# Patient Record
Sex: FEMALE | Race: BLACK OR AFRICAN AMERICAN | HISPANIC OR LATINO
[De-identification: names, ages, dates, MRNs, and addresses within clinical notes are randomized per-mention and may not be internally consistent; named-entity substitution may affect disease eponyms.]

---

## 2021-04-20 ENCOUNTER — APPOINTMENT (OUTPATIENT)
Dept: SURGICAL ONCOLOGY | Facility: CLINIC | Age: 56
End: 2021-04-20
Payer: MEDICAID

## 2021-04-20 ENCOUNTER — RESULT REVIEW (OUTPATIENT)
Age: 56
End: 2021-04-20

## 2021-04-20 PROCEDURE — 99072 ADDL SUPL MATRL&STAF TM PHE: CPT

## 2021-04-20 PROCEDURE — 19083 BX BREAST 1ST LESION US IMAG: CPT | Mod: LT

## 2021-04-20 PROCEDURE — 99204 OFFICE O/P NEW MOD 45 MIN: CPT | Mod: 25

## 2021-04-28 NOTE — REASON FOR VISIT
[Initial Consultation] : an initial consultation for [Follow-Up Visit] : a follow-up visit for [Breast Cancer] : breast cancer

## 2021-04-29 ENCOUNTER — NON-APPOINTMENT (OUTPATIENT)
Age: 56
End: 2021-04-29

## 2021-04-29 ENCOUNTER — APPOINTMENT (OUTPATIENT)
Dept: HEMATOLOGY ONCOLOGY | Facility: CLINIC | Age: 56
End: 2021-04-29
Payer: MEDICAID

## 2021-04-29 ENCOUNTER — APPOINTMENT (OUTPATIENT)
Dept: MULTI SPECIALTY CLINIC | Facility: CLINIC | Age: 56
End: 2021-04-29
Payer: MEDICAID

## 2021-04-29 ENCOUNTER — OUTPATIENT (OUTPATIENT)
Dept: OUTPATIENT SERVICES | Facility: HOSPITAL | Age: 56
LOS: 1 days | Discharge: HOME | End: 2021-04-29

## 2021-04-29 VITALS
HEIGHT: 66 IN | BODY MASS INDEX: 20.89 KG/M2 | OXYGEN SATURATION: 99 % | WEIGHT: 130 LBS | SYSTOLIC BLOOD PRESSURE: 160 MMHG | RESPIRATION RATE: 18 BRPM | DIASTOLIC BLOOD PRESSURE: 85 MMHG | HEART RATE: 97 BPM

## 2021-04-29 DIAGNOSIS — C96.9 MALIGNANT NEOPLASM OF LYMPHOID, HEMATOPOIETIC AND RELATED TISSUE, UNSPECIFIED: ICD-10-CM

## 2021-04-29 DIAGNOSIS — Z78.9 OTHER SPECIFIED HEALTH STATUS: ICD-10-CM

## 2021-04-29 PROCEDURE — 99214 OFFICE O/P EST MOD 30 MIN: CPT

## 2021-04-29 PROCEDURE — 99072 ADDL SUPL MATRL&STAF TM PHE: CPT

## 2021-04-29 PROCEDURE — 99203 OFFICE O/P NEW LOW 30 MIN: CPT | Mod: 25

## 2021-04-29 PROCEDURE — 99205 OFFICE O/P NEW HI 60 MIN: CPT

## 2021-04-29 NOTE — ASSESSMENT
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 \par \par PLAN: \par PET Skull to Thigh \par Medical oncology evaluation for hormonal therapy\par Rad oncology evaluation for radiation or possible surgery for the lumbar spine\par RTO 3 months

## 2021-04-29 NOTE — ASSESSMENT
[FreeTextEntry1] : IMP:\par \par 57 y/o with newly diagnosed invasive moderately differentiated ductal carcinoma, ER+/OH+/HER-2 negative with metastases to the axilla and lumbar spine. \par \par PLAN: \par PET Skull to Thigh \par Medical oncology evaluation for hormonal therapy\par Rad oncology evaluation for radiation or possible surgery for the lumbar spine\par RTO 3 months

## 2021-04-29 NOTE — PHYSICAL EXAM
[Normal] : supple, no neck mass and thyroid not enlarged [Normal Neck Lymph Nodes] : normal neck lymph nodes  [Normal Supraclavicular Lymph Nodes] : normal supraclavicular lymph nodes [Normal] : oriented to person, place and time, with appropriate affect [de-identified] : 5 cm mass medial left breast and left axillary adenopathy ( not fixed ) [de-identified] : mobile left axillary adenopathy

## 2021-04-29 NOTE — HISTORY OF PRESENT ILLNESS
[de-identified] : Ms. ROGELIO PEARSON is a 56 year old female who presents today for follow up for newly diagnoses metastatic left breast cancer.\par \par Bilateral mammo/sono 4/7/21: left breast mass at the site a palpable abnormality, enlarged left axillary lymph node, right breast unremarkable. BI-RADS 4. \par \par Ultrasound Guided core biopsy of left breast x 2 sites 4/20/21:\par 1. Breast, left, 9-11 o?clock, core biopsy: Invasive moderately differentiated ductal carcinoma; Geovanny score 6 / 9 (3 + 2 +1) in this limited material; Invasive tumor measures at least 1.8cm; Ductal carcinoma in situ not seen; Microcalcifications present; Lymphovascular permeation by tumor not seen. --ER+/[place]+/HER-2 equivocal ([facility] study in progress)\par \par \par 2. Left axila, core biopsy: metastatic ductal carcinoma to lymph node.\par \par -ER+/[place]+/HER-2 equivocal ([facility] study in progress)\par \par MRI Lumbar Spine 4/22/21: Metastatic disease to the lower thoracic spine, lumbar spine and upper sacrum with severe pathologic fracture of L2 with retropulsion of the posterior endplate causing moderate canal stenosis. No discogenic disease. Please correlate with any history of known malignancy. The patient is awaiting results of her left breast biopsy studies findings may represent metastatic breast cancer.\par \par She states she has noticed a palpable left breast mass for the past few months. She states she has had lower back pain  that radiates down her leg for approximately 3 months.  She has difficulty walking and bending over. \par \par She states she has not had a routine physical in few years and is unaware of any significant medical history. She denies a personal history of cancer, her family history of cancer includes her mother with cervical cancer. She is scheduled to have a physical on 5/5/21 and [facility]

## 2021-04-29 NOTE — PHYSICAL EXAM
[Normal] : supple, no neck mass and thyroid not enlarged [Normal Neck Lymph Nodes] : normal neck lymph nodes  [Normal Supraclavicular Lymph Nodes] : normal supraclavicular lymph nodes [Normal] : oriented to person, place and time, with appropriate affect [de-identified] : 5 cm mass medial left breast and left axillary adenopathy ( not fixed ) [de-identified] : mobile left axillary adenopathy

## 2021-04-29 NOTE — HISTORY OF PRESENT ILLNESS
[de-identified] : Ms. ROGELIO PEARSON is a 56 year old female who presents today for follow up for newly diagnoses metastatic left breast cancer.\par \par Bilateral mammo/sono 4/7/21: left breast mass at the site a palpable abnormality, enlarged left axillary lymph node, right breast unremarkable. BI-RADS 4. \par \par Ultrasound Guided core biopsy of left breast x 2 sites 4/20/21:\par 1. Breast, left, 9-11 o?clock, core biopsy: Invasive moderately differentiated ductal carcinoma; Geovanny score 6 / 9 (3 + 2 +1) in this limited material; Invasive tumor measures at least 1.8cm; Ductal carcinoma in situ not seen; Microcalcifications present; Lymphovascular permeation by tumor not seen. --ER+/[place]+/HER-2 equivocal ([facility] study in progress)\par \par \par 2. Left axila, core biopsy: metastatic ductal carcinoma to lymph node.\par \par -ER+/[place]+/HER-2 equivocal ([facility] study in progress)\par \par MRI Lumbar Spine 4/22/21: Metastatic disease to the lower thoracic spine, lumbar spine and upper sacrum with severe pathologic fracture of L2 with retropulsion of the posterior endplate causing moderate canal stenosis. No discogenic disease. Please correlate with any history of known malignancy. The patient is awaiting results of her left breast biopsy studies findings may represent metastatic breast cancer.\par \par She states she has noticed a palpable left breast mass for the past few months. She states she has had lower back pain  that radiates down her leg for approximately 3 months.  She has difficulty walking and bending over. \par \par She states she has not had a routine physical in few years and is unaware of any significant medical history. She denies a personal history of cancer, her family history of cancer includes her mother with cervical cancer. She is scheduled to have a physical on 5/5/21 and see a gynecologist on 5/2/21 at [facility].

## 2021-05-02 PROBLEM — Z78.9 DOES NOT USE TOBACCO: Status: ACTIVE | Noted: 2021-05-02

## 2021-05-02 PROBLEM — Z78.9 DENIES ALCOHOL CONSUMPTION: Status: ACTIVE | Noted: 2021-05-02

## 2021-05-02 RX ORDER — ACETAMINOPHEN 325 MG/1
325 TABLET ORAL
Qty: 56 | Refills: 0 | Status: DISCONTINUED | COMMUNITY
Start: 2021-04-17

## 2021-05-02 NOTE — HISTORY OF PRESENT ILLNESS
[de-identified] : ROGELIO PEARSON  is a 56 year old female here for initial consultation for left breast cancer.\par \par Pt noticed a left breast mass x 2 months for which she did not seek medical attention, denied any pain, ulceration, bleeding or nipple changes/discharge.  She also c/o lower back pain for 2-3 months, associated with pain radiating down the lower extremities relieved with Tylenol prn.  Also reports intermittent upper back pain.   No fevers, night sweats, weight loss.  Reports fatigue and poor appetite.  She went to an urgent care in [place] for back pain and breast mass and was given a referral for mammo/US.  \par \par Mammogram and breast US on 4/7/2021 showed  5.6x3.2x5.3cm hypoechoic irregularly-shaped mass with angular margins 9-11:00 5 to 9cm from nipple; left axilla - enlarged 4.0x2.1x3.4cm lymph node, US biopsy rec, BIRADS4.\par \par On 4/20/2021, pt underwent an US guided left breast core biopsy which showed invasive moderately differentiated ductal carcinoma, Sacul score 6/9 (3+2+10), invasive tumor at least 1.8cm, no DCIS, no LVI, microcalcifications present, ER 90%, MO 2%, HER2 2+, CISH negative.  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  \par \par For the back pain, pt underwent an MRI lumbar spine on 4/22/2021 which showed metastatic disease in lower thoracic spine, lumbar spine and upper sacrum with severe pathologic fracture of L2 with retropulsion of the posterior endplate causing moderate canal stenosis. \par \par Risk factors:  No prior disease or biopsies. Menarche: age 12, Postmenopausal age 51; 2 pregnancies, 1 miscarriage, 1 living child daughter 19, 2 years of breastfeeding. No OCP or HRT.  Family history is significant for mother had cervical cancer age 64 and father with prostate cancer age 67.  No cancer of the breast, ovary, endometrium, and pancreatic cancer.  The patient is of [place] ethnic background.  No ETOH or tobacco.\par Screening: No prior colonoscopy, pap smear 2-3 years ago reportedly normal.\par Previous certified nursing assistant.

## 2021-05-02 NOTE — ASSESSMENT
[FreeTextEntry1] : ROGELIO PEARSON  is a 56 year old female here for initial consultation for left breast cancer, ER 90%, OK 2%, HER2 negative, likely with metastatic disease to the spine. \par \par -rec staging scans to assess the extent of disease; PET scan ordered by Dr. Rodríguez\par -after PET, will need biopsy of metastatic site to confirm disease\par -pt to get blood work including tumor markers\par -pt will see radiation oncology vs spine for pathologic fracture\par -back pain controlled currently with Tylenol prn\par -treatment will likely include CDK 4/6 inhibitors + AI\par -bisphosphates to prevent skeletal related events\par -patient was given the time to ask questions which I answered to the best of my ability and to her apparent satisfaction.  I encouraged her to call me with any additional questions. \par \par FU after results

## 2021-05-02 NOTE — PHYSICAL EXAM
[Restricted in physically strenuous activity but ambulatory and able to carry out work of a light or sedentary nature] : Status 1- Restricted in physically strenuous activity but ambulatory and able to carry out work of a light or sedentary nature, e.g., light house work, office work [Normal] : affect appropriate [de-identified] : left 9:00-10:00 breast mass 5x5cm reaching skin, no ulceration, left axillary LN enlarged; right breast no masses

## 2021-05-05 ENCOUNTER — NON-APPOINTMENT (OUTPATIENT)
Age: 56
End: 2021-05-05

## 2021-05-06 ENCOUNTER — APPOINTMENT (OUTPATIENT)
Dept: NUCLEAR MEDICINE | Facility: IMAGING CENTER | Age: 56
End: 2021-05-06
Payer: MEDICAID

## 2021-05-06 ENCOUNTER — OUTPATIENT (OUTPATIENT)
Dept: OUTPATIENT SERVICES | Facility: HOSPITAL | Age: 56
LOS: 1 days | End: 2021-05-06
Payer: MEDICAID

## 2021-05-06 ENCOUNTER — NON-APPOINTMENT (OUTPATIENT)
Age: 56
End: 2021-05-06

## 2021-05-06 DIAGNOSIS — C79.9 SECONDARY MALIGNANT NEOPLASM OF UNSPECIFIED SITE: ICD-10-CM

## 2021-05-06 DIAGNOSIS — C50.919 MALIGNANT NEOPLASM OF UNSPECIFIED SITE OF UNSPECIFIED FEMALE BREAST: ICD-10-CM

## 2021-05-06 DIAGNOSIS — M84.48XA PATHOLOGICAL FRACTURE, OTHER SITE, INITIAL ENCOUNTER FOR FRACTURE: ICD-10-CM

## 2021-05-06 PROCEDURE — A9552: CPT

## 2021-05-06 PROCEDURE — 78815 PET IMAGE W/CT SKULL-THIGH: CPT | Mod: 26,PI

## 2021-05-06 PROCEDURE — 78815 PET IMAGE W/CT SKULL-THIGH: CPT

## 2021-05-12 ENCOUNTER — NON-APPOINTMENT (OUTPATIENT)
Age: 56
End: 2021-05-12

## 2021-05-13 NOTE — ADDENDUM
[FreeTextEntry1] : 5/3/21 \par contacted Dr. Agrawal who advised me to give MsByron Rudy Lopez his phone number to call for an appointment. I called the patient and gave her the phone number and said that she would follow through. \par will continue to follow and monitor. \par LL

## 2021-05-13 NOTE — VITALS
[Least Pain Intensity: 4/10] : 4/10 [70: Cares for self; unalbe to carry on normal activity or do active work.] : 70: Cares for self; unable to carry on normal activity or do active work. [Maximal Pain Intensity: 8/10] : 8/10 [OTC] : OTC [NSAID/Non-Opioid] : NSAID/Non-Opioid

## 2021-05-13 NOTE — PHYSICAL EXAM
[Sclera] : the sclera and conjunctiva were normal [] : no respiratory distress [Heart Rate And Rhythm] : heart rate and rhythm were normal [Abdomen Soft] : soft [Nondistended] : nondistended [Normal] : oriented to person, place and time, the affect was normal, the mood was normal and not anxious [de-identified] : antalgic gait, no motor deficits, no sensory deficits

## 2021-05-13 NOTE — HISTORY OF PRESENT ILLNESS
[FreeTextEntry1] : Ms. Avelar is a 56 year-old woman with metastatic breast cancer. She presents for further recommendations. \par \par Her history began a few months ago, when she noted lower back pain and a palpable left breast mass. The lower back pain became progressively more severe, radiating to the hips, initially the right hip and now more towards the left hip. The pain radiates around the hips and towards the upper thigh. The lower back pain progressed in severity to be rated a 10 on a scale of 1-10. The pain is exacerbated by movement, including walking and bending at the hips. She does not report numbness of the lower extremities. She is taking Tylenol and Aleve with inadequate pain relief but she does not wish to take stronger medications at this time. \par \par Her work up included a diagnostic mammogram on 4/7/21 showing a focal asymmetry in the left breast measuring at least 4.3 cm at 11-12:00, 6 cm FN. Ultrasound showed a 5.6 x 3.2 x 5.3 cm hypoechoic irregular mass at 9-11:00, 5-9cm FN as well as a 4.0 x 2.1 x 3.4 cm left axillary lymph node. Left breast and axilla core biopsies on 4/20/21 showed invasive moderately differentiated ductal carcinoma, measuring at least 1.8 cm, without DCIS or LVI. Metastatic ductal carcinoma was seen in the lymph node. The tumor was ER 90%, AL 2% and HER 2 equivocal 2+, nonamplified by [facility]. \par \par MRI of the lumbar spine showed metastatic disease throughout the spine with a severe pathological fracture at L2 with retropulsion of posterior endplate causing moderate canal stenosis.

## 2021-05-18 ENCOUNTER — RESULT REVIEW (OUTPATIENT)
Age: 56
End: 2021-05-18

## 2021-05-18 ENCOUNTER — APPOINTMENT (OUTPATIENT)
Dept: HEMATOLOGY ONCOLOGY | Facility: CLINIC | Age: 56
End: 2021-05-18
Payer: MEDICAID

## 2021-05-18 ENCOUNTER — APPOINTMENT (OUTPATIENT)
Dept: SPINE | Facility: CLINIC | Age: 56
End: 2021-05-18
Payer: MEDICAID

## 2021-05-18 VITALS
TEMPERATURE: 98.1 F | SYSTOLIC BLOOD PRESSURE: 163 MMHG | DIASTOLIC BLOOD PRESSURE: 105 MMHG | OXYGEN SATURATION: 93 % | WEIGHT: 118 LBS | HEIGHT: 66 IN | HEART RATE: 92 BPM | BODY MASS INDEX: 18.96 KG/M2

## 2021-05-18 VITALS
HEIGHT: 65.98 IN | TEMPERATURE: 97 F | OXYGEN SATURATION: 95 % | SYSTOLIC BLOOD PRESSURE: 157 MMHG | RESPIRATION RATE: 18 BRPM | WEIGHT: 119.05 LBS | BODY MASS INDEX: 19.13 KG/M2 | DIASTOLIC BLOOD PRESSURE: 97 MMHG | HEART RATE: 94 BPM

## 2021-05-18 DIAGNOSIS — Z85.9 PERSONAL HISTORY OF MALIGNANT NEOPLASM, UNSPECIFIED: ICD-10-CM

## 2021-05-18 LAB
BASOPHILS # BLD AUTO: 0.07 K/UL — SIGNIFICANT CHANGE UP (ref 0–0.2)
BASOPHILS NFR BLD AUTO: 0.9 % — SIGNIFICANT CHANGE UP (ref 0–2)
EOSINOPHIL # BLD AUTO: 0.06 K/UL — SIGNIFICANT CHANGE UP (ref 0–0.5)
EOSINOPHIL NFR BLD AUTO: 0.8 % — SIGNIFICANT CHANGE UP (ref 0–6)
HCT VFR BLD CALC: 37.3 % — SIGNIFICANT CHANGE UP (ref 34.5–45)
HGB BLD-MCNC: 11.7 G/DL — SIGNIFICANT CHANGE UP (ref 11.5–15.5)
IMM GRANULOCYTES NFR BLD AUTO: 0.5 % — SIGNIFICANT CHANGE UP (ref 0–1.5)
LYMPHOCYTES # BLD AUTO: 1.77 K/UL — SIGNIFICANT CHANGE UP (ref 1–3.3)
LYMPHOCYTES # BLD AUTO: 23.8 % — SIGNIFICANT CHANGE UP (ref 13–44)
MCHC RBC-ENTMCNC: 26.2 PG — LOW (ref 27–34)
MCHC RBC-ENTMCNC: 31.4 G/DL — LOW (ref 32–36)
MCV RBC AUTO: 83.6 FL — SIGNIFICANT CHANGE UP (ref 80–100)
MONOCYTES # BLD AUTO: 0.74 K/UL — SIGNIFICANT CHANGE UP (ref 0–0.9)
MONOCYTES NFR BLD AUTO: 9.9 % — SIGNIFICANT CHANGE UP (ref 2–14)
NEUTROPHILS # BLD AUTO: 4.77 K/UL — SIGNIFICANT CHANGE UP (ref 1.8–7.4)
NEUTROPHILS NFR BLD AUTO: 64.1 % — SIGNIFICANT CHANGE UP (ref 43–77)
NRBC # BLD: 0 /100 WBCS — SIGNIFICANT CHANGE UP (ref 0–0)
PLATELET # BLD AUTO: 348 K/UL — SIGNIFICANT CHANGE UP (ref 150–400)
RBC # BLD: 4.46 M/UL — SIGNIFICANT CHANGE UP (ref 3.8–5.2)
RBC # FLD: 14.1 % — SIGNIFICANT CHANGE UP (ref 10.3–14.5)
WBC # BLD: 7.45 K/UL — SIGNIFICANT CHANGE UP (ref 3.8–10.5)
WBC # FLD AUTO: 7.45 K/UL — SIGNIFICANT CHANGE UP (ref 3.8–10.5)

## 2021-05-18 PROCEDURE — 99215 OFFICE O/P EST HI 40 MIN: CPT

## 2021-05-18 PROCEDURE — 99072 ADDL SUPL MATRL&STAF TM PHE: CPT

## 2021-05-18 PROCEDURE — 99203 OFFICE O/P NEW LOW 30 MIN: CPT

## 2021-05-19 ENCOUNTER — NON-APPOINTMENT (OUTPATIENT)
Age: 56
End: 2021-05-19

## 2021-05-19 ENCOUNTER — INPATIENT (INPATIENT)
Facility: HOSPITAL | Age: 56
LOS: 22 days | Discharge: NURSING, REHAB OR LONG-TERM CARE | End: 2021-06-11
Attending: HOSPITALIST | Admitting: HOSPITALIST
Payer: MEDICAID

## 2021-05-19 VITALS
SYSTOLIC BLOOD PRESSURE: 178 MMHG | TEMPERATURE: 98 F | HEART RATE: 97 BPM | DIASTOLIC BLOOD PRESSURE: 108 MMHG | OXYGEN SATURATION: 100 % | RESPIRATION RATE: 16 BRPM

## 2021-05-19 DIAGNOSIS — E83.52 HYPERCALCEMIA: ICD-10-CM

## 2021-05-19 PROBLEM — Z85.9 HISTORY OF MALIGNANT NEOPLASM: Status: RESOLVED | Noted: 2021-05-18 | Resolved: 2021-05-19

## 2021-05-19 LAB
25(OH)D3 SERPL-MCNC: 30.5 NG/ML
ALBUMIN SERPL ELPH-MCNC: 4.1 G/DL — SIGNIFICANT CHANGE UP (ref 3.3–5)
ALBUMIN SERPL ELPH-MCNC: 4.5 G/DL
ALP BLD-CCNC: 211 U/L
ALP SERPL-CCNC: 196 U/L — HIGH (ref 40–120)
ALT FLD-CCNC: 82 U/L — HIGH (ref 4–33)
ALT SERPL-CCNC: 100 U/L
ANION GAP SERPL CALC-SCNC: 16 MMOL/L — HIGH (ref 7–14)
ANION GAP SERPL CALC-SCNC: 18 MMOL/L
APTT BLD: 25.8 SEC
AST SERPL-CCNC: 115 U/L
AST SERPL-CCNC: 90 U/L — HIGH (ref 4–32)
BASOPHILS # BLD AUTO: 0.07 K/UL — SIGNIFICANT CHANGE UP (ref 0–0.2)
BASOPHILS NFR BLD AUTO: 0.9 % — SIGNIFICANT CHANGE UP (ref 0–2)
BILIRUB SERPL-MCNC: 0.2 MG/DL
BILIRUB SERPL-MCNC: 0.3 MG/DL — SIGNIFICANT CHANGE UP (ref 0.2–1.2)
BLOOD GAS VENOUS COMPREHENSIVE RESULT: SIGNIFICANT CHANGE UP
BUN SERPL-MCNC: 13 MG/DL
BUN SERPL-MCNC: 15 MG/DL — SIGNIFICANT CHANGE UP (ref 7–23)
CALCIUM SERPL-MCNC: 13.5 MG/DL
CALCIUM SERPL-MCNC: 15 MG/DL — CRITICAL HIGH (ref 8.4–10.5)
CANCER AG27-29 SERPL-ACNC: 84.3 U/ML
CEA SERPL-MCNC: 47.7 NG/ML
CHLORIDE SERPL-SCNC: 95 MMOL/L
CHLORIDE SERPL-SCNC: 95 MMOL/L — LOW (ref 98–107)
CO2 SERPL-SCNC: 28 MMOL/L
CO2 SERPL-SCNC: 29 MMOL/L — SIGNIFICANT CHANGE UP (ref 22–31)
CREAT SERPL-MCNC: 0.73 MG/DL — SIGNIFICANT CHANGE UP (ref 0.5–1.3)
CREAT SERPL-MCNC: 0.81 MG/DL
EOSINOPHIL # BLD AUTO: 0.05 K/UL — SIGNIFICANT CHANGE UP (ref 0–0.5)
EOSINOPHIL NFR BLD AUTO: 0.6 % — SIGNIFICANT CHANGE UP (ref 0–6)
GLUCOSE SERPL-MCNC: 90 MG/DL — SIGNIFICANT CHANGE UP (ref 70–99)
GLUCOSE SERPL-MCNC: 91 MG/DL
HCT VFR BLD CALC: 38.1 % — SIGNIFICANT CHANGE UP (ref 34.5–45)
HGB BLD-MCNC: 11.8 G/DL — SIGNIFICANT CHANGE UP (ref 11.5–15.5)
IANC: 4.91 K/UL — SIGNIFICANT CHANGE UP (ref 1.5–8.5)
IMM GRANULOCYTES NFR BLD AUTO: 0.5 % — SIGNIFICANT CHANGE UP (ref 0–1.5)
INR PPP: 1.04 RATIO
LYMPHOCYTES # BLD AUTO: 1.95 K/UL — SIGNIFICANT CHANGE UP (ref 1–3.3)
LYMPHOCYTES # BLD AUTO: 24.6 % — SIGNIFICANT CHANGE UP (ref 13–44)
MAGNESIUM SERPL-MCNC: 1.6 MG/DL — SIGNIFICANT CHANGE UP (ref 1.6–2.6)
MCHC RBC-ENTMCNC: 25.7 PG — LOW (ref 27–34)
MCHC RBC-ENTMCNC: 31 GM/DL — LOW (ref 32–36)
MCV RBC AUTO: 82.8 FL — SIGNIFICANT CHANGE UP (ref 80–100)
MONOCYTES # BLD AUTO: 0.91 K/UL — HIGH (ref 0–0.9)
MONOCYTES NFR BLD AUTO: 11.5 % — SIGNIFICANT CHANGE UP (ref 2–14)
NEUTROPHILS # BLD AUTO: 4.91 K/UL — SIGNIFICANT CHANGE UP (ref 1.8–7.4)
NEUTROPHILS NFR BLD AUTO: 61.9 % — SIGNIFICANT CHANGE UP (ref 43–77)
NRBC # BLD: 0 /100 WBCS — SIGNIFICANT CHANGE UP
NRBC # FLD: 0 K/UL — SIGNIFICANT CHANGE UP
PHOSPHATE SERPL-MCNC: 2.4 MG/DL — LOW (ref 2.5–4.5)
PLATELET # BLD AUTO: 335 K/UL — SIGNIFICANT CHANGE UP (ref 150–400)
POTASSIUM SERPL-MCNC: 3.2 MMOL/L — LOW (ref 3.5–5.3)
POTASSIUM SERPL-SCNC: 2.6 MMOL/L
POTASSIUM SERPL-SCNC: 3.2 MMOL/L — LOW (ref 3.5–5.3)
PROT SERPL-MCNC: 8.1 G/DL
PROT SERPL-MCNC: 8.4 G/DL — HIGH (ref 6–8.3)
PT BLD: 12.3 SEC
RBC # BLD: 4.6 M/UL — SIGNIFICANT CHANGE UP (ref 3.8–5.2)
RBC # FLD: 13.9 % — SIGNIFICANT CHANGE UP (ref 10.3–14.5)
SARS-COV-2 RNA SPEC QL NAA+PROBE: SIGNIFICANT CHANGE UP
SODIUM SERPL-SCNC: 140 MMOL/L
SODIUM SERPL-SCNC: 140 MMOL/L — SIGNIFICANT CHANGE UP (ref 135–145)
WBC # BLD: 7.93 K/UL — SIGNIFICANT CHANGE UP (ref 3.8–10.5)
WBC # FLD AUTO: 7.93 K/UL — SIGNIFICANT CHANGE UP (ref 3.8–10.5)

## 2021-05-19 PROCEDURE — 99285 EMERGENCY DEPT VISIT HI MDM: CPT

## 2021-05-19 PROCEDURE — 99223 1ST HOSP IP/OBS HIGH 75: CPT

## 2021-05-19 PROCEDURE — 71045 X-RAY EXAM CHEST 1 VIEW: CPT | Mod: 26

## 2021-05-19 RX ORDER — OXYCODONE 5 MG/1
5 TABLET ORAL
Qty: 56 | Refills: 0 | Status: DISCONTINUED | COMMUNITY
Start: 2021-05-18 | End: 2021-05-19

## 2021-05-19 RX ORDER — SODIUM CHLORIDE 9 MG/ML
1000 INJECTION INTRAMUSCULAR; INTRAVENOUS; SUBCUTANEOUS
Refills: 0 | Status: DISCONTINUED | OUTPATIENT
Start: 2021-05-19 | End: 2021-05-19

## 2021-05-19 RX ORDER — ONDANSETRON 8 MG/1
4 TABLET, FILM COATED ORAL ONCE
Refills: 0 | Status: COMPLETED | OUTPATIENT
Start: 2021-05-19 | End: 2021-05-19

## 2021-05-19 RX ORDER — SODIUM CHLORIDE 9 MG/ML
1000 INJECTION, SOLUTION INTRAVENOUS ONCE
Refills: 0 | Status: COMPLETED | OUTPATIENT
Start: 2021-05-19 | End: 2021-05-19

## 2021-05-19 RX ORDER — POTASSIUM CHLORIDE 20 MEQ
40 PACKET (EA) ORAL ONCE
Refills: 0 | Status: COMPLETED | OUTPATIENT
Start: 2021-05-19 | End: 2021-05-19

## 2021-05-19 RX ORDER — ACETAMINOPHEN 500 MG
650 TABLET ORAL EVERY 6 HOURS
Refills: 0 | Status: DISCONTINUED | OUTPATIENT
Start: 2021-05-19 | End: 2021-05-27

## 2021-05-19 RX ORDER — SODIUM CHLORIDE 9 MG/ML
1000 INJECTION INTRAMUSCULAR; INTRAVENOUS; SUBCUTANEOUS
Refills: 0 | Status: DISCONTINUED | OUTPATIENT
Start: 2021-05-19 | End: 2021-05-20

## 2021-05-19 RX ORDER — CALCITONIN SALMON 200 [IU]/ML
220 INJECTION, SOLUTION INTRAMUSCULAR EVERY 12 HOURS
Refills: 0 | Status: COMPLETED | OUTPATIENT
Start: 2021-05-19 | End: 2021-05-20

## 2021-05-19 RX ORDER — OXYCODONE HYDROCHLORIDE 5 MG/1
5 TABLET ORAL ONCE
Refills: 0 | Status: DISCONTINUED | OUTPATIENT
Start: 2021-05-19 | End: 2021-05-19

## 2021-05-19 RX ORDER — OXYCODONE HYDROCHLORIDE 5 MG/1
5 TABLET ORAL EVERY 6 HOURS
Refills: 0 | Status: DISCONTINUED | OUTPATIENT
Start: 2021-05-19 | End: 2021-05-20

## 2021-05-19 RX ORDER — POLYETHYLENE GLYCOL 3350 17 G/17G
17 POWDER, FOR SOLUTION ORAL DAILY
Refills: 0 | Status: DISCONTINUED | OUTPATIENT
Start: 2021-05-19 | End: 2021-06-10

## 2021-05-19 RX ORDER — OXYCODONE HYDROCHLORIDE 5 MG/1
10 TABLET ORAL EVERY 6 HOURS
Refills: 0 | Status: DISCONTINUED | OUTPATIENT
Start: 2021-05-19 | End: 2021-05-20

## 2021-05-19 RX ORDER — ZOLEDRONIC ACID 5 MG/100ML
4 INJECTION, SOLUTION INTRAVENOUS ONCE
Refills: 0 | Status: COMPLETED | OUTPATIENT
Start: 2021-05-19 | End: 2021-05-19

## 2021-05-19 RX ORDER — LETROZOLE TABLETS 2.5 MG/1
2.5 TABLET, FILM COATED ORAL
Qty: 90 | Refills: 1 | Status: DISCONTINUED | COMMUNITY
Start: 2021-05-18 | End: 2021-05-19

## 2021-05-19 RX ORDER — POTASSIUM CHLORIDE 20 MEQ
10 PACKET (EA) ORAL ONCE
Refills: 0 | Status: COMPLETED | OUTPATIENT
Start: 2021-05-19 | End: 2021-05-19

## 2021-05-19 RX ORDER — SENNA PLUS 8.6 MG/1
2 TABLET ORAL AT BEDTIME
Refills: 0 | Status: DISCONTINUED | OUTPATIENT
Start: 2021-05-19 | End: 2021-06-11

## 2021-05-19 RX ADMIN — Medication 100 MILLIEQUIVALENT(S): at 15:18

## 2021-05-19 RX ADMIN — SODIUM CHLORIDE 100 MILLILITER(S): 9 INJECTION INTRAMUSCULAR; INTRAVENOUS; SUBCUTANEOUS at 22:38

## 2021-05-19 RX ADMIN — CALCITONIN SALMON 220 INTERNATIONAL UNIT(S): 200 INJECTION, SOLUTION INTRAMUSCULAR at 22:37

## 2021-05-19 RX ADMIN — SODIUM CHLORIDE 100 MILLILITER(S): 9 INJECTION INTRAMUSCULAR; INTRAVENOUS; SUBCUTANEOUS at 19:02

## 2021-05-19 RX ADMIN — Medication 40 MILLIEQUIVALENT(S): at 15:18

## 2021-05-19 RX ADMIN — SODIUM CHLORIDE 1000 MILLILITER(S): 9 INJECTION, SOLUTION INTRAVENOUS at 17:24

## 2021-05-19 RX ADMIN — SENNA PLUS 2 TABLET(S): 8.6 TABLET ORAL at 22:38

## 2021-05-19 RX ADMIN — OXYCODONE HYDROCHLORIDE 5 MILLIGRAM(S): 5 TABLET ORAL at 13:44

## 2021-05-19 RX ADMIN — ONDANSETRON 4 MILLIGRAM(S): 8 TABLET, FILM COATED ORAL at 23:53

## 2021-05-19 RX ADMIN — OXYCODONE HYDROCHLORIDE 5 MILLIGRAM(S): 5 TABLET ORAL at 17:35

## 2021-05-19 RX ADMIN — ZOLEDRONIC ACID 400 MILLIGRAM(S): 5 INJECTION, SOLUTION INTRAVENOUS at 19:58

## 2021-05-19 RX ADMIN — SODIUM CHLORIDE 1000 MILLILITER(S): 9 INJECTION, SOLUTION INTRAVENOUS at 13:43

## 2021-05-19 NOTE — PATIENT PROFILE ADULT - NSPROPTRIGHTBILLOFRIGHTS_GEN_A_NUR
PT HAS AB APT NEXT SHE HAS TO DEPEND ON GETTING A RIDE WITH HER CHILDREN AND THEY ARE ALL WORKING--NEEDS TO RESCHEDULE BUT WILL BE OUT OF HER LORAZEPAM--CAN SHE HAVE A  REFILL ??   patient

## 2021-05-19 NOTE — H&P ADULT - PROBLEM SELECTOR PLAN 4
MRI lumbar spine showing metastatic disease in the lower thoracic, lumbar, and upper sacral spine with severe pathologic fracture of L2 with retropulsion of the posterior endplate causing moderate canal stenosis. No alarm S/S.  - Pain control with Tylenol PRN for mild pain, oxycodone 5 mg q6hrs PRN for moderate pain, and oxycodone 10 mg q6hrs PRN for severe pain  - Rad-Onc consult to be called in AM for possible palliative RT to spine  - Pt will need to be fitted for TLSO brace   - Pt seen by neurosurgery as outpatient for severe pathologic fracture of L2 and deemed not a surgical candidate

## 2021-05-19 NOTE — H&P ADULT - PROBLEM SELECTOR PLAN 5
AST 90 and ALT 82 on admission. T bili wnl at 0.3, alk phos mildly elevated to 196 from mets to spine.  - AST and ALT elevated, but specimen moderately hemolyzed  - Monitor LFTs. If transaminitis worsens, will obtain viral hepatitis panel, abdominal imaging, and/or Hepatology consult.

## 2021-05-19 NOTE — REVIEW OF SYSTEMS
[Feeling Poorly] : feeling poorly [Poor Coordination] : poor coordination [Numbness] : numbness [Tingling] : tingling [Abnormal Sensation] : an abnormal sensation [Difficulty Walking] : difficulty walking [Arthralgias] : arthralgias [Negative] : Heme/Lymph

## 2021-05-19 NOTE — H&P ADULT - NSHPLABSRESULTS_GEN_ALL_CORE
EKG personally reviewed. EKG personally reviewed.  Sinus tach at 101 bpm, no acute ischemic changes, QTc 414 ms.    Imaging personally reviewed.  MRI lumbar spine w/wo contrast (4/22/21):  Metastatic disease in the lower thoracic, lumbar, and upper sacral spine with severe pathologic fracture of L2 with retropulsion of the posterior endplate causing moderate canal stenosis.

## 2021-05-19 NOTE — ED ADULT TRIAGE NOTE - CHIEF COMPLAINT QUOTE
Patient had lab work yesterday , called today to go to the ED for high calcium, low K . Newly diagnosed with left breast ca. H/o lower back pain x months , states she has a fracture.

## 2021-05-19 NOTE — ED PROVIDER NOTE - OBJECTIVE STATEMENT
56 year old female with history of metastatic L breast cancer on hormone therapy and pathological L2 compression fx presenting for abnormal labs, sent by Dr. Dowd/Julio. States that she had bloodwork at [facility] yesterday and told to present to ED for elevated calcium/low potassium. Reports having low back pain for a few months recent work up resulting in discovery of breast CA, currently seeing multiple providers for tx options including sx for L2 fx with Dr. Agrawal and Dr. Rodríguez ?rad onc. Endorses fatigue, difficulty ambulating due to pain in R hip/leg, and constipation. Denies fever/chills, saddle anethesia, urinary/fecal incontinence, focal weakness, chest pain, SOB, N/V, bleeding. 56 year old female with history of metastatic L breast cancer on hormone therapy and pathological L2 compression fx presenting for abnormal labs, sent by Dr. Dowd/Julio. States that she had bloodwork at [facility] yesterday and told to present to ED for elevated calcium/low potassium. Reports having low back pain for a few months recent work up resulting in discovery of breast CA, currently seeing multiple providers for tx options including sx for L2 fx with Dr. Agrawal and Dr. Rodríguez ?rad onc. Endorses fatigue, difficulty ambulating due to pain in R hip/leg, and constipation. Denies fever/chills, saddle anethesia, urinary/fecal incontinence, focal weakness, chest pain, SOB, N/V, bleeding.  Attending - Agree with above.  I evaluated patient myself. 55 y/o F with recent diagnosis of metastatic Left breast CA and L2 compression fx.  Labs sent at [facility] yesterday.  Patient called today and advised to come to ED for hypercalcemia and hypokalemia.  patient denies CP/SOB.  No n/v/d.  Still having LBP.  Generalized weakness/tiredness, but denies difficulty walking or any focal weakness.  No tremor or seizure activity per patient.

## 2021-05-19 NOTE — CONSULT NOTE ADULT - ASSESSMENT
56f with metastatic breast ca, Hormone receptor positive, not started on treatment as of yet, referred to the ED from [facility] with hypercalcemia, hypokalemia and pain.    Admit to medicine  Hypercalcemia management, will make further recs when CMP comes back  Metastatic site bone biopsy of posterior iliac bone to confirm metastases by IR  Further eval of elevated liver enzymes, PET scan did not show any liver disease, hepatology consult  Rad onc consult for palliative RT to spine for pain and TLSO brace.  Neurosurgery saw her outpt and did not rec surgery.   56f with metastatic breast ca, Hormone receptor positive, not started on treatment as of yet, referred to the ED from [facility] with hypercalcemia, hypokalemia and pain.    Admit to medicine  Hypercalcemia management, continuous IVF at 75-100cc/h, Calcitonin  Zometa 4mg x 1  Monitor Ca level   Metastatic site bone biopsy of posterior iliac bone to confirm metastases by IR  Further eval of elevated liver enzymes, PET scan did not show any liver disease, hepatology consult  Rad onc consult for palliative RT to spine for pain and TLSO brace.  Neurosurgery has seen pt as outpt and did not rec surgery  Supportive care, pain control, Nutrition, PT, DVT ppx  Outpatient oncology f/u    Will follow. Please do not hesitate to call back with questions.     Abril Ramirez MD  Medical Oncology Attending  C: [phone]

## 2021-05-19 NOTE — H&P ADULT - NSHPSOCIALHISTORY_GEN_ALL_CORE
Tobacco: denies  EtOH: denies  Illicit drugs: denies  Lives with Denies any tobacco, alcohol, or illicit drug use.  Lives with Denies any tobacco, alcohol, or illicit drug use.  Lives with daughter.

## 2021-05-19 NOTE — H&P ADULT - PROBLEM SELECTOR PLAN 7
- DVT ppx: Will hold pharmacologic ppx for now given possible bone biopsy by IR. Otherwise, start Lovenox 40 mg daily.  - Diet: Regular, NPO after MN

## 2021-05-19 NOTE — H&P ADULT - NSICDXPASTMEDICALHX_GEN_ALL_CORE_FT
PAST MEDICAL HISTORY:  Breast cancer      PAST MEDICAL HISTORY:  Breast cancer     Lumbar compression fracture

## 2021-05-19 NOTE — H&P ADULT - PROBLEM SELECTOR PLAN 2
Serum K 3.2 on admission, moderately hemolyzed. Possibly in setting of hypercalcemia.  - S/p PO potassium 40 mEq x1 and IV potassium 10 mEq x1 in ED  - Serum Mg 1.6. Will give PO magnesium 400 mg tid x1 day.  - Monitor serum K and Mg  - EKG without any acute changes. Monitor on tele.

## 2021-05-19 NOTE — H&P ADULT - PROBLEM SELECTOR PLAN 6
- DVT ppx: Will hold pharmacologic ppx for now given possible bone biopsy by IR. Otherwise, start Lovenox 40 mg daily.  - Diet: Regular, NPO after MN Pt tachycardic to 110s overnight. Per outpatient records, pt with documented heart rates in 90s. EKG showing sinus tachycardia.   - Check TSH  - F/u TTE  - Low suspicion for PE at this time given that pt is not tachypneic and is maintaining O2 sats in high 90s-100% on RA  - Monitor on tele

## 2021-05-19 NOTE — H&P ADULT - NSICDXFAMILYHX_GEN_ALL_CORE_FT
FAMILY HISTORY:  Father  Still living? Unknown  Family history of prostate cancer, Age at diagnosis: Age Unknown    Mother  Still living? Unknown  Family history of cervical cancer, Age at diagnosis: Age Unknown

## 2021-05-19 NOTE — ED PROVIDER NOTE - PHYSICAL EXAMINATION
ATTENDING PHYSICAL EXAM  GEN - NAD; well appearing; A+O x3  HEAD - NC/AT; EYES/NOSE - PERRL, EOMI, mucous membranes moist, no discharge; THROAT: Oral cavity and pharynx normal. No inflammation, swelling, exudate, or lesions  NECK: Neck supple, non-tender without lymphadenopathy, no masses, no JVD  PULMONARY - CTA b/l, symmetric breath sounds, no w/r/r  CARDIAC -Noted mass medial aspect of left breast, s1s2, RRR, no M,R,G  ABDOMEN - +NABS, ND, NT, soft, no guarding, no rebound, no masses   BACK - + mid-lumbar TTP  EXTREMITIES - symmetric pulses, 2+ dp, capillary refill < 2 seconds, no clubbing, no cyanosis, no edema  SKIN - no rash or bruising   NEUROLOGIC - alert, CN 2-12 intact, no focal deficits.

## 2021-05-19 NOTE — CONSULT NOTE ADULT - SUBJECTIVE AND OBJECTIVE BOX
Patient is a 56y old  Female who presents with a chief complaint of     HPI:  56f, recently diagnosed with metastatic ER/OR+, HER2- breast cancer, with mets to bone, lung, LN referred to [facility] ED from [facility] with electrolyte abnormalities and pain.     ROS: as above     PAST MEDICAL & SURGICAL HISTORY:  Breast CA        MEDICATIONS  (STANDING):  lactated ringers Bolus 1000 milliLiter(s) IV Bolus once    MEDICATIONS  (PRN):      Allergies    Allergy Status Unknown    Intolerances        Vital Signs Last 24 Hrs  T(C): 36.8 (19 May 2021 11:22), Max: 36.8 (19 May 2021 11:22)  T(F): 98.2 (19 May 2021 11:22), Max: 98.2 (19 May 2021 11:22)  HR: 89 (19 May 2021 13:32) (89 - 97)  BP: 197/96 (19 May 2021 13:32) (178/108 - 197/96)  BP(mean): --  RR: 15 (19 May 2021 13:32) (15 - 16)  SpO2: 100% (19 May 2021 13:32) (100% - 100%)  LABS:                          11.8   7.93  )-----------( 335      ( 19 May 2021 14:05 )             38.1         Mean Cell Volume : 82.8 fL  Mean Cell Hemoglobin : 25.7 pg  Mean Cell Hemoglobin Concentration : 31.0 gm/dL  Auto Neutrophil # : 4.91 K/uL  Auto Lymphocyte # : 1.95 K/uL  Auto Monocyte # : 0.91 K/uL  Auto Eosinophil # : 0.05 K/uL  Auto Basophil # : 0.07 K/uL  Auto Neutrophil % : 61.9 %  Auto Lymphocyte % : 24.6 %  Auto Monocyte % : 11.5 %  Auto Eosinophil % : 0.6 %  Auto Basophil % : 0.9 %      Serial CBC's  05-19 @ 14:05  Hct-38.1 / Hgb-11.8 / Plat-335 / RBC-4.60 / WBC-7.93  Serial CBC's  05-18 @ 13:47  Hct-37.3 / Hgb-11.7 / Plat-348 / RBC-4.46 / WBC-7.45      05-19    140  |  95<L>  |  15  ----------------------------<  90  3.2<L>   |  29  |  0.73    Ca    15.0<HH>      19 May 2021 14:05  Phos  2.4     05-19  Mg     1.6     05-19    TPro  8.4<H>  /  Alb  4.1  /  TBili  0.3  /  DBili  x   /  AST  90<H>  /  ALT  82<H>  /  AlkPhos  196<H>  05-19                RADIOLOGY & ADDITIONAL STUDIES:    recent PET CT reviewed

## 2021-05-19 NOTE — H&P ADULT - PROBLEM SELECTOR PLAN 3
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  - Oncology consult obtained. Appreciate recs.  - C/w Letrozole 2.5 mg daily  - PET/CT scan on 5/6/21 showing lesion in the posterior left iliac bone. IR consult to be obtained in AM for possible bone biopsy to confirm metastases  - Lactate elevated to 2.4 on admission. Likely from metastatic breast cancer. Repeat lactate in AM.

## 2021-05-19 NOTE — ED ADULT NURSE NOTE - OBJECTIVE STATEMENT
pt received spot 25. pt A+Ox3 sent for abnormal lab results. states she was recently diagnosed with left breast CA, started hormonal therapy yesterday. c/o back pain. respirations even and unlabored. denies cp/sob. labs sent. IVSl in place. will monitor.

## 2021-05-19 NOTE — ED PROVIDER NOTE - CLINICAL SUMMARY MEDICAL DECISION MAKING FREE TEXT BOX
56F with new dx met breast CA found to be hypercalcemic/hypokalemic on outside BW, here with chronic low BP/constipation/fatigue. Appears well overall, mentating fully, VS wnl stable. Exam benign with some focal tenderness along lumbar midline, strength/sensation intact equally throughout, abdomen soft NT, lungs clear. Will likely need copious IVF to treat hypercalcemia and replenishment of K, tba for pain control and further work up per heme/onc rec

## 2021-05-19 NOTE — H&P ADULT - NSHPPHYSICALEXAM_GEN_ALL_CORE
Vital Signs Last 24 Hrs  T(C): 36.8 (19 May 2021 11:22), Max: 36.8 (19 May 2021 11:22)  T(F): 98.2 (19 May 2021 11:22), Max: 98.2 (19 May 2021 11:22)  HR: 85 (19 May 2021 19:01) (85 - 97)  BP: 187/95 (19 May 2021 19:01) (178/108 - 197/96)  BP(mean): --  RR: 17 (19 May 2021 19:01) (15 - 18)  SpO2: 98% (19 May 2021 19:01) (97% - 100%)    PHYSICAL EXAM:  General: Awake and alert.  No acute distress.  Head: Normocephalic, atraumatic.    Eyes: PERRL.  EOMI.  No scleral icterus.  No conjunctival pallor.  Mouth: Moist MM.  No oropharyngeal exudates.    Neck: Supple.  Full range of motion.  No JVD.  No LAD.  No thyromegaly.  Trachea midline.    Heart: RRR.  Normal S1 and S2.  No murmurs, rubs, or gallops.  No LE edema b/l.   Lungs: Nonlabored breathing.  Good inspiratory effort.  CTAB.  No wheezes, crackles, or rhonchi.    Abdomen: BS+, soft, NT/ND.  No hepatomegaly.   Skin: Warm and dry.  No rashes.  Extremities: No cyanosis.  2+ peripheral pulses b/l.  Musculoskeletal: No joint deformities.  No spinal or paraspinal tenderness.  Neuro: A&Ox3.  CN II-XII intact.  5/5 motor strength in UE and LE b/l.  Tactile sensation intact in UE and LE b/l.  Cerebellar function intact as assessed by finger-to-nose test. Vital Signs Last 24 Hrs  T(C): 36.8 (19 May 2021 11:22), Max: 36.8 (19 May 2021 11:22)  T(F): 98.2 (19 May 2021 11:22), Max: 98.2 (19 May 2021 11:22)  HR: 85 (19 May 2021 19:01) (85 - 97)  BP: 187/95 (19 May 2021 19:01) (178/108 - 197/96)  BP(mean): --  RR: 17 (19 May 2021 19:01) (15 - 18)  SpO2: 98% (19 May 2021 19:01) (97% - 100%)    PHYSICAL EXAM:  General: Awake and alert.  No acute distress.  Head: Normocephalic, atraumatic.    Eyes: PERRL.  EOMI.  No scleral icterus.  No conjunctival pallor.  Mouth: Moist MM.  No oropharyngeal exudates.    Neck: Supple.  Full range of motion.  No JVD.  No LAD.  Trachea midline.    Heart: Tachycardic but regular rhythm.  Normal S1 and S2.  No murmurs, rubs, or gallops.  No LE edema b/l.   Lungs: Nonlabored breathing.  Good inspiratory effort.  CTAB.  No wheezes, crackles, or rhonchi.    Abdomen: BS+, soft, nontender, mildly distended, no hepatomegaly.  Skin: 5x5 cm L breast mass located just left of sternum, no ulceration or TTP.  Warm and dry.  No rashes.  Extremities: No cyanosis.  2+ peripheral pulses b/l.  Musculoskeletal: No joint deformities.  Mild TTP in lumbar spine, no paraspinal tenderness.    Neuro: A&Ox3.  CN II-XII intact.  5/5 motor strength in UE and LE b/l.  Tactile sensation intact in UE and LE b/l.  No focal deficits.

## 2021-05-19 NOTE — ED PROVIDER NOTE - CARE PLAN
Principal Discharge DX:	Hypercalcemia  Secondary Diagnosis:	Hypokalemia  Secondary Diagnosis:	Breast cancer

## 2021-05-19 NOTE — H&P ADULT - HISTORY OF PRESENT ILLNESS
57 yo woman with history of recent diagnosis of L breast cancer (invasive moderately differentiated ductal carcinoma) with metastatic disease in lower thoracic, lumbar, upper sacral spine with severe pathologic fracture of L2 with retropulsion of the posterior endplate causing moderate canal stenosis.  55 yo woman with history of recent diagnosis of L breast cancer (invasive moderately differentiated ductal carcinoma) with metastatic disease in lower thoracic, lumbar, upper sacral spine with severe pathologic fracture of L2 with retropulsion of the posterior endplate causing moderate canal stenosis presents with hypercalcemia (13.5) and hypokalemia (2.6) on outpatient labs that were drawn on Tuesday. Pt states that she was diagnosed with L breast cancer in April after core biopsy of a L breast mass revealed invasive moderately differentiated ductal carcinoma  57 yo woman with history of recent diagnosis of L breast cancer with metastatic disease to spine with severe pathologic fracture of L2 with moderate canal stenosis presents with hypercalcemia (13.5) and hypokalemia (2.6) on outpatient labs that were drawn on Tuesday. Pt was diagnosed with L breast cancer in April after core biopsy of a L breast mass revealed invasive moderately differentiated ductal carcinoma and was started on Letrozole on Tuesday. Soon after her breast cancer diagnosis, pt had an MRI lumbar spine for persistent lower back pain that showed metastatic disease in the lower thoracic, lumbar, and upper sacral spine with severe pathologic fracture of L2 with retropulsion of the posterior endplate causing moderate canal stenosis. Pt denies any worsening or new back pain, weakness, numbness/tingling, or saddle anesthesia. Pt states that she has been experiencing increasing fatigue for the last 2 weeks, though her appetite and PO intake remain strong. She has also been having frequent bouts of constipation during the last 2-3 months, requiring use of fleet enemas, suppositories, and laxatives. Her last BM was 5 days ago, though she has been passing gas since then. Otherwise, pt has no other complaints. She denies any fevers, chills, cough, headaches, vision changes, chest pain, shortness of breath, palpitations, abdominal pain, nausea, vomiting, diarrhea, or dysuria.  55 yo woman with history of recent diagnosis of L breast cancer with metastatic disease to spine with severe pathologic fracture of L2 with moderate canal stenosis presents with hypercalcemia (13.5) and hypokalemia (2.6) on outpatient labs that were drawn on Tuesday. Pt was diagnosed with L breast cancer in April after core biopsy of a L breast mass revealed invasive moderately differentiated ductal carcinoma and was started on Letrozole on Tuesday. Soon after her breast cancer diagnosis, pt had an MRI lumbar spine for persistent lower back pain that showed metastatic disease in the lower thoracic, lumbar, and upper sacral spine with severe pathologic fracture of L2 with retropulsion of the posterior endplate causing moderate canal stenosis. Pt denies any worsening or new back pain, weakness, numbness/tingling, or saddle anesthesia. Pt states that she has been experiencing increasing fatigue for the last 2 weeks, though her appetite and PO intake remain strong. She has also been having frequent bouts of constipation during the last 2-3 months, requiring use of fleet enemas, suppositories, and laxatives. Her last BM was 5 days ago, though she has been passing gas since then. Otherwise, pt has no other complaints. She denies any fevers, chills, cough, headaches, vision changes, chest pain, shortness of breath, palpitations, abdominal pain, nausea, vomiting, diarrhea, or dysuria.     In the ED,  T 98-98.5, HR 80-86, -128/81-90, RR 14-18, O2 sats 96-99% RA.

## 2021-05-19 NOTE — ED ADULT NURSE NOTE - NSIMPLEMENTINTERV_GEN_ALL_ED
Implemented All Universal Safety Interventions:  [place] to call system. Call bell, personal items and telephone within reach. Instruct patient to call for assistance. Room bathroom lighting operational. Non-slip footwear when patient is off stretcher. Physically safe environment: no spills, clutter or unnecessary equipment. Stretcher in lowest position, wheels locked, appropriate side rails in place.

## 2021-05-19 NOTE — H&P ADULT - NSHPREVIEWOFSYSTEMS_GEN_ALL_CORE
Constitutional: No generalized weakness, fevers, chills, or weight loss  Eyes: No visual changes, double vision, or eye pain  Ears, Nose, Mouth, Throat: No runny nose, sinus pain, ear pain, tinnitus, sore throat, dysphagia, or odynophagia  Cardiovascular: No chest pain, palpitations, or LE edema  Respiratory: No cough, wheezing, hemoptysis, or shortness of breath  Gastrointestinal: No abdominal pain, dysphagia, anorexia, nausea/vomiting, diarrhea/constipation, hematemesis, melena, or BRBPR  Genitourinary: No dysuria, frequency, urgency, or hematuria  Musculoskeletal: No joint pain, joint swelling, or decreased ROM  Skin: No pruritus, rashes, lesions, or wounds  Neurologic:  No seizures, headache, paresthesias, numbness, or limb weakness  Psychiatric: No depression, anxiety, difficulty concentrating, anhedonia, or lack of energy  Endocrine: No heat/cold intolerance, mood swings, sweats, polydipsia, or polyuria  Hematologic/lymphatic: No purpura, petechia, or prolonged or excessive bleeding after dental extraction / injury  Allergic/Immunologic: No anaphylaxis or allergic response to materials, foods, animals    Positives and pertinent negatives noted and all other systems negative. Constitutional: +Fatigue. No fevers, chills, or weight changes.  Eyes: No visual changes, double vision, or eye pain  Ears, Nose, Mouth, Throat: No runny nose, sinus pain, ear pain, tinnitus, sore throat, dysphagia, or odynophagia  Cardiovascular: No chest pain, palpitations, or LE edema  Respiratory: No shortness of breath, cough, wheezing, or hemoptysis  Gastrointestinal: +Constipation. No abdominal pain, nausea, vomiting, diarrhea, hematemesis, melena, or BRBPR.  Genitourinary: No dysuria, frequency, urgency, or hematuria  Musculoskeletal: +Lower back pain and R hip pain.   Skin: +L breast mass. No pruritus or rashes.   Neurologic: No syncope, seizures, headache, numbness, paresthesias, saddle anesthesia, or limb weakness  Psychiatric: No depression, anxiety, or agitation  Endocrine: No heat/cold intolerance, mood swings, sweats, polydipsia, or polyuria  Hematologic/lymphatic: No purpura, petechia, or prolonged or excessive bleeding after dental extraction / injury  Allergic/Immunologic: No anaphylaxis or allergic response to materials, foods, animals    Positives and pertinent negatives noted and all other systems negative.

## 2021-05-19 NOTE — PHYSICAL EXAM
[General Appearance - Alert] : alert [General Appearance - In No Acute Distress] : in no acute distress [Oriented To Time, Place, And Person] : oriented to person, place, and time [Impaired Insight] : insight and judgment were intact [Cranial Nerves Optic (II)] : visual acuity intact bilaterally,  pupils equal round and reactive to light [Cranial Nerves Oculomotor (III)] : extraocular motion intact [Cranial Nerves Trigeminal (V)] : facial sensation intact symmetrically [Cranial Nerves Facial (VII)] : face symmetrical [Cranial Nerves Vestibulocochlear (VIII)] : hearing was intact bilaterally [Cranial Nerves Glossopharyngeal (IX)] : tongue and palate midline [Cranial Nerves Accessory (XI - Cranial And Spinal)] : head turning and shoulder shrug symmetric [Cranial Nerves Hypoglossal (XII)] : there was no tongue deviation with protrusion [Motor Tone] : muscle tone was normal in all four extremities [Motor Strength] : muscle strength was normal in all four extremities [Sensation Tactile Decrease] : light touch was intact [No Visual Abnormalities] : no visible abnormailities [Antalgic] : antalgic [Able to toe walk] : the patient was able to toe walk [Able to heel walk] : the patient was able to heel walk [Sclera] : the sclera and conjunctiva were normal [Outer Ear] : the ears and nose were normal in appearance [Neck Appearance] : the appearance of the neck was normal [] : no respiratory distress [Heart Rate And Rhythm] : heart rate was normal and rhythm regular [Abdomen Soft] : soft [Abnormal Walk] : normal gait

## 2021-05-19 NOTE — H&P ADULT - ASSESSMENT
57 yo woman with history of recent diagnosis of L breast cancer with metastatic disease to spine with severe pathologic fracture of L2 with moderate canal stenosis presents with hypercalcemia (13.5) and hypokalemia (2.6) on outpatient labs that were drawn on Tuesday, admitted for electrolyte abnormalities.

## 2021-05-19 NOTE — H&P ADULT - PROBLEM SELECTOR PLAN 1
Serum Ca 15 on admission, 13.5 on outpatient labs. Pt symptomatic with fatigue and constipation but with no altered sensorium. Likely hypercalcemia of malignancy 2/2 metastatic breast cancer.  - Oncology consult obtained. Appreciate recs.  - S/p 2L bolus of LR. C/w IVF with NS at 100 cc/hr for now.  - S/p Zometa 4 mg x1   - Will also give calcitonin 220 IU q12hrs x2 doses as per Oncology recs  - Check PTH, PTHrP, and 25-OH vitamin D levels  - Monitor serum Ca and ionized Ca  - EKG without any acute changes. Monitor on tele.

## 2021-05-19 NOTE — ED PROVIDER NOTE - NS ED ROS FT
Gen: No fever, +fatigue  CV: No chest pain, no palpitations  HEENT: No sore throat, no hoarseness  Skin: No rash, no color changes  Resp: No SOB, no cough  GI: +constipation, no diarrhea, no nausea, no vomiting  Msk: +back pain, no LE swelling, no extremity pain  : No dysuria, no increased frequency  Neuro: No LOC, no weakness

## 2021-05-20 DIAGNOSIS — R74.01 ELEVATION OF LEVELS OF LIVER TRANSAMINASE LEVELS: ICD-10-CM

## 2021-05-20 DIAGNOSIS — E83.52 HYPERCALCEMIA: ICD-10-CM

## 2021-05-20 DIAGNOSIS — S32.020A WEDGE COMPRESSION FRACTURE OF SECOND LUMBAR VERTEBRA, INITIAL ENCOUNTER FOR CLOSED FRACTURE: ICD-10-CM

## 2021-05-20 DIAGNOSIS — C50.919 MALIGNANT NEOPLASM OF UNSPECIFIED SITE OF UNSPECIFIED FEMALE BREAST: ICD-10-CM

## 2021-05-20 DIAGNOSIS — Z29.9 ENCOUNTER FOR PROPHYLACTIC MEASURES, UNSPECIFIED: ICD-10-CM

## 2021-05-20 DIAGNOSIS — R00.0 TACHYCARDIA, UNSPECIFIED: ICD-10-CM

## 2021-05-20 DIAGNOSIS — E87.6 HYPOKALEMIA: ICD-10-CM

## 2021-05-20 DIAGNOSIS — A41.9 SEPSIS, UNSPECIFIED ORGANISM: ICD-10-CM

## 2021-05-20 LAB
ALBUMIN SERPL ELPH-MCNC: 3.7 G/DL — SIGNIFICANT CHANGE UP (ref 3.3–5)
ALP SERPL-CCNC: 186 U/L — HIGH (ref 40–120)
ALT FLD-CCNC: 73 U/L — HIGH (ref 4–33)
ANION GAP SERPL CALC-SCNC: 12 MMOL/L — SIGNIFICANT CHANGE UP (ref 7–14)
ANION GAP SERPL CALC-SCNC: 14 MMOL/L — SIGNIFICANT CHANGE UP (ref 7–14)
APPEARANCE UR: ABNORMAL
APTT BLD: 24.8 SEC — LOW (ref 27–36.3)
AST SERPL-CCNC: 69 U/L — HIGH (ref 4–32)
BASOPHILS # BLD AUTO: 0.05 K/UL — SIGNIFICANT CHANGE UP (ref 0–0.2)
BASOPHILS NFR BLD AUTO: 0.5 % — SIGNIFICANT CHANGE UP (ref 0–2)
BILIRUB SERPL-MCNC: 0.3 MG/DL — SIGNIFICANT CHANGE UP (ref 0.2–1.2)
BILIRUB UR-MCNC: NEGATIVE — SIGNIFICANT CHANGE UP
BUN SERPL-MCNC: 8 MG/DL — SIGNIFICANT CHANGE UP (ref 7–23)
BUN SERPL-MCNC: 9 MG/DL — SIGNIFICANT CHANGE UP (ref 7–23)
CALCIUM SERPL-MCNC: 10.3 MG/DL — SIGNIFICANT CHANGE UP (ref 8.4–10.5)
CALCIUM SERPL-MCNC: 11.8 MG/DL — HIGH (ref 8.4–10.5)
CHLORIDE SERPL-SCNC: 100 MMOL/L — SIGNIFICANT CHANGE UP (ref 98–107)
CHLORIDE SERPL-SCNC: 98 MMOL/L — SIGNIFICANT CHANGE UP (ref 98–107)
CO2 SERPL-SCNC: 28 MMOL/L — SIGNIFICANT CHANGE UP (ref 22–31)
CO2 SERPL-SCNC: 28 MMOL/L — SIGNIFICANT CHANGE UP (ref 22–31)
COLOR SPEC: COLORLESS — SIGNIFICANT CHANGE UP
COVID-19 SPIKE DOMAIN AB INTERP: POSITIVE
COVID-19 SPIKE DOMAIN ANTIBODY RESULT: 149 U/ML — HIGH
CREAT SERPL-MCNC: 0.67 MG/DL — SIGNIFICANT CHANGE UP (ref 0.5–1.3)
CREAT SERPL-MCNC: 0.73 MG/DL — SIGNIFICANT CHANGE UP (ref 0.5–1.3)
DIFF PNL FLD: ABNORMAL
EOSINOPHIL # BLD AUTO: 0.01 K/UL — SIGNIFICANT CHANGE UP (ref 0–0.5)
EOSINOPHIL NFR BLD AUTO: 0.1 % — SIGNIFICANT CHANGE UP (ref 0–6)
GLUCOSE SERPL-MCNC: 142 MG/DL — HIGH (ref 70–99)
GLUCOSE SERPL-MCNC: 182 MG/DL — HIGH (ref 70–99)
GLUCOSE UR QL: NEGATIVE — SIGNIFICANT CHANGE UP
HCT VFR BLD CALC: 35.8 % — SIGNIFICANT CHANGE UP (ref 34.5–45)
HCV AB S/CO SERPL IA: 0.16 S/CO — SIGNIFICANT CHANGE UP (ref 0–0.99)
HCV AB SERPL-IMP: SIGNIFICANT CHANGE UP
HGB BLD-MCNC: 11.3 G/DL — LOW (ref 11.5–15.5)
IANC: 8.56 K/UL — HIGH (ref 1.5–8.5)
IMM GRANULOCYTES NFR BLD AUTO: 0.4 % — SIGNIFICANT CHANGE UP (ref 0–1.5)
INR BLD: 1.12 RATIO — SIGNIFICANT CHANGE UP (ref 0.88–1.16)
KETONES UR-MCNC: ABNORMAL
LACTATE SERPL-SCNC: 1.5 MMOL/L — SIGNIFICANT CHANGE UP (ref 0.5–2)
LEUKOCYTE ESTERASE UR-ACNC: NEGATIVE — SIGNIFICANT CHANGE UP
LYMPHOCYTES # BLD AUTO: 0.67 K/UL — LOW (ref 1–3.3)
LYMPHOCYTES # BLD AUTO: 6.9 % — LOW (ref 13–44)
MAGNESIUM SERPL-MCNC: 1.1 MG/DL — LOW (ref 1.6–2.6)
MAGNESIUM SERPL-MCNC: 1.2 MG/DL — LOW (ref 1.6–2.6)
MCHC RBC-ENTMCNC: 26 PG — LOW (ref 27–34)
MCHC RBC-ENTMCNC: 31.6 GM/DL — LOW (ref 32–36)
MCV RBC AUTO: 82.5 FL — SIGNIFICANT CHANGE UP (ref 80–100)
MONOCYTES # BLD AUTO: 0.4 K/UL — SIGNIFICANT CHANGE UP (ref 0–0.9)
MONOCYTES NFR BLD AUTO: 4.1 % — SIGNIFICANT CHANGE UP (ref 2–14)
NEUTROPHILS # BLD AUTO: 8.56 K/UL — HIGH (ref 1.8–7.4)
NEUTROPHILS NFR BLD AUTO: 88 % — HIGH (ref 43–77)
NITRITE UR-MCNC: NEGATIVE — SIGNIFICANT CHANGE UP
NRBC # BLD: 0 /100 WBCS — SIGNIFICANT CHANGE UP
NRBC # FLD: 0 K/UL — SIGNIFICANT CHANGE UP
PH UR: 8.5 — HIGH (ref 5–8)
PHOSPHATE SERPL-MCNC: 1.8 MG/DL — LOW (ref 2.5–4.5)
PLATELET # BLD AUTO: 337 K/UL — SIGNIFICANT CHANGE UP (ref 150–400)
POTASSIUM SERPL-MCNC: 2.7 MMOL/L — CRITICAL LOW (ref 3.5–5.3)
POTASSIUM SERPL-MCNC: 2.8 MMOL/L — CRITICAL LOW (ref 3.5–5.3)
POTASSIUM SERPL-MCNC: SIGNIFICANT CHANGE UP MMOL/L (ref 3.5–5.3)
POTASSIUM SERPL-SCNC: 2.7 MMOL/L — CRITICAL LOW (ref 3.5–5.3)
POTASSIUM SERPL-SCNC: 2.8 MMOL/L — CRITICAL LOW (ref 3.5–5.3)
POTASSIUM SERPL-SCNC: SIGNIFICANT CHANGE UP MMOL/L (ref 3.5–5.3)
PROT SERPL-MCNC: 7.4 G/DL — SIGNIFICANT CHANGE UP (ref 6–8.3)
PROT UR-MCNC: ABNORMAL
PROTHROM AB SERPL-ACNC: 12.8 SEC — SIGNIFICANT CHANGE UP (ref 10.6–13.6)
RBC # BLD: 4.34 M/UL — SIGNIFICANT CHANGE UP (ref 3.8–5.2)
RBC # FLD: 14 % — SIGNIFICANT CHANGE UP (ref 10.3–14.5)
SARS-COV-2 IGG+IGM SERPL QL IA: 149 U/ML — HIGH
SARS-COV-2 IGG+IGM SERPL QL IA: POSITIVE
SODIUM SERPL-SCNC: 138 MMOL/L — SIGNIFICANT CHANGE UP (ref 135–145)
SODIUM SERPL-SCNC: 142 MMOL/L — SIGNIFICANT CHANGE UP (ref 135–145)
SP GR SPEC: 1.01 — SIGNIFICANT CHANGE UP (ref 1.01–1.02)
UROBILINOGEN FLD QL: SIGNIFICANT CHANGE UP
WBC # BLD: 9.73 K/UL — SIGNIFICANT CHANGE UP (ref 3.8–10.5)
WBC # FLD AUTO: 9.73 K/UL — SIGNIFICANT CHANGE UP (ref 3.8–10.5)

## 2021-05-20 PROCEDURE — 99232 SBSQ HOSP IP/OBS MODERATE 35: CPT

## 2021-05-20 PROCEDURE — 99221 1ST HOSP IP/OBS SF/LOW 40: CPT | Mod: GC

## 2021-05-20 PROCEDURE — 99222 1ST HOSP IP/OBS MODERATE 55: CPT

## 2021-05-20 PROCEDURE — 99233 SBSQ HOSP IP/OBS HIGH 50: CPT

## 2021-05-20 PROCEDURE — 93010 ELECTROCARDIOGRAM REPORT: CPT

## 2021-05-20 RX ORDER — OXYCODONE HYDROCHLORIDE 5 MG/1
10 TABLET ORAL EVERY 6 HOURS
Refills: 0 | Status: DISCONTINUED | OUTPATIENT
Start: 2021-05-20 | End: 2021-05-24

## 2021-05-20 RX ORDER — LETROZOLE 2.5 MG/1
2.5 TABLET, FILM COATED ORAL DAILY
Refills: 0 | Status: DISCONTINUED | OUTPATIENT
Start: 2021-05-20 | End: 2021-06-11

## 2021-05-20 RX ORDER — PIPERACILLIN AND TAZOBACTAM 4; .5 G/20ML; G/20ML
3.38 INJECTION, POWDER, LYOPHILIZED, FOR SOLUTION INTRAVENOUS EVERY 8 HOURS
Refills: 0 | Status: DISCONTINUED | OUTPATIENT
Start: 2021-05-20 | End: 2021-05-23

## 2021-05-20 RX ORDER — POTASSIUM CHLORIDE 20 MEQ
10 PACKET (EA) ORAL
Refills: 0 | Status: COMPLETED | OUTPATIENT
Start: 2021-05-20 | End: 2021-05-20

## 2021-05-20 RX ORDER — POTASSIUM CHLORIDE 20 MEQ
40 PACKET (EA) ORAL EVERY 4 HOURS
Refills: 0 | Status: COMPLETED | OUTPATIENT
Start: 2021-05-20 | End: 2021-05-21

## 2021-05-20 RX ORDER — MAGNESIUM OXIDE 400 MG ORAL TABLET 241.3 MG
400 TABLET ORAL
Refills: 0 | Status: COMPLETED | OUTPATIENT
Start: 2021-05-20 | End: 2021-05-20

## 2021-05-20 RX ORDER — OXYCODONE HYDROCHLORIDE 5 MG/1
5 TABLET ORAL EVERY 6 HOURS
Refills: 0 | Status: DISCONTINUED | OUTPATIENT
Start: 2021-05-20 | End: 2021-05-24

## 2021-05-20 RX ORDER — MAGNESIUM SULFATE 500 MG/ML
2 VIAL (ML) INJECTION ONCE
Refills: 0 | Status: COMPLETED | OUTPATIENT
Start: 2021-05-20 | End: 2021-05-20

## 2021-05-20 RX ORDER — VANCOMYCIN HCL 1 G
1000 VIAL (EA) INTRAVENOUS EVERY 12 HOURS
Refills: 0 | Status: DISCONTINUED | OUTPATIENT
Start: 2021-05-20 | End: 2021-05-22

## 2021-05-20 RX ORDER — SODIUM CHLORIDE 9 MG/ML
1000 INJECTION INTRAMUSCULAR; INTRAVENOUS; SUBCUTANEOUS
Refills: 0 | Status: DISCONTINUED | OUTPATIENT
Start: 2021-05-20 | End: 2021-05-24

## 2021-05-20 RX ORDER — IBUPROFEN 200 MG
400 TABLET ORAL ONCE
Refills: 0 | Status: COMPLETED | OUTPATIENT
Start: 2021-05-20 | End: 2021-05-20

## 2021-05-20 RX ORDER — PIPERACILLIN AND TAZOBACTAM 4; .5 G/20ML; G/20ML
3.38 INJECTION, POWDER, LYOPHILIZED, FOR SOLUTION INTRAVENOUS ONCE
Refills: 0 | Status: COMPLETED | OUTPATIENT
Start: 2021-05-20 | End: 2021-05-20

## 2021-05-20 RX ADMIN — MAGNESIUM OXIDE 400 MG ORAL TABLET 400 MILLIGRAM(S): 241.3 TABLET ORAL at 18:11

## 2021-05-20 RX ADMIN — Medication 650 MILLIGRAM(S): at 15:17

## 2021-05-20 RX ADMIN — SODIUM CHLORIDE 75 MILLILITER(S): 9 INJECTION INTRAMUSCULAR; INTRAVENOUS; SUBCUTANEOUS at 18:11

## 2021-05-20 RX ADMIN — Medication 650 MILLIGRAM(S): at 21:59

## 2021-05-20 RX ADMIN — Medication 50 GRAM(S): at 20:56

## 2021-05-20 RX ADMIN — PIPERACILLIN AND TAZOBACTAM 200 GRAM(S): 4; .5 INJECTION, POWDER, LYOPHILIZED, FOR SOLUTION INTRAVENOUS at 14:49

## 2021-05-20 RX ADMIN — SODIUM CHLORIDE 75 MILLILITER(S): 9 INJECTION INTRAMUSCULAR; INTRAVENOUS; SUBCUTANEOUS at 10:11

## 2021-05-20 RX ADMIN — Medication 100 MILLIEQUIVALENT(S): at 11:22

## 2021-05-20 RX ADMIN — Medication 250 MILLIGRAM(S): at 18:10

## 2021-05-20 RX ADMIN — POLYETHYLENE GLYCOL 3350 17 GRAM(S): 17 POWDER, FOR SOLUTION ORAL at 11:43

## 2021-05-20 RX ADMIN — SODIUM CHLORIDE 75 MILLILITER(S): 9 INJECTION INTRAMUSCULAR; INTRAVENOUS; SUBCUTANEOUS at 06:24

## 2021-05-20 RX ADMIN — SODIUM CHLORIDE 75 MILLILITER(S): 9 INJECTION INTRAMUSCULAR; INTRAVENOUS; SUBCUTANEOUS at 20:57

## 2021-05-20 RX ADMIN — MAGNESIUM OXIDE 400 MG ORAL TABLET 400 MILLIGRAM(S): 241.3 TABLET ORAL at 10:11

## 2021-05-20 RX ADMIN — Medication 40 MILLIEQUIVALENT(S): at 20:56

## 2021-05-20 RX ADMIN — Medication 100 MILLIEQUIVALENT(S): at 09:11

## 2021-05-20 RX ADMIN — OXYCODONE HYDROCHLORIDE 10 MILLIGRAM(S): 5 TABLET ORAL at 09:12

## 2021-05-20 RX ADMIN — Medication 650 MILLIGRAM(S): at 09:12

## 2021-05-20 RX ADMIN — Medication 650 MILLIGRAM(S): at 10:10

## 2021-05-20 RX ADMIN — SENNA PLUS 2 TABLET(S): 8.6 TABLET ORAL at 21:59

## 2021-05-20 RX ADMIN — MAGNESIUM OXIDE 400 MG ORAL TABLET 400 MILLIGRAM(S): 241.3 TABLET ORAL at 14:50

## 2021-05-20 RX ADMIN — OXYCODONE HYDROCHLORIDE 10 MILLIGRAM(S): 5 TABLET ORAL at 10:10

## 2021-05-20 RX ADMIN — Medication 100 MILLIEQUIVALENT(S): at 11:43

## 2021-05-20 RX ADMIN — PIPERACILLIN AND TAZOBACTAM 25 GRAM(S): 4; .5 INJECTION, POWDER, LYOPHILIZED, FOR SOLUTION INTRAVENOUS at 21:59

## 2021-05-20 RX ADMIN — SODIUM CHLORIDE 100 MILLILITER(S): 9 INJECTION INTRAMUSCULAR; INTRAVENOUS; SUBCUTANEOUS at 00:57

## 2021-05-20 RX ADMIN — LETROZOLE 2.5 MILLIGRAM(S): 2.5 TABLET, FILM COATED ORAL at 11:44

## 2021-05-20 RX ADMIN — Medication 650 MILLIGRAM(S): at 22:59

## 2021-05-20 RX ADMIN — OXYCODONE HYDROCHLORIDE 10 MILLIGRAM(S): 5 TABLET ORAL at 01:58

## 2021-05-20 RX ADMIN — Medication 650 MILLIGRAM(S): at 16:00

## 2021-05-20 RX ADMIN — Medication 400 MILLIGRAM(S): at 11:44

## 2021-05-20 RX ADMIN — CALCITONIN SALMON 220 INTERNATIONAL UNIT(S): 200 INJECTION, SOLUTION INTRAMUSCULAR at 10:10

## 2021-05-20 RX ADMIN — OXYCODONE HYDROCHLORIDE 10 MILLIGRAM(S): 5 TABLET ORAL at 00:58

## 2021-05-20 NOTE — END OF VISIT
[FreeTextEntry3] : I, Dr. Anibal Agrawal, evaluated the patient with the nurse practitioner Chiki Eaton and established the plan of care. I personally discuss this patient with the nurse practitioner at the time of the visit. I agree with the assessment and plan as written, unless noted below.\par \par

## 2021-05-20 NOTE — PROGRESS NOTE ADULT - SUBJECTIVE AND OBJECTIVE BOX
INTERVAL HPI/OVERNIGHT EVENTS:  Patient seen at bedside.  Patient with improved pain symptoms   Rated 3/10  No BM for the past 6 days  denies abdominal pain    VITAL SIGNS:  T(F): 99.6 (05-20-21 @ 11:40)  HR: 130 (05-20-21 @ 10:06)  BP: 148/93 (05-20-21 @ 10:06)  RR: 18 (05-20-21 @ 10:06)  SpO2: 100% (05-20-21 @ 10:06)  Wt(kg): --    PHYSICAL EXAM:  In accordance with current standard limiting patient contact, deferred physical exam  2/2 COVID pandemic  Please refer to physical exam of primary team.    MEDICATIONS  (STANDING):  letrozole 2.5 milliGRAM(s) Oral daily  magnesium oxide 400 milliGRAM(s) Oral three times a day with meals  polyethylene glycol 3350 17 Gram(s) Oral daily  potassium chloride  10 mEq/100 mL IVPB 10 milliEquivalent(s) IV Intermittent every 1 hour  senna 2 Tablet(s) Oral at bedtime  sodium chloride 0.9%. 1000 milliLiter(s) (75 mL/Hr) IV Continuous <Continuous>    MEDICATIONS  (PRN):  acetaminophen   Tablet .. 650 milliGRAM(s) Oral every 6 hours PRN Temp greater or equal to 38C (100.4F), Mild Pain (1 - 3)  bisacodyl Suppository 10 milliGRAM(s) Rectal daily PRN Constipation  oxyCODONE    IR 5 milliGRAM(s) Oral every 6 hours PRN Moderate Pain (4 - 6)  oxyCODONE    IR 10 milliGRAM(s) Oral every 6 hours PRN Severe Pain (7 - 10)      Allergies    No Known Allergies    Intolerances        LABS:                        11.3   9.73  )-----------( 337      ( 20 May 2021 07:42 )             35.8     05-20    142  |  100  |  8   ----------------------------<  142<H>  2.7<LL>   |  28  |  0.67    Ca    11.8<H>      20 May 2021 07:42  Phos  1.8     05-20  Mg     1.2     05-20    TPro  7.4  /  Alb  3.7  /  TBili  0.3  /  DBili  x   /  AST  69<H>  /  ALT  73<H>  /  AlkPhos  186<H>  05-20    PT/INR - ( 20 May 2021 07:42 )   PT: 12.8 sec;   INR: 1.12 ratio         PTT - ( 20 May 2021 07:42 )  PTT:24.8 sec      RADIOLOGY & ADDITIONAL TESTS:  Studies reviewed.

## 2021-05-20 NOTE — PROGRESS NOTE ADULT - ASSESSMENT
57 yo woman with history of recent diagnosis of L breast cancer with metastatic disease to spine with severe pathologic fracture of L2 with moderate canal stenosis presents with hypercalcemia (13.5) and hypokalemia (2.6) on outpatient labs likely due to metastatic disease.

## 2021-05-20 NOTE — PROGRESS NOTE ADULT - PROBLEM SELECTOR PLAN 1
- unclear etiology  - monitor blood and urine cultures  - hold off on antibiotics at this time unless pt decompensates

## 2021-05-20 NOTE — PROGRESS NOTE ADULT - PROBLEM SELECTOR PLAN 4
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  - Oncology consult obtained. Appreciate recs.  - C/w Letrozole 2.5 mg daily  - PET/CT scan on 5/6/21 showing lesion in the posterior left iliac bone. IR consult for bone biopsy and kyphoplasty  - Lactate elevated to 2.4 on admission. Likely from metastatic breast cancer.

## 2021-05-20 NOTE — CONSULT NOTE ADULT - SUBJECTIVE AND OBJECTIVE BOX
HPI:  55 yo woman with history of recent diagnosis of left breast cancer, no treatment yet, with metastatic disease to spine with severe pathologic fracture of L2 with moderate canal stenosis.    Also presents with hypercalcemia (13.5) and hypokalemia (2.6) on outpatient labs that were drawn on Tuesday. Pt was diagnosed with L breast cancer in April after core biopsy of a L breast mass revealed invasive moderately differentiated ductal carcinoma and was started on Letrozole on Tuesday. Soon after her breast cancer diagnosis, pt had an MRI lumbar spine for persistent lower back pain that showed metastatic disease in the lower thoracic, lumbar, and upper sacral spine with severe pathologic fracture of L2 with retropulsion of the posterior endplate causing moderate canal stenosis. Pt denies any worsening or new back pain, weakness, numbness/tingling, or saddle anesthesia.    Fracture was seen on MRI from 4/22/21.       seen by ortho- no surgery indicated, TLSO brace.     Surgical Final Report          Final Diagnosis  1. Breast, left, 9-11 o?clock, core biopsy  - Invasive moderately differentiated ductal carcinoma  - Geovanny score 6 / 9 (3 + 2 +1) in this limited material  - Invasive tumor measures at least 1.8cm  - Ductal carcinoma in situ not seen  - Microcalcifications present  - Lymphovascular permeation by tumor not seen  -ER/WI/HER-2 study in progress; an addendum will follow.    2. Left axila, core biopsy  - Metastatic ductal carcinoma to lymph node  - See comment    Case reported to Tumor Registry.    Verified by: LOUIE WILLIS      < from: NM PET/CT Onc FDG Skull to Thigh, Inital (05.06.21 @ 16:17) >  IMPRESSION: Abnormal FDG-PET/CT scan.    1. Large FDG-avid, centrally necrotic mass in medial left breast, involving overlying skin and adjacent chest wall musculature and sternum corresponds to known malignancy. Additional separate FDG-avid lesions in left breast are suspicious for additional sites of disease. Further evaluation may be performed with breast MRI.    2. FDG-avid lymph nodes in bilateral lower cervical, left supraclavicular, left axillary, and left retropectoral regions are compatible with metastatic disease.    3. Multiple FDG-avid bilateral pulmonary nodules are compatible with metastatic disease.    4. Small left pleural effusion with heterogeneous FDG activity suspicious for malignant effusion.    5. Multiple FDG-avid lytic metastases in axial skeleton with severe pathologic compression fracture of L2 vertebral body, as seen on MRI dated 4/22/2021, where retropulsion of posterior endplate is noted, causing moderate canal stenosis.    Findings emailed to Dr. Joshua Rodríguez.          In the ED,  T 98-98.5, HR 80-86, -128/81-90, RR 14-18, O2 sats 96-99% RA.   (19 May 2021 20:30)      Allergies    No Known Allergies    Intolerances        ROS: [  ] Fever  [  ] Chills  [  ]Chest Pain [  ] SOB  [  ]Cough [  ] N/V  [  ] Diarrhea [  ]Constipation [  ]Other ROS:  [  ] ROS otherwise negative    PAST MEDICAL & SURGICAL HISTORY:  Breast CA    Breast cancer    Lumbar compression fracture    No significant past surgical history        FAMILY HISTORY:  Family history of cervical cancer (Mother)    Family history of prostate cancer (Father)        MEDICATIONS  (STANDING):  letrozole 2.5 milliGRAM(s) Oral daily  magnesium oxide 400 milliGRAM(s) Oral three times a day with meals  piperacillin/tazobactam IVPB. 3.375 Gram(s) IV Intermittent once  piperacillin/tazobactam IVPB.. 3.375 Gram(s) IV Intermittent every 8 hours  polyethylene glycol 3350 17 Gram(s) Oral daily  potassium chloride  10 mEq/100 mL IVPB 10 milliEquivalent(s) IV Intermittent every 1 hour  senna 2 Tablet(s) Oral at bedtime  sodium chloride 0.9%. 1000 milliLiter(s) (75 mL/Hr) IV Continuous <Continuous>  vancomycin  IVPB 1000 milliGRAM(s) IV Intermittent every 12 hours    MEDICATIONS  (PRN):  acetaminophen   Tablet .. 650 milliGRAM(s) Oral every 6 hours PRN Temp greater or equal to 38C (100.4F), Mild Pain (1 - 3)  bisacodyl Suppository 10 milliGRAM(s) Rectal daily PRN Constipation  oxyCODONE    IR 5 milliGRAM(s) Oral every 6 hours PRN Moderate Pain (4 - 6)  oxyCODONE    IR 10 milliGRAM(s) Oral every 6 hours PRN Severe Pain (7 - 10)      PHYSICAL EXAM  Vital Signs Last 24 Hrs  T(C): 37.6 (20 May 2021 11:40), Max: 38.4 (20 May 2021 09:02)  T(F): 99.6 (20 May 2021 11:40), Max: 101.1 (20 May 2021 09:02)  HR: 130 (20 May 2021 10:06) (85 - 130)  BP: 148/93 (20 May 2021 10:06) (148/93 - 193/107)  BP(mean): --  RR: 18 (20 May 2021 10:06) (17 - 19)  SpO2: 100% (20 May 2021 10:06) (97% - 100%)    General: Well nourished, well developed, no acute distress  HEENT: NC/AT; EOMI, PERRL, sclera nonicteric; external ears normal; no rhinorrhea or epistaxis; mucous membranes moist; oropharynx clear and without erythema  CV: NR, RR; no appreciable r/m/g  Lungs: CTAB, no increased work of breathing  Abdomen: Bowel sounds present; soft, NTND  MSK: Vertebral spine non-tender to palpation  Neuro: AAOx3; cranial nerves II-XII intact; strength 5/5 in upper and lower extremities; sensation to light touch in tact bilaterally.  Psych: Full affect; mood congruent  Skin: no visible rashes on limited examination      ASSESSMENT/PLAN    ROGELIO PEARSON is a 56y woman with     We discussed the use of palliative radiation in this setting, namely to improve quality of life through the reduction of symptoms.  We talked about the risks, benefits, acute and long term side effects, as well as expected treatment outcomes.  He/She was given the opportunity to ask questions, which were answered to his/her apparent satisfaction.  He/She provided written consent to proceed with radiation therapy. We will arrange for inpatient/outpatient treatment. HPI:  57 yo woman with history of recent diagnosis of left breast cancer, no treatment yet, with metastatic disease to spine with severe pathologic fracture of L2 with moderate canal stenosis.    Also presents with hypercalcemia (13.5) and hypokalemia (2.6) on outpatient labs that were drawn on Tuesday. Pt was diagnosed with L breast cancer in April after core biopsy of a L breast mass revealed invasive moderately differentiated ductal carcinoma and was started on Letrozole on Tuesday. Soon after her breast cancer diagnosis, pt had an MRI lumbar spine for persistent lower back pain that showed metastatic disease in the lower thoracic, lumbar, and upper sacral spine with severe pathologic fracture of L2 with retropulsion of the posterior endplate causing moderate canal stenosis. Pt denies any worsening or new back pain, weakness, numbness/tingling, or saddle anesthesia.    Fracture was seen on MRI from 4/22/21.     Pain is 0/10 now and at its worst 8/10.      seen by ortho- no surgery indicated, TLSO brace.     Surgical Final Report          Final Diagnosis  1. Breast, left, 9-11 o?clock, core biopsy  - Invasive moderately differentiated ductal carcinoma  - Geovanny score 6 / 9 (3 + 2 +1) in this limited material  - Invasive tumor measures at least 1.8cm  - Ductal carcinoma in situ not seen  - Microcalcifications present  - Lymphovascular permeation by tumor not seen  -ER/AR/HER-2 study in progress; an addendum will follow.    2. Left axila, core biopsy  - Metastatic ductal carcinoma to lymph node  - See comment    Case reported to Tumor Registry.    Verified by: LOUIE WILLIS      < from: NM PET/CT Onc FDG Skull to Thigh, Inital (05.06.21 @ 16:17) >  IMPRESSION: Abnormal FDG-PET/CT scan.    1. Large FDG-avid, centrally necrotic mass in medial left breast, involving overlying skin and adjacent chest wall musculature and sternum corresponds to known malignancy. Additional separate FDG-avid lesions in left breast are suspicious for additional sites of disease. Further evaluation may be performed with breast MRI.    2. FDG-avid lymph nodes in bilateral lower cervical, left supraclavicular, left axillary, and left retropectoral regions are compatible with metastatic disease.    3. Multiple FDG-avid bilateral pulmonary nodules are compatible with metastatic disease.    4. Small left pleural effusion with heterogeneous FDG activity suspicious for malignant effusion.    5. Multiple FDG-avid lytic metastases in axial skeleton with severe pathologic compression fracture of L2 vertebral body, as seen on MRI dated 4/22/2021, where retropulsion of posterior endplate is noted, causing moderate canal stenosis.    Findings emailed to Dr. Joshua Rodríguez.          In the ED,  T 98-98.5, HR 80-86, -128/81-90, RR 14-18, O2 sats 96-99% RA.   (19 May 2021 20:30)      Allergies    No Known Allergies    Intolerances        ROS: [  ] Fever  [  ] Chills  [  ]Chest Pain [  ] SOB  [  ]Cough [  ] N/V  [  ] Diarrhea [  ]Constipation [  ]Other ROS:  [  ] ROS otherwise negative    PAST MEDICAL & SURGICAL HISTORY:  Breast CA    Breast cancer    Lumbar compression fracture    No significant past surgical history        FAMILY HISTORY:  Family history of cervical cancer (Mother)    Family history of prostate cancer (Father)        MEDICATIONS  (STANDING):  letrozole 2.5 milliGRAM(s) Oral daily  magnesium oxide 400 milliGRAM(s) Oral three times a day with meals  piperacillin/tazobactam IVPB. 3.375 Gram(s) IV Intermittent once  piperacillin/tazobactam IVPB.. 3.375 Gram(s) IV Intermittent every 8 hours  polyethylene glycol 3350 17 Gram(s) Oral daily  potassium chloride  10 mEq/100 mL IVPB 10 milliEquivalent(s) IV Intermittent every 1 hour  senna 2 Tablet(s) Oral at bedtime  sodium chloride 0.9%. 1000 milliLiter(s) (75 mL/Hr) IV Continuous <Continuous>  vancomycin  IVPB 1000 milliGRAM(s) IV Intermittent every 12 hours    MEDICATIONS  (PRN):  acetaminophen   Tablet .. 650 milliGRAM(s) Oral every 6 hours PRN Temp greater or equal to 38C (100.4F), Mild Pain (1 - 3)  bisacodyl Suppository 10 milliGRAM(s) Rectal daily PRN Constipation  oxyCODONE    IR 5 milliGRAM(s) Oral every 6 hours PRN Moderate Pain (4 - 6)  oxyCODONE    IR 10 milliGRAM(s) Oral every 6 hours PRN Severe Pain (7 - 10)      PHYSICAL EXAM  KPS 60  Vital Signs Last 24 Hrs  T(C): 37.6 (20 May 2021 11:40), Max: 38.4 (20 May 2021 09:02)  T(F): 99.6 (20 May 2021 11:40), Max: 101.1 (20 May 2021 09:02)  HR: 130 (20 May 2021 10:06) (85 - 130)  BP: 148/93 (20 May 2021 10:06) (148/93 - 193/107)  BP(mean): --  RR: 18 (20 May 2021 10:06) (17 - 19)  SpO2: 100% (20 May 2021 10:06) (97% - 100%)    General: Well nourished, well developed, no acute distress  HEENT: NC/AT; EOMI, PERRL, sclera nonicteric; external ears normal; no rhinorrhea or epistaxis; mucous membranes moist; oropharynx clear and without erythema  CV: NR, RR; no appreciable r/m/g  Lungs: CTAB, no increased work of breathing  Abdomen: Bowel sounds present; soft, NTND  MSK: Vertebral spine non-tender to palpation  Neuro: AAOx3; cranial nerves II-XII intact; strength 5/5 in upper and lower extremities; sensation to light touch in tact bilaterally.  Psych: Full affect; mood congruent  Skin: no visible rashes on limited examination      ASSESSMENT/PLAN    ROGELIO PEARSON is a 56y woman with metastatic breast cancer, began hormone therapy only yesterday with Dr. Dowd, and was admitted for hypercalcemia.  She has an old L2 fracture seen on 4/22/21 MRI and we were consulted over RT candidacy.  Seen by Dr. Agrawal- no surgical intervention now.   We discussed palliative RT but Ms. Pearson prefers to continue hormone therapy alone at this time.   We informed her we are available if she changes her mind.  She can also f/u with Dr. Kim (case d/w Dr. Kim) as an outpatient at [facility].    HPI:  57 yo woman with history of recent diagnosis of left breast cancer, no chemo/RT yet but began hormone therapy yesterday (Dr. Dowd)  with metastatic disease to spine with severe pathologic fracture of L2 with moderate canal stenosis.    Also presents with hypercalcemia (13.5) and hypokalemia (2.6) on outpatient labs that were drawn on Tuesday. Pt was diagnosed with L breast cancer in April after core biopsy of a L breast mass revealed invasive moderately differentiated ductal carcinoma and was started on Letrozole on Tuesday. Soon after her breast cancer diagnosis, pt had an MRI lumbar spine for persistent lower back pain that showed metastatic disease in the lower thoracic, lumbar, and upper sacral spine with severe pathologic fracture of L2 with retropulsion of the posterior endplate causing moderate canal stenosis. Pt denies any worsening or new back pain, weakness, numbness/tingling, or saddle anesthesia.    Fracture was seen on MRI from 4/22/21.     Pain is 0/10 now and at its worst 8/10.      seen by ortho- no surgery indicated, TLSO brace.     Surgical Final Report          Final Diagnosis  1. Breast, left, 9-11 o?clock, core biopsy  - Invasive moderately differentiated ductal carcinoma  - Geovanny score 6 / 9 (3 + 2 +1) in this limited material  - Invasive tumor measures at least 1.8cm  - Ductal carcinoma in situ not seen  - Microcalcifications present  - Lymphovascular permeation by tumor not seen  -ER/IA/HER-2 study in progress; an addendum will follow.    2. Left axila, core biopsy  - Metastatic ductal carcinoma to lymph node  - See comment    Case reported to Tumor Registry.    Verified by: LOUIE WILLIS      < from: NM PET/CT Onc FDG Skull to Thigh, Inital (05.06.21 @ 16:17) >  IMPRESSION: Abnormal FDG-PET/CT scan.    1. Large FDG-avid, centrally necrotic mass in medial left breast, involving overlying skin and adjacent chest wall musculature and sternum corresponds to known malignancy. Additional separate FDG-avid lesions in left breast are suspicious for additional sites of disease. Further evaluation may be performed with breast MRI.    2. FDG-avid lymph nodes in bilateral lower cervical, left supraclavicular, left axillary, and left retropectoral regions are compatible with metastatic disease.    3. Multiple FDG-avid bilateral pulmonary nodules are compatible with metastatic disease.    4. Small left pleural effusion with heterogeneous FDG activity suspicious for malignant effusion.    5. Multiple FDG-avid lytic metastases in axial skeleton with severe pathologic compression fracture of L2 vertebral body, as seen on MRI dated 4/22/2021, where retropulsion of posterior endplate is noted, causing moderate canal stenosis.    Findings emailed to Dr. Joshua Rodríguez.          In the ED,  T 98-98.5, HR 80-86, -128/81-90, RR 14-18, O2 sats 96-99% RA.   (19 May 2021 20:30)      Allergies    No Known Allergies    Intolerances        ROS: [  ] Fever  [  ] Chills  [  ]Chest Pain [  ] SOB  [  ]Cough [  ] N/V  [  ] Diarrhea [  ]Constipation [  ]Other ROS:  [  ] ROS otherwise negative    PAST MEDICAL & SURGICAL HISTORY:  Breast CA    Breast cancer    Lumbar compression fracture    No significant past surgical history        FAMILY HISTORY:  Family history of cervical cancer (Mother)    Family history of prostate cancer (Father)        MEDICATIONS  (STANDING):  letrozole 2.5 milliGRAM(s) Oral daily  magnesium oxide 400 milliGRAM(s) Oral three times a day with meals  piperacillin/tazobactam IVPB. 3.375 Gram(s) IV Intermittent once  piperacillin/tazobactam IVPB.. 3.375 Gram(s) IV Intermittent every 8 hours  polyethylene glycol 3350 17 Gram(s) Oral daily  potassium chloride  10 mEq/100 mL IVPB 10 milliEquivalent(s) IV Intermittent every 1 hour  senna 2 Tablet(s) Oral at bedtime  sodium chloride 0.9%. 1000 milliLiter(s) (75 mL/Hr) IV Continuous <Continuous>  vancomycin  IVPB 1000 milliGRAM(s) IV Intermittent every 12 hours    MEDICATIONS  (PRN):  acetaminophen   Tablet .. 650 milliGRAM(s) Oral every 6 hours PRN Temp greater or equal to 38C (100.4F), Mild Pain (1 - 3)  bisacodyl Suppository 10 milliGRAM(s) Rectal daily PRN Constipation  oxyCODONE    IR 5 milliGRAM(s) Oral every 6 hours PRN Moderate Pain (4 - 6)  oxyCODONE    IR 10 milliGRAM(s) Oral every 6 hours PRN Severe Pain (7 - 10)      PHYSICAL EXAM  KPS 60  Vital Signs Last 24 Hrs  T(C): 37.6 (20 May 2021 11:40), Max: 38.4 (20 May 2021 09:02)  T(F): 99.6 (20 May 2021 11:40), Max: 101.1 (20 May 2021 09:02)  HR: 130 (20 May 2021 10:06) (85 - 130)  BP: 148/93 (20 May 2021 10:06) (148/93 - 193/107)  BP(mean): --  RR: 18 (20 May 2021 10:06) (17 - 19)  SpO2: 100% (20 May 2021 10:06) (97% - 100%)    General: Well nourished, well developed, no acute distress  HEENT: NC/AT; EOMI, PERRL, sclera nonicteric; external ears normal; no rhinorrhea or epistaxis; mucous membranes moist; oropharynx clear and without erythema  CV: NR, RR; no appreciable r/m/g  Lungs: CTAB, no increased work of breathing  Abdomen: Bowel sounds present; soft, NTND  MSK: Vertebral spine non-tender to palpation  Neuro: AAOx3; cranial nerves II-XII intact; strength 5/5 in upper and lower extremities; sensation to light touch in tact bilaterally.  Psych: Full affect; mood congruent  Skin: no visible rashes on limited examination      ASSESSMENT/PLAN    ROGELIO PEARSON is a 56y woman with metastatic breast cancer, began hormone therapy only yesterday with Dr. Dowd, and was admitted for hypercalcemia.  She has an old L2 fracture seen on 4/22/21 MRI and we were consulted over RT candidacy.  Seen by Dr. Agrawal- no surgical intervention now.   We discussed palliative RT but Ms. Pearson prefers to continue hormone therapy alone at this time.   We informed her we are available if she changes her mind.  She can also f/u with Dr. Kim (case d/w Dr. Kim) as an outpatient at [facility].

## 2021-05-20 NOTE — HISTORY OF PRESENT ILLNESS
[< 3 months] : less than 3 months [de-identified] : This is a A 56-year-old woman here for comprehensive evaluation of metastatic spine tumor.\par She reports that she Discovered Breast Lump 2-3 Months ago\par She developed BAck pain and underwent Mammogram and sonogram\par She underwent breast biopsy which shows evidence of estrogen receptor positive breast cancer.\par She is currently under the care of Dr.Charles Rodríguez\par Today she is presenting with lower back pain and severe muscle spasms as well as difficulty walking. She ambulates independently and slowly without assistive device. She denies any bladder or difficulty but is reporting constipation or which she is on a bowel regimen.  Her MRI shows evidence of metastatic disease in the lower thoracic lumbar and upper sacrum with severe pathologic fracture of L2 with retropulsion of the posterior endplate causing moderate canal stenosis. [FreeTextEntry1] : Severe lowerback pain and difficulty walking

## 2021-05-20 NOTE — ASSESSMENT
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 brace medically necessary for lower back support and to reduce pain.

## 2021-05-20 NOTE — PROGRESS NOTE ADULT - PROBLEM SELECTOR PLAN 3
Possibly in setting of hypercalcemia.  - Monitor serum K and Mg  - EKG without any acute changes. Monitor on tele.

## 2021-05-20 NOTE — PROGRESS NOTE ADULT - SUBJECTIVE AND OBJECTIVE BOX
[facility] Division of Hospital Medicine  Kae Hubbard MD  Pager (M-F, 8A-5P): [phone]  Other Times:  [phone]    Patient is a 56y old  Female who presents with a chief complaint of Hypercalcemia, hypokalemia (19 May 2021 20:30)    SUBJECTIVE / OVERNIGHT EVENTS:  Pt with back pain - but she feels better since admission - she is constipated.      MEDICATIONS  (STANDING):  letrozole 2.5 milliGRAM(s) Oral daily  magnesium oxide 400 milliGRAM(s) Oral three times a day with meals  polyethylene glycol 3350 17 Gram(s) Oral daily  potassium chloride  10 mEq/100 mL IVPB 10 milliEquivalent(s) IV Intermittent every 1 hour  senna 2 Tablet(s) Oral at bedtime  sodium chloride 0.9%. 1000 milliLiter(s) (75 mL/Hr) IV Continuous <Continuous>    MEDICATIONS  (PRN):  acetaminophen   Tablet .. 650 milliGRAM(s) Oral every 6 hours PRN Temp greater or equal to 38C (100.4F), Mild Pain (1 - 3)  bisacodyl Suppository 10 milliGRAM(s) Rectal daily PRN Constipation  oxyCODONE    IR 5 milliGRAM(s) Oral every 6 hours PRN Moderate Pain (4 - 6)  oxyCODONE    IR 10 milliGRAM(s) Oral every 6 hours PRN Severe Pain (7 - 10)      CAPILLARY BLOOD GLUCOSE        I&O's Summary      PHYSICAL EXAM:  Vital Signs Last 24 Hrs  T(C): 37.6 (20 May 2021 11:40), Max: 38.4 (20 May 2021 09:02)  T(F): 99.6 (20 May 2021 11:40), Max: 101.1 (20 May 2021 09:02)  HR: 130 (20 May 2021 10:06) (85 - 130)  BP: 148/93 (20 May 2021 10:06) (148/93 - 197/96)  BP(mean): --  RR: 18 (20 May 2021 10:06) (15 - 19)  SpO2: 100% (20 May 2021 10:06) (97% - 100%)    CONSTITUTIONAL: NAD, well-developed, well-groomed  EYES: EOMI; conjunctiva and sclera clear  ENMT: Moist oral mucosa  RESPIRATORY: Normal respiratory effort; lungs are clear to auscultation bilaterally  CARDIOVASCULAR: Regular rate and rhythm, normal S1 and S2, no murmur; No lower extremity edema  ABDOMEN: Nontender to palpation, normoactive bowel sounds, no rebound/guarding  MUSCULOSKELETAL:   no clubbing or cyanosis of digits; no joint swelling or tenderness to palpation  PSYCH: A+O to person, place, and time; affect appropriate  NEUROLOGY: CN 2-12 are intact and symmetric; no gross sensory deficits   SKIN: L breast 8oclock hard lesion    LABS:                        11.3   9.73  )-----------( 337      ( 20 May 2021 07:42 )             35.8     05-20    142  |  100  |  8   ----------------------------<  142<H>  2.7<LL>   |  28  |  0.67    Ca    11.8<H>      20 May 2021 07:42  Phos  1.8     05-20  Mg     1.2     05-20    TPro  7.4  /  Alb  3.7  /  TBili  0.3  /  DBili  x   /  AST  69<H>  /  ALT  73<H>  /  AlkPhos  186<H>  05-20    PT/INR - ( 20 May 2021 07:42 )   PT: 12.8 sec;   INR: 1.12 ratio         PTT - ( 20 May 2021 07:42 )  PTT:24.8 sec      RADIOLOGY & ADDITIONAL TESTS:  Results Reviewed:   Imaging Personally Reviewed:  Electrocardiogram Personally Reviewed:    COORDINATION OF CARE:  Care Discussed with Consultants/Other Providers [Y/N]: Y  Prior or Outpatient Records Reviewed [Y/N]: Y

## 2021-05-20 NOTE — CONSULT NOTE ADULT - SUBJECTIVE AND OBJECTIVE BOX
HPI:  55 yo woman with history of recent diagnosis of L breast cancer with metastatic disease to spine with severe pathologic fracture of L2 seen first 2021 with moderate canal stenosis presents with hypercalcemia (13.5) and hypokalemia (2.6) on outpatient labs that were drawn on Tuesday. Pt was diagnosed with L breast cancer in April after core biopsy of a L breast mass revealed invasive moderately differentiated ductal carcinoma and was started on Letrozole on Tuesday. Soon after her breast cancer diagnosis, pt had an MRI lumbar spine for persistent lower back pain that showed metastatic disease in the lower thoracic, lumbar, and upper sacral spine with severe pathologic fracture of L2 with retropulsion of the posterior endplate causing moderate canal stenosis. Pt denies any worsening or new back pain, weakness, numbness/tingling, or saddle anesthesia. Pt states that she has been experiencing increasing fatigue for the last 2 weeks, though her appetite and PO intake remain strong. She has also been having frequent bouts of constipation during the last 2-3 months, requiring use of fleet enemas, suppositories, and laxatives. Her last BM was 5 days ago, though she has been passing gas since then. Otherwise, pt has no other complaints. She denies any fevers, chills, cough, headaches, vision changes, chest pain, shortness of breath, palpitations, abdominal pain, nausea, vomiting, diarrhea, or dysuria.       Subjective:    Patient seen at bedside, back pain beginning in April, not overall improved. Improves only with medication which cause constipation.  Pain limits mobility, difficulty shifting in bed.   Describes center of pain as midline back in the area of the lumbar spine.     ROS:  Denies HS/LOC. Denies pain/injury elsewhere. Denies numbness/tingling/paresthesias/weakness. Denies bowel/bladder incontinence. Denies fevers/chills. No other complaints at this time.    HEALTH ISSUES - PROBLEM Dx:  Hypercalcemia  Hypercalcemia    Hypokalemia  Hypokalemia    Breast cancer  Breast cancer    Compression fracture of L2 vertebra, initial encounter  Compression fracture of L2 vertebra, initial encounter    Transaminitis  Transaminitis    Need for prophylactic measure  Need for prophylactic measure    Tachycardia  Tachycardia    Sepsis, due to unspecified organism, unspecified whether acute organ dysfunction present  Sepsis, due to unspecified organism, unspecified whether acute organ dysfunction present      MEDICATIONS  (STANDING):  letrozole 2.5 milliGRAM(s) Oral daily  piperacillin/tazobactam IVPB.. 3.375 Gram(s) IV Intermittent every 8 hours  polyethylene glycol 3350 17 Gram(s) Oral daily  senna 2 Tablet(s) Oral at bedtime  sodium chloride 0.9%. 1000 milliLiter(s) IV Continuous <Continuous>  vancomycin  IVPB 1000 milliGRAM(s) IV Intermittent every 12 hours      Allergies    No Known Allergies    Intolerances        PAST MEDICAL & SURGICAL HISTORY:  Breast CA    Breast cancer    Lumbar compression fracture    No significant past surgical history                              11.3   9.73  )-----------( 337      ( 20 May 2021 07:42 )             35.8       20 May 2021 07:42    142    |  100    |  8      ----------------------------<  142    2.7     |  28     |  0.67     Ca    11.8       20 May 2021 07:42  Phos  1.8       20 May 2021 07:42  Mg     1.2       20 May 2021 07:42    TPro  7.4    /  Alb  3.7    /  TBili  0.3    /  DBili  x      /  AST  69     /  ALT  73     /  AlkPhos  186    20 May 2021 07:42      PT/INR - ( 20 May 2021 07:42 )   PT: 12.8 sec;   INR: 1.12 ratio         PTT - ( 20 May 2021 07:42 )  PTT:24.8 sec    Urinalysis Basic - ( 20 May 2021 13:49 )    Color: Colorless / Appearance: Slightly Turbid / S.013 / pH: x  Gluc: x / Ketone: Small  / Bili: Negative / Urobili: <2 mg/dL   Blood: x / Protein: Trace / Nitrite: Negative   Leuk Esterase: Negative / RBC: 3 /HPF / WBC 2 /HPF   Sq Epi: x / Non Sq Epi: 5 /HPF / Bacteria: Few        Vital Signs Last 24 Hrs  T(C): 37.6 (21 @ 14:58), Max: 38.4 (21 @ 09:02)  T(F): 99.6 (21 @ 14:58), Max: 101.1 (21 @ 09:02)  HR: 131 (21 @ 14:58) (101 - 131)  BP: 158/97 (21 @ 14:58) (148/93 - 176/105)  BP(mean): --  RR: 18 (21 @ 14:58) (18 - 19)  SpO2: 100% (21 @ 14:58) (97% - 100%)    Gen: NAD    Spine PE:  Skin intact  No gross deformity  severe Lumbar spine TTP, no thoracic or sacral TTP  No bony step offs  Mild bilateral paraspinal muscle ttp/hypertonicity , slightly greater on right      Limited examination due to inability of patient to fully sit up in bed.     Motor:                   C5                C6              C7               C8           T1   R            5/5                5/5            5/5             5/5          5/5  L             5/5               5/5             5/5             5/5          5/5                L2             L3             L4               L5            S1  R         5/5           5/5          5/5             5/5           5/5  L          5/5          5/5           5/5             5/5           5/5    Sensory:            C5         C6         C7      C8       T1        (0=absent, 1=impaired, 2=normal, NT=not testable)  R         2            2           2        2         2  L          2            2           2        2         2               L2          L3         L4      L5       S1         (0=absent, 1=impaired, 2=normal, NT=not testable)  R         2            2            2        2        2  L          2            2           2        2         2    Imaging: reviewed    A/P: 56y Female with breast cancer with likely metastatic bone lesions on PET CT and acute L2 compression fracture on MRI 2021, with continuing back pain, IR consulted for biopsy of bone lesion and possible L2 vertebral augmentation for pain control.     At this time, recommend repeat MRI lumbar spine with IV contrast to re-evaluate L2 vertebral compression fracture.     pending MRI results plan for biopsy with L2 vertebral augmentation versus bone lesion biopsy only on Monday.   NPO AMN for sedation  please recheck CBC BMP Coags 4AM monday.  hold DVT prophylactic anticoagulation PM before procedure    Pain control  WBAT with assistive devices as needed  FU Labs/imaging  SCDs    IR will follow

## 2021-05-20 NOTE — PROGRESS NOTE ADULT - ASSESSMENT
56f with metastatic breast ca, Hormone receptor positive, not started on treatment as of yet, referred to the ED from [facility] with hypercalcemia, hypokalemia and pain.      Hypercalcemia management, continuous IVF at 75-100cc/h (please make sure order doesnt lapse), Calcitonin  S/p Zometa 4mg x 1  Monitor Ca level   Please obtain IR consult fotr biopsy of bone lesion of posterior iliac bone, biopsy to confirm metastases by IR  Further eval of elevated liver enzymes, PET scan did not show any liver disease, liver enzymes improving today. Consider hepatology consult if uptrending  Please obtain Rad onc consult for palliative RT to spine for pain and TLSO brace.    Neurosurgery has seen pt as outpt and did not rec surgery  Bowel regimen  -Patient to followup with Dr. Rekha Dowd ([facility]) upon discharge  -C/w Supportive care, pain control, Nutrition, PT, DVT ppx  -Oncology will continue to follow with you      Case d/w Dr. James PAL  Oncology Physician Assistant  Bridgette SANTOS/[facility]  Pager [phone]    If after 5pm or weekends please page On-call Oncology Fellow   56f with metastatic breast ca, Hormone receptor positive, not started on treatment as of yet, referred to the ED from [facility] with hypercalcemia, hypokalemia and pain.      Hypercalcemia management, continuous IVF at 75-100cc/h (please make sure order doesnt lapse), Calcitonin  S/p Zometa 4mg x 1  Monitor Ca level   Please obtain IR consult fotr biopsy of bone lesion of posterior iliac bone, biopsy to confirm metastases by IR  Further eval of elevated liver enzymes, PET scan did not show any liver disease, liver enzymes improving today. Consider hepatology consult if uptrending  Please obtain Rad onc consult for palliative RT to spine for pain and TLSO brace.    Neurosurgery has seen pt as outpt and did not rec surgery  Bowel regimen  -Patient to followup with Dr. Rekha Dowd ([facility]) upon discharge  -C/w Supportive care, pain control, Nutrition, PT, DVT ppx  -Oncology will continue to follow with you      Case d/w Dr. Gurpreet PAL  Oncology Physician Assistant  Bridgette SANTOS/[facility]  Pager [phone]    If after 5pm or weekends please page On-call Oncology Fellow   56f with metastatic breast ca, Hormone receptor positive, not started on treatment as of yet, referred to the ED from [facility] with hypercalcemia, hypokalemia and pain.      Noted to be febrile, will followup infectious workup, not on abx yet, consider CT chest , portable CXR on admission with no signs of infection  Hypercalcemia management, continuous IVF at 75-100cc/h (please make sure order doesnt lapse), Calcitonin  S/p Zometa 4mg x 1  Monitor Ca level   Please obtain IR consult fotr biopsy of bone lesion of posterior iliac bone, biopsy to confirm metastases by IR  Further eval of elevated liver enzymes, PET scan did not show any liver disease, liver enzymes improving today. Consider hepatology consult if uptrending  Please obtain Rad onc consult for palliative RT to spine for pain and TLSO brace.    Neurosurgery has seen pt as outpt and did not rec surgery  Bowel regimen  -Patient to followup with Dr. Rekha Dowd ([facility]) upon discharge  -C/w Supportive care, pain control, Nutrition, PT, DVT ppx  -Oncology will continue to follow with you      Case d/w Dr. Gurpreet PAL  Oncology Physician Assistant  Bridgette SANTOS/[facility]  Pager [phone]    If after 5pm or weekends please page On-call Oncology Fellow   56f with metastatic breast ca, Hormone receptor positive, not started on treatment as of yet, referred to the ED from [facility] with hypercalcemia, hypokalemia and pain.      Noted to be febrile, will followup infectious workup, not on abx yet, consider CT chest , portable CXR on admission with no signs of infection  Hypercalcemia management, continuous IVF at 75-100cc/h (please make sure order doesnt lapse), Calcitonin  S/p Zometa 4mg x 1  Monitor Ca level   Please obtain IR consult fotr biopsy of bone lesion of posterior iliac bone, biopsy to confirm metastases by IR  Further eval of elevated liver enzymes, PET scan did not show any liver disease, liver enzymes improving today. Consider hepatology consult if uptrending  Please obtain Rad onc consult for palliative RT to spine for pain and TLSO brace.    Also consider Nephrology consult for multiple electrolyte abnl ( low K ,Mg and Ph)  Neurosurgery has seen pt as outpt and did not rec surgery  Bowel regimen  -Patient to followup with Dr. Rekha Dowd ([facility]) upon discharge  -C/w Supportive care, pain control, Nutrition, PT, DVT ppx  -Oncology will continue to follow with you      Case d/w Dr. Gurpreet PAL  Oncology Physician Assistant  Bridgette SANTOS/[facility]  Pager [phone]    If after 5pm or weekends please page On-call Oncology Fellow

## 2021-05-21 LAB
24R-OH-CALCIDIOL SERPL-MCNC: 26.1 NG/ML — LOW (ref 30–80)
ALBUMIN SERPL ELPH-MCNC: 3.2 G/DL — LOW (ref 3.3–5)
ALP SERPL-CCNC: 148 U/L — HIGH (ref 40–120)
ALT FLD-CCNC: 48 U/L — HIGH (ref 4–33)
ANION GAP SERPL CALC-SCNC: 11 MMOL/L — SIGNIFICANT CHANGE UP (ref 7–14)
AST SERPL-CCNC: 46 U/L — HIGH (ref 4–32)
BASOPHILS # BLD AUTO: 0.03 K/UL — SIGNIFICANT CHANGE UP (ref 0–0.2)
BASOPHILS NFR BLD AUTO: 0.6 % — SIGNIFICANT CHANGE UP (ref 0–2)
BILIRUB SERPL-MCNC: 0.4 MG/DL — SIGNIFICANT CHANGE UP (ref 0.2–1.2)
BUN SERPL-MCNC: 15 MG/DL — SIGNIFICANT CHANGE UP (ref 7–23)
CA-I BLD-SCNC: 1.2 MMOL/L — SIGNIFICANT CHANGE UP (ref 1.15–1.29)
CALCIUM SERPL-MCNC: 9.5 MG/DL — SIGNIFICANT CHANGE UP (ref 8.4–10.5)
CHLORIDE SERPL-SCNC: 97 MMOL/L — LOW (ref 98–107)
CO2 SERPL-SCNC: 25 MMOL/L — SIGNIFICANT CHANGE UP (ref 22–31)
CREAT SERPL-MCNC: 1.11 MG/DL — SIGNIFICANT CHANGE UP (ref 0.5–1.3)
EOSINOPHIL # BLD AUTO: 0.02 K/UL — SIGNIFICANT CHANGE UP (ref 0–0.5)
EOSINOPHIL NFR BLD AUTO: 0.4 % — SIGNIFICANT CHANGE UP (ref 0–6)
GLUCOSE SERPL-MCNC: 157 MG/DL — HIGH (ref 70–99)
HCT VFR BLD CALC: 30.9 % — LOW (ref 34.5–45)
HGB BLD-MCNC: 9.5 G/DL — LOW (ref 11.5–15.5)
IANC: 4.69 K/UL — SIGNIFICANT CHANGE UP (ref 1.5–8.5)
IMM GRANULOCYTES NFR BLD AUTO: 0.2 % — SIGNIFICANT CHANGE UP (ref 0–1.5)
LYMPHOCYTES # BLD AUTO: 0.33 K/UL — LOW (ref 1–3.3)
LYMPHOCYTES # BLD AUTO: 6.2 % — LOW (ref 13–44)
MAGNESIUM SERPL-MCNC: 1.8 MG/DL — SIGNIFICANT CHANGE UP (ref 1.6–2.6)
MCHC RBC-ENTMCNC: 25.6 PG — LOW (ref 27–34)
MCHC RBC-ENTMCNC: 30.7 GM/DL — LOW (ref 32–36)
MCV RBC AUTO: 83.3 FL — SIGNIFICANT CHANGE UP (ref 80–100)
MONOCYTES # BLD AUTO: 0.24 K/UL — SIGNIFICANT CHANGE UP (ref 0–0.9)
MONOCYTES NFR BLD AUTO: 4.5 % — SIGNIFICANT CHANGE UP (ref 2–14)
NEUTROPHILS # BLD AUTO: 4.69 K/UL — SIGNIFICANT CHANGE UP (ref 1.8–7.4)
NEUTROPHILS NFR BLD AUTO: 88.1 % — HIGH (ref 43–77)
NRBC # BLD: 0 /100 WBCS — SIGNIFICANT CHANGE UP
NRBC # FLD: 0 K/UL — SIGNIFICANT CHANGE UP
PHOSPHATE SERPL-MCNC: 1.8 MG/DL — LOW (ref 2.5–4.5)
PLATELET # BLD AUTO: 260 K/UL — SIGNIFICANT CHANGE UP (ref 150–400)
POTASSIUM SERPL-MCNC: 3.5 MMOL/L — SIGNIFICANT CHANGE UP (ref 3.5–5.3)
POTASSIUM SERPL-SCNC: 3.5 MMOL/L — SIGNIFICANT CHANGE UP (ref 3.5–5.3)
PROT SERPL-MCNC: 6.4 G/DL — SIGNIFICANT CHANGE UP (ref 6–8.3)
PTH-INTACT FLD-MCNC: 21 PG/ML — SIGNIFICANT CHANGE UP (ref 15–65)
RBC # BLD: 3.71 M/UL — LOW (ref 3.8–5.2)
RBC # FLD: 14.4 % — SIGNIFICANT CHANGE UP (ref 10.3–14.5)
SODIUM SERPL-SCNC: 133 MMOL/L — LOW (ref 135–145)
TSH SERPL-MCNC: 0.84 UIU/ML — SIGNIFICANT CHANGE UP (ref 0.27–4.2)
WBC # BLD: 5.32 K/UL — SIGNIFICANT CHANGE UP (ref 3.8–10.5)
WBC # FLD AUTO: 5.32 K/UL — SIGNIFICANT CHANGE UP (ref 3.8–10.5)

## 2021-05-21 PROCEDURE — 99233 SBSQ HOSP IP/OBS HIGH 50: CPT

## 2021-05-21 RX ORDER — ONDANSETRON 8 MG/1
4 TABLET, FILM COATED ORAL EVERY 6 HOURS
Refills: 0 | Status: DISCONTINUED | OUTPATIENT
Start: 2021-05-21 | End: 2021-06-11

## 2021-05-21 RX ADMIN — Medication 250 MILLIGRAM(S): at 18:14

## 2021-05-21 RX ADMIN — PIPERACILLIN AND TAZOBACTAM 25 GRAM(S): 4; .5 INJECTION, POWDER, LYOPHILIZED, FOR SOLUTION INTRAVENOUS at 06:37

## 2021-05-21 RX ADMIN — ONDANSETRON 4 MILLIGRAM(S): 8 TABLET, FILM COATED ORAL at 12:36

## 2021-05-21 RX ADMIN — POLYETHYLENE GLYCOL 3350 17 GRAM(S): 17 POWDER, FOR SOLUTION ORAL at 12:36

## 2021-05-21 RX ADMIN — LETROZOLE 2.5 MILLIGRAM(S): 2.5 TABLET, FILM COATED ORAL at 12:36

## 2021-05-21 RX ADMIN — SODIUM CHLORIDE 75 MILLILITER(S): 9 INJECTION INTRAMUSCULAR; INTRAVENOUS; SUBCUTANEOUS at 18:14

## 2021-05-21 RX ADMIN — Medication 10 MILLIGRAM(S): at 20:54

## 2021-05-21 RX ADMIN — PIPERACILLIN AND TAZOBACTAM 25 GRAM(S): 4; .5 INJECTION, POWDER, LYOPHILIZED, FOR SOLUTION INTRAVENOUS at 15:30

## 2021-05-21 RX ADMIN — PIPERACILLIN AND TAZOBACTAM 25 GRAM(S): 4; .5 INJECTION, POWDER, LYOPHILIZED, FOR SOLUTION INTRAVENOUS at 22:51

## 2021-05-21 RX ADMIN — SENNA PLUS 2 TABLET(S): 8.6 TABLET ORAL at 20:53

## 2021-05-21 RX ADMIN — Medication 40 MILLIEQUIVALENT(S): at 00:58

## 2021-05-21 RX ADMIN — Medication 1 ENEMA: at 23:26

## 2021-05-21 RX ADMIN — Medication 250 MILLIGRAM(S): at 05:00

## 2021-05-21 RX ADMIN — Medication 650 MILLIGRAM(S): at 05:13

## 2021-05-21 RX ADMIN — Medication 650 MILLIGRAM(S): at 04:13

## 2021-05-21 RX ADMIN — Medication 40 MILLIEQUIVALENT(S): at 04:13

## 2021-05-21 NOTE — CHART NOTE - NSCHARTNOTEFT_GEN_A_CORE
PRE-INTERVENTIONAL RADIOLOGY PROCEDURE NOTE      Patient Age: 57 YO     Patient Gender: Female    Procedure: . Vertebral augmentation and biopsy    Diagnosis/Indication: Breast Ca    Interventional Radiology Attending Physician: Dr. Gordon     Ordering Attending Physician: Dr. Hubbard     Pertinent Medical History: Metastatic Breast Ca    Pertinent labs:                      9.5    5.32  )-----------( 260      ( 21 May 2021 08:04 )             30.9       05-21    133<L>  |  97<L>  |  15  ----------------------------<  157<H>  3.5   |  25  |  1.11    Ca    9.5      21 May 2021 08:04  Phos  1.8     05-21  Mg     1.8     05-21    TPro  6.4  /  Alb  3.2<L>  /  TBili  0.4  /  DBili  x   /  AST  46<H>  /  ALT  48<H>  /  AlkPhos  148<H>  05-21      PT/INR - ( 20 May 2021 07:42 )   PT: 12.8 sec;   INR: 1.12 ratio         PTT - ( 20 May 2021 07:42 )  PTT:24.8 sec        Patient and Family Aware ? Yes

## 2021-05-21 NOTE — PROGRESS NOTE ADULT - ASSESSMENT
55 yo woman with history of recent diagnosis of L breast cancer with metastatic disease to spine with severe pathologic fracture of L2 with moderate canal stenosis presents with hypercalcemia (13.5) and hypokalemia (2.6) on outpatient labs likely due to metastatic disease.

## 2021-05-21 NOTE — CHART NOTE - NSCHARTNOTEFT_GEN_A_CORE
can put order for IR procedure - vertebral augmentation and biopsy- under Dr. Gordon to be added to schedule.   Will adjust based on results of MRI L-spine (ordered): please expedite to perform over weekend.   NPO AMN for sedation Hugo evening  please recheck CBC BMP Coags 4AM monday

## 2021-05-21 NOTE — PROGRESS NOTE ADULT - SUBJECTIVE AND OBJECTIVE BOX
[facility] Division of [facility] Medicine  Kae Hubbard MD  Pager (M-F, 8A-5P): [phone]  Other Times:  [phone]    Patient is a 56y old  Female who presents with a chief complaint of Hypercalcemia, hypokalemia (20 May 2021 19:02)    SUBJECTIVE / OVERNIGHT EVENTS:  pt with some back pain but overall feels better than when she came in.  Continues to feel constipated.      MEDICATIONS  (STANDING):  letrozole 2.5 milliGRAM(s) Oral daily  piperacillin/tazobactam IVPB.. 3.375 Gram(s) IV Intermittent every 8 hours  polyethylene glycol 3350 17 Gram(s) Oral daily  senna 2 Tablet(s) Oral at bedtime  sodium chloride 0.9%. 1000 milliLiter(s) (75 mL/Hr) IV Continuous <Continuous>  vancomycin  IVPB 1000 milliGRAM(s) IV Intermittent every 12 hours    MEDICATIONS  (PRN):  acetaminophen   Tablet .. 650 milliGRAM(s) Oral every 6 hours PRN Temp greater or equal to 38C (100.4F), Mild Pain (1 - 3)  bisacodyl Suppository 10 milliGRAM(s) Rectal daily PRN Constipation  ondansetron Injectable 4 milliGRAM(s) IV Push every 6 hours PRN Nausea and/or Vomiting  oxyCODONE    IR 5 milliGRAM(s) Oral every 6 hours PRN Moderate Pain (4 - 6)  oxyCODONE    IR 10 milliGRAM(s) Oral every 6 hours PRN Severe Pain (7 - 10)      CAPILLARY BLOOD GLUCOSE        I&O's Summary    20 May 2021 07:  -  21 May 2021 07:00  --------------------------------------------------------  IN: 430 mL / OUT: 600 mL / NET: -170 mL    21 May 2021 07:  -  21 May 2021 15:34  --------------------------------------------------------  IN: 0 mL / OUT: 0 mL / NET: 0 mL        PHYSICAL EXAM:  Vital Signs Last 24 Hrs  T(C): 36.7 (21 May 2021 11:20), Max: 37.3 (20 May 2021 20:56)  T(F): 98.1 (21 May 2021 11:20), Max: 99.2 (20 May 2021 20:56)  HR: 100 (21 May 2021 11:20) (99 - 115)  BP: 151/99 (21 May 2021 11:20) (133/83 - 151/99)  BP(mean): --  RR: 18 (21 May 2021 11:20) (17 - 18)  SpO2: 98% (21 May 2021 11:20) (98% - 100%)    CONSTITUTIONAL: NAD, well-developed, well-groomed  EYES: EOMI; conjunctiva and sclera clear  ENMT: Moist oral mucosa  RESPIRATORY: Normal respiratory effort; lungs are clear to auscultation bilaterally  CARDIOVASCULAR: Regular rate and rhythm, normal S1 and S2, no murmur; No lower extremity edema  ABDOMEN: Nontender to palpation, normoactive bowel sounds, no rebound/guarding  MUSCULOSKELETAL:   no clubbing or cyanosis of digits; no joint swelling or tenderness to palpation  PSYCH: A+O to person, place, and time; affect appropriate  NEUROLOGY: CN 2-12 are intact and symmetric; no gross sensory deficits   SKIN: L breast 8oclock hard lesion    LABS:                        9.5    5.32  )-----------( 260      ( 21 May 2021 08:04 )             30.9         133<L>  |  97<L>  |  15  ----------------------------<  157<H>  3.5   |  25  |  1.11    Ca    9.5      21 May 2021 08:04  Phos  1.8       Mg     1.8         TPro  6.4  /  Alb  3.2<L>  /  TBili  0.4  /  DBili  x   /  AST  46<H>  /  ALT  48<H>  /  AlkPhos  148<H>      PT/INR - ( 20 May 2021 07:42 )   PT: 12.8 sec;   INR: 1.12 ratio         PTT - ( 20 May 2021 07:42 )  PTT:24.8 sec      Urinalysis Basic - ( 20 May 2021 13:49 )    Color: Colorless / Appearance: Slightly Turbid / S.013 / pH: x  Gluc: x / Ketone: Small  / Bili: Negative / Urobili: <2 mg/dL   Blood: x / Protein: Trace / Nitrite: Negative   Leuk Esterase: Negative / RBC: 3 /HPF / WBC 2 /HPF   Sq Epi: x / Non Sq Epi: 5 /HPF / Bacteria: Few        RADIOLOGY & ADDITIONAL TESTS:  Results Reviewed:   Imaging Personally Reviewed:  Electrocardiogram Personally Reviewed:    COORDINATION OF CARE:  Care Discussed with Consultants/Other Providers [Y/N]: Y  Prior or Outpatient Records Reviewed [Y/N]: Y

## 2021-05-22 LAB
ANION GAP SERPL CALC-SCNC: 11 MMOL/L — SIGNIFICANT CHANGE UP (ref 7–14)
BUN SERPL-MCNC: 16 MG/DL — SIGNIFICANT CHANGE UP (ref 7–23)
CALCIUM SERPL-MCNC: 8.7 MG/DL — SIGNIFICANT CHANGE UP (ref 8.4–10.5)
CHLORIDE SERPL-SCNC: 102 MMOL/L — SIGNIFICANT CHANGE UP (ref 98–107)
CO2 SERPL-SCNC: 22 MMOL/L — SIGNIFICANT CHANGE UP (ref 22–31)
CREAT SERPL-MCNC: 1.18 MG/DL — SIGNIFICANT CHANGE UP (ref 0.5–1.3)
GLUCOSE SERPL-MCNC: 87 MG/DL — SIGNIFICANT CHANGE UP (ref 70–99)
HCT VFR BLD CALC: 31.4 % — LOW (ref 34.5–45)
HGB BLD-MCNC: 10 G/DL — LOW (ref 11.5–15.5)
MAGNESIUM SERPL-MCNC: 1.6 MG/DL — SIGNIFICANT CHANGE UP (ref 1.6–2.6)
MCHC RBC-ENTMCNC: 25.3 PG — LOW (ref 27–34)
MCHC RBC-ENTMCNC: 31.8 GM/DL — LOW (ref 32–36)
MCV RBC AUTO: 79.5 FL — LOW (ref 80–100)
NRBC # BLD: 0 /100 WBCS — SIGNIFICANT CHANGE UP
NRBC # FLD: 0 K/UL — SIGNIFICANT CHANGE UP
PHOSPHATE SERPL-MCNC: 1.9 MG/DL — LOW (ref 2.5–4.5)
PLATELET # BLD AUTO: 306 K/UL — SIGNIFICANT CHANGE UP (ref 150–400)
POTASSIUM SERPL-MCNC: 3.5 MMOL/L — SIGNIFICANT CHANGE UP (ref 3.5–5.3)
POTASSIUM SERPL-SCNC: 3.5 MMOL/L — SIGNIFICANT CHANGE UP (ref 3.5–5.3)
RBC # BLD: 3.95 M/UL — SIGNIFICANT CHANGE UP (ref 3.8–5.2)
RBC # FLD: 14.3 % — SIGNIFICANT CHANGE UP (ref 10.3–14.5)
SODIUM SERPL-SCNC: 135 MMOL/L — SIGNIFICANT CHANGE UP (ref 135–145)
VANCOMYCIN TROUGH SERPL-MCNC: 16.4 UG/ML — SIGNIFICANT CHANGE UP (ref 10–20)
VANCOMYCIN TROUGH SERPL-MCNC: 25 UG/ML — HIGH (ref 10–20)
WBC # BLD: 8.5 K/UL — SIGNIFICANT CHANGE UP (ref 3.8–10.5)
WBC # FLD AUTO: 8.5 K/UL — SIGNIFICANT CHANGE UP (ref 3.8–10.5)

## 2021-05-22 PROCEDURE — 99233 SBSQ HOSP IP/OBS HIGH 50: CPT

## 2021-05-22 RX ADMIN — PIPERACILLIN AND TAZOBACTAM 25 GRAM(S): 4; .5 INJECTION, POWDER, LYOPHILIZED, FOR SOLUTION INTRAVENOUS at 21:15

## 2021-05-22 RX ADMIN — LETROZOLE 2.5 MILLIGRAM(S): 2.5 TABLET, FILM COATED ORAL at 13:21

## 2021-05-22 RX ADMIN — PIPERACILLIN AND TAZOBACTAM 25 GRAM(S): 4; .5 INJECTION, POWDER, LYOPHILIZED, FOR SOLUTION INTRAVENOUS at 13:20

## 2021-05-22 RX ADMIN — OXYCODONE HYDROCHLORIDE 5 MILLIGRAM(S): 5 TABLET ORAL at 14:10

## 2021-05-22 RX ADMIN — SODIUM CHLORIDE 75 MILLILITER(S): 9 INJECTION INTRAMUSCULAR; INTRAVENOUS; SUBCUTANEOUS at 05:09

## 2021-05-22 RX ADMIN — PIPERACILLIN AND TAZOBACTAM 25 GRAM(S): 4; .5 INJECTION, POWDER, LYOPHILIZED, FOR SOLUTION INTRAVENOUS at 06:07

## 2021-05-22 RX ADMIN — Medication 650 MILLIGRAM(S): at 00:23

## 2021-05-22 RX ADMIN — POLYETHYLENE GLYCOL 3350 17 GRAM(S): 17 POWDER, FOR SOLUTION ORAL at 13:21

## 2021-05-22 RX ADMIN — Medication 650 MILLIGRAM(S): at 01:23

## 2021-05-22 RX ADMIN — SODIUM CHLORIDE 75 MILLILITER(S): 9 INJECTION INTRAMUSCULAR; INTRAVENOUS; SUBCUTANEOUS at 13:21

## 2021-05-22 RX ADMIN — OXYCODONE HYDROCHLORIDE 5 MILLIGRAM(S): 5 TABLET ORAL at 13:20

## 2021-05-22 NOTE — PROGRESS NOTE ADULT - SUBJECTIVE AND OBJECTIVE BOX
[facility] Division of Hospital Medicine  Kae Hubbard MD  Pager (M-F, 8A-5P): [phone]  Other Times:  [phone]    Patient is a 56y old  Female who presents with a chief complaint of Hypercalcemia, hypokalemia (21 May 2021 15:34)    SUBJECTIVE / OVERNIGHT EVENTS:  mild back pain    MEDICATIONS  (STANDING):  letrozole 2.5 milliGRAM(s) Oral daily  piperacillin/tazobactam IVPB.. 3.375 Gram(s) IV Intermittent every 8 hours  polyethylene glycol 3350 17 Gram(s) Oral daily  senna 2 Tablet(s) Oral at bedtime  sodium chloride 0.9%. 1000 milliLiter(s) (75 mL/Hr) IV Continuous <Continuous>  vancomycin  IVPB 1000 milliGRAM(s) IV Intermittent every 12 hours    MEDICATIONS  (PRN):  acetaminophen   Tablet .. 650 milliGRAM(s) Oral every 6 hours PRN Temp greater or equal to 38C (100.4F), Mild Pain (1 - 3)  bisacodyl Suppository 10 milliGRAM(s) Rectal daily PRN Constipation  ondansetron Injectable 4 milliGRAM(s) IV Push every 6 hours PRN Nausea and/or Vomiting  oxyCODONE    IR 5 milliGRAM(s) Oral every 6 hours PRN Moderate Pain (4 - 6)  oxyCODONE    IR 10 milliGRAM(s) Oral every 6 hours PRN Severe Pain (7 - 10)      CAPILLARY BLOOD GLUCOSE        I&O's Summary    21 May 2021 07:01  -  22 May 2021 07:00  --------------------------------------------------------  IN: 560 mL / OUT: 800 mL / NET: -240 mL        PHYSICAL EXAM:  Vital Signs Last 24 Hrs  T(C): 36.5 (22 May 2021 05:04), Max: 37.1 (21 May 2021 21:03)  T(F): 97.7 (22 May 2021 05:04), Max: 98.7 (21 May 2021 21:03)  HR: 95 (22 May 2021 05:04) (95 - 110)  BP: 151/99 (22 May 2021 05:04) (151/99 - 168/108)  BP(mean): --  RR: 17 (22 May 2021 05:04) (17 - 18)  SpO2: 95% (22 May 2021 05:04) (95% - 98%)    CONSTITUTIONAL: NAD, well-developed, well-groomed  EYES: EOMI; conjunctiva and sclera clear  ENMT: Moist oral mucosa  RESPIRATORY: Normal respiratory effort; lungs are clear to auscultation bilaterally  CARDIOVASCULAR: Regular rate and rhythm, normal S1 and S2, no murmur; No lower extremity edema  ABDOMEN: Nontender to palpation, normoactive bowel sounds, no rebound/guarding  MUSCULOSKELETAL:   no clubbing or cyanosis of digits; no joint swelling or tenderness to palpation  PSYCH: A+O to person, place, and time; affect appropriate  NEUROLOGY: CN 2-12 are intact and symmetric; no gross sensory deficits   SKIN: L breast 8oclock hard lesion    LABS:                        10.0   8.50  )-----------( 306      ( 22 May 2021 05:42 )             31.4     05    135  |  102  |  16  ----------------------------<  87  3.5   |  22  |  1.18    Ca    8.7      22 May 2021 05:42  Phos  1.9       Mg     1.6         TPro  6.4  /  Alb  3.2<L>  /  TBili  0.4  /  DBili  x   /  AST  46<H>  /  ALT  48<H>  /  AlkPhos  148<H>        Urinalysis Basic - ( 20 May 2021 13:49 )    Color: Colorless / Appearance: Slightly Turbid / S.013 / pH: x  Gluc: x / Ketone: Small  / Bili: Negative / Urobili: <2 mg/dL   Blood: x / Protein: Trace / Nitrite: Negative   Leuk Esterase: Negative / RBC: 3 /HPF / WBC 2 /HPF   Sq Epi: x / Non Sq Epi: 5 /HPF / Bacteria: Few        Culture - Blood (collected 20 May 2021 16:25)  Source: .Blood Blood-Venous  Preliminary Report (21 May 2021 17:01):    No growth to date.    Culture - Blood (collected 20 May 2021 16:25)  Source: .Blood Blood-Peripheral  Preliminary Report (21 May 2021 17:01):    No growth to date.      RADIOLOGY & ADDITIONAL TESTS:  Results Reviewed:   Imaging Personally Reviewed:  Electrocardiogram Personally Reviewed:    COORDINATION OF CARE:  Care Discussed with Consultants/Other Providers [Y/N]: Y  Prior or Outpatient Records Reviewed [Y/N]: Y

## 2021-05-22 NOTE — PROGRESS NOTE ADULT - PROBLEM SELECTOR PLAN 4
MRI lumbar spine showing metastatic disease in the lower thoracic, lumbar, and upper sacral spine with severe pathologic fracture of L2 with retropulsion of the posterior endplate causing moderate canal stenosis. No alarm S/S.  - Pain control with Tylenol PRN for mild pain, oxycodone 5 mg q6hrs PRN for moderate pain, and oxycodone 10 mg q6hrs PRN for severe pain  - Rad-Onc consult for possible palliative RT to spine - pt prefers to hold off at this time   - Pt will need to be fitted for TLSO brace   - Pt seen by neurosurgery as outpatient for severe pathologic fracture of L2 and deemed not a surgical candidate - will discuss with IR for possible kyphoplasty - await MRI

## 2021-05-22 NOTE — PROGRESS NOTE ADULT - PROBLEM SELECTOR PLAN 1
- pt started on broad spectrum antibiotics   - await culture results - pt started on broad spectrum antibiotics   - vanco trough elevated - will decrease and recheck trough   - await culture results - pt started on broad spectrum antibiotics   - vanco trough elevated - discussed with pharmacy - check level in am and if within acceptable range, will restart at 1 gm daily if necessary  - cxs neg to date

## 2021-05-22 NOTE — PROGRESS NOTE ADULT - PROBLEM SELECTOR PLAN 3
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  - Oncology consult obtained. Appreciate recs.  - C/w Letrozole 2.5 mg daily  - PET/CT scan on 5/6/21 showing lesion in the posterior left iliac bone. IR consult for bone biopsy and kyphoplasty - plan for monday - MR pending   - Lactate elevated to 2.4 on admission. Likely from metastatic breast cancer.

## 2021-05-23 LAB
ANION GAP SERPL CALC-SCNC: 15 MMOL/L — HIGH (ref 7–14)
BUN SERPL-MCNC: 13 MG/DL — SIGNIFICANT CHANGE UP (ref 7–23)
CALCIUM SERPL-MCNC: 8.2 MG/DL — LOW (ref 8.4–10.5)
CHLORIDE SERPL-SCNC: 105 MMOL/L — SIGNIFICANT CHANGE UP (ref 98–107)
CO2 SERPL-SCNC: 21 MMOL/L — LOW (ref 22–31)
CREAT SERPL-MCNC: 0.99 MG/DL — SIGNIFICANT CHANGE UP (ref 0.5–1.3)
GLUCOSE SERPL-MCNC: 78 MG/DL — SIGNIFICANT CHANGE UP (ref 70–99)
HCT VFR BLD CALC: 32.4 % — LOW (ref 34.5–45)
HGB BLD-MCNC: 10 G/DL — LOW (ref 11.5–15.5)
MAGNESIUM SERPL-MCNC: 1.4 MG/DL — LOW (ref 1.6–2.6)
MCHC RBC-ENTMCNC: 25.5 PG — LOW (ref 27–34)
MCHC RBC-ENTMCNC: 30.9 GM/DL — LOW (ref 32–36)
MCV RBC AUTO: 82.7 FL — SIGNIFICANT CHANGE UP (ref 80–100)
NRBC # BLD: 0 /100 WBCS — SIGNIFICANT CHANGE UP
NRBC # FLD: 0.02 K/UL — HIGH
PHOSPHATE SERPL-MCNC: 1.4 MG/DL — LOW (ref 2.5–4.5)
PLATELET # BLD AUTO: 324 K/UL — SIGNIFICANT CHANGE UP (ref 150–400)
POTASSIUM SERPL-MCNC: 3.1 MMOL/L — LOW (ref 3.5–5.3)
POTASSIUM SERPL-SCNC: 3.1 MMOL/L — LOW (ref 3.5–5.3)
RBC # BLD: 3.92 M/UL — SIGNIFICANT CHANGE UP (ref 3.8–5.2)
RBC # FLD: 14.6 % — HIGH (ref 10.3–14.5)
SODIUM SERPL-SCNC: 141 MMOL/L — SIGNIFICANT CHANGE UP (ref 135–145)
VANCOMYCIN TROUGH SERPL-MCNC: 10.2 UG/ML — SIGNIFICANT CHANGE UP (ref 10–20)
WBC # BLD: 7.24 K/UL — SIGNIFICANT CHANGE UP (ref 3.8–10.5)
WBC # FLD AUTO: 7.24 K/UL — SIGNIFICANT CHANGE UP (ref 3.8–10.5)

## 2021-05-23 PROCEDURE — 99233 SBSQ HOSP IP/OBS HIGH 50: CPT

## 2021-05-23 RX ORDER — POTASSIUM CHLORIDE 20 MEQ
40 PACKET (EA) ORAL EVERY 4 HOURS
Refills: 0 | Status: COMPLETED | OUTPATIENT
Start: 2021-05-23 | End: 2021-05-23

## 2021-05-23 RX ADMIN — LETROZOLE 2.5 MILLIGRAM(S): 2.5 TABLET, FILM COATED ORAL at 18:01

## 2021-05-23 RX ADMIN — Medication 40 MILLIEQUIVALENT(S): at 22:34

## 2021-05-23 RX ADMIN — SODIUM CHLORIDE 75 MILLILITER(S): 9 INJECTION INTRAMUSCULAR; INTRAVENOUS; SUBCUTANEOUS at 22:15

## 2021-05-23 RX ADMIN — Medication 40 MILLIEQUIVALENT(S): at 18:01

## 2021-05-23 RX ADMIN — Medication 40 MILLIEQUIVALENT(S): at 13:11

## 2021-05-23 RX ADMIN — POLYETHYLENE GLYCOL 3350 17 GRAM(S): 17 POWDER, FOR SOLUTION ORAL at 13:12

## 2021-05-23 RX ADMIN — SODIUM CHLORIDE 75 MILLILITER(S): 9 INJECTION INTRAMUSCULAR; INTRAVENOUS; SUBCUTANEOUS at 09:52

## 2021-05-23 RX ADMIN — OXYCODONE HYDROCHLORIDE 10 MILLIGRAM(S): 5 TABLET ORAL at 10:43

## 2021-05-23 RX ADMIN — PIPERACILLIN AND TAZOBACTAM 25 GRAM(S): 4; .5 INJECTION, POWDER, LYOPHILIZED, FOR SOLUTION INTRAVENOUS at 05:12

## 2021-05-23 RX ADMIN — OXYCODONE HYDROCHLORIDE 10 MILLIGRAM(S): 5 TABLET ORAL at 11:30

## 2021-05-23 NOTE — PROGRESS NOTE ADULT - PROBLEM SELECTOR PLAN 1
- pt started on broad spectrum antibiotics empirically but cultures neg   - would d/c all antibiotics and monitor off   - cxs neg to date

## 2021-05-23 NOTE — PROGRESS NOTE ADULT - SUBJECTIVE AND OBJECTIVE BOX
[facility] Division of Hospital Medicine  Kae Hubbard MD  Pager (M-F, 8A-5P): [phone]  Other Times:  [phone]    Patient is a 56y old  Female who presents with a chief complaint of Hypercalcemia, hypokalemia (22 May 2021 09:02)      SUBJECTIVE / OVERNIGHT EVENTS:  denies complaints    MEDICATIONS  (STANDING):  letrozole 2.5 milliGRAM(s) Oral daily  polyethylene glycol 3350 17 Gram(s) Oral daily  senna 2 Tablet(s) Oral at bedtime  sodium chloride 0.9%. 1000 milliLiter(s) (75 mL/Hr) IV Continuous <Continuous>    MEDICATIONS  (PRN):  acetaminophen   Tablet .. 650 milliGRAM(s) Oral every 6 hours PRN Temp greater or equal to 38C (100.4F), Mild Pain (1 - 3)  bisacodyl Suppository 10 milliGRAM(s) Rectal daily PRN Constipation  ondansetron Injectable 4 milliGRAM(s) IV Push every 6 hours PRN Nausea and/or Vomiting  oxyCODONE    IR 5 milliGRAM(s) Oral every 6 hours PRN Moderate Pain (4 - 6)  oxyCODONE    IR 10 milliGRAM(s) Oral every 6 hours PRN Severe Pain (7 - 10)      CAPILLARY BLOOD GLUCOSE        I&O's Summary    22 May 2021 07:01  -  23 May 2021 07:00  --------------------------------------------------------  IN: 1825 mL / OUT: 2900 mL / NET: -1075 mL    23 May 2021 07:01  -  23 May 2021 11:49  --------------------------------------------------------  IN: 0 mL / OUT: 1200 mL / NET: -1200 mL        PHYSICAL EXAM:  Vital Signs Last 24 Hrs  T(C): 37.2 (23 May 2021 05:11), Max: 37.2 (22 May 2021 21:09)  T(F): 99 (23 May 2021 05:11), Max: 99 (22 May 2021 21:09)  HR: 110 (23 May 2021 05:11) (92 - 110)  BP: 169/95 (23 May 2021 05:11) (152/94 - 170/96)  BP(mean): --  RR: 17 (23 May 2021 05:11) (17 - 18)  SpO2: 100% (23 May 2021 05:11) (96% - 100%)    CONSTITUTIONAL: NAD, well-developed, well-groomed  EYES: EOMI; conjunctiva and sclera clear  ENMT: Moist oral mucosa  RESPIRATORY: Normal respiratory effort; lungs are clear to auscultation bilaterally  CARDIOVASCULAR: Regular rate and rhythm, normal S1 and S2, no murmur; No lower extremity edema  ABDOMEN: Nontender to palpation, normoactive bowel sounds, no rebound/guarding  MUSCULOSKELETAL:   no clubbing or cyanosis of digits; no joint swelling or tenderness to palpation  PSYCH: A+O to person, place, and time; affect appropriate  NEUROLOGY: CN 2-12 are intact and symmetric; no gross sensory deficits   SKIN: L breast 8oclock hard lesion    LABS:                        10.0   7.24  )-----------( 324      ( 23 May 2021 07:17 )             32.4     05-23    141  |  105  |  13  ----------------------------<  78  3.1<L>   |  21<L>  |  0.99    Ca    8.2<L>      23 May 2021 07:17  Phos  1.4     05-23  Mg     1.4     05-23                Culture - Blood (collected 20 May 2021 16:25)  Source: .Blood Blood-Venous  Preliminary Report (21 May 2021 17:01):    No growth to date.    Culture - Blood (collected 20 May 2021 16:25)  Source: .Blood Blood-Peripheral  Preliminary Report (21 May 2021 17:01):    No growth to date.      RADIOLOGY & ADDITIONAL TESTS:  Results Reviewed:   Imaging Personally Reviewed:  Electrocardiogram Personally Reviewed:    COORDINATION OF CARE:  Care Discussed with Consultants/Other Providers [Y/N]: Y  Prior or Outpatient Records Reviewed [Y/N]: Y

## 2021-05-24 ENCOUNTER — RESULT REVIEW (OUTPATIENT)
Age: 56
End: 2021-05-24

## 2021-05-24 LAB
ANION GAP SERPL CALC-SCNC: 14 MMOL/L — SIGNIFICANT CHANGE UP (ref 7–14)
APTT BLD: 26.4 SEC — LOW (ref 27–36.3)
BUN SERPL-MCNC: 8 MG/DL — SIGNIFICANT CHANGE UP (ref 7–23)
CALCIUM SERPL-MCNC: 8.2 MG/DL — LOW (ref 8.4–10.5)
CHLORIDE SERPL-SCNC: 108 MMOL/L — HIGH (ref 98–107)
CO2 SERPL-SCNC: 19 MMOL/L — LOW (ref 22–31)
CREAT SERPL-MCNC: 0.79 MG/DL — SIGNIFICANT CHANGE UP (ref 0.5–1.3)
GLUCOSE SERPL-MCNC: 84 MG/DL — SIGNIFICANT CHANGE UP (ref 70–99)
HCT VFR BLD CALC: 31.7 % — LOW (ref 34.5–45)
HGB BLD-MCNC: 10 G/DL — LOW (ref 11.5–15.5)
INR BLD: 1.18 RATIO — HIGH (ref 0.88–1.16)
MAGNESIUM SERPL-MCNC: 1.5 MG/DL — LOW (ref 1.6–2.6)
MCHC RBC-ENTMCNC: 25.6 PG — LOW (ref 27–34)
MCHC RBC-ENTMCNC: 31.5 GM/DL — LOW (ref 32–36)
MCV RBC AUTO: 81.3 FL — SIGNIFICANT CHANGE UP (ref 80–100)
NRBC # BLD: 0 /100 WBCS — SIGNIFICANT CHANGE UP
NRBC # FLD: 0 K/UL — SIGNIFICANT CHANGE UP
PHOSPHATE SERPL-MCNC: 1 MG/DL — CRITICAL LOW (ref 2.5–4.5)
PLATELET # BLD AUTO: 335 K/UL — SIGNIFICANT CHANGE UP (ref 150–400)
POTASSIUM SERPL-MCNC: 4.1 MMOL/L — SIGNIFICANT CHANGE UP (ref 3.5–5.3)
POTASSIUM SERPL-SCNC: 4.1 MMOL/L — SIGNIFICANT CHANGE UP (ref 3.5–5.3)
PROTHROM AB SERPL-ACNC: 13.5 SEC — SIGNIFICANT CHANGE UP (ref 10.6–13.6)
RBC # BLD: 3.9 M/UL — SIGNIFICANT CHANGE UP (ref 3.8–5.2)
RBC # FLD: 14.6 % — HIGH (ref 10.3–14.5)
SODIUM SERPL-SCNC: 141 MMOL/L — SIGNIFICANT CHANGE UP (ref 135–145)
VANCOMYCIN TROUGH SERPL-MCNC: 5.3 UG/ML — LOW (ref 10–20)
WBC # BLD: 9.32 K/UL — SIGNIFICANT CHANGE UP (ref 3.8–10.5)
WBC # FLD AUTO: 9.32 K/UL — SIGNIFICANT CHANGE UP (ref 3.8–10.5)

## 2021-05-24 PROCEDURE — 93010 ELECTROCARDIOGRAM REPORT: CPT

## 2021-05-24 PROCEDURE — 88305 TISSUE EXAM BY PATHOLOGIST: CPT | Mod: 26

## 2021-05-24 PROCEDURE — 88342 IMHCHEM/IMCYTCHM 1ST ANTB: CPT | Mod: 26,59

## 2021-05-24 PROCEDURE — 88377 M/PHMTRC ALYS ISHQUANT/SEMIQ: CPT | Mod: 26

## 2021-05-24 PROCEDURE — 77012 CT SCAN FOR NEEDLE BIOPSY: CPT | Mod: 26

## 2021-05-24 PROCEDURE — 99233 SBSQ HOSP IP/OBS HIGH 50: CPT

## 2021-05-24 PROCEDURE — 88341 IMHCHEM/IMCYTCHM EA ADD ANTB: CPT | Mod: 26,59

## 2021-05-24 PROCEDURE — 88173 CYTOPATH EVAL FNA REPORT: CPT | Mod: 26

## 2021-05-24 PROCEDURE — 88360 TUMOR IMMUNOHISTOCHEM/MANUAL: CPT | Mod: 26

## 2021-05-24 PROCEDURE — 20220 BONE BIOPSY TROCAR/NDL SUPFC: CPT

## 2021-05-24 RX ORDER — POTASSIUM CHLORIDE 20 MEQ
40 PACKET (EA) ORAL EVERY 4 HOURS
Refills: 0 | Status: DISCONTINUED | OUTPATIENT
Start: 2021-05-24 | End: 2021-05-24

## 2021-05-24 RX ORDER — POTASSIUM PHOSPHATE, MONOBASIC POTASSIUM PHOSPHATE, DIBASIC 236; 224 MG/ML; MG/ML
15 INJECTION, SOLUTION INTRAVENOUS ONCE
Refills: 0 | Status: COMPLETED | OUTPATIENT
Start: 2021-05-24 | End: 2021-05-24

## 2021-05-24 RX ORDER — HYDRALAZINE HCL 50 MG
2.5 TABLET ORAL ONCE
Refills: 0 | Status: COMPLETED | OUTPATIENT
Start: 2021-05-24 | End: 2021-05-24

## 2021-05-24 RX ORDER — OXYCODONE HYDROCHLORIDE 5 MG/1
5 TABLET ORAL EVERY 4 HOURS
Refills: 0 | Status: DISCONTINUED | OUTPATIENT
Start: 2021-05-24 | End: 2021-05-24

## 2021-05-24 RX ORDER — METOPROLOL TARTRATE 50 MG
2.5 TABLET ORAL ONCE
Refills: 0 | Status: COMPLETED | OUTPATIENT
Start: 2021-05-24 | End: 2021-05-24

## 2021-05-24 RX ORDER — OXYCODONE HYDROCHLORIDE 5 MG/1
10 TABLET ORAL EVERY 4 HOURS
Refills: 0 | Status: DISCONTINUED | OUTPATIENT
Start: 2021-05-24 | End: 2021-05-31

## 2021-05-24 RX ORDER — AMLODIPINE BESYLATE 2.5 MG/1
5 TABLET ORAL ONCE
Refills: 0 | Status: COMPLETED | OUTPATIENT
Start: 2021-05-24 | End: 2021-05-24

## 2021-05-24 RX ADMIN — SODIUM CHLORIDE 75 MILLILITER(S): 9 INJECTION INTRAMUSCULAR; INTRAVENOUS; SUBCUTANEOUS at 09:58

## 2021-05-24 RX ADMIN — OXYCODONE HYDROCHLORIDE 10 MILLIGRAM(S): 5 TABLET ORAL at 11:20

## 2021-05-24 RX ADMIN — Medication 2.5 MILLIGRAM(S): at 02:18

## 2021-05-24 RX ADMIN — AMLODIPINE BESYLATE 5 MILLIGRAM(S): 2.5 TABLET ORAL at 17:54

## 2021-05-24 RX ADMIN — OXYCODONE HYDROCHLORIDE 10 MILLIGRAM(S): 5 TABLET ORAL at 10:28

## 2021-05-24 RX ADMIN — POTASSIUM PHOSPHATE, MONOBASIC POTASSIUM PHOSPHATE, DIBASIC 62.5 MILLIMOLE(S): 236; 224 INJECTION, SOLUTION INTRAVENOUS at 10:01

## 2021-05-24 RX ADMIN — OXYCODONE HYDROCHLORIDE 10 MILLIGRAM(S): 5 TABLET ORAL at 17:45

## 2021-05-24 RX ADMIN — LETROZOLE 2.5 MILLIGRAM(S): 2.5 TABLET, FILM COATED ORAL at 17:45

## 2021-05-24 RX ADMIN — OXYCODONE HYDROCHLORIDE 10 MILLIGRAM(S): 5 TABLET ORAL at 02:40

## 2021-05-24 RX ADMIN — SODIUM CHLORIDE 75 MILLILITER(S): 9 INJECTION INTRAMUSCULAR; INTRAVENOUS; SUBCUTANEOUS at 17:45

## 2021-05-24 RX ADMIN — Medication 2.5 MILLIGRAM(S): at 19:04

## 2021-05-24 RX ADMIN — OXYCODONE HYDROCHLORIDE 10 MILLIGRAM(S): 5 TABLET ORAL at 01:41

## 2021-05-24 RX ADMIN — OXYCODONE HYDROCHLORIDE 10 MILLIGRAM(S): 5 TABLET ORAL at 18:45

## 2021-05-24 NOTE — PROGRESS NOTE ADULT - PROBLEM SELECTOR PLAN 3
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  - Oncology consult obtained. Appreciate recs.  - C/w Letrozole 2.5 mg daily  - PET/CT scan on 5/6/21 showing lesion in the posterior left iliac bone. IR consult for bone biopsy. If patient agrees to MRI then IR can also perform kyphoplasty   - Lactate elevated to 2.4 on admission. Likely from metastatic breast cancer.

## 2021-05-24 NOTE — PROGRESS NOTE ADULT - PROBLEM SELECTOR PLAN 1
MRI lumbar spine showing metastatic disease in the lower thoracic, lumbar, and upper sacral spine with severe pathologic fracture of L2 with retropulsion of the posterior endplate causing moderate canal stenosis. No alarm S/S.  - Pain control with Tylenol PRN for mild pain, oxycodone 5 mg q6hrs PRN for moderate pain, and oxycodone 10 mg q6hrs PRN for severe pain  - Rad-Onc consult for possible palliative RT to spine - pt prefers to hold off at this time   - Pt will need to be fitted for TLSO brace   - Pt seen by neurosurgery as outpatient for severe pathologic fracture of L2 and deemed not a surgical candidate - will discuss with IR for possible kyphoplasty, though patient will require MRI prior to doing so, but at this time declines MRI. Will go for biopsy for now.

## 2021-05-24 NOTE — PROGRESS NOTE ADULT - SUBJECTIVE AND OBJECTIVE BOX
Obey Henry MD  Academic Hospitalist  Pager [phone]  Email: [email or web address]          PROGRESS NOTE:     Patient is a 56y old  Female who presents with a chief complaint of Hypercalcemia, hypokalemia (23 May 2021 11:49)      SUBJECTIVE / OVERNIGHT EVENTS:  Patient seen and examined this morning. Awaiting IR procedure. She denies shortness of breath, f/c/n/v.   ADDITIONAL REVIEW OF SYSTEMS:    MEDICATIONS  (STANDING):  letrozole 2.5 milliGRAM(s) Oral daily  polyethylene glycol 3350 17 Gram(s) Oral daily  senna 2 Tablet(s) Oral at bedtime  sodium chloride 0.9%. 1000 milliLiter(s) (75 mL/Hr) IV Continuous <Continuous>    MEDICATIONS  (PRN):  acetaminophen   Tablet .. 650 milliGRAM(s) Oral every 6 hours PRN Temp greater or equal to 38C (100.4F), Mild Pain (1 - 3)  bisacodyl Suppository 10 milliGRAM(s) Rectal daily PRN Constipation  ondansetron Injectable 4 milliGRAM(s) IV Push every 6 hours PRN Nausea and/or Vomiting  oxyCODONE    IR 5 milliGRAM(s) Oral every 4 hours PRN Moderate Pain (4 - 6)  oxyCODONE    IR 10 milliGRAM(s) Oral every 4 hours PRN Severe Pain (7 - 10)      CAPILLARY BLOOD GLUCOSE        I&O's Summary    23 May 2021 07:01  -  24 May 2021 07:00  --------------------------------------------------------  IN: 2880 mL / OUT: 2800 mL / NET: 80 mL    24 May 2021 07:01  -  24 May 2021 14:09  --------------------------------------------------------  IN: 325 mL / OUT: 1000 mL / NET: -675 mL        PHYSICAL EXAM:  Vital Signs Last 24 Hrs  T(C): 37 (24 May 2021 12:39), Max: 37.8 (24 May 2021 01:32)  T(F): 98.6 (24 May 2021 12:39), Max: 100 (24 May 2021 01:32)  HR: 112 (24 May 2021 12:39) (108 - 120)  BP: 171/92 (24 May 2021 12:39) (168/100 - 178/105)  BP(mean): --  RR: 18 (24 May 2021 12:39) (18 - 18)  SpO2: 96% (24 May 2021 12:39) (96% - 100%)    CONSTITUTIONAL: NAD, well-developed, well-groomed  EYES: EOMI; conjunctiva and sclera clear  ENMT: Moist oral mucosa  RESPIRATORY: Normal respiratory effort; lungs are clear to auscultation bilaterally  CARDIOVASCULAR: Regular rate and rhythm, normal S1 and S2, no murmur; No lower extremity edema  ABDOMEN: Nontender to palpation, normoactive bowel sounds, no rebound/guarding  MUSCULOSKELETAL:   no clubbing or cyanosis of digits; no joint swelling or tenderness to palpation  PSYCH: A+O to person, place, and time; affect appropriate  NEUROLOGY: CN 2-12 are intact and symmetric; no gross sensory deficits   SKIN: L breast 8oclock hard lesion    LABS:                        10.0   9.32  )-----------( 335      ( 24 May 2021 05:02 )             31.7     05-24    141  |  108<H>  |  8   ----------------------------<  84  4.1   |  19<L>  |  0.79    Ca    8.2<L>      24 May 2021 05:02  Phos  1.0     05-24  Mg     1.5     05-24      PT/INR - ( 24 May 2021 05:02 )   PT: 13.5 sec;   INR: 1.18 ratio         PTT - ( 24 May 2021 05:02 )  PTT:26.4 sec            RADIOLOGY & ADDITIONAL TESTS:  Results Reviewed:   Imaging Personally Reviewed:  Electrocardiogram Personally Reviewed:    COORDINATION OF CARE:  Care Discussed with Consultants/Other Providers [Y/N]:  Prior or Outpatient Records Reviewed [Y/N]:

## 2021-05-24 NOTE — PROVIDER CONTACT NOTE (CRITICAL VALUE NOTIFICATION) - ASSESSMENT
Pt is at baseline.  NPO for IR this am
Patient A&Ox4, vital signs as per flowsheet. No signs or symptoms of distress noted.
Patient denies chest pain, SOB and headache.

## 2021-05-24 NOTE — PROVIDER CONTACT NOTE (CRITICAL VALUE NOTIFICATION) - BACKGROUND
patient admitted for hypercalcemia and hypokalemia
Metastatic L breast cancer and compression fracture of L2 vertebre
Hypercalcemia, hypopatassium, new breast CA, lumbar fracture

## 2021-05-24 NOTE — PROGRESS NOTE ADULT - PROBLEM SELECTOR PLAN 4
AST 90 and ALT 82 on admission. T bili wnl at 0.3, alk phos mildly elevated to 196 from mets to spine.  - Monitor LFTs - downtrending. If transaminitis worsens, will obtain viral hepatitis panel, abdominal imaging, and/or Hepatology consult.

## 2021-05-25 ENCOUNTER — APPOINTMENT (OUTPATIENT)
Dept: INTERVENTIONAL RADIOLOGY/VASCULAR | Facility: CLINIC | Age: 56
End: 2021-05-25

## 2021-05-25 PROBLEM — S32.000A WEDGE COMPRESSION FRACTURE OF UNSPECIFIED LUMBAR VERTEBRA, INITIAL ENCOUNTER FOR CLOSED FRACTURE: Chronic | Status: ACTIVE | Noted: 2021-05-20

## 2021-05-25 PROBLEM — C50.919 MALIGNANT NEOPLASM OF UNSPECIFIED SITE OF UNSPECIFIED FEMALE BREAST: Chronic | Status: ACTIVE | Noted: 2021-05-19

## 2021-05-25 LAB
ALBUMIN SERPL ELPH-MCNC: 3.5 G/DL — SIGNIFICANT CHANGE UP (ref 3.3–5)
ALBUMIN SERPL ELPH-MCNC: 3.6 G/DL — SIGNIFICANT CHANGE UP (ref 3.3–5)
ALP SERPL-CCNC: 190 U/L — HIGH (ref 40–120)
ALP SERPL-CCNC: 194 U/L — HIGH (ref 40–120)
ALT FLD-CCNC: 114 U/L — HIGH (ref 4–33)
ALT FLD-CCNC: 155 U/L — HIGH (ref 4–33)
ANION GAP SERPL CALC-SCNC: 14 MMOL/L — SIGNIFICANT CHANGE UP (ref 7–14)
ANION GAP SERPL CALC-SCNC: 18 MMOL/L — HIGH (ref 7–14)
APPEARANCE UR: CLEAR — SIGNIFICANT CHANGE UP
AST SERPL-CCNC: 116 U/L — HIGH (ref 4–32)
AST SERPL-CCNC: 213 U/L — HIGH (ref 4–32)
BASOPHILS # BLD AUTO: 0.07 K/UL — SIGNIFICANT CHANGE UP (ref 0–0.2)
BASOPHILS NFR BLD AUTO: 0.8 % — SIGNIFICANT CHANGE UP (ref 0–2)
BILIRUB SERPL-MCNC: 0.3 MG/DL — SIGNIFICANT CHANGE UP (ref 0.2–1.2)
BILIRUB SERPL-MCNC: 0.4 MG/DL — SIGNIFICANT CHANGE UP (ref 0.2–1.2)
BILIRUB UR-MCNC: NEGATIVE — SIGNIFICANT CHANGE UP
BUN SERPL-MCNC: 7 MG/DL — SIGNIFICANT CHANGE UP (ref 7–23)
BUN SERPL-MCNC: 8 MG/DL — SIGNIFICANT CHANGE UP (ref 7–23)
CALCIUM SERPL-MCNC: 7.5 MG/DL — LOW (ref 8.4–10.5)
CALCIUM SERPL-MCNC: 7.9 MG/DL — LOW (ref 8.4–10.5)
CHLORIDE SERPL-SCNC: 102 MMOL/L — SIGNIFICANT CHANGE UP (ref 98–107)
CHLORIDE SERPL-SCNC: 102 MMOL/L — SIGNIFICANT CHANGE UP (ref 98–107)
CO2 SERPL-SCNC: 18 MMOL/L — LOW (ref 22–31)
CO2 SERPL-SCNC: 20 MMOL/L — LOW (ref 22–31)
COLOR SPEC: SIGNIFICANT CHANGE UP
CREAT SERPL-MCNC: 0.69 MG/DL — SIGNIFICANT CHANGE UP (ref 0.5–1.3)
CREAT SERPL-MCNC: 0.73 MG/DL — SIGNIFICANT CHANGE UP (ref 0.5–1.3)
CULTURE RESULTS: SIGNIFICANT CHANGE UP
CULTURE RESULTS: SIGNIFICANT CHANGE UP
DIFF PNL FLD: NEGATIVE — SIGNIFICANT CHANGE UP
EOSINOPHIL # BLD AUTO: 0.12 K/UL — SIGNIFICANT CHANGE UP (ref 0–0.5)
EOSINOPHIL NFR BLD AUTO: 1.4 % — SIGNIFICANT CHANGE UP (ref 0–6)
GLUCOSE SERPL-MCNC: 136 MG/DL — HIGH (ref 70–99)
GLUCOSE SERPL-MCNC: 95 MG/DL — SIGNIFICANT CHANGE UP (ref 70–99)
GLUCOSE UR QL: NEGATIVE — SIGNIFICANT CHANGE UP
HCT VFR BLD CALC: 31.8 % — LOW (ref 34.5–45)
HGB BLD-MCNC: 10.3 G/DL — LOW (ref 11.5–15.5)
IANC: 5.62 K/UL — SIGNIFICANT CHANGE UP (ref 1.5–8.5)
IMM GRANULOCYTES NFR BLD AUTO: 0.8 % — SIGNIFICANT CHANGE UP (ref 0–1.5)
KETONES UR-MCNC: ABNORMAL
LEUKOCYTE ESTERASE UR-ACNC: NEGATIVE — SIGNIFICANT CHANGE UP
LYMPHOCYTES # BLD AUTO: 1.93 K/UL — SIGNIFICANT CHANGE UP (ref 1–3.3)
LYMPHOCYTES # BLD AUTO: 21.8 % — SIGNIFICANT CHANGE UP (ref 13–44)
MAGNESIUM SERPL-MCNC: 1.2 MG/DL — LOW (ref 1.6–2.6)
MAGNESIUM SERPL-MCNC: 1.6 MG/DL — SIGNIFICANT CHANGE UP (ref 1.6–2.6)
MCHC RBC-ENTMCNC: 25.9 PG — LOW (ref 27–34)
MCHC RBC-ENTMCNC: 32.4 GM/DL — SIGNIFICANT CHANGE UP (ref 32–36)
MCV RBC AUTO: 79.9 FL — LOW (ref 80–100)
MONOCYTES # BLD AUTO: 1.03 K/UL — HIGH (ref 0–0.9)
MONOCYTES NFR BLD AUTO: 11.7 % — SIGNIFICANT CHANGE UP (ref 2–14)
NEUTROPHILS # BLD AUTO: 5.62 K/UL — SIGNIFICANT CHANGE UP (ref 1.8–7.4)
NEUTROPHILS NFR BLD AUTO: 63.5 % — SIGNIFICANT CHANGE UP (ref 43–77)
NITRITE UR-MCNC: NEGATIVE — SIGNIFICANT CHANGE UP
NRBC # BLD: 0 /100 WBCS — SIGNIFICANT CHANGE UP
NRBC # FLD: 0 K/UL — SIGNIFICANT CHANGE UP
PH UR: 7.5 — SIGNIFICANT CHANGE UP (ref 5–8)
PHOSPHATE SERPL-MCNC: 1.8 MG/DL — LOW (ref 2.5–4.5)
PHOSPHATE SERPL-MCNC: 2.3 MG/DL — LOW (ref 2.5–4.5)
PLATELET # BLD AUTO: 317 K/UL — SIGNIFICANT CHANGE UP (ref 150–400)
POTASSIUM SERPL-MCNC: 3.7 MMOL/L — SIGNIFICANT CHANGE UP (ref 3.5–5.3)
POTASSIUM SERPL-MCNC: 3.7 MMOL/L — SIGNIFICANT CHANGE UP (ref 3.5–5.3)
POTASSIUM SERPL-SCNC: 3.7 MMOL/L — SIGNIFICANT CHANGE UP (ref 3.5–5.3)
POTASSIUM SERPL-SCNC: 3.7 MMOL/L — SIGNIFICANT CHANGE UP (ref 3.5–5.3)
PROT SERPL-MCNC: 7.2 G/DL — SIGNIFICANT CHANGE UP (ref 6–8.3)
PROT SERPL-MCNC: 7.4 G/DL — SIGNIFICANT CHANGE UP (ref 6–8.3)
PROT UR-MCNC: ABNORMAL
PTH RELATED PROT SERPL-MCNC: <2 PMOL/L — SIGNIFICANT CHANGE UP
RBC # BLD: 3.98 M/UL — SIGNIFICANT CHANGE UP (ref 3.8–5.2)
RBC # FLD: 14.8 % — HIGH (ref 10.3–14.5)
SODIUM SERPL-SCNC: 136 MMOL/L — SIGNIFICANT CHANGE UP (ref 135–145)
SODIUM SERPL-SCNC: 138 MMOL/L — SIGNIFICANT CHANGE UP (ref 135–145)
SP GR SPEC: 1.01 — SIGNIFICANT CHANGE UP (ref 1.01–1.02)
SPECIMEN SOURCE: SIGNIFICANT CHANGE UP
SPECIMEN SOURCE: SIGNIFICANT CHANGE UP
UROBILINOGEN FLD QL: SIGNIFICANT CHANGE UP
WBC # BLD: 8.84 K/UL — SIGNIFICANT CHANGE UP (ref 3.8–10.5)
WBC # FLD AUTO: 8.84 K/UL — SIGNIFICANT CHANGE UP (ref 3.8–10.5)

## 2021-05-25 PROCEDURE — 71045 X-RAY EXAM CHEST 1 VIEW: CPT | Mod: 26

## 2021-05-25 PROCEDURE — 99233 SBSQ HOSP IP/OBS HIGH 50: CPT | Mod: GC

## 2021-05-25 PROCEDURE — 93010 ELECTROCARDIOGRAM REPORT: CPT

## 2021-05-25 RX ORDER — PIPERACILLIN AND TAZOBACTAM 4; .5 G/20ML; G/20ML
3.38 INJECTION, POWDER, LYOPHILIZED, FOR SOLUTION INTRAVENOUS ONCE
Refills: 0 | Status: COMPLETED | OUTPATIENT
Start: 2021-05-25 | End: 2021-05-25

## 2021-05-25 RX ORDER — POTASSIUM CHLORIDE 20 MEQ
40 PACKET (EA) ORAL ONCE
Refills: 0 | Status: COMPLETED | OUTPATIENT
Start: 2021-05-25 | End: 2021-05-25

## 2021-05-25 RX ORDER — CHOLECALCIFEROL (VITAMIN D3) 125 MCG
1000 CAPSULE ORAL DAILY
Refills: 0 | Status: DISCONTINUED | OUTPATIENT
Start: 2021-05-25 | End: 2021-06-11

## 2021-05-25 RX ORDER — SODIUM CHLORIDE 9 MG/ML
1000 INJECTION INTRAMUSCULAR; INTRAVENOUS; SUBCUTANEOUS
Refills: 0 | Status: DISCONTINUED | OUTPATIENT
Start: 2021-05-25 | End: 2021-05-25

## 2021-05-25 RX ORDER — POTASSIUM PHOSPHATE, MONOBASIC POTASSIUM PHOSPHATE, DIBASIC 236; 224 MG/ML; MG/ML
30 INJECTION, SOLUTION INTRAVENOUS ONCE
Refills: 0 | Status: COMPLETED | OUTPATIENT
Start: 2021-05-25 | End: 2021-05-25

## 2021-05-25 RX ORDER — AMLODIPINE BESYLATE 2.5 MG/1
5 TABLET ORAL DAILY
Refills: 0 | Status: DISCONTINUED | OUTPATIENT
Start: 2021-05-25 | End: 2021-06-11

## 2021-05-25 RX ORDER — SODIUM CHLORIDE 9 MG/ML
1000 INJECTION INTRAMUSCULAR; INTRAVENOUS; SUBCUTANEOUS
Refills: 0 | Status: DISCONTINUED | OUTPATIENT
Start: 2021-05-25 | End: 2021-06-03

## 2021-05-25 RX ORDER — PIPERACILLIN AND TAZOBACTAM 4; .5 G/20ML; G/20ML
3.38 INJECTION, POWDER, LYOPHILIZED, FOR SOLUTION INTRAVENOUS EVERY 8 HOURS
Refills: 0 | Status: DISCONTINUED | OUTPATIENT
Start: 2021-05-25 | End: 2021-05-26

## 2021-05-25 RX ORDER — ENOXAPARIN SODIUM 100 MG/ML
40 INJECTION SUBCUTANEOUS DAILY
Refills: 0 | Status: DISCONTINUED | OUTPATIENT
Start: 2021-05-25 | End: 2021-06-03

## 2021-05-25 RX ORDER — MAGNESIUM SULFATE 500 MG/ML
2 VIAL (ML) INJECTION ONCE
Refills: 0 | Status: COMPLETED | OUTPATIENT
Start: 2021-05-25 | End: 2021-05-25

## 2021-05-25 RX ORDER — VANCOMYCIN HCL 1 G
1000 VIAL (EA) INTRAVENOUS ONCE
Refills: 0 | Status: COMPLETED | OUTPATIENT
Start: 2021-05-25 | End: 2021-05-25

## 2021-05-25 RX ADMIN — Medication 50 GRAM(S): at 08:31

## 2021-05-25 RX ADMIN — Medication 650 MILLIGRAM(S): at 17:01

## 2021-05-25 RX ADMIN — Medication 40 MILLIEQUIVALENT(S): at 08:31

## 2021-05-25 RX ADMIN — SODIUM CHLORIDE 125 MILLILITER(S): 9 INJECTION INTRAMUSCULAR; INTRAVENOUS; SUBCUTANEOUS at 16:54

## 2021-05-25 RX ADMIN — LETROZOLE 2.5 MILLIGRAM(S): 2.5 TABLET, FILM COATED ORAL at 11:12

## 2021-05-25 RX ADMIN — Medication 650 MILLIGRAM(S): at 18:30

## 2021-05-25 RX ADMIN — OXYCODONE HYDROCHLORIDE 10 MILLIGRAM(S): 5 TABLET ORAL at 01:30

## 2021-05-25 RX ADMIN — Medication 250 MILLIGRAM(S): at 21:47

## 2021-05-25 RX ADMIN — POTASSIUM PHOSPHATE, MONOBASIC POTASSIUM PHOSPHATE, DIBASIC 83.33 MILLIMOLE(S): 236; 224 INJECTION, SOLUTION INTRAVENOUS at 11:12

## 2021-05-25 RX ADMIN — PIPERACILLIN AND TAZOBACTAM 200 GRAM(S): 4; .5 INJECTION, POWDER, LYOPHILIZED, FOR SOLUTION INTRAVENOUS at 18:52

## 2021-05-25 RX ADMIN — Medication 1000 UNIT(S): at 11:12

## 2021-05-25 RX ADMIN — Medication 650 MILLIGRAM(S): at 04:30

## 2021-05-25 RX ADMIN — Medication 650 MILLIGRAM(S): at 02:33

## 2021-05-25 RX ADMIN — OXYCODONE HYDROCHLORIDE 10 MILLIGRAM(S): 5 TABLET ORAL at 17:30

## 2021-05-25 RX ADMIN — AMLODIPINE BESYLATE 5 MILLIGRAM(S): 2.5 TABLET ORAL at 11:12

## 2021-05-25 RX ADMIN — OXYCODONE HYDROCHLORIDE 10 MILLIGRAM(S): 5 TABLET ORAL at 16:28

## 2021-05-25 RX ADMIN — OXYCODONE HYDROCHLORIDE 10 MILLIGRAM(S): 5 TABLET ORAL at 00:30

## 2021-05-25 NOTE — PROVIDER CONTACT NOTE (OTHER) - BACKGROUND
Patient admitted for hypercalcemia, and low potassium. PMH of lumbar puncture, breast cancer

## 2021-05-25 NOTE — PROGRESS NOTE ADULT - ASSESSMENT
56f with metastatic breast ca, Hormone receptor positive, not started on treatment as of yet, referred to the ED from [facility] with hypercalcemia, hypokalemia and pain.      *Noted to fevers on admission, last temp 100.2 this morning,  s/p antibiotics,  infectious workup with NGTD,    S/p CXR today  5/25 for innumerable bilateral pulmonary nodules again seen.  New ill-defined medial right upper lung opacities, nonspecific findings, which may be due to subsegmental atelectasis or pneumonia. Small left pleural effusion which appears larger. Likely associated passive atelectasis. Possible new trace right pleural effusion. Lytic bone metastases again seen. New pathologic right second rib fracture.  *Patient with significant bony  mets which may explain hypercalcemia s/p IVF, Calcitonin anf S/p Zometa 4mg x 1  Now with improved levels, will continue to monitor  Noted to have transaminitis, no evidence of liver mets on most recent PET scan, please get Hepatology consult  Appreciate IR consult for biopsy of bone lesion of posterior iliac bone, will followup path  Patient deferring MRI lumber spine (2/2 claustrophobia) and hence delaying plans for kyphoplasty and possible Radiation Oncology  Encouraged patient to re-consider options towards getting inpatient MRI and educated patient on the importance of consultants teams input (NeuroSx, RadOnc) in the management of her metastatic breast cancer. All questions and concerns were addressed  Please RE- Consult, Rad onc consult for palliative RT to spine for pain and TLSO brace.    Also consider Nephrology consult for multiple electrolyte abnl ( low K ,Mg and Ph)  Neurosurgery has seen pt as outpt and did not rec surgery  Bowel regimen  -Patient to followup with Dr. Rekha Dowd ([facility]) upon discharge  -C/w Supportive care, pain control, Nutrition, PT, DVT ppx  -Oncology will continue to follow with you      Case d/w Dr. Abril PAL  Oncology Physician Assistant  Bridgette SANTOS/[facility]  Pager [phone]    If after 5pm or weekends please page On-call Oncology Fellow

## 2021-05-25 NOTE — PHYSICAL THERAPY INITIAL EVALUATION ADULT - PERTINENT HX OF CURRENT PROBLEM, REHAB EVAL
Patient is 56 year old female admitted with history of left breast cancer with metastatic disease to spine with severe pathologic fracture of L2 with moderate canal stenosis presents with hypercalcemia and hypokalemia

## 2021-05-25 NOTE — PROGRESS NOTE ADULT - SUBJECTIVE AND OBJECTIVE BOX
Obey Henry MD  Academic Hospitalist  Pager [phone]  Email: [email or web address]          PROGRESS NOTE:     Patient is a 56y old  Female who presents with a chief complaint of Hypercalcemia, hypokalemia (25 May 2021 14:21)      SUBJECTIVE / OVERNIGHT EVENTS:  Patient seen and examined this morning. Low grade spike, but afebrile since. Patient denies feeling feverish, she also denies n/v.   ADDITIONAL REVIEW OF SYSTEMS:    MEDICATIONS  (STANDING):  amLODIPine   Tablet 5 milliGRAM(s) Oral daily  cholecalciferol 1000 Unit(s) Oral daily  letrozole 2.5 milliGRAM(s) Oral daily  polyethylene glycol 3350 17 Gram(s) Oral daily  senna 2 Tablet(s) Oral at bedtime    MEDICATIONS  (PRN):  acetaminophen   Tablet .. 650 milliGRAM(s) Oral every 6 hours PRN Temp greater or equal to 38C (100.4F), Mild Pain (1 - 3)  bisacodyl Suppository 10 milliGRAM(s) Rectal daily PRN Constipation  ondansetron Injectable 4 milliGRAM(s) IV Push every 6 hours PRN Nausea and/or Vomiting  oxyCODONE    IR 5 milliGRAM(s) Oral every 4 hours PRN Moderate Pain (4 - 6)  oxyCODONE    IR 10 milliGRAM(s) Oral every 4 hours PRN Severe Pain (7 - 10)      CAPILLARY BLOOD GLUCOSE        I&O's Summary    24 May 2021 07:01  -  25 May 2021 07:00  --------------------------------------------------------  IN: 805 mL / OUT: 2900 mL / NET: -2095 mL    25 May 2021 07:01  -  25 May 2021 14:44  --------------------------------------------------------  IN: 0 mL / OUT: 1000 mL / NET: -1000 mL        PHYSICAL EXAM:  Vital Signs Last 24 Hrs  T(C): 37.2 (25 May 2021 12:29), Max: 38.1 (25 May 2021 02:27)  T(F): 99 (25 May 2021 12:29), Max: 100.5 (25 May 2021 02:27)  HR: 120 (25 May 2021 12:29) (115 - 125)  BP: 158/96 (25 May 2021 12:29) (146/109 - 184/116)  BP(mean): --  RR: 18 (25 May 2021 12:29) (18 - 19)  SpO2: 97% (25 May 2021 12:29) (97% - 100%)    CONSTITUTIONAL: NAD, well-developed, well-groomed  EYES: EOMI; conjunctiva and sclera clear  ENMT: Moist oral mucosa  RESPIRATORY: Normal respiratory effort; lungs are clear to auscultation bilaterally  CARDIOVASCULAR: Regular rate and rhythm, normal S1 and S2, no murmur; No lower extremity edema  ABDOMEN: Nontender to palpation, normoactive bowel sounds, no rebound/guarding  MUSCULOSKELETAL:  no clubbing or cyanosis of digits; no joint swelling or tenderness to palpation  PSYCH: A+O to person, place, and time; affect appropriate  NEUROLOGY: CN 2-12 are intact and symmetric; no gross sensory deficits   SKIN: L breast 8 o'clock hard lesion    LABS:                        10.3   8.84  )-----------( 317      ( 25 May 2021 05:27 )             31.8     05-25    138  |  102  |  8   ----------------------------<  95  3.7   |  18<L>  |  0.73    Ca    7.9<L>      25 May 2021 05:32  Phos  1.8     05-25  Mg     1.2     05-25    TPro  7.2  /  Alb  3.5  /  TBili  0.4  /  DBili  x   /  AST  213<H>  /  ALT  155<H>  /  AlkPhos  190<H>  05-25    PT/INR - ( 24 May 2021 05:02 )   PT: 13.5 sec;   INR: 1.18 ratio         PTT - ( 24 May 2021 05:02 )  PTT:26.4 sec            RADIOLOGY & ADDITIONAL TESTS:  Results Reviewed:   Imaging Personally Reviewed:  Electrocardiogram Personally Reviewed:    COORDINATION OF CARE:  Care Discussed with Consultants/Other Providers [Y/N]:  Prior or Outpatient Records Reviewed [Y/N]:

## 2021-05-25 NOTE — PROGRESS NOTE ADULT - PROBLEM SELECTOR PLAN 3
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  - Oncology consult obtained. Appreciate recs.  - C/w Letrozole 2.5 mg daily  - PET/CT scan on 5/6/21 showing lesion in the posterior left iliac bone. S/P IR bone biopsy. If and when patient agrees to MRI then IR can also perform kyphoplasty   - Lactate elevated to 2.4 on admission. Likely from metastatic breast cancer.

## 2021-05-25 NOTE — PHYSICAL THERAPY INITIAL EVALUATION ADULT - ADDITIONAL COMMENTS
Patient report lives with daughter in basement house, 6 steps to enter, ambulated with out assistive device..   Patient left in bed with 30 degrees of bed elevated with RN present.

## 2021-05-25 NOTE — PROGRESS NOTE ADULT - PROBLEM SELECTOR PLAN 4
AST 90 and ALT 82 on admission. T bili wnl at 0.3, alk phos mildly elevated to 196 from mets to spine.  - Monitor LFTs - If worsens this week, will get liver u/s

## 2021-05-25 NOTE — CHART NOTE - NSCHARTNOTEFT_GEN_A_CORE
[facility] MEDICINE COVERAGE - Medicine Subsequent Hospital Care Note    CC:  HPI/Subjective:    ROS:  Denies fever, chills, diaphoresis, malaise, night sweats, generalized weakness, headache, changes in vision, dizziness, cough, sputum production, wheezing, hemoptysis, chest pain, palpitations, shortness of breath, dyspnea on exertion, PND, dysphagia, new abdominal pain, nausea, vomiting, diarrhea, constipation, melena, hematochezia, dysuria, increased urinary frequency/urgency, hematuria, nocturia, numbness/weakness/tingling, any other complaints.    [  ] I spoke with the attending, nurse, and family to obtain the history.  [  ] Unable to obtain history due to ___________.    Vital Signs Last 24 Hrs  T(C): 37.6 (21 @ 18:22), Max: 38.1 (21 @ 02:27)  T(F): 99.7 (21 @ 18:22), Max: 100.5 (21 @ 02:27)  HR: 129 (- @ 18:22) (115 - 133)  BP: 145/92 (-25-21 @ 18:22) (145/92 - 184/116)  RR: 18 (- @ 18:22) (18 - 19)  SpO2: 100% (-21 @ 18:22) (97% - 100%) on (O2)    PHYSICAL EXAM:  Constitutional: NAD, well-developed, well-nourished  Ears, nose, Mouth, and Throat: normal external ears and nose, normal hearing, moist oral mucosa  Eyes: normal conjunctiva, EOMI, PEERL  Neck: supple, no JVD  Respiratory: Clear to auscultation bilaterally. No wheezes, rales or rhonchi. Normal respiratory effort  Cardiovascular: regular rate and rhythm, S1 and S2, no murmurs, rubs or gallops, no edema, 2+ peripheral pulses  Gastrointestinal: soft, nontender, nondistended, +bowel sounds, no hernia  Skin: warm, dry, no rash  Neurologic: sensation grossly intact, CN grossly intact, non-focal exam  Musculoskeletal: no clubbing, no cyanosis, no joint swelling  Psychiatric: A&Ox3, appropriate mood, affect    LABS:                        10.3   8.84  )-----------( 317      ( 25 May 2021 05:27 )             31.8         138  |  102  |  8   ----------------------------<  95  3.7   |  18<L>  |  0.73    Ca    7.9<L>      25 May 2021 05:32  Phos  1.8       Mg     1.2         TPro  7.2  /  Alb  3.5  /  TBili  0.4  /  DBili  x   /  AST  213<H>  /  ALT  155<H>  /  AlkPhos  190<H>      PT/INR: PT: 13.5 sec | INR: 1.18 ratio (21 @ 05:02)  PTT: 26.4 sec (21 @ 05:02)  PT/INR: PT: 12.8 sec | INR: 1.12 ratio (21 @ 07:42)  PTT: 24.8 sec (21 @ 07:42)    CARDIAC ENZYMES            Serum Pro-Brain Natriuretic Peptide:   D-Dimer Assay:     Urinanalysis Basic (21 @ 18:38):  Color: Light Yellow / Appearance: Clear / S.010 / pH: --  Gluc: -- / Ketone: Small / Bili: Negative / Urobili: <2 mg/dL  Blood: -- / Protein: Trace / Nitrite: Negative  Leuk Esterase: Negative / RBC: -- / WBC: --  Sq Epi: -- / Non Sq Epi: -- / Bacteria: --      Blood Gas Venous (21 @ 18:38):  pH: 7.47 | HCO3: 32 | pCO2: 48 | pO2: 31 | Lactate: 2.4      Blood Gas Arterial (21 @ 18:38):      CAPILLARY BLOOD GLUCOSE:      COVID PCR:  NotDetec (21 @ 14:26)      RADIOLOGY:    MEDICATIONS  (STANDING):  amLODIPine   Tablet 5 milliGRAM(s) Oral daily  cholecalciferol 1000 Unit(s) Oral daily  enoxaparin Injectable 40 milliGRAM(s) SubCutaneous daily  letrozole 2.5 milliGRAM(s) Oral daily  piperacillin/tazobactam IVPB. 3.375 Gram(s) IV Intermittent once  piperacillin/tazobactam IVPB.. 3.375 Gram(s) IV Intermittent every 8 hours  polyethylene glycol 3350 17 Gram(s) Oral daily  senna 2 Tablet(s) Oral at bedtime  sodium chloride 0.9%. 1000 milliLiter(s) (125 mL/Hr) IV Continuous <Continuous>  vancomycin  IVPB 1000 milliGRAM(s) IV Intermittent once    MEDICATIONS  (PRN):  acetaminophen   Tablet .. 650 milliGRAM(s) Oral every 6 hours PRN Temp greater or equal to 38C (100.4F), Mild Pain (1 - 3)  bisacodyl Suppository 10 milliGRAM(s) Rectal daily PRN Constipation  ondansetron Injectable 4 milliGRAM(s) IV Push every 6 hours PRN Nausea and/or Vomiting  oxyCODONE    IR 5 milliGRAM(s) Oral every 4 hours PRN Moderate Pain (4 - 6)  oxyCODONE    IR 10 milliGRAM(s) Oral every 4 hours PRN Severe Pain (7 - 10)    I&O's Summary    24 May 2021 07:  -  25 May 2021 07:00  --------------------------------------------------------  IN: 805 mL / OUT: 2900 mL / NET: -2095 mL    25 May 2021 07:  -  25 May 2021 18:38  --------------------------------------------------------  IN: 0 mL / OUT: 1000 mL / NET: -1000 mL      I reviewed the above labs, radiology, medications, tests, telemetry, and EKG interpretation.    ASSESSMENT/PLAN:    - Clinical findings, labs, tests, telemetry, and ekg reviewed with attending. Will monitor patient closely.       Low complexity/risk (30min)  medium complexity/ risk (45 min)  pt seen and examined pt with tachycardia throughout the day  : no role for betablockers. Pt states her pain is under control on current mgmt   ekg sinus tachycardia 120s : anion gap as per medical attending Dr. Henry ok to give NS 125ml/hr x 12 hours     1800  fever: will f/u fever work up this am : vanco x 1 /zosyn added empirically    reviewed lfts with medical attending if worsens in am will add US of abdomen ------------------------  CXR: with pleural effusion and new rib fracture : medical oncology  Attarian made : rec Rad onc consult and Mr. Segura will be called in am. Dr. Henry made aware and in agreement with plan .    -Pt now agreeing to MRI not for tonight for am.  MRI called and made aware

## 2021-05-25 NOTE — PROGRESS NOTE ADULT - PROBLEM SELECTOR PLAN 1
MRI lumbar spine showing metastatic disease in the lower thoracic, lumbar, and upper sacral spine with severe pathologic fracture of L2 with retropulsion of the posterior endplate causing moderate canal stenosis. No alarm S/S.  - Pain control with Tylenol PRN for mild pain, oxycodone 5 mg q6hrs PRN for moderate pain, and oxycodone 10 mg q6hrs PRN for severe pain  - Rad-Onc consult for possible palliative RT to spine - pt prefers to hold off at this time   - Pt will need to be fitted for TLSO brace   - Pt seen by neurosurgery as outpatient for severe pathologic fracture of L2 and deemed not a surgical candidate - will need IR for possible kyphoplasty, though patient will require MRI prior to doing so, but at this time declines MRI.  Though s/p biopsy on 5/24. MRI lumbar spine showing metastatic disease in the lower thoracic, lumbar, and upper sacral spine with severe pathologic fracture of L2 with retropulsion of the posterior endplate causing moderate canal stenosis. No alarm S/S.  - Pain control with Tylenol PRN for mild pain, oxycodone 5 mg q6hrs PRN for moderate pain, and oxycodone 10 mg q6hrs PRN for severe pain  - Rad-Onc consult for possible palliative RT to spine - pt prefers to hold off at this time   - New lytic lesions in in the ribs, will see if patient is amenable to RT.   - Pt will need to be fitted for TLSO brace   - Pt seen by neurosurgery as outpatient for severe pathologic fracture of L2 and deemed not a surgical candidate - will need IR for possible kyphoplasty, though patient will require MRI prior to doing so, but at this time declines MRI.  Though s/p biopsy on 5/24.

## 2021-05-25 NOTE — PROVIDER CONTACT NOTE (OTHER) - SITUATION
Pt HR sustaining in the 130s, /91
patient BP elevated 176/105, HR in the 120-130's, rectal temp of 101.1
Pts HR sustaining Sinus tach in the 130s

## 2021-05-26 DIAGNOSIS — R50.9 FEVER, UNSPECIFIED: ICD-10-CM

## 2021-05-26 LAB
ALBUMIN SERPL ELPH-MCNC: 3.5 G/DL — SIGNIFICANT CHANGE UP (ref 3.3–5)
ALBUMIN SERPL ELPH-MCNC: 3.6 G/DL — SIGNIFICANT CHANGE UP (ref 3.3–5)
ALP SERPL-CCNC: 184 U/L — HIGH (ref 40–120)
ALP SERPL-CCNC: 194 U/L — HIGH (ref 40–120)
ALT FLD-CCNC: 78 U/L — HIGH (ref 4–33)
ALT FLD-CCNC: 97 U/L — HIGH (ref 4–33)
ANION GAP SERPL CALC-SCNC: 15 MMOL/L — HIGH (ref 7–14)
ANION GAP SERPL CALC-SCNC: 16 MMOL/L — HIGH (ref 7–14)
AST SERPL-CCNC: 65 U/L — HIGH (ref 4–32)
AST SERPL-CCNC: 83 U/L — HIGH (ref 4–32)
BASOPHILS # BLD AUTO: 0.08 K/UL — SIGNIFICANT CHANGE UP (ref 0–0.2)
BASOPHILS NFR BLD AUTO: 0.8 % — SIGNIFICANT CHANGE UP (ref 0–2)
BILIRUB SERPL-MCNC: 0.3 MG/DL — SIGNIFICANT CHANGE UP (ref 0.2–1.2)
BILIRUB SERPL-MCNC: 0.3 MG/DL — SIGNIFICANT CHANGE UP (ref 0.2–1.2)
BUN SERPL-MCNC: 6 MG/DL — LOW (ref 7–23)
BUN SERPL-MCNC: 6 MG/DL — LOW (ref 7–23)
CALCIUM SERPL-MCNC: 7.1 MG/DL — LOW (ref 8.4–10.5)
CALCIUM SERPL-MCNC: 7.3 MG/DL — LOW (ref 8.4–10.5)
CHLORIDE SERPL-SCNC: 100 MMOL/L — SIGNIFICANT CHANGE UP (ref 98–107)
CHLORIDE SERPL-SCNC: 102 MMOL/L — SIGNIFICANT CHANGE UP (ref 98–107)
CO2 SERPL-SCNC: 19 MMOL/L — LOW (ref 22–31)
CO2 SERPL-SCNC: 20 MMOL/L — LOW (ref 22–31)
CREAT SERPL-MCNC: 0.63 MG/DL — SIGNIFICANT CHANGE UP (ref 0.5–1.3)
CREAT SERPL-MCNC: 0.69 MG/DL — SIGNIFICANT CHANGE UP (ref 0.5–1.3)
EOSINOPHIL # BLD AUTO: 0.22 K/UL — SIGNIFICANT CHANGE UP (ref 0–0.5)
EOSINOPHIL NFR BLD AUTO: 2.3 % — SIGNIFICANT CHANGE UP (ref 0–6)
GLUCOSE SERPL-MCNC: 100 MG/DL — HIGH (ref 70–99)
GLUCOSE SERPL-MCNC: 91 MG/DL — SIGNIFICANT CHANGE UP (ref 70–99)
HCT VFR BLD CALC: 31.2 % — LOW (ref 34.5–45)
HGB BLD-MCNC: 10.2 G/DL — LOW (ref 11.5–15.5)
IANC: 6.53 K/UL — SIGNIFICANT CHANGE UP (ref 1.5–8.5)
IMM GRANULOCYTES NFR BLD AUTO: 0.7 % — SIGNIFICANT CHANGE UP (ref 0–1.5)
LYMPHOCYTES # BLD AUTO: 1.56 K/UL — SIGNIFICANT CHANGE UP (ref 1–3.3)
LYMPHOCYTES # BLD AUTO: 16.4 % — SIGNIFICANT CHANGE UP (ref 13–44)
MAGNESIUM SERPL-MCNC: 1.4 MG/DL — LOW (ref 1.6–2.6)
MAGNESIUM SERPL-MCNC: 1.5 MG/DL — LOW (ref 1.6–2.6)
MCHC RBC-ENTMCNC: 25.8 PG — LOW (ref 27–34)
MCHC RBC-ENTMCNC: 32.7 GM/DL — SIGNIFICANT CHANGE UP (ref 32–36)
MCV RBC AUTO: 79 FL — LOW (ref 80–100)
MONOCYTES # BLD AUTO: 1.05 K/UL — HIGH (ref 0–0.9)
MONOCYTES NFR BLD AUTO: 11 % — SIGNIFICANT CHANGE UP (ref 2–14)
NEUTROPHILS # BLD AUTO: 6.53 K/UL — SIGNIFICANT CHANGE UP (ref 1.8–7.4)
NEUTROPHILS NFR BLD AUTO: 68.8 % — SIGNIFICANT CHANGE UP (ref 43–77)
NRBC # BLD: 0 /100 WBCS — SIGNIFICANT CHANGE UP
NRBC # FLD: 0 K/UL — SIGNIFICANT CHANGE UP
PHOSPHATE SERPL-MCNC: 1.6 MG/DL — LOW (ref 2.5–4.5)
PHOSPHATE SERPL-MCNC: 2.7 MG/DL — SIGNIFICANT CHANGE UP (ref 2.5–4.5)
PLATELET # BLD AUTO: 326 K/UL — SIGNIFICANT CHANGE UP (ref 150–400)
POTASSIUM SERPL-MCNC: 3.5 MMOL/L — SIGNIFICANT CHANGE UP (ref 3.5–5.3)
POTASSIUM SERPL-MCNC: 3.6 MMOL/L — SIGNIFICANT CHANGE UP (ref 3.5–5.3)
POTASSIUM SERPL-SCNC: 3.5 MMOL/L — SIGNIFICANT CHANGE UP (ref 3.5–5.3)
POTASSIUM SERPL-SCNC: 3.6 MMOL/L — SIGNIFICANT CHANGE UP (ref 3.5–5.3)
PROT SERPL-MCNC: 7 G/DL — SIGNIFICANT CHANGE UP (ref 6–8.3)
PROT SERPL-MCNC: 7.6 G/DL — SIGNIFICANT CHANGE UP (ref 6–8.3)
RBC # BLD: 3.95 M/UL — SIGNIFICANT CHANGE UP (ref 3.8–5.2)
RBC # FLD: 14.8 % — HIGH (ref 10.3–14.5)
SODIUM SERPL-SCNC: 136 MMOL/L — SIGNIFICANT CHANGE UP (ref 135–145)
SODIUM SERPL-SCNC: 136 MMOL/L — SIGNIFICANT CHANGE UP (ref 135–145)
WBC # BLD: 9.51 K/UL — SIGNIFICANT CHANGE UP (ref 3.8–10.5)
WBC # FLD AUTO: 9.51 K/UL — SIGNIFICANT CHANGE UP (ref 3.8–10.5)

## 2021-05-26 PROCEDURE — 99233 SBSQ HOSP IP/OBS HIGH 50: CPT | Mod: GC

## 2021-05-26 PROCEDURE — 99223 1ST HOSP IP/OBS HIGH 75: CPT

## 2021-05-26 PROCEDURE — 93970 EXTREMITY STUDY: CPT | Mod: 26

## 2021-05-26 PROCEDURE — 93306 TTE W/DOPPLER COMPLETE: CPT | Mod: 26

## 2021-05-26 PROCEDURE — 74019 RADEX ABDOMEN 2 VIEWS: CPT | Mod: 26

## 2021-05-26 RX ORDER — MAGNESIUM SULFATE 500 MG/ML
2 VIAL (ML) INJECTION ONCE
Refills: 0 | Status: COMPLETED | OUTPATIENT
Start: 2021-05-26 | End: 2021-05-26

## 2021-05-26 RX ORDER — SIMETHICONE 80 MG/1
80 TABLET, CHEWABLE ORAL ONCE
Refills: 0 | Status: COMPLETED | OUTPATIENT
Start: 2021-05-26 | End: 2021-05-26

## 2021-05-26 RX ORDER — POTASSIUM PHOSPHATE, MONOBASIC POTASSIUM PHOSPHATE, DIBASIC 236; 224 MG/ML; MG/ML
30 INJECTION, SOLUTION INTRAVENOUS ONCE
Refills: 0 | Status: COMPLETED | OUTPATIENT
Start: 2021-05-26 | End: 2021-05-26

## 2021-05-26 RX ORDER — SIMETHICONE 80 MG/1
80 TABLET, CHEWABLE ORAL EVERY 6 HOURS
Refills: 0 | Status: DISCONTINUED | OUTPATIENT
Start: 2021-05-26 | End: 2021-06-11

## 2021-05-26 RX ADMIN — ENOXAPARIN SODIUM 40 MILLIGRAM(S): 100 INJECTION SUBCUTANEOUS at 11:45

## 2021-05-26 RX ADMIN — Medication 50 GRAM(S): at 23:57

## 2021-05-26 RX ADMIN — POTASSIUM PHOSPHATE, MONOBASIC POTASSIUM PHOSPHATE, DIBASIC 83.33 MILLIMOLE(S): 236; 224 INJECTION, SOLUTION INTRAVENOUS at 10:08

## 2021-05-26 RX ADMIN — PIPERACILLIN AND TAZOBACTAM 25 GRAM(S): 4; .5 INJECTION, POWDER, LYOPHILIZED, FOR SOLUTION INTRAVENOUS at 05:01

## 2021-05-26 RX ADMIN — Medication 50 GRAM(S): at 10:08

## 2021-05-26 RX ADMIN — OXYCODONE HYDROCHLORIDE 10 MILLIGRAM(S): 5 TABLET ORAL at 00:45

## 2021-05-26 RX ADMIN — PIPERACILLIN AND TAZOBACTAM 25 GRAM(S): 4; .5 INJECTION, POWDER, LYOPHILIZED, FOR SOLUTION INTRAVENOUS at 12:00

## 2021-05-26 RX ADMIN — AMLODIPINE BESYLATE 5 MILLIGRAM(S): 2.5 TABLET ORAL at 05:02

## 2021-05-26 RX ADMIN — Medication 1000 UNIT(S): at 11:45

## 2021-05-26 RX ADMIN — OXYCODONE HYDROCHLORIDE 10 MILLIGRAM(S): 5 TABLET ORAL at 12:45

## 2021-05-26 RX ADMIN — OXYCODONE HYDROCHLORIDE 10 MILLIGRAM(S): 5 TABLET ORAL at 01:45

## 2021-05-26 RX ADMIN — OXYCODONE HYDROCHLORIDE 10 MILLIGRAM(S): 5 TABLET ORAL at 11:45

## 2021-05-26 RX ADMIN — PIPERACILLIN AND TAZOBACTAM 25 GRAM(S): 4; .5 INJECTION, POWDER, LYOPHILIZED, FOR SOLUTION INTRAVENOUS at 18:23

## 2021-05-26 NOTE — DIETITIAN INITIAL EVALUATION ADULT. - OTHER INFO
Pt 57 yo female with history of recent diagnosis of L breast cancer with metastatic disease to spine with severe pathologic fracture of L2 with moderate canal stenosis presented with hypercalcemia and hypokalemia likely due to metastatic disease - per chart review.     At time of visit, Pt appears alert, oriented. Per Pt her appetite/PO intake not well for past 2-3 weeks. Food preferences discussed. Will recommend to add PO supplement: Ensure Enlive - 2x daily to diet rx. Will add Magic Cup (290kcal, 9gm pro) X 2 daily as discussed with Pt. Pt C/O vomiting last night; Pt usually constipated, but Pt moved her bowel yesterday reported as well. Per Pt her height: ~66" & her weight: ~119# "one week ago at doctor's office." Per Pt her UBW: 125# "two months ago." No report of chewing or swallowing difficulty. RDN answered concerns related to diet. RDN remains available, Pt made aware.

## 2021-05-26 NOTE — PROVIDER CONTACT NOTE (OTHER) - REASON
K+ 3.5, Ca+ 7.3
HR 130s
HR 130s
patient BP elevated 176/105, HR in the 120-130's, rectal temp of 101.1

## 2021-05-26 NOTE — PROVIDER CONTACT NOTE (OTHER) - ASSESSMENT
AOx4, in no acute distress, resting in bed, denies pain
AOx4, resting in bed in no acute distress, denies palpitations
Patient currently in bed, resting. Patient denies chest pain, SOB, dizziness, or lightheadedness. Patient reports feeling cold and having shivers. Complains of pain in the back. Patient appears weak, with no appetite at this time. No acute distress noted.
Pt asymptomatic

## 2021-05-26 NOTE — DIETITIAN INITIAL EVALUATION ADULT. - ADD RECOMMEND
1. Encourage & assist Pt with meals; Monitor PO diet tolerance; Honor food preferences;        2. Correct E-lytes (Mg++, phosphorus) per MD discretion;      3. Monitor labs, hydration status;

## 2021-05-26 NOTE — ASSESSMENT
[FreeTextEntry1] : ROGELIO PEARSON  is a 56 year old female here for left breast cancer, ER 90%, OH 2%, HER2 negative, likely with metastatic disease to the spine. \par \par -we reviewed the findings of the PET scan which showed the large necrotic mass in the left breast involving overlying skin and adjacent chest wall musculature and sternum corresponds to known malignancy, additional separate lesions in left breast suspicious for additional site of disease, FDG avid LN in b/l lower cervical, left supraclavicular, left axillary, left retropectoral regions, b/l pulmonary nodules compatible with metastatic disease, left pleural effusion suspicious for malignant effusion, multiple lytic metastases in axial skeleton with severe pathologic compression fracture of L2 vertebral body.\par -pt sched for bone biopsy to confirm metastatic site on 5/25\par -labs today including tumor markers\par -saw Dr. Agrawal today for pathologic fracture, no surgery indicated at this time, rec LSO brace\par -back pain not well controlled, rx oxycodone; refer to radiation for palliative RT\par -treatment will include combination of CDK 4/6 inhibitors + AI; will start on letrozole while awaiting biopsy and potential palliative RT\par -plan for bisphosphates to prevent skeletal related events, rec dental exam\par -patient was given the time to ask questions which I answered to the best of my ability and to her apparent satisfaction.  I encouraged her to call me with any additional questions. \par \par FU after results\par --------------------\par \par UPDATE: labs indicated K of 2.6, Ca of 13.5, pt admitted to [facility].

## 2021-05-26 NOTE — PROGRESS NOTE ADULT - PROBLEM SELECTOR PLAN 4
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  - Oncology consult obtained. Appreciate recs.  - C/w Letrozole 2.5 mg daily  - PET/CT scan on 5/6/21 showing lesion in the posterior left iliac bone. S/P IR bone biopsy. After MRI then IR can also perform kyphoplasty   - Lactate elevated to 2.4 on admission. Likely from metastatic breast cancer.

## 2021-05-26 NOTE — CONSULT NOTE ADULT - ASSESSMENT
Assessment:  The patient is a 56 year old female with past medical history of left breast cancer with metastatic disease to spine with severe pathologic fracture of L2 with moderate spinal canal stenosis who initially presented with hypercalcemia (13.5) and hypokalemia (2.6) on outpatient labs. She was recently diagnosed with left sided breast cancer in April after core biopsy of a left breast mass revealed invasive moderately differentiated ductal carcinoma and was started on Letrozole. Soon after her breast cancer diagnosis, she had an MRI lumbar spine for persistent lower back pain that showed metastatic disease in the lower thoracic, lumbar, and upper sacral spine with severe pathologic fracture of L2 with retropulsion of the posterior endplate causing moderate canal stenosis. She was noted to have fever yesterday and infectious disease was consulted for further workup.    Plan:  # Fever, rule out nosocomial infection vs. underlying malignancy  - Await final blood cultures with sensitivities  - Monitor fever curve  - Trend CBC and CMP daily  - ID will continue to follow      Case not yet discussed with attending.      Cirilo Stroud MD PGY-4   Fellow, Infectious Diseases   Pager: [phone]  If no response, after 5pm and on weekends: Call [phone]   Assessment:  The patient is a 56 year old female with past medical history of left breast cancer with metastatic disease to spine with severe pathologic fracture of L2 with moderate spinal canal stenosis who initially presented with hypercalcemia (13.5) and hypokalemia (2.6) on outpatient labs. She was recently diagnosed with left sided breast cancer in April after core biopsy of a left breast mass revealed invasive moderately differentiated ductal carcinoma and was started on Letrozole. Soon after her breast cancer diagnosis, she had an MRI lumbar spine for persistent lower back pain that showed metastatic disease in the lower thoracic, lumbar, and upper sacral spine with severe pathologic fracture of L2 with retropulsion of the posterior endplate causing moderate canal stenosis. She was noted to have fever yesterday and infectious disease was consulted for further workup.    Plan:  # Fever likely due to underlying breast cancer  - Would favor discontinuing all antibiotics for now  - Await final blood cultures with sensitivities  - Recent UA negative  - Monitor fever curve  - Trend CBC and CMP daily  - Plan of care discussed with primary team  - ID will continue to follow    Case discussed with attending MD Cirilo Kaur MD PGY-4   Fellow, Infectious Diseases   Pager: [phone]  If no response, after 5pm and on weekends: Call [phone]   Assessment:  The patient is a 56 year old female with past medical history of left breast cancer with metastatic disease to spine with severe pathologic fracture of L2 with moderate spinal canal stenosis who initially presented with hypercalcemia (13.5) and hypokalemia (2.6) on outpatient labs. She was recently diagnosed with left sided breast cancer in April after core biopsy of a left breast mass revealed invasive moderately differentiated ductal carcinoma and was started on Letrozole. Soon after her breast cancer diagnosis, she had an MRI lumbar spine for persistent lower back pain that showed metastatic disease in the lower thoracic, lumbar, and upper sacral spine with severe pathologic fracture of L2 with retropulsion of the posterior endplate causing moderate canal stenosis. She was noted to have fever yesterday and infectious disease was consulted for further workup.    Plan:  # Fever likely due to underlying breast cancer  - Would favor discontinuing all antibiotics for now  - Await final blood cultures with sensitivities  - Recent UA negative  - Monitor fever curve  - Plan of care discussed with primary team  - ID will continue to follow    Case discussed with attending MD Cirilo Kaur MD PGY-4   Fellow, Infectious Diseases   Pager: [phone]  If no response, after 5pm and on weekends: Call [phone]

## 2021-05-26 NOTE — CHART NOTE - NSCHARTNOTEFT_GEN_A_CORE
Ms. Babb agreed to sign consent reluctantly for RT to the spine for L2 compression fracture.  She did mention she would still think it over and asked if she could change her mind again.    Plan for CT simulation tomorrow followed by likely single fraction RT to the spine. Ms. Babb agreed to sign consent reluctantly for RT to the spine for L2 compression fracture.  She did mention she would still think it over and asked if she could change her mind again.    Plan for CT simulation tomorrow followed by likely single fraction RT to the spine.  No acute painful site to the ribs but rather "all over" pains per Ms. Babb.  No plan at present for RT to the ribs.

## 2021-05-26 NOTE — CHART NOTE - NSCHARTNOTEFT_GEN_A_CORE
[facility] MEDICINE COVERAGE - Medicine Subsequent Hospital Care Note    CC:  HPI/Subjective:    ROS:  Denies fever, chills, diaphoresis, malaise, night sweats, generalized weakness, headache, changes in vision, dizziness, cough, sputum production, wheezing, hemoptysis, chest pain, palpitations, shortness of breath, dyspnea on exertion, PND, dysphagia, new abdominal pain, nausea, vomiting, diarrhea, constipation, melena, hematochezia, dysuria, increased urinary frequency/urgency, hematuria, nocturia, numbness/weakness/tingling, any other complaints.    [  ] I spoke with the attending, nurse, and family to obtain the history.  [  ] Unable to obtain history due to ___________.    Vital Signs Last 24 Hrs  T(C): 37.1 (21 @ 16:25), Max: 37.1 (21 @ 11:30)  T(F): 98.8 (21 @ 16:25), Max: 98.8 (21 @ 16:25)  HR: 119 (21 @ 16:25) (115 - 121)  BP: 159/97 (21 @ 16:25) (124/90 - 175/108)  RR: 18 (21 @ 16:25) (18 - 25)  SpO2: 97% (21 @ 16:25) (95% - 100%) on (O2)    PHYSICAL EXAM:  Constitutional: NAD, well-developed, well-nourished  Ears, nose, Mouth, and Throat: normal external ears and nose, normal hearing, moist oral mucosa  Eyes: normal conjunctiva, EOMI, PEERL  Neck: supple, no JVD  Respiratory: Clear to auscultation bilaterally. No wheezes, rales or rhonchi. Normal respiratory effort  Cardiovascular: regular rate and rhythm, S1 and S2, no murmurs, rubs or gallops, no edema, 2+ peripheral pulses  Gastrointestinal: soft, nontender, nondistended, +bowel sounds, no hernia  Skin: warm, dry, no rash  Neurologic: sensation grossly intact, CN grossly intact, non-focal exam  Musculoskeletal: no clubbing, no cyanosis, no joint swelling  Psychiatric: A&Ox3, appropriate mood, affect    LABS:                        10.2   9.51  )-----------( 326      ( 26 May 2021 06:35 )             31.2         136  |  102  |  6<L>  ----------------------------<  91  3.6   |  19<L>  |  0.69    Ca    7.1<L>      26 May 2021 06:35  Phos  1.6       Mg     1.4         TPro  7.0  /  Alb  3.5  /  TBili  0.3  /  DBili  x   /  AST  83<H>  /  ALT  97<H>  /  AlkPhos  184<H>      PT/INR: PT: 13.5 sec | INR: 1.18 ratio (21 @ 05:02)  PTT: 26.4 sec (21 @ 05:02)  PT/INR: PT: 12.8 sec | INR: 1.12 ratio (21 @ 07:42)  PTT: 24.8 sec (21 @ 07:42)    CARDIAC ENZYMES            Serum Pro-Brain Natriuretic Peptide:   D-Dimer Assay:     Urinanalysis Basic (21 @ 19:19):  Color: Light Yellow / Appearance: Clear / S.010 / pH: --  Gluc: -- / Ketone: Small / Bili: Negative / Urobili: <2 mg/dL  Blood: -- / Protein: Trace / Nitrite: Negative  Leuk Esterase: Negative / RBC: -- / WBC: --  Sq Epi: -- / Non Sq Epi: -- / Bacteria: --      Blood Gas Venous (21 @ 19:19):  pH: 7.47 | HCO3: 32 | pCO2: 48 | pO2: 31 | Lactate: 2.4      Blood Gas Arterial (21 @ 19:19):      CAPILLARY BLOOD GLUCOSE:      COVID PCR:  NotDetec (21 @ 14:26)      RADIOLOGY:    MEDICATIONS  (STANDING):  amLODIPine   Tablet 5 milliGRAM(s) Oral daily  cholecalciferol 1000 Unit(s) Oral daily  enoxaparin Injectable 40 milliGRAM(s) SubCutaneous daily  letrozole 2.5 milliGRAM(s) Oral daily  LORazepam     Tablet 0.5 milliGRAM(s) Oral once  piperacillin/tazobactam IVPB.. 3.375 Gram(s) IV Intermittent every 8 hours  polyethylene glycol 3350 17 Gram(s) Oral daily  senna 2 Tablet(s) Oral at bedtime  simethicone 80 milliGRAM(s) Chew once  sodium chloride 0.9%. 1000 milliLiter(s) (125 mL/Hr) IV Continuous <Continuous>    MEDICATIONS  (PRN):  acetaminophen   Tablet .. 650 milliGRAM(s) Oral every 6 hours PRN Temp greater or equal to 38C (100.4F), Mild Pain (1 - 3)  bisacodyl Suppository 10 milliGRAM(s) Rectal daily PRN Constipation  ondansetron Injectable 4 milliGRAM(s) IV Push every 6 hours PRN Nausea and/or Vomiting  oxyCODONE    IR 5 milliGRAM(s) Oral every 4 hours PRN Moderate Pain (4 - 6)  oxyCODONE    IR 10 milliGRAM(s) Oral every 4 hours PRN Severe Pain (7 - 10)  simethicone 80 milliGRAM(s) Chew every 6 hours PRN Gas    I&O's Summary    25 May 2021 07:01  -  26 May 2021 07:00  --------------------------------------------------------  IN: 1965 mL / OUT: 3200 mL / NET: -1235 mL    26 May 2021 07:01  -  26 May 2021 19:19  --------------------------------------------------------  IN: 0 mL / OUT: 1400 mL / NET: -1400 mL      I reviewed the above labs, radiology, medications, tests, telemetry, and EKG interpretation.    ASSESSMENT/PLAN:    - Clinical findings, labs, tests, telemetry, and ekg reviewed with attending. Will monitor patient closely.       Low complexity/risk (30min)  rn called pt with abdominal distention   pt seen and examined pos bowel sounds x 4 quadrants, soft, nontender  simethicone added   continue bowel regimen  f/u xray of abdomen   pt last bm    d/w medical attending Dr. Henry in agreement with plan

## 2021-05-26 NOTE — CONSULT NOTE ADULT - SUBJECTIVE AND OBJECTIVE BOX
The patient is a 56 year old female who presents with a chief complaint of back pain. (25 May 2021 14:44)    HPI:  The patient is a 56 year old female with past medical history of left breast cancer with metastatic disease to spine with severe pathologic fracture of L2 with moderate spinal canal stenosis who initially presented with hypercalcemia (13.5) and hypokalemia (2.6) on outpatient labs. She was recently diagnosed with left sided breast cancer in April after core biopsy of a left breast mass revealed invasive moderately differentiated ductal carcinoma and was started on Letrozole. Soon after her breast cancer diagnosis, she had an MRI lumbar spine for persistent lower back pain that showed metastatic disease in the lower thoracic, lumbar, and upper sacral spine with severe pathologic fracture of L2 with retropulsion of the posterior endplate causing moderate canal stenosis. She denies any worsening or new back pain, weakness, numbness/tingling, or saddle anesthesia. She states that she has been experiencing increasing fatigue for the last 2 weeks, though her appetite and oral intake remain strong. She has also been having frequent bouts of constipation during the last 2-3 months, requiring use of fleet enemas, suppositories, and laxatives. Her last bowel movement was 5 days ago, though she has been passing gas since then. Otherwise, she has no other complaints. She denies any chills, cough, headaches, vision changes, chest pain, shortness of breath, palpitations, abdominal pain, nausea, vomiting, diarrhea, or dysuria. She spiked a fever (100.4) yesterday around 5 pm. She is s/p ercutaneous CT-guided coaxial core needle biopsy on 21.       CBC showed microcytic anemia otherwise unremarkable. CMP showed hypocalcemia otherwise unremarkable. Elevated alkaline phosphatase with a transaminitis was noted. Hypomagnesemia and hypophosphatemia were also noted. Urinalysis was negative. Blood cultures showed no growth to date, with an initial set negative. COVID-19 PCR was negative. COVID-19 Ab was positive. Bilateral DVT study was negative for DVT. CXR showed innumerable bilateral pulmonary nodules again seen with new ill-defined medial right upper lung opacities, nonspecific findings, which may be due to subsegmental atelectasis or pneumonia. A small left pleural effusion which appears larger with likely associated passive atelectasis and a possible new trace right pleural effusion. Lytic bone metastases again seen with a new pathologic right second rib fracture. She received intravenous vancomycin and started on intravenous piperacillin-tazobactam. Cytopathology report is pending. Infectious disease was consulted for workup of fever.               prior hospital charts reviewed [x]  primary team notes reviewed [x]  other consultant notes reviewed [x]    PAST MEDICAL & SURGICAL HISTORY:  Breast cancer  Lumbar compression fracture  No significant past surgical history        Allergies  No Known Allergies    ANTIMICROBIALS (past 90 days)  MEDICATIONS  (STANDING):  piperacillin/tazobactam IVPB.   200 mL/Hr IV Intermittent (21 @ 18:52)    piperacillin/tazobactam IVPB.   200 mL/Hr IV Intermittent (21 @ 14:49)    piperacillin/tazobactam IVPB..   25 mL/Hr IV Intermittent (21 @ 05:12)   25 mL/Hr IV Intermittent (21 @ 21:15)   25 mL/Hr IV Intermittent (21 @ 13:20)   25 mL/Hr IV Intermittent (21 @ 06:07)   25 mL/Hr IV Intermittent (21 @ 22:51)   25 mL/Hr IV Intermittent (21 @ 15:30)   25 mL/Hr IV Intermittent (21 @ 06:37)   25 mL/Hr IV Intermittent (21 @ 21:59)    piperacillin/tazobactam IVPB..   25 mL/Hr IV Intermittent (21 @ 05:01)    vancomycin  IVPB   250 mL/Hr IV Intermittent (21 @ 21:47)    vancomycin  IVPB   250 mL/Hr IV Intermittent (21 @ 18:14)   250 mL/Hr IV Intermittent (21 @ 05:00)   250 mL/Hr IV Intermittent (21 @ 18:10)        piperacillin/tazobactam IVPB.. 3.375 every 8 hours    OTHER MEDS: MEDICATIONS  (STANDING):  acetaminophen   Tablet .. 650 every 6 hours PRN  amLODIPine   Tablet 5 daily  bisacodyl Suppository 10 daily PRN  enoxaparin Injectable 40 daily  letrozole 2.5 daily  LORazepam     Tablet 0.5 once  ondansetron Injectable 4 every 6 hours PRN  oxyCODONE    IR 5 every 4 hours PRN  oxyCODONE    IR 10 every 4 hours PRN  polyethylene glycol 3350 17 daily  senna 2 at bedtime    SOCIAL HISTORY:   Denies smoking, alcohol, or recreational drug use     FAMILY HISTORY:  Family history of cervical cancer (Mother)    Family history of prostate cancer (Father)      REVIEW OF SYSTEMS  [  ] ROS unobtainable because:    [x] All other systems negative except as noted below:	    Constitutional:  [x] fever [ ] chills  [ ] weight loss  [ ] weakness  Skin:  [ ] rash [ ] phlebitis	  Eyes: [ ] icterus [ ] pain  [ ] discharge	  ENMT: [ ] sore throat  [ ] thrush [ ] ulcers [ ] exudates  Respiratory: [ ] dyspnea [ ] hemoptysis [ ] cough [ ] sputum	  Cardiovascular:  [ ] chest pain [ ] palpitations [ ] edema	  Gastrointestinal:  [ ] nausea [ ] vomiting [ ] diarrhea [ ] constipation [ ] pain	  Genitourinary:  [ ] dysuria [ ] frequency [ ] hematuria [ ] discharge [ ] flank pain  [ ] incontinence  Musculoskeletal:  [ ] myalgias [ ] arthralgias [ ] arthritis  [x] back pain  Neurological:  [ ] headache [ ] seizures  [ ] confusion/altered mental status  Psychiatric:  [ ] anxiety [ ] depression	  Hematology/Lymphatics:  [ ] lymphadenopathy  Endocrine:  [ ] adrenal [ ] thyroid  Allergic/Immunologic:	 [ ] transplant [ ] seasonal    Vital Signs Last 24 Hrs  T(F): 98.7 (21 @ 11:30), Max: 101.1 (21 @ 09:02)  Vital Signs Last 24 Hrs  HR: 118 (21 @ 11:30) (115 - 133)  BP: 154/90 (21 @ 11:30) (124/90 - 175/108)  RR: 18 (21 @ 11:30)  SpO2: 100% (21 @ 11:30) (95% - 100%)  Wt(kg): --    PHYSICAL EXAM:  Constitutional: non-toxic, no distress  HEAD/EYES: anicteric, no conjunctival injection  ENT:  supple, no thrush  Cardiovascular:   normal S1, S2, no murmur, no edema  Respiratory:  clear BS bilaterally, no wheezes, no rales  GI:  soft, non-tender, normal bowel sounds  :  no escalante, no CVA tenderness  Musculoskeletal:  no synovitis, normal ROM  Neurologic: awake and alert, normal strength, no focal findings  Skin:  no rash, no erythema, no phlebitis  Heme/Onc: no lymphadenopathy   Psychiatric:  awake, alert, appropriate mood                            10.2   9.51  )-----------( 326      ( 26 May 2021 06:35 )             31.2       136  |  102  |  6<L>  ----------------------------<  91  3.6   |  19<L>  |  0.69    Ca    7.1<L>      26 May 2021 06:35  Phos  1.6       Mg     1.4         TPro  7.0  /  Alb  3.5  /  TBili  0.3  /  DBili  x   /  AST  83<H>  /  ALT  97<H>  /  AlkPhos  184<H>      Urinalysis Basic - ( 25 May 2021 17:04 )    Color: Light Yellow / Appearance: Clear / S.010 / pH: x  Gluc: x / Ketone: Small  / Bili: Negative / Urobili: <2 mg/dL   Blood: x / Protein: Trace / Nitrite: Negative   Leuk Esterase: Negative / RBC: x / WBC x   Sq Epi: x / Non Sq Epi: x / Bacteria: x    MICROBIOLOGY    :Vancomycin Level, Trough: 5.3 (05-24 @ 05:02)  Vancomycin Level, Trough: 10.2 (05-23 @ 07:17)  Vancomycin Level, Trough: 16.4 (0522 @ 15:13)  Vancomycin Level, Trough: 25.0 (05-22 @ 06:13)    Culture - Blood (collected 25 May 2021 06:22)  Source: .Blood Blood-Venous  Preliminary Report (26 May 2021 07:01):    No growth to date.    Culture - Blood (collected 25 May 2021 06:22)  Source: .Blood Blood-Peripheral  Preliminary Report (26 May 2021 07:01):    No growth to date.                  RADIOLOGY:    < from: US Duplex Venous Lower Ext Complete, Bilateral (21 @ 12:21) >  FINDINGS:    RIGHT:  Normal compressibility of the RIGHT common femoral, femoral and popliteal veins.  Doppler examination shows normal spontaneous and phasic flow.  No RIGHT calf vein thrombosis is detected.    LEFT:  Normal compressibility of the LEFT common femoral, femoral and popliteal veins.  Doppler examination shows normal spontaneous and phasic flow.  No LEFT calf vein thrombosis is detected.    IMPRESSION:  No evidence of deep venous thrombosis in either lower extremity.        < end of copied text >      < from: Xray Chest 1 View- PORTABLE-Urgent (Xray Chest 1 View- PORTABLE-Urgent .) (21 @ 02:43) >    IMPRESSION:  Innumerable bilateral pulmonary nodules again seen.    New ill-defined medial right upper lung opacities, nonspecific findings, which may be due to subsegmental atelectasis or pneumonia.    Small left pleural effusion which appears larger. Likely associated passive atelectasis. Possible new trace right pleural effusion.    Lytic bone metastases again seen. New pathologic right second rib fracture.      < end of copied text >   The patient is a 56 year old female who presents with a chief complaint of back pain. (25 May 2021 14:44)    HPI:  The patient is a 56 year old female with past medical history of left breast cancer with metastatic disease to spine with severe pathologic fracture of L2 with moderate spinal canal stenosis who initially referred to [facility] from [facility] with hypercalcemia (13.5) and hypokalemia (2.6) on outpatient labs. She was recently diagnosed with left sided breast cancer in April after core biopsy of a left breast mass revealed invasive moderately differentiated ductal carcinoma and was started on Letrozole. Soon after her breast cancer diagnosis, she had an MRI lumbar spine for persistent lower back pain that showed metastatic disease in the lower thoracic, lumbar, and upper sacral spine with severe pathologic fracture of L2 with retropulsion of the posterior endplate causing moderate canal stenosis. She denies any worsening or new back pain, weakness, numbness/tingling, or saddle anesthesia. She states that she has been experiencing increasing fatigue for the last 2 weeks, though her appetite and oral intake remain strong. She has also been having frequent bouts of constipation during the last 2-3 months, requiring use of fleet enemas, suppositories, and laxatives. Her last bowel movement was 5 days ago, though she has been passing gas since then. Otherwise, she has no other complaints. She denies any chills, cough, headaches, vision changes, chest pain, shortness of breath, palpitations, abdominal pain, nausea, vomiting, diarrhea, or dysuria. She spiked a fever (100.4) yesterday around 5 pm. She is s/p ercutaneous CT-guided coaxial core needle biopsy on 21.       CBC showed microcytic anemia otherwise unremarkable. CMP showed hypocalcemia otherwise unremarkable. Elevated alkaline phosphatase with a transaminitis was noted. Hypomagnesemia and hypophosphatemia were also noted. Urinalysis was negative. Blood cultures showed no growth to date, with an initial set negative. COVID-19 PCR was negative. COVID-19 Ab was positive. Bilateral DVT study was negative for DVT. CXR showed innumerable bilateral pulmonary nodules again seen with new ill-defined medial right upper lung opacities, nonspecific findings, which may be due to subsegmental atelectasis or pneumonia. A small left pleural effusion which appears larger with likely associated passive atelectasis and a possible new trace right pleural effusion. Lytic bone metastases again seen with a new pathologic right second rib fracture. She received intravenous vancomycin and started on intravenous piperacillin-tazobactam. Cytopathology report is pending. Infectious disease was consulted for workup of fever.               prior hospital charts reviewed [x]  primary team notes reviewed [x]  other consultant notes reviewed [x]    PAST MEDICAL & SURGICAL HISTORY:  Breast cancer  Lumbar compression fracture  No significant past surgical history        Allergies  No Known Allergies    ANTIMICROBIALS (past 90 days)  MEDICATIONS  (STANDING):  piperacillin/tazobactam IVPB.   200 mL/Hr IV Intermittent (21 @ 18:52)    piperacillin/tazobactam IVPB.   200 mL/Hr IV Intermittent (21 @ 14:49)    piperacillin/tazobactam IVPB..   25 mL/Hr IV Intermittent (21 @ 05:12)   25 mL/Hr IV Intermittent (21 @ 21:15)   25 mL/Hr IV Intermittent (21 @ 13:20)   25 mL/Hr IV Intermittent (21 @ 06:07)   25 mL/Hr IV Intermittent (21 @ 22:51)   25 mL/Hr IV Intermittent (21 @ 15:30)   25 mL/Hr IV Intermittent (21 @ 06:37)   25 mL/Hr IV Intermittent (21 @ 21:59)    piperacillin/tazobactam IVPB..   25 mL/Hr IV Intermittent (21 @ 05:01)    vancomycin  IVPB   250 mL/Hr IV Intermittent (21 @ 21:47)    vancomycin  IVPB   250 mL/Hr IV Intermittent (21 @ 18:14)   250 mL/Hr IV Intermittent (21 @ 05:00)   250 mL/Hr IV Intermittent (21 @ 18:10)        piperacillin/tazobactam IVPB.. 3.375 every 8 hours    OTHER MEDS: MEDICATIONS  (STANDING):  acetaminophen   Tablet .. 650 every 6 hours PRN  amLODIPine   Tablet 5 daily  bisacodyl Suppository 10 daily PRN  enoxaparin Injectable 40 daily  letrozole 2.5 daily  LORazepam     Tablet 0.5 once  ondansetron Injectable 4 every 6 hours PRN  oxyCODONE    IR 5 every 4 hours PRN  oxyCODONE    IR 10 every 4 hours PRN  polyethylene glycol 3350 17 daily  senna 2 at bedtime    SOCIAL HISTORY:   Denies smoking, alcohol, or recreational drug use     FAMILY HISTORY:  Family history of cervical cancer (Mother)    Family history of prostate cancer (Father)      REVIEW OF SYSTEMS  [  ] ROS unobtainable because:    [x] All other systems negative except as noted below:	    Constitutional:  [x] fever [ ] chills  [ ] weight loss  [ ] weakness  Skin:  [ ] rash [ ] phlebitis	  Eyes: [ ] icterus [ ] pain  [ ] discharge	  ENMT: [ ] sore throat  [ ] thrush [ ] ulcers [ ] exudates  Respiratory: [ ] dyspnea [ ] hemoptysis [ ] cough [ ] sputum	  Cardiovascular:  [ ] chest pain [ ] palpitations [ ] edema	  Gastrointestinal:  [ ] nausea [ ] vomiting [ ] diarrhea [ ] constipation [ ] pain	  Genitourinary:  [ ] dysuria [ ] frequency [ ] hematuria [ ] discharge [ ] flank pain  [ ] incontinence  Musculoskeletal:  [ ] myalgias [ ] arthralgias [ ] arthritis  [x] back pain  Neurological:  [ ] headache [ ] seizures  [ ] confusion/altered mental status  Psychiatric:  [ ] anxiety [ ] depression	  Hematology/Lymphatics:  [ ] lymphadenopathy  Endocrine:  [ ] adrenal [ ] thyroid  Allergic/Immunologic:	 [ ] transplant [ ] seasonal    Vital Signs Last 24 Hrs  T(F): 98.7 (21 @ 11:30), Max: 101.1 (21 @ 09:02)  Vital Signs Last 24 Hrs  HR: 118 (21 @ 11:30) (115 - 133)  BP: 154/90 (21 @ 11:30) (124/90 - 175/108)  RR: 18 (21 @ 11:30)  SpO2: 100% (21 @ 11:30) (95% - 100%)  Wt(kg): --    PHYSICAL EXAM:  Constitutional: non-toxic, no distress  HEAD/EYES: anicteric, no conjunctival injection  ENT:  supple, no thrush  Cardiovascular:   normal S1, S2, no murmur, no edema  Respiratory:  clear BS bilaterally, no wheezes, no rales  GI:  soft, non-tender, normal bowel sounds  :  no escalante, no CVA tenderness  Musculoskeletal:  no synovitis, normal ROM  Neurologic: awake and alert, normal strength, no focal findings  Skin:  no rash, no erythema, no phlebitis  Heme/Onc: no lymphadenopathy   Psychiatric:  awake, alert, appropriate mood                            10.2   9.51  )-----------( 326      ( 26 May 2021 06:35 )             31.2       136  |  102  |  6<L>  ----------------------------<  91  3.6   |  19<L>  |  0.69    Ca    7.1<L>      26 May 2021 06:35  Phos  1.6       Mg     1.4         TPro  7.0  /  Alb  3.5  /  TBili  0.3  /  DBili  x   /  AST  83<H>  /  ALT  97<H>  /  AlkPhos  184<H>      Urinalysis Basic - ( 25 May 2021 17:04 )    Color: Light Yellow / Appearance: Clear / S.010 / pH: x  Gluc: x / Ketone: Small  / Bili: Negative / Urobili: <2 mg/dL   Blood: x / Protein: Trace / Nitrite: Negative   Leuk Esterase: Negative / RBC: x / WBC x   Sq Epi: x / Non Sq Epi: x / Bacteria: x    MICROBIOLOGY    :Vancomycin Level, Trough: 5.3 (05-24 @ 05:02)  Vancomycin Level, Trough: 10.2 (05-23 @ 07:17)  Vancomycin Level, Trough: 16.4 (22 @ 15:13)  Vancomycin Level, Trough: 25.0 (05-22 @ 06:13)    Culture - Blood (collected 25 May 2021 06:22)  Source: .Blood Blood-Venous  Preliminary Report (26 May 2021 07:01):    No growth to date.    Culture - Blood (collected 25 May 2021 06:22)  Source: .Blood Blood-Peripheral  Preliminary Report (26 May 2021 07:01):    No growth to date.                  RADIOLOGY:    < from: US Duplex Venous Lower Ext Complete, Bilateral (21 @ 12:21) >  FINDINGS:    RIGHT:  Normal compressibility of the RIGHT common femoral, femoral and popliteal veins.  Doppler examination shows normal spontaneous and phasic flow.  No RIGHT calf vein thrombosis is detected.    LEFT:  Normal compressibility of the LEFT common femoral, femoral and popliteal veins.  Doppler examination shows normal spontaneous and phasic flow.  No LEFT calf vein thrombosis is detected.    IMPRESSION:  No evidence of deep venous thrombosis in either lower extremity.        < end of copied text >      < from: Xray Chest 1 View- PORTABLE-Urgent (Xray Chest 1 View- PORTABLE-Urgent .) (21 @ 02:43) >    IMPRESSION:  Innumerable bilateral pulmonary nodules again seen.    New ill-defined medial right upper lung opacities, nonspecific findings, which may be due to subsegmental atelectasis or pneumonia.    Small left pleural effusion which appears larger. Likely associated passive atelectasis. Possible new trace right pleural effusion.    Lytic bone metastases again seen. New pathologic right second rib fracture.      < end of copied text >   HPI:   The patient is a 56 year old female with past medical history of left breast cancer with metastatic disease to spine with severe pathologic fracture of L2 with moderate spinal canal stenosis who initially referred to [facility] from [facility] with hypercalcemia (13.5) and hypokalemia (2.6) on outpatient labs. She was recently diagnosed with left sided breast cancer in April after core biopsy of a left breast mass revealed invasive moderately differentiated ductal carcinoma and was started on Letrozole. Soon after her breast cancer diagnosis, she had an MRI lumbar spine for persistent lower back pain that showed metastatic disease in the lower thoracic, lumbar, and upper sacral spine with severe pathologic fracture of L2 with retropulsion of the posterior endplate causing moderate canal stenosis. She denies any worsening or new back pain, weakness, numbness/tingling, or saddle anesthesia. She states that she has been experiencing increasing fatigue for the last 2 weeks, though her appetite and oral intake remain strong. She has also been having frequent bouts of constipation during the last 2-3 months, requiring use of fleet enemas, suppositories, and laxatives. Her last bowel movement was 5 days ago, though she has been passing gas since then. Otherwise, she has no other complaints. She denies any chills, cough, headaches, vision changes, chest pain, shortness of breath, palpitations, abdominal pain, nausea, vomiting, diarrhea, or dysuria. She spiked a fever (100.4) yesterday around 5 pm. She is s/p ercutaneous CT-guided coaxial core needle biopsy on 21.     CBC showed microcytic anemia otherwise unremarkable. CMP showed hypocalcemia otherwise unremarkable. Elevated alkaline phosphatase with a transaminitis was noted. Hypomagnesemia and hypophosphatemia were also noted. Urinalysis was negative. Blood cultures showed no growth to date, with an initial set negative. COVID-19 PCR was negative. COVID-19 Ab was positive. Bilateral DVT study was negative for DVT. CXR showed innumerable bilateral pulmonary nodules again seen with new ill-defined medial right upper lung opacities, nonspecific findings, which may be due to subsegmental atelectasis or pneumonia. A small left pleural effusion which appears larger with likely associated passive atelectasis and a possible new trace right pleural effusion. Lytic bone metastases again seen with a new pathologic right second rib fracture. She received intravenous vancomycin and started on intravenous piperacillin-tazobactam. Cytopathology report is pending. Infectious disease was consulted for workup of fever.       prior hospital charts reviewed [x]  primary team notes reviewed [x]  other consultant notes reviewed [x]    PAST MEDICAL & SURGICAL HISTORY:  Breast cancer  Lumbar compression fracture  No significant past surgical history        Allergies  No Known Allergies    ANTIMICROBIALS (past 90 days)  MEDICATIONS  (STANDING):  piperacillin/tazobactam IVPB.   200 mL/Hr IV Intermittent (21 @ 18:52)    piperacillin/tazobactam IVPB.   200 mL/Hr IV Intermittent (21 @ 14:49)    piperacillin/tazobactam IVPB..   25 mL/Hr IV Intermittent (21 @ 05:12)   25 mL/Hr IV Intermittent (21 @ 21:15)   25 mL/Hr IV Intermittent (21 @ 13:20)   25 mL/Hr IV Intermittent (21 @ 06:07)   25 mL/Hr IV Intermittent (21 @ 22:51)   25 mL/Hr IV Intermittent (21 @ 15:30)   25 mL/Hr IV Intermittent (21 @ 06:37)   25 mL/Hr IV Intermittent (21 @ 21:59)    piperacillin/tazobactam IVPB..   25 mL/Hr IV Intermittent (21 @ 05:01)    vancomycin  IVPB   250 mL/Hr IV Intermittent (21 @ 21:47)    vancomycin  IVPB   250 mL/Hr IV Intermittent (21 @ 18:14)   250 mL/Hr IV Intermittent (21 @ 05:00)   250 mL/Hr IV Intermittent (21 @ 18:10)        piperacillin/tazobactam IVPB.. 3.375 every 8 hours    OTHER MEDS: MEDICATIONS  (STANDING):  acetaminophen   Tablet .. 650 every 6 hours PRN  amLODIPine   Tablet 5 daily  bisacodyl Suppository 10 daily PRN  enoxaparin Injectable 40 daily  letrozole 2.5 daily  LORazepam     Tablet 0.5 once  ondansetron Injectable 4 every 6 hours PRN  oxyCODONE    IR 5 every 4 hours PRN  oxyCODONE    IR 10 every 4 hours PRN  polyethylene glycol 3350 17 daily  senna 2 at bedtime    SOCIAL HISTORY:   Denies smoking, alcohol, or recreational drug use     FAMILY HISTORY:  Family history of cervical cancer (Mother)  Family history of prostate cancer (Father)      REVIEW OF SYSTEMS  [  ] ROS unobtainable because:    [x] All other systems negative except as noted below:	    Constitutional:  [x] fever [ ] chills  [ ] weight loss  [ ] weakness  Skin:  [ ] rash [ ] phlebitis	  Eyes: [ ] icterus [ ] pain  [ ] discharge	  ENMT: [ ] sore throat  [ ] thrush [ ] ulcers [ ] exudates  Respiratory: [ ] dyspnea [ ] hemoptysis [ ] cough [ ] sputum	  Cardiovascular:  [ ] chest pain [ ] palpitations [ ] edema	  Gastrointestinal:  [ ] nausea [ ] vomiting [ ] diarrhea [ ] constipation [ ] pain	  Genitourinary:  [ ] dysuria [ ] frequency [ ] hematuria [ ] discharge [ ] flank pain  [ ] incontinence  Musculoskeletal:  [ ] myalgias [ ] arthralgias [ ] arthritis  [x] back pain  Neurological:  [ ] headache [ ] seizures  [ ] confusion/altered mental status  Psychiatric:  [ ] anxiety [ ] depression	  Hematology/Lymphatics:  [ ] lymphadenopathy  Endocrine:  [ ] adrenal [ ] thyroid  Allergic/Immunologic:	 [ ] transplant [ ] seasonal    Vital Signs Last 24 Hrs  T(F): 98.7 (21 @ 11:30), Max: 101.1 (21 @ 09:02)  Vital Signs Last 24 Hrs  HR: 118 (21 @ 11:30) (115 - 133)  BP: 154/90 (21 @ 11:30) (124/90 - 175/108)  RR: 18 (21 @ 11:30)  SpO2: 100% (21 @ 11:30) (95% - 100%)  Wt(kg): --    PHYSICAL EXAM:  Constitutional: non-toxic, no distress  HEAD/EYES: anicteric, no conjunctival injection  ENT:  supple, no thrush  Cardiovascular:   normal S1, S2, no murmur, no edema  Respiratory:  clear BS bilaterally, no wheezes, no rales  GI:  soft, non-tender, normal bowel sounds  :  no escalante, no CVA tenderness  Musculoskeletal:  no synovitis, normal ROM  Neurologic: awake and alert, normal strength, no focal findings  Skin:  no rash, no erythema, no phlebitis  Heme/Onc: no lymphadenopathy   Psychiatric:  awake, alert, appropriate mood                            10.2   9.51  )-----------( 326      ( 26 May 2021 06:35 )             31.2       136  |  102  |  6<L>  ----------------------------<  91  3.6   |  19<L>  |  0.69    Ca    7.1<L>      26 May 2021 06:35  Phos  1.6       Mg     1.4         TPro  7.0  /  Alb  3.5  /  TBili  0.3  /  DBili  x   /  AST  83<H>  /  ALT  97<H>  /  AlkPhos  184<H>      Urinalysis Basic - ( 25 May 2021 17:04 )    Color: Light Yellow / Appearance: Clear / S.010 / pH: x  Gluc: x / Ketone: Small  / Bili: Negative / Urobili: <2 mg/dL   Blood: x / Protein: Trace / Nitrite: Negative   Leuk Esterase: Negative / RBC: x / WBC x   Sq Epi: x / Non Sq Epi: x / Bacteria: x    MICROBIOLOGY  Vancomycin Level, Trough: 5.3 ( @ 05:02)  Vancomycin Level, Trough: 10.2 ( @ 07:17)  Vancomycin Level, Trough: 16.4 ( @ 15:13)  Vancomycin Level, Trough: 25.0 (05-22 @ 06:13)    Culture - Blood (collected 25 May 2021 06:22)  Source: .Blood Blood-Venous  Preliminary Report (26 May 2021 07:01):    No growth to date.    Culture - Blood (collected 25 May 2021 06:22)  Source: .Blood Blood-Peripheral  Preliminary Report (26 May 2021 07:01):    No growth to date.      RADIOLOGY:  US Duplex Venous Lower Ext Complete, Bilateral (21 @ 12:21)   RIGHT:  Normal compressibility of the RIGHT common femoral, femoral and popliteal veins.  Doppler examination shows normal spontaneous and phasic flow.  No RIGHT calf vein thrombosis is detected.  LEFT:  Normal compressibility of the LEFT common femoral, femoral and popliteal veins.  Doppler examination shows normal spontaneous and phasic flow.  No LEFT calf vein thrombosis is detected.  IMPRESSION:  No evidence of deep venous thrombosis in either lower extremity.    Xray Chest 1 View- PORTABLE-Urgent (Xray Chest 1 View- PORTABLE-Urgent .) (21 @ 02:43)   Innumerable bilateral pulmonary nodules again seen.  New ill-defined medial right upper lung opacities, nonspecific findings, which may be due to subsegmental atelectasis or pneumonia.  Small left pleural effusion which appears larger. Likely associated passive atelectasis. Possible new trace right pleural effusion.  Lytic bone metastases again seen. New pathologic right second rib fracture.

## 2021-05-26 NOTE — DIETITIAN INITIAL EVALUATION ADULT. - DIET TYPE
Will add Magic Cup daily (290kcal, 9gm pro) X 2 daily as discussed with Pt/Add Ensure Enlive - 2x daily/supplement (specify)

## 2021-05-26 NOTE — CHART NOTE - NSCHARTNOTEFT_GEN_A_CORE
Reviewed LAbs with medical attending ca decreased will hold off on supplementation at this time as pt was hypercalcemic and s/p Zometa.  Anion gap 15.  encourage oral intake.  repleted electrolytes .  Night shift to follow up cmp and replete electrolytes as needed   Expedited MRI exaplained pt agreeable to mri also explained this to mri tech on 5/25

## 2021-05-26 NOTE — PROVIDER CONTACT NOTE (OTHER) - RECOMMENDATIONS
notify provider
Continue to monitor
Provider notified, tylenol and pain medication administered, fluids encouraged. Interventions followed
notify provider

## 2021-05-26 NOTE — PROGRESS NOTE ADULT - PROBLEM SELECTOR PLAN 1
MRI lumbar spine showing metastatic disease in the lower thoracic, lumbar, and upper sacral spine with severe pathologic fracture of L2 with retropulsion of the posterior endplate causing moderate canal stenosis. No alarm S/S.  - Pain control with Tylenol PRN for mild pain, oxycodone 5 mg q6hrs PRN for moderate pain, and oxycodone 10 mg q6hrs PRN for severe pain  - Rad-Onc consult for possible palliative RT to spine - pt prefers to hold off at this time   - New lytic lesions in in the ribs, will see if patient is amenable to RT.   - Pt will need to be fitted for TLSO brace   - Pt seen by neurosurgery as outpatient for severe pathologic fracture of L2 and deemed not a surgical candidate - will need IR for possible kyphoplasty, though patient will require MRI prior to doing so. After refusing MRI earlier this week, now the patient is open to MRI and RT.   s/p biopsy on 5/24.

## 2021-05-26 NOTE — PROVIDER CONTACT NOTE (OTHER) - ACTION/TREATMENT ORDERED:
2 mg Magnesium Sulfate IVPB given
EKG, blood cultures, UA, and motrin ordered, BMP at 6pm ordered
provider aware. provider will consult with the attending
provider aware, provider informed EKG already performed earlier today

## 2021-05-26 NOTE — PHYSICAL EXAM
[Restricted in physically strenuous activity but ambulatory and able to carry out work of a light or sedentary nature] : Status 1- Restricted in physically strenuous activity but ambulatory and able to carry out work of a light or sedentary nature, e.g., light house work, office work [Normal] : affect appropriate [de-identified] : left 9:00-10:00 breast mass 5x5cm reaching skin, no ulceration, left axillary LN enlarged; right breast no masses

## 2021-05-26 NOTE — PROGRESS NOTE ADULT - PROBLEM SELECTOR PLAN 3
Placed on antibiotics, infectious workup underway  due to underlying metastatic cancer, ID guidance requested. Will follow up with recs.

## 2021-05-26 NOTE — PROGRESS NOTE ADULT - SUBJECTIVE AND OBJECTIVE BOX
Obey Henry MD  Academic Hospitalist  Pager [phone]  Email: [email or web address]          PROGRESS NOTE:     Patient is a 56y old  Female who presents with a chief complaint of Hypercalcemia, hypokalemia (26 May 2021 12:33)      SUBJECTIVE / OVERNIGHT EVENTS:  Patient seen and examined this morning. She continues to spike periodically with low grade fevers 100.4-100.5. Now patient also open to the idea of MRI and RT.   ADDITIONAL REVIEW OF SYSTEMS:    MEDICATIONS  (STANDING):  amLODIPine   Tablet 5 milliGRAM(s) Oral daily  cholecalciferol 1000 Unit(s) Oral daily  enoxaparin Injectable 40 milliGRAM(s) SubCutaneous daily  letrozole 2.5 milliGRAM(s) Oral daily  LORazepam     Tablet 0.5 milliGRAM(s) Oral once  piperacillin/tazobactam IVPB.. 3.375 Gram(s) IV Intermittent every 8 hours  polyethylene glycol 3350 17 Gram(s) Oral daily  senna 2 Tablet(s) Oral at bedtime  sodium chloride 0.9%. 1000 milliLiter(s) (125 mL/Hr) IV Continuous <Continuous>    MEDICATIONS  (PRN):  acetaminophen   Tablet .. 650 milliGRAM(s) Oral every 6 hours PRN Temp greater or equal to 38C (100.4F), Mild Pain (1 - 3)  bisacodyl Suppository 10 milliGRAM(s) Rectal daily PRN Constipation  ondansetron Injectable 4 milliGRAM(s) IV Push every 6 hours PRN Nausea and/or Vomiting  oxyCODONE    IR 5 milliGRAM(s) Oral every 4 hours PRN Moderate Pain (4 - 6)  oxyCODONE    IR 10 milliGRAM(s) Oral every 4 hours PRN Severe Pain (7 - 10)      CAPILLARY BLOOD GLUCOSE        I&O's Summary    25 May 2021 07:01  -  26 May 2021 07:00  --------------------------------------------------------  IN: 1965 mL / OUT: 3200 mL / NET: -1235 mL        PHYSICAL EXAM:  Vital Signs Last 24 Hrs  T(C): 37.1 (26 May 2021 11:30), Max: 38 (25 May 2021 16:59)  T(F): 98.7 (26 May 2021 11:30), Max: 100.4 (25 May 2021 16:59)  HR: 118 (26 May 2021 11:30) (115 - 133)  BP: 154/90 (26 May 2021 11:30) (124/90 - 175/108)  BP(mean): --  RR: 18 (26 May 2021 11:30) (18 - 25)  SpO2: 100% (26 May 2021 11:30) (95% - 100%)    CONSTITUTIONAL: NAD, well-developed, well-groomed  EYES: EOMI; conjunctiva and sclera clear  ENMT: Moist oral mucosa  RESPIRATORY: Normal respiratory effort; lungs are clear to auscultation bilaterally  CARDIOVASCULAR: Regular rate and rhythm, normal S1 and S2, no murmur; No lower extremity edema  ABDOMEN: Nontender to palpation, normoactive bowel sounds, no rebound/guarding  MUSCULOSKELETAL:  no clubbing or cyanosis of digits; no joint swelling or tenderness to palpation  PSYCH: calm and pleasant  NEUROLOGY: CN 2-12 are intact and symmetric; no gross sensory deficits   SKIN: L breast 8 o'clock hard lesion (not examined today)  LABS:                        10.2   9.51  )-----------( 326      ( 26 May 2021 06:35 )             31.2         136  |  102  |  6<L>  ----------------------------<  91  3.6   |  19<L>  |  0.69    Ca    7.1<L>      26 May 2021 06:35  Phos  1.6       Mg     1.4         TPro  7.0  /  Alb  3.5  /  TBili  0.3  /  DBili  x   /  AST  83<H>  /  ALT  97<H>  /  AlkPhos  184<H>            Urinalysis Basic - ( 25 May 2021 17:04 )    Color: Light Yellow / Appearance: Clear / S.010 / pH: x  Gluc: x / Ketone: Small  / Bili: Negative / Urobili: <2 mg/dL   Blood: x / Protein: Trace / Nitrite: Negative   Leuk Esterase: Negative / RBC: x / WBC x   Sq Epi: x / Non Sq Epi: x / Bacteria: x        Culture - Blood (collected 25 May 2021 06:22)  Source: .Blood Blood-Venous  Preliminary Report (26 May 2021 07:01):    No growth to date.    Culture - Blood (collected 25 May 2021 06:22)  Source: .Blood Blood-Peripheral  Preliminary Report (26 May 2021 07:01):    No growth to date.        RADIOLOGY & ADDITIONAL TESTS:  Results Reviewed:   Imaging Personally Reviewed:  Electrocardiogram Personally Reviewed:    COORDINATION OF CARE:  Care Discussed with Consultants/Other Providers [Y/N]:  Prior or Outpatient Records Reviewed [Y/N]:

## 2021-05-26 NOTE — HISTORY OF PRESENT ILLNESS
[de-identified] : Pt noticed a left breast mass x 2 months for which she did not seek medical attention, denied any pain, ulceration, bleeding or nipple changes/discharge.  She also c/o lower back pain for 2-3 months, associated with pain radiating down the lower extremities relieved with Tylenol prn.  Also reports intermittent upper back pain.   No fevers, night sweats, weight loss.  Reports fatigue and poor appetite.  She went to an urgent care in [place] for back pain and breast mass and was given a referral for mammo/US.  \par \par Mammogram and breast US on 4/7/2021 showed  5.6x3.2x5.3cm hypoechoic irregularly-shaped mass with angular margins 9-11:00 5 to 9cm from nipple; left axilla - enlarged 4.0x2.1x3.4cm lymph node, US biopsy rec, [id number].\par \par On 4/20/2021, pt underwent an US guided left breast core biopsy which showed invasive moderately differentiated ductal carcinoma, Summit score 6/9 (3+2+10), invasive tumor at least 1.8cm, no DCIS, no LVI, microcalcifications present, ER 90%, ID 2%, HER2 2+, CISH negative.  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  \par \par For the back pain, pt underwent an MRI lumbar spine on 4/22/2021 which showed metastatic disease in lower thoracic spine, lumbar spine and upper sacrum with severe pathologic fracture of L2 with retropulsion of the posterior endplate causing moderate canal stenosis. \par \par Risk factors:  No prior disease or biopsies. Menarche: age 12, Postmenopausal age 51; 2 pregnancies, 1 miscarriage, 1 living child daughter 19, 2 years of breastfeeding. No OCP or HRT.  Family history is significant for mother had cervical cancer age 64 and father with prostate cancer age 67.  No cancer of the breast, ovary, endometrium, and pancreatic cancer.  The patient is of [place] ethnic background.  No ETOH or tobacco.\par Screening: No prior colonoscopy, pap smear 2-3 years ago reportedly normal.\par Previous certified nursing assistant. [de-identified] : \par Reports some discharge from her breast mass seen on gauze, small amount, denies any blood or fevers.\par Pt continues to complain of back pain, 8/10, improved to 5/10 with Aleve and Tylenol.\par Reports difficulty in walking long distances.\par Has constipation, otherwise denies abd pain, n/v.\par Denies breathing difficulties, chest pain, cough.

## 2021-05-27 LAB
ALBUMIN SERPL ELPH-MCNC: 3.6 G/DL — SIGNIFICANT CHANGE UP (ref 3.3–5)
ALP SERPL-CCNC: 194 U/L — HIGH (ref 40–120)
ALT FLD-CCNC: 71 U/L — HIGH (ref 4–33)
ANION GAP SERPL CALC-SCNC: 20 MMOL/L — HIGH (ref 7–14)
AST SERPL-CCNC: 60 U/L — HIGH (ref 4–32)
BASOPHILS # BLD AUTO: 0.05 K/UL — SIGNIFICANT CHANGE UP (ref 0–0.2)
BASOPHILS NFR BLD AUTO: 0.6 % — SIGNIFICANT CHANGE UP (ref 0–2)
BILIRUB SERPL-MCNC: 0.3 MG/DL — SIGNIFICANT CHANGE UP (ref 0.2–1.2)
BUN SERPL-MCNC: 7 MG/DL — SIGNIFICANT CHANGE UP (ref 7–23)
CALCIUM SERPL-MCNC: 7.7 MG/DL — LOW (ref 8.4–10.5)
CHLORIDE SERPL-SCNC: 97 MMOL/L — LOW (ref 98–107)
CO2 SERPL-SCNC: 18 MMOL/L — LOW (ref 22–31)
CREAT SERPL-MCNC: 0.62 MG/DL — SIGNIFICANT CHANGE UP (ref 0.5–1.3)
EOSINOPHIL # BLD AUTO: 0.17 K/UL — SIGNIFICANT CHANGE UP (ref 0–0.5)
EOSINOPHIL NFR BLD AUTO: 1.9 % — SIGNIFICANT CHANGE UP (ref 0–6)
GLUCOSE SERPL-MCNC: 84 MG/DL — SIGNIFICANT CHANGE UP (ref 70–99)
HCT VFR BLD CALC: 35.4 % — SIGNIFICANT CHANGE UP (ref 34.5–45)
HGB BLD-MCNC: 11.4 G/DL — LOW (ref 11.5–15.5)
IANC: 6.1 K/UL — SIGNIFICANT CHANGE UP (ref 1.5–8.5)
IMM GRANULOCYTES NFR BLD AUTO: 0.7 % — SIGNIFICANT CHANGE UP (ref 0–1.5)
LYMPHOCYTES # BLD AUTO: 1.55 K/UL — SIGNIFICANT CHANGE UP (ref 1–3.3)
LYMPHOCYTES # BLD AUTO: 17.6 % — SIGNIFICANT CHANGE UP (ref 13–44)
MAGNESIUM SERPL-MCNC: 2.1 MG/DL — SIGNIFICANT CHANGE UP (ref 1.6–2.6)
MCHC RBC-ENTMCNC: 25.3 PG — LOW (ref 27–34)
MCHC RBC-ENTMCNC: 32.2 GM/DL — SIGNIFICANT CHANGE UP (ref 32–36)
MCV RBC AUTO: 78.5 FL — LOW (ref 80–100)
MONOCYTES # BLD AUTO: 0.86 K/UL — SIGNIFICANT CHANGE UP (ref 0–0.9)
MONOCYTES NFR BLD AUTO: 9.8 % — SIGNIFICANT CHANGE UP (ref 2–14)
NEUTROPHILS # BLD AUTO: 6.1 K/UL — SIGNIFICANT CHANGE UP (ref 1.8–7.4)
NEUTROPHILS NFR BLD AUTO: 69.4 % — SIGNIFICANT CHANGE UP (ref 43–77)
NRBC # BLD: 0 /100 WBCS — SIGNIFICANT CHANGE UP
NRBC # FLD: 0 K/UL — SIGNIFICANT CHANGE UP
PHOSPHATE SERPL-MCNC: 1.8 MG/DL — LOW (ref 2.5–4.5)
PLATELET # BLD AUTO: 379 K/UL — SIGNIFICANT CHANGE UP (ref 150–400)
POTASSIUM SERPL-MCNC: 3.6 MMOL/L — SIGNIFICANT CHANGE UP (ref 3.5–5.3)
POTASSIUM SERPL-SCNC: 3.6 MMOL/L — SIGNIFICANT CHANGE UP (ref 3.5–5.3)
PROT SERPL-MCNC: 7.9 G/DL — SIGNIFICANT CHANGE UP (ref 6–8.3)
RBC # BLD: 4.51 M/UL — SIGNIFICANT CHANGE UP (ref 3.8–5.2)
RBC # FLD: 15.1 % — HIGH (ref 10.3–14.5)
SODIUM SERPL-SCNC: 135 MMOL/L — SIGNIFICANT CHANGE UP (ref 135–145)
WBC # BLD: 8.79 K/UL — SIGNIFICANT CHANGE UP (ref 3.8–10.5)
WBC # FLD AUTO: 8.79 K/UL — SIGNIFICANT CHANGE UP (ref 3.8–10.5)

## 2021-05-27 PROCEDURE — 99232 SBSQ HOSP IP/OBS MODERATE 35: CPT

## 2021-05-27 PROCEDURE — 77263 THER RADIOLOGY TX PLNG CPLX: CPT

## 2021-05-27 PROCEDURE — 99222 1ST HOSP IP/OBS MODERATE 55: CPT | Mod: GC

## 2021-05-27 PROCEDURE — 77290 THER RAD SIMULAJ FIELD CPLX: CPT | Mod: 26

## 2021-05-27 PROCEDURE — 72149 MRI LUMBAR SPINE W/DYE: CPT | Mod: 26

## 2021-05-27 PROCEDURE — 77334 RADIATION TREATMENT AID(S): CPT | Mod: 26

## 2021-05-27 RX ADMIN — LETROZOLE 2.5 MILLIGRAM(S): 2.5 TABLET, FILM COATED ORAL at 14:33

## 2021-05-27 RX ADMIN — OXYCODONE HYDROCHLORIDE 10 MILLIGRAM(S): 5 TABLET ORAL at 15:11

## 2021-05-27 RX ADMIN — OXYCODONE HYDROCHLORIDE 10 MILLIGRAM(S): 5 TABLET ORAL at 22:43

## 2021-05-27 RX ADMIN — OXYCODONE HYDROCHLORIDE 10 MILLIGRAM(S): 5 TABLET ORAL at 23:40

## 2021-05-27 RX ADMIN — AMLODIPINE BESYLATE 5 MILLIGRAM(S): 2.5 TABLET ORAL at 06:22

## 2021-05-27 RX ADMIN — OXYCODONE HYDROCHLORIDE 10 MILLIGRAM(S): 5 TABLET ORAL at 14:32

## 2021-05-27 RX ADMIN — ENOXAPARIN SODIUM 40 MILLIGRAM(S): 100 INJECTION SUBCUTANEOUS at 14:32

## 2021-05-27 RX ADMIN — Medication 0.5 MILLIGRAM(S): at 20:15

## 2021-05-27 RX ADMIN — Medication 1000 UNIT(S): at 14:33

## 2021-05-27 RX ADMIN — Medication 10 MILLIGRAM(S): at 14:33

## 2021-05-27 NOTE — PROGRESS NOTE ADULT - SUBJECTIVE AND OBJECTIVE BOX
Obey Henry MD  Academic Hospitalist  Pager [phone]  Email: [email or web address]          PROGRESS NOTE:     Patient is a 56y old  Female who presents with a chief complaint of Hypercalcemia, hypokalemia (26 May 2021 13:29)      SUBJECTIVE / OVERNIGHT EVENTS:  Patient seen and examined this morning. States that she is ok now with getting the MRI and RT. No further fevers noted, she denies f/c/n/v.  ADDITIONAL REVIEW OF SYSTEMS:    MEDICATIONS  (STANDING):  amLODIPine   Tablet 5 milliGRAM(s) Oral daily  cholecalciferol 1000 Unit(s) Oral daily  enoxaparin Injectable 40 milliGRAM(s) SubCutaneous daily  letrozole 2.5 milliGRAM(s) Oral daily  LORazepam     Tablet 0.5 milliGRAM(s) Oral once  polyethylene glycol 3350 17 Gram(s) Oral daily  senna 2 Tablet(s) Oral at bedtime  sodium chloride 0.9%. 1000 milliLiter(s) (125 mL/Hr) IV Continuous <Continuous>    MEDICATIONS  (PRN):  bisacodyl Suppository 10 milliGRAM(s) Rectal daily PRN Constipation  ondansetron Injectable 4 milliGRAM(s) IV Push every 6 hours PRN Nausea and/or Vomiting  oxyCODONE    IR 5 milliGRAM(s) Oral every 4 hours PRN Moderate Pain (4 - 6)  oxyCODONE    IR 10 milliGRAM(s) Oral every 4 hours PRN Severe Pain (7 - 10)  simethicone 80 milliGRAM(s) Chew every 6 hours PRN Gas      CAPILLARY BLOOD GLUCOSE        I&O's Summary    26 May 2021 07:01  -  27 May 2021 07:00  --------------------------------------------------------  IN: 0 mL / OUT: 2300 mL / NET: -2300 mL        PHYSICAL EXAM:  Vital Signs Last 24 Hrs  T(C): 36.6 (27 May 2021 05:49), Max: 37.2 (26 May 2021 20:45)  T(F): 97.8 (27 May 2021 05:49), Max: 98.9 (26 May 2021 20:45)  HR: 111 (27 May 2021 05:49) (111 - 119)  BP: 147/100 (27 May 2021 05:49) (147/100 - 159/97)  BP(mean): --  RR: 18 (27 May 2021 05:49) (18 - 18)  SpO2: 98% (27 May 2021 05:49) (95% - 98%)    CONSTITUTIONAL: NAD, well-developed, well-groomed  EYES: EOMI; conjunctiva and sclera clear  ENMT: Moist oral mucosa  RESPIRATORY: Normal respiratory effort; lungs are clear to auscultation bilaterally  CARDIOVASCULAR: Regular rate and rhythm, normal S1 and S2, no murmur; No lower extremity edema  ABDOMEN: Nontender to palpation, normoactive bowel sounds, no rebound/guarding  MUSCULOSKELETAL:  no clubbing or cyanosis of digits; no joint swelling or tenderness to palpation  PSYCH: calm and pleasant  NEUROLOGY: CN 2-12 are intact and symmetric; no gross sensory deficits   SKIN: L breast 8 o'clock hard lesion (not examined today)    LABS:                        11.4   8.79  )-----------( 379      ( 27 May 2021 07:04 )             35.4     05-27    135  |  97<L>  |  7   ----------------------------<  84  3.6   |  18<L>  |  0.62    Ca    7.7<L>      27 May 2021 07:04  Phos  1.8       Mg     2.1         TPro  7.9  /  Alb  3.6  /  TBili  0.3  /  DBili  x   /  AST  60<H>  /  ALT  71<H>  /  AlkPhos  194<H>            Urinalysis Basic - ( 25 May 2021 17:04 )    Color: Light Yellow / Appearance: Clear / S.010 / pH: x  Gluc: x / Ketone: Small  / Bili: Negative / Urobili: <2 mg/dL   Blood: x / Protein: Trace / Nitrite: Negative   Leuk Esterase: Negative / RBC: x / WBC x   Sq Epi: x / Non Sq Epi: x / Bacteria: x        Culture - Blood (collected 25 May 2021 06:22)  Source: .Blood Blood-Venous  Preliminary Report (26 May 2021 07:01):    No growth to date.    Culture - Blood (collected 25 May 2021 06:22)  Source: .Blood Blood-Peripheral  Preliminary Report (26 May 2021 07:01):    No growth to date.        RADIOLOGY & ADDITIONAL TESTS:  Results Reviewed:   Imaging Personally Reviewed:  Electrocardiogram Personally Reviewed:    COORDINATION OF CARE:  Care Discussed with Consultants/Other Providers [Y/N]: D/W Dr. Ramirez, oncologist. She is recommending hepatology consult for LFTs. Also discussed with hepatology Dr. Conley and fellow, recommending to order MRI abdomen w/wo contrast  Prior or Outpatient Records Reviewed [Y/N]:

## 2021-05-27 NOTE — PROGRESS NOTE ADULT - SUBJECTIVE AND OBJECTIVE BOX
INTERVAL HPI/OVERNIGHT EVENTS:  Patient seen at bedside.  Patient with improved pain symptoms  Has abdominal pain this AM  which she attributes to constipation  Has had no BM for 3 days  Patient anticipating CT simulation today  Willing to try having  MRI soon      VITAL SIGNS:  T(F): 97.8 (21 @ 05:49)  HR: 111 (21 @ 05:49)  BP: 147/100 (21 @ 05:49)  RR: 18 (21 @ 05:49)  SpO2: 98% (21 @ 05:49)  Wt(kg): --    PHYSICAL EXAM:  In accordance with current standard limiting patient contact, deferred physical exam  2/2 COVID pandemic  Please refer to physical exam of primary team.    MEDICATIONS  (STANDING):  amLODIPine   Tablet 5 milliGRAM(s) Oral daily  cholecalciferol 1000 Unit(s) Oral daily  enoxaparin Injectable 40 milliGRAM(s) SubCutaneous daily  letrozole 2.5 milliGRAM(s) Oral daily  LORazepam     Tablet 0.5 milliGRAM(s) Oral once  polyethylene glycol 3350 17 Gram(s) Oral daily  senna 2 Tablet(s) Oral at bedtime  sodium chloride 0.9%. 1000 milliLiter(s) (125 mL/Hr) IV Continuous <Continuous>    MEDICATIONS  (PRN):  bisacodyl Suppository 10 milliGRAM(s) Rectal daily PRN Constipation  ondansetron Injectable 4 milliGRAM(s) IV Push every 6 hours PRN Nausea and/or Vomiting  oxyCODONE    IR 5 milliGRAM(s) Oral every 4 hours PRN Moderate Pain (4 - 6)  oxyCODONE    IR 10 milliGRAM(s) Oral every 4 hours PRN Severe Pain (7 - 10)  simethicone 80 milliGRAM(s) Chew every 6 hours PRN Gas      Allergies    No Known Allergies    Intolerances        LABS:                        11.4   8.79  )-----------( 379      ( 27 May 2021 07:04 )             35.4         135  |  97<L>  |  7   ----------------------------<  84  3.6   |  18<L>  |  0.62    Ca    7.7<L>      27 May 2021 07:04  Phos  1.8     0527  Mg     2.1         TPro  7.9  /  Alb  3.6  /  TBili  0.3  /  DBili  x   /  AST  60<H>  /  ALT  71<H>  /  AlkPhos  194<H>        Urinalysis Basic - ( 25 May 2021 17:04 )    Color: Light Yellow / Appearance: Clear / S.010 / pH: x  Gluc: x / Ketone: Small  / Bili: Negative / Urobili: <2 mg/dL   Blood: x / Protein: Trace / Nitrite: Negative   Leuk Esterase: Negative / RBC: x / WBC x   Sq Epi: x / Non Sq Epi: x / Bacteria: x        RADIOLOGY & ADDITIONAL TESTS:  Studies reviewed.

## 2021-05-27 NOTE — PROGRESS NOTE ADULT - SUBJECTIVE AND OBJECTIVE BOX
Follow Up: Fevers    Interval History/ROS: Afebrile overnight. No chills. Feels fine, eating lunch. Back pain is stable. No cough, diarrhea or dysuria.     Allergies  No Known Allergies        ANTIMICROBIALS:      OTHER MEDS:  amLODIPine   Tablet 5 milliGRAM(s) Oral daily  bisacodyl Suppository 10 milliGRAM(s) Rectal daily PRN  cholecalciferol 1000 Unit(s) Oral daily  enoxaparin Injectable 40 milliGRAM(s) SubCutaneous daily  letrozole 2.5 milliGRAM(s) Oral daily  LORazepam     Tablet 0.5 milliGRAM(s) Oral once  ondansetron Injectable 4 milliGRAM(s) IV Push every 6 hours PRN  oxyCODONE    IR 5 milliGRAM(s) Oral every 4 hours PRN  oxyCODONE    IR 10 milliGRAM(s) Oral every 4 hours PRN  polyethylene glycol 3350 17 Gram(s) Oral daily  senna 2 Tablet(s) Oral at bedtime  simethicone 80 milliGRAM(s) Chew every 6 hours PRN  sodium chloride 0.9%. 1000 milliLiter(s) IV Continuous <Continuous>      Vital Signs Last 24 Hrs  T(C): 37.1 (27 May 2021 13:58), Max: 37.2 (26 May 2021 20:45)  T(F): 98.8 (27 May 2021 13:58), Max: 98.9 (26 May 2021 20:45)  HR: 120 (27 May 2021 13:58) (111 - 120)  BP: 129/77 (27 May 2021 13:58) (129/77 - 159/97)  BP(mean): --  RR: 18 (27 May 2021 13:58) (18 - 18)  SpO2: 97% (27 May 2021 13:58) (95% - 98%)    Physical Exam:  General: awake, alert, non toxic  Head: atraumatic, normocephalic  Eye: normal sclera and conjunctiva  ENT: no cervical lymphadenopathy   Cardio: regular rate   Respiratory: nonlabored on room air, clear bilaterally, no wheezing  abd: soft, bowel sounds present, no tenderness  : no escalante  Musculoskeletal: no focal joint swelling to upper extremities. no edema  vascular: right arm peripheral IV without phlebitis   Skin: no rash  Neurologic: no focal deficit  psych: normal affect                          11.4   8.79  )-----------( 379      ( 27 May 2021 07:04 )             35.4           135  |  97<L>  |  7   ----------------------------<  84  3.6   |  18<L>  |  0.62    Ca    7.7<L>      27 May 2021 07:04  Phos  1.8       Mg     2.1         TPro  7.9  /  Alb  3.6  /  TBili  0.3  /  DBili  x   /  AST  60<H>  /  ALT  71<H>  /  AlkPhos  194<H>        Urinalysis Basic - ( 25 May 2021 17:04 )    Color: Light Yellow / Appearance: Clear / S.010 / pH: x  Gluc: x / Ketone: Small  / Bili: Negative / Urobili: <2 mg/dL   Blood: x / Protein: Trace / Nitrite: Negative   Leuk Esterase: Negative / RBC: x / WBC x   Sq Epi: x / Non Sq Epi: x / Bacteria: x        MICROBIOLOGY:  Culture - Blood (collected 21 @ 06:22)  Source: .Blood Blood-Venous  Preliminary Report (21 @ 07:01):    No growth to date.    Culture - Blood (collected 21 @ 06:22)  Source: .Blood Blood-Peripheral  Preliminary Report (21 @ 07:01):    No growth to date.    RADIOLOGY:  Images below reviewed personally  Xray Abdomen 2 Views (21 @ 19:39)   Nonobstructive bowel gas pattern.    US Duplex Venous Lower Ext Complete, Bilateral (21 @ 12:21)   No evidence of deep venous thrombosis in either lower extremity.    Xray Chest 1 View- PORTABLE-Urgent (Xray Chest 1 View- PORTABLE-Urgent .) (21 @ 02:43)   Innumerable bilateral pulmonary nodules again seen.  New ill-defined medial right upper lung opacities, nonspecific findings, which may be due to subsegmental atelectasis or pneumonia.  Small left pleural effusion which appears larger. Likely associated passive atelectasis. Possible new trace right pleural effusion.  Lytic bone metastases again seen. New pathologic right second rib fracture.    Xray Chest 1 View- PORTABLE-Urgent (Xray Chest 1 View- PORTABLE-Urgent .) (21 @ 21:49)   The heart is top normal in size.  Innumerable small pulmonary nodules compatible with metastatic disease - as seen on PET/CT scan.  Small left pleural effusion with associated left basilar atelectasis.  No pleural effusion. No bilateral pneumothoraces.  Multiple lytic metastases in the axial skeleton better appreciated on CT/PET scan.

## 2021-05-27 NOTE — PROGRESS NOTE ADULT - ASSESSMENT
56f with metastatic breast ca, Hormone receptor positive, not started on treatment as of yet, referred to the ED from [facility] with hypercalcemia, hypokalemia and pain.      *Noted to fevers on admission, last temp 100.2 this morning,  s/p antibiotics,  infectious workup with NGTD,    S/p CXR today  5/25 for innumerable bilateral pulmonary nodules again seen.  New ill-defined medial right upper lung opacities, nonspecific findings, which may be due to subsegmental atelectasis or pneumonia. Small left pleural effusion which appears larger. Likely associated passive atelectasis. Possible new trace right pleural effusion. Lytic bone metastases again seen. New pathologic right second rib fracture.  *Patient with significant bony  mets which may explain hypercalcemia s/p IVF, Calcitonin anf S/p Zometa 4mg x 1  Now with improved levels, will continue to monitor  Noted to have transaminitis, no evidence of liver mets on most recent PET scan, please get Hepatology consult  Appreciate IR consult for biopsy of bone lesion of posterior iliac bone, will followup path  Patient deferring MRI lumber spine (2/2 claustrophobia) and hence delaying plans for kyphoplasty and possible Radiation Oncology  MRI lumbar spine order, will follow up  Rad Onc reconsulted will follow up recs  Also consider Nephrology consult for multiple electrolyte abnl ( low K ,Mg and Ph)  Neurosurgery has seen pt as outpt and did not rec surgery  Consider aggressive bowel regimen for constipation, which may also be opioid induced, patient on senna and miralax  -Patient to followup with Dr. Rekha Dowd ([facility]) upon discharge  -C/w Supportive care, pain control, Nutrition, PT, DVT ppx  -Oncology will continue to follow with you      Case d/w Dr. Abril PAL  Oncology Physician Assistant  [facility]  Pager [phone]    If after 5pm or weekends please page On-call Oncology Fellow

## 2021-05-27 NOTE — PROGRESS NOTE ADULT - ASSESSMENT
56F diagnosed with left breast ductal carcinoma in April, metastatic with an L2 compression fracture.   Admitted 5/19/21 for symptomatic hypercalcemia and hypokalemia.   Intermittent fevers without clear evidence of infection. Suspect tumor related fevers.     Suggest  -monitor off antibiotics   -f/u final cultures    Will sign off, pleases call back if needed   Above discussed with medicine     Yoni Cassidy MD   Infectious Disease   Pager [phone]   After 5PM and on weekends please page fellow on call or call [phone]

## 2021-05-27 NOTE — CONSULT NOTE ADULT - ATTENDING COMMENTS
Ms. Rudy Lopez is a 55 yo woman with newly diagnosed metastatic breast cancer with severe L2 compression fracture causing pain and difficulty walking. She was evaluated at out breast cancer clinic previously and referred for neurosurgical consultation, which was done earlier this week and no surgical intervention was recommended. She is admitted today mainly due to elevated calcium and low potassium levels. She very recently started endocrine therapy. She is a suitable candidate for radiation therapy for palliation. She suggested to give the hormone therapy some time to work and was hesitant to proceed. She was advised that the radiation therapy is likely to have a more durable effect on the disease at L2 and may improve her symptoms more than the hormone therapy alone. She will think it over and let us know. The treatment can be arranged as out-pt as well.
Intermittent fevers in the setting of metastatic breast cancer.   No clinical infection and workup negative so far.   I'm not impressed with the new RUL opacties and they don't correlate with a clinical pneumonia.   Would stop antibiotics.   Monitor cultures.     Yoni Cassidy MD   Infectious Disease   Pager [phone]   After 5PM and on weekends please page fellow on call or call [phone]
This patient has metastatic breast cancer and now noted to have abnormal liver tests.  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 Labs show elevation of aminotransferase values and alk phos. Exam shows hepatomegaly which is nontender. Will await MRI w/wo contrast to define liver morphology. will also assess for other causes of liver injury including hepatitis. No evident toxin exposure.

## 2021-05-27 NOTE — CONSULT NOTE ADULT - ASSESSMENT
Impression:  57 yo woman with history of recent diagnosis of L breast cancer with metastatic disease to spine with severe pathologic fracture of L2 with moderate canal stenosis (started Letrozole 5/19) presents with hypercalcemia (13.5) and hypokalemia (2.6) on outpatient labs. Hepatolgoy now consulted for elevated liver enzymes.     # Elevated liver enzymes- Elevated since 03/2021.     Impression:  57 yo woman with history of recent diagnosis of L breast cancer with metastatic disease to spine with severe pathologic fracture of L2 with moderate canal stenosis (started Letrozole 5/19) presents with hypercalcemia (13.5) and hypokalemia (2.6) on outpatient labs. Hepatolgoy now consulted for elevated liver enzymes.     # Elevated liver enzymes- Differential diagnosis includes metastatic disease, viral hepatitis, autoimmune etiology, Wilsons's. No prior liver disease in the past. No recent drug use.  # Recently diagnosed metastatic breast cancer    Recommendations:  - Please obtain MRI to evaluate for liver lesion  - Please obtain acute viral panel, HSV, EBV, HSV serologies/PCR, KATRIN, anti smooth muscle ab, liver kidney microsomal ab, IgG (not subset), IgA, IgM, AMA, alpha-1-antitrypsin, ceruloplasmin  - Monitor CBC, CMP, coags  - Rest of care per primary team    Willard Gray, PGY6  Gastroenterology Fellow  Pager # [phone] / [phone]  Can be contacted via [employer] Teams    For nights/weekends/holidays, contact on-call GI fellow via answering service ([phone])

## 2021-05-27 NOTE — CONSULT NOTE ADULT - SUBJECTIVE AND OBJECTIVE BOX
Chief Complaint:  Patient is a 56y old  Female who presents with a chief complaint of Hypercalcemia, hypokalemia (27 May 2021 11:56)      HPI:      55 yo woman with history of recent diagnosis of L breast cancer with metastatic disease to spine with severe pathologic fracture of L2 with moderate canal stenosis (started Letrozole ) presents with hypercalcemia (13.5) and hypokalemia (2.6) on outpatient labs. Here patient found to have, infectious work up negative, thought 2/2 tumor fever. Pt was on vanc/zosyn -, then vanc/zosyn x1 on . There are also plans for possible radiation therapy to spine, which patient is still considering. Hepatolgoy now consulted for elevated liver enzymes. Labs today: Tbili 0.3  AST/ALT 60/71 INR 1.18. Patient had elevated liver enzymes also back in 2021 on [employer] Tbili 0.2  AST//210. PET scan showed no disease in the liver. Pt denies use of new medications, herbal product, chinese medicine, alcohol use.  BMI 19.     Allergies:  No Known Allergies      Home Medications:    Hospital Medications:  amLODIPine   Tablet 5 milliGRAM(s) Oral daily  bisacodyl Suppository 10 milliGRAM(s) Rectal daily PRN  cholecalciferol 1000 Unit(s) Oral daily  enoxaparin Injectable 40 milliGRAM(s) SubCutaneous daily  letrozole 2.5 milliGRAM(s) Oral daily  LORazepam     Tablet 0.5 milliGRAM(s) Oral once  ondansetron Injectable 4 milliGRAM(s) IV Push every 6 hours PRN  oxyCODONE    IR 5 milliGRAM(s) Oral every 4 hours PRN  oxyCODONE    IR 10 milliGRAM(s) Oral every 4 hours PRN  polyethylene glycol 3350 17 Gram(s) Oral daily  senna 2 Tablet(s) Oral at bedtime  simethicone 80 milliGRAM(s) Chew every 6 hours PRN  sodium chloride 0.9%. 1000 milliLiter(s) IV Continuous <Continuous>      PMHX/PSHX:  Breast CA    Breast cancer    Lumbar compression fracture    No significant past surgical history        Family history:  Family history of cervical cancer (Mother)    Family history of prostate cancer (Father)        There is no family history of peptic ulcer disease, gastric cancer, colon polyps, colon cancer, celiac disease, biliary, hepatic, or pancreatic disease.  None of the female relatives have breast, uterine, or ovarian cancer.     Social History:     ROS:     General:  No wt loss, fevers, chills, night sweats, fatigue,   Eyes:  Good vision, no reported pain  ENT:  No sore throat, pain, runny nose, dysphagia  CV:  No pain, palpitations, hypo/hypertension  Resp:  No dyspnea, cough, tachypnea, wheezing  GI:  See HPI   :  No pain, bleeding, incontinence, nocturia  Muscle:  No pain, weakness  Neuro:  No weakness, tingling, memory problems  Psych:  No fatigue, insomnia, mood problems, depression  Endocrine:  No polyuria, polydipsia, cold/heat intolerance  Heme:  No petechiae, ecchymosis, easy bruisability  Skin:  No rash, tattoos, scars, edema      PHYSICAL EXAM:     GENERAL:  Appears stated age, well-groomed  HEENT:  NC/AT,  conjunctivae clear and pink, no thyromegaly  CHEST:  Full & symmetric excursion, no increased effort, breath sounds clear  HEART:  Regular rhythm, S1, S2  ABDOMEN:  Soft, non-tender, non-distended, normoactive bowel sounds  EXTEREMITIES:  no cyanosis,clubbing or edema  SKIN:  No rash/erythema/ecchymoses  NEURO:  Alert, oriented, no asterixis    Vital Signs:  Vital Signs Last 24 Hrs  T(C): 36.6 (27 May 2021 05:49), Max: 37.2 (26 May 2021 20:45)  T(F): 97.8 (27 May 2021 05:49), Max: 98.9 (26 May 2021 20:45)  HR: 111 (27 May 2021 05:49) (111 - 119)  BP: 147/100 (27 May 2021 05:49) (147/100 - 159/97)  BP(mean): --  RR: 18 (27 May 2021 05:49) (18 - 18)  SpO2: 98% (27 May 2021 05:49) (95% - 98%)  Daily     Daily     LABS:                        11.4   8.79  )-----------( 379      ( 27 May 2021 07:04 )             35.4     Mean Cell Volume: 78.5 fL (21 @ 07:04)    05-27    135  |  97<L>  |  7   ----------------------------<  84  3.6   |  18<L>  |  0.62    Ca    7.7<L>      27 May 2021 07:04  Phos  1.8       Mg     2.1         TPro  7.9  /  Alb  3.6  /  TBili  0.3  /  DBili  x   /  AST  60<H>  /  ALT  71<H>  /  AlkPhos  194<H>      LIVER FUNCTIONS - ( 27 May 2021 07:04 )  Alb: 3.6 g/dL / Pro: 7.9 g/dL / ALK PHOS: 194 U/L / ALT: 71 U/L / AST: 60 U/L / GGT: x             Urinalysis Basic - ( 25 May 2021 17:04 )    Color: Light Yellow / Appearance: Clear / S.010 / pH: x  Gluc: x / Ketone: Small  / Bili: Negative / Urobili: <2 mg/dL   Blood: x / Protein: Trace / Nitrite: Negative   Leuk Esterase: Negative / RBC: x / WBC x   Sq Epi: x / Non Sq Epi: x / Bacteria: x                              11.4   8.79  )-----------( 379      ( 27 May 2021 07:04 )             35.4                         10.2   9.51  )-----------( 326      ( 26 May 2021 06:35 )             31.2                         10.3   8.84  )-----------( 317      ( 25 May 2021 05:27 )             31.8     Imaging:             Chief Complaint:  Patient is a 56y old  Female who presents with a chief complaint of Hypercalcemia, hypokalemia (27 May 2021 11:56)      HPI:      55 yo woman with history of recent diagnosis of L breast cancer with metastatic disease to spine with severe pathologic fracture of L2 with moderate canal stenosis (started Letrozole ) presents with hypercalcemia (13.5) and hypokalemia (2.6) on outpatient labs. Here patient found to have, infectious work up negative, thought 2/2 tumor fever. Pt was on vanc/zosyn -, then vanc/zosyn x1 on . There are also plans for possible radiation therapy to spine, which patient is still considering. Hepatolgoy now consulted for elevated liver enzymes. Labs today: Tbili 0.3  AST/ALT 60/71 INR 1.18. Patient had elevated liver enzymes also back in 2021 on [employer] Tbili 0.2  AST//210. PET scan showed no disease in the liver. Pt denies use of new medications, herbal product, chinese medicine, alcohol use.  BMI 19.     Allergies:  No Known Allergies      Home Medications:    Hospital Medications:  amLODIPine   Tablet 5 milliGRAM(s) Oral daily  bisacodyl Suppository 10 milliGRAM(s) Rectal daily PRN  cholecalciferol 1000 Unit(s) Oral daily  enoxaparin Injectable 40 milliGRAM(s) SubCutaneous daily  letrozole 2.5 milliGRAM(s) Oral daily  LORazepam     Tablet 0.5 milliGRAM(s) Oral once  ondansetron Injectable 4 milliGRAM(s) IV Push every 6 hours PRN  oxyCODONE    IR 5 milliGRAM(s) Oral every 4 hours PRN  oxyCODONE    IR 10 milliGRAM(s) Oral every 4 hours PRN  polyethylene glycol 3350 17 Gram(s) Oral daily  senna 2 Tablet(s) Oral at bedtime  simethicone 80 milliGRAM(s) Chew every 6 hours PRN  sodium chloride 0.9%. 1000 milliLiter(s) IV Continuous <Continuous>      PMHX/PSHX:  Breast CA    Breast cancer    Lumbar compression fracture    No significant past surgical history        Family history:  Family history of cervical cancer (Mother)    Family history of prostate cancer (Father)        There is no family history of peptic ulcer disease, gastric cancer, colon polyps, colon cancer, celiac disease, biliary, hepatic, or pancreatic disease.  None of the female relatives have breast, uterine, or ovarian cancer.     Social History:     ROS:     General:  No wt loss, fevers, chills, night sweats, fatigue,   Eyes:  Good vision, no reported pain  ENT:  No sore throat, pain, runny nose, dysphagia  CV:  No pain, palpitations, hypo/hypertension  Resp:  No dyspnea, cough, tachypnea, wheezing  GI:  See HPI   :  No pain, bleeding, incontinence, nocturia  Muscle:  No pain, weakness  Neuro:  No weakness, tingling, memory problems  Psych:  No fatigue, insomnia, mood problems, depression  Endocrine:  No polyuria, polydipsia, cold/heat intolerance  Heme:  No petechiae, ecchymosis, easy bruisability  Skin:  No rash, tattoos, scars, edema      PHYSICAL EXAM:     GENERAL:  Appears stated age, well-groomed  HEENT:  NC/AT,  conjunctivae clear and pink, no thyromegaly  CHEST:  Full & symmetric excursion, no increased effort, breath sounds clear  HEART:  Regular rhythm, S1, S2  ABDOMEN:  Soft, non-tender, non-distended, normoactive bowel sounds  EXTEREMITIES:  no cyanosis,clubbing or edema  SKIN:  No rash/erythema/ecchymoses  NEURO:  Alert, oriented, no asterixis    Vital Signs:  Vital Signs Last 24 Hrs  T(C): 36.6 (27 May 2021 05:49), Max: 37.2 (26 May 2021 20:45)  T(F): 97.8 (27 May 2021 05:49), Max: 98.9 (26 May 2021 20:45)  HR: 111 (27 May 2021 05:49) (111 - 119)  BP: 147/100 (27 May 2021 05:49) (147/100 - 159/97)  BP(mean): --  RR: 18 (27 May 2021 05:49) (18 - 18)  SpO2: 98% (27 May 2021 05:49) (95% - 98%)  Daily     Daily     LABS:                        11.4   8.79  )-----------( 379      ( 27 May 2021 07:04 )             35.4     Mean Cell Volume: 78.5 fL (21 @ 07:04)        135  |  97<L>  |  7   ----------------------------<  84  3.6   |  18<L>  |  0.62    Ca    7.7<L>      27 May 2021 07:04  Phos  1.8       Mg     2.1         TPro  7.9  /  Alb  3.6  /  TBili  0.3  /  DBili  x   /  AST  60<H>  /  ALT  71<H>  /  AlkPhos  194<H>      LIVER FUNCTIONS - ( 27 May 2021 07:04 )  Alb: 3.6 g/dL / Pro: 7.9 g/dL / ALK PHOS: 194 U/L / ALT: 71 U/L / AST: 60 U/L / GGT: x             Urinalysis Basic - ( 25 May 2021 17:04 )    Color: Light Yellow / Appearance: Clear / S.010 / pH: x  Gluc: x / Ketone: Small  / Bili: Negative / Urobili: <2 mg/dL   Blood: x / Protein: Trace / Nitrite: Negative   Leuk Esterase: Negative / RBC: x / WBC x   Sq Epi: x / Non Sq Epi: x / Bacteria: x                              11.4   8.79  )-----------( 379      ( 27 May 2021 07:04 )             35.4                         10.2   9.51  )-----------( 326      ( 26 May 2021 06:35 )             31.2                         10.3   8.84  )-----------( 317      ( 25 May 2021 05:27 )             31.8     Imaging:

## 2021-05-27 NOTE — PROGRESS NOTE ADULT - PROBLEM SELECTOR PLAN 1
MRI lumbar spine showing metastatic disease in the lower thoracic, lumbar, and upper sacral spine with severe pathologic fracture of L2 with retropulsion of the posterior endplate causing moderate canal stenosis. No alarm S/S.  - Pain control with Tylenol PRN for mild pain, oxycodone 5 mg q6hrs PRN for moderate pain, and oxycodone 10 mg q6hrs PRN for severe pain  - Rad-Onc consult for possible palliative RT to spine, after declining, patient now amenable  - New lytic lesions in in the ribs   - TLSO brace present  - Pt seen by neurosurgery as outpatient for severe pathologic fracture of L2 and deemed not a surgical candidate - will need IR for possible kyphoplasty, though patient will require MRI prior to doing so. After refusing MRI earlier this week, now the patient is open to MRI and RT.   s/p biopsy on 5/24.

## 2021-05-28 ENCOUNTER — OUTPATIENT (OUTPATIENT)
Dept: OUTPATIENT SERVICES | Facility: HOSPITAL | Age: 56
LOS: 1 days | Discharge: HOME | End: 2021-05-28
Payer: MEDICAID

## 2021-05-28 DIAGNOSIS — C96.9 MALIGNANT NEOPLASM OF LYMPHOID, HEMATOPOIETIC AND RELATED TISSUE, UNSPECIFIED: ICD-10-CM

## 2021-05-28 LAB
ANION GAP SERPL CALC-SCNC: 15 MMOL/L — HIGH (ref 7–14)
BUN SERPL-MCNC: 9 MG/DL — SIGNIFICANT CHANGE UP (ref 7–23)
CALCIUM SERPL-MCNC: 8 MG/DL — LOW (ref 8.4–10.5)
CERULOPLASMIN SERPL-MCNC: 45 MG/DL — SIGNIFICANT CHANGE UP (ref 16–45)
CHLORIDE SERPL-SCNC: 98 MMOL/L — SIGNIFICANT CHANGE UP (ref 98–107)
CO2 SERPL-SCNC: 22 MMOL/L — SIGNIFICANT CHANGE UP (ref 22–31)
CREAT SERPL-MCNC: 0.71 MG/DL — SIGNIFICANT CHANGE UP (ref 0.5–1.3)
EBV EA AB SER IA-ACNC: 28.4 U/ML — HIGH
EBV EA AB TITR SER IF: POSITIVE
EBV EA IGG SER-ACNC: POSITIVE
EBV NA IGG SER IA-ACNC: >600 U/ML — HIGH
EBV PATRN SPEC IB-IMP: SIGNIFICANT CHANGE UP
EBV VCA IGG AVIDITY SER QL IA: POSITIVE
EBV VCA IGM SER IA-ACNC: <10 U/ML — SIGNIFICANT CHANGE UP
EBV VCA IGM SER IA-ACNC: >750 U/ML — HIGH
EBV VCA IGM TITR FLD: NEGATIVE — SIGNIFICANT CHANGE UP
GLUCOSE SERPL-MCNC: 92 MG/DL — SIGNIFICANT CHANGE UP (ref 70–99)
HAV IGM SER-ACNC: SIGNIFICANT CHANGE UP
HBV CORE IGM SER-ACNC: SIGNIFICANT CHANGE UP
HBV SURFACE AG SER-ACNC: SIGNIFICANT CHANGE UP
HCT VFR BLD CALC: 33.8 % — LOW (ref 34.5–45)
HCV AB S/CO SERPL IA: 0.13 S/CO — SIGNIFICANT CHANGE UP (ref 0–0.99)
HCV AB SERPL-IMP: SIGNIFICANT CHANGE UP
HGB BLD-MCNC: 10.9 G/DL — LOW (ref 11.5–15.5)
IGA FLD-MCNC: 309 MG/DL — SIGNIFICANT CHANGE UP (ref 70–400)
IGG FLD-MCNC: 1320 MG/DL — SIGNIFICANT CHANGE UP (ref 700–1600)
IGM SERPL-MCNC: 106 MG/DL — SIGNIFICANT CHANGE UP (ref 40–230)
MAGNESIUM SERPL-MCNC: 1.8 MG/DL — SIGNIFICANT CHANGE UP (ref 1.6–2.6)
MCHC RBC-ENTMCNC: 25.2 PG — LOW (ref 27–34)
MCHC RBC-ENTMCNC: 32.2 GM/DL — SIGNIFICANT CHANGE UP (ref 32–36)
MCV RBC AUTO: 78.2 FL — LOW (ref 80–100)
NRBC # BLD: 0 /100 WBCS — SIGNIFICANT CHANGE UP
NRBC # FLD: 0 K/UL — SIGNIFICANT CHANGE UP
PHOSPHATE SERPL-MCNC: 1.9 MG/DL — LOW (ref 2.5–4.5)
PLATELET # BLD AUTO: 392 K/UL — SIGNIFICANT CHANGE UP (ref 150–400)
POTASSIUM SERPL-MCNC: 3.5 MMOL/L — SIGNIFICANT CHANGE UP (ref 3.5–5.3)
POTASSIUM SERPL-SCNC: 3.5 MMOL/L — SIGNIFICANT CHANGE UP (ref 3.5–5.3)
RBC # BLD: 4.32 M/UL — SIGNIFICANT CHANGE UP (ref 3.8–5.2)
RBC # FLD: 15 % — HIGH (ref 10.3–14.5)
SODIUM SERPL-SCNC: 135 MMOL/L — SIGNIFICANT CHANGE UP (ref 135–145)
WBC # BLD: 6.9 K/UL — SIGNIFICANT CHANGE UP (ref 3.8–10.5)
WBC # FLD AUTO: 6.9 K/UL — SIGNIFICANT CHANGE UP (ref 3.8–10.5)

## 2021-05-28 PROCEDURE — 99232 SBSQ HOSP IP/OBS MODERATE 35: CPT | Mod: GC

## 2021-05-28 PROCEDURE — 99232 SBSQ HOSP IP/OBS MODERATE 35: CPT

## 2021-05-28 RX ADMIN — AMLODIPINE BESYLATE 5 MILLIGRAM(S): 2.5 TABLET ORAL at 05:52

## 2021-05-28 RX ADMIN — Medication 1000 UNIT(S): at 12:46

## 2021-05-28 RX ADMIN — ENOXAPARIN SODIUM 40 MILLIGRAM(S): 100 INJECTION SUBCUTANEOUS at 12:47

## 2021-05-28 RX ADMIN — POLYETHYLENE GLYCOL 3350 17 GRAM(S): 17 POWDER, FOR SOLUTION ORAL at 12:46

## 2021-05-28 RX ADMIN — LETROZOLE 2.5 MILLIGRAM(S): 2.5 TABLET, FILM COATED ORAL at 12:47

## 2021-05-28 NOTE — PROGRESS NOTE ADULT - PROBLEM SELECTOR PLAN 1
MRI lumbar spine showing metastatic disease in the lower thoracic, lumbar, and upper sacral spine with severe pathologic fracture of L2 with retropulsion of the posterior endplate causing moderate canal stenosis. No alarm S/S.  - Pain control with Tylenol PRN for mild pain, oxycodone 5 mg q6hrs PRN for moderate pain, and oxycodone 10 mg q6hrs PRN for severe pain  - Rad-Onc consult for possible palliative RT to spine, after declining, patient now amenable  - New lytic lesions in in the ribs   - TLSO brace present  - Pt seen by neurosurgery as outpatient for severe pathologic fracture of L2 and deemed not a surgical candidate - will need IR for possible kyphoplasty, though patient will require MRI prior to doing so. After refusing MRI earlier this week, now the patient is open to MRI and RT. S/P Lumbar spine MRI- unchanged since last one in April.    s/p biopsy on 5/24. MRI lumbar spine showing metastatic disease in the lower thoracic, lumbar, and upper sacral spine with severe pathologic fracture of L2 with retropulsion of the posterior endplate causing moderate canal stenosis. No alarm S/S.  - Pain control with Tylenol PRN for mild pain, oxycodone 5 mg q6hrs PRN for moderate pain, and oxycodone 10 mg q6hrs PRN for severe pain  - Rad-Onc consult for possible palliative RT to spine, after declining, patient now amenable  - New lytic lesions in in the ribs   - TLSO brace present  - Pt seen by neurosurgery as outpatient for severe pathologic fracture of L2 and deemed not a surgical candidate - will need IR for possible kyphoplasty, though patient will require MRI prior to doing so. After refusing MRI earlier this week, now the patient is open to MRI and RT. S/P Lumbar spine MRI- unchanged since last one in April. Will f/u with recs from IR next week.   s/p biopsy on 5/24- confirming cancer

## 2021-05-28 NOTE — PROGRESS NOTE ADULT - PROBLEM SELECTOR PLAN 4
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  - Oncology consult obtained. Appreciate recs.  - C/w Letrozole 2.5 mg daily  - PET/CT scan on 5/6/21 showing lesion in the posterior left iliac bone. S/P IR bone biopsy. After MRI then IR can also perform kyphoplasty   - Lactate elevated to 2.4 on admission. Likely from metastatic breast cancer. 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  - Oncology consult obtained. Appreciate recs.  - C/w Letrozole 2.5 mg daily  - PET/CT scan on 5/6/21 showing lesion in the posterior left iliac bone. S/P IR bone biopsy confirming cancer. After MRI then IR can also perform kyphoplasty   - Lactate elevated to 2.4 on admission. Likely from metastatic breast cancer.

## 2021-05-28 NOTE — PROGRESS NOTE ADULT - ASSESSMENT
Impression:  55 yo woman with history of recent diagnosis of L breast cancer with metastatic disease to spine with severe pathologic fracture of L2 with moderate canal stenosis (started Letrozole 5/19) presents with hypercalcemia (13.5) and hypokalemia (2.6) on outpatient labs. Hepatology now consulted for elevated liver enzymes.     # Elevated liver enzymes- Differential diagnosis includes metastatic disease, viral hepatitis, autoimmune etiology, Wilsons's. No prior liver disease in the past. No recent drug use.  Work Up: IgG, IgA, IgM wnl, Pending acute viral panel, KATRIN, ceruloplasmin, EBV serology, LKM, AMA, ASMA  # Recently diagnosed metastatic breast cancer    Recommendations:  - Please obtain MRI to evaluate for liver lesion  - Follow up liver work up  - Monitor CBC, CMP, coags  - Rest of care per primary team    Willard Gray, PGY6  Gastroenterology Fellow  Pager # [phone] / [phone]  Can be contacted via [employer] Teams    For nights/weekends/holidays, contact on-call GI fellow via answering service ([phone])

## 2021-05-28 NOTE — PROGRESS NOTE ADULT - SUBJECTIVE AND OBJECTIVE BOX
Chief Complaint:  Patient is a 56y old  Female who presents with a chief complaint of Hypercalcemia, hypokalemia (27 May 2021 15:27)      Interval Events: Pt feels well, denies nausea, vomiting, abd pain.    Allergies:  No Known Allergies        Hospital Medications:  amLODIPine   Tablet 5 milliGRAM(s) Oral daily  bisacodyl Suppository 10 milliGRAM(s) Rectal daily PRN  cholecalciferol 1000 Unit(s) Oral daily  enoxaparin Injectable 40 milliGRAM(s) SubCutaneous daily  letrozole 2.5 milliGRAM(s) Oral daily  ondansetron Injectable 4 milliGRAM(s) IV Push every 6 hours PRN  oxyCODONE    IR 5 milliGRAM(s) Oral every 4 hours PRN  oxyCODONE    IR 10 milliGRAM(s) Oral every 4 hours PRN  polyethylene glycol 3350 17 Gram(s) Oral daily  senna 2 Tablet(s) Oral at bedtime  simethicone 80 milliGRAM(s) Chew every 6 hours PRN  sodium chloride 0.9%. 1000 milliLiter(s) IV Continuous <Continuous>      PMHX/PSHX:  Breast CA    Breast cancer    Lumbar compression fracture    No significant past surgical history        Family history:  Family history of cervical cancer (Mother)    Family history of prostate cancer (Father)      There is no family history of peptic ulcer disease, gastric cancer, colon polyps, colon cancer, celiac disease, biliary, hepatic, or pancreatic disease.  None of the female relatives have breast, uterine, or ovarian cancer.     PHYSICAL EXAM:   Vital Signs:  Vital Signs Last 24 Hrs  T(C): 36.8 (28 May 2021 05:50), Max: 37.1 (27 May 2021 13:58)  T(F): 98.2 (28 May 2021 05:50), Max: 98.8 (27 May 2021 13:58)  HR: 109 (28 May 2021 05:50) (109 - 120)  BP: 136/92 (28 May 2021 05:50) (129/77 - 139/85)  BP(mean): --  RR: 17 (28 May 2021 05:50) (17 - 18)  SpO2: 99% (28 May 2021 05:50) (97% - 99%)  Daily     Daily     ROS:     General:  No wt loss, fevers, chills, night sweats, fatigue,   Eyes:  Good vision, no reported pain  ENT:  No sore throat, pain, runny nose, dysphagia  CV:  No pain, palpitations, hypo/hypertension  Resp:  No dyspnea, cough, tachypnea, wheezing  GI:  See HPI   :  No pain, bleeding, incontinence, nocturia  Muscle:  No pain, weakness  Neuro:  No weakness, tingling, memory problems  Psych:  No fatigue, insomnia, mood problems, depression  Endocrine:  No polyuria, polydipsia, cold/heat intolerance  Heme:  No petechiae, ecchymosis, easy bruisability  Skin:  No rash, tattoos, scars, edema      PHYSICAL EXAM:     GENERAL:  Appears stated age, well-groomed  HEENT:  NC/AT,  conjunctivae clear and pink, no thyromegaly  CHEST:  Full & symmetric excursion, no increased effort, breath sounds clear  HEART:  Regular rhythm, S1, S2  ABDOMEN:  Soft, non-tender, non-distended, normoactive bowel sounds  EXTEREMITIES:  no cyanosis,clubbing or edema  SKIN:  No rash/erythema/ecchymoses  NEURO:  Alert, oriented, no asterixis      LABS:                        10.9   6.90  )-----------( 392      ( 28 May 2021 07:20 )             33.8     Mean Cell Volume: 78.2 fL (05-28-21 @ 07:20)    05-28    135  |  98  |  9   ----------------------------<  92  3.5   |  22  |  0.71    Ca    8.0<L>      28 May 2021 07:20  Phos  1.9     05-28  Mg     1.8     05-28    TPro  7.9  /  Alb  3.6  /  TBili  0.3  /  DBili  x   /  AST  60<H>  /  ALT  71<H>  /  AlkPhos  194<H>  05-27    LIVER FUNCTIONS - ( 27 May 2021 07:04 )  Alb: 3.6 g/dL / Pro: 7.9 g/dL / ALK PHOS: 194 U/L / ALT: 71 U/L / AST: 60 U/L / GGT: x                                       10.9   6.90  )-----------( 392      ( 28 May 2021 07:20 )             33.8                         11.4   8.79  )-----------( 379      ( 27 May 2021 07:04 )             35.4                         10.2   9.51  )-----------( 326      ( 26 May 2021 06:35 )             31.2     Imaging:

## 2021-05-28 NOTE — PROGRESS NOTE ADULT - SUBJECTIVE AND OBJECTIVE BOX
Obey Henry MD  Academic Hospitalist  Pager [phone]  Email: [email or web address]          PROGRESS NOTE:     Patient is a 56y old  Female who presents with a chief complaint of Hypercalcemia, hypokalemia (28 May 2021 09:20)      SUBJECTIVE / OVERNIGHT EVENTS:  Patient seen and examined this morning. Able to tolerate MRI lumbar spine yesterday, still pending abdominal MRI. Denies f/c/n/v.   ADDITIONAL REVIEW OF SYSTEMS:    MEDICATIONS  (STANDING):  amLODIPine   Tablet 5 milliGRAM(s) Oral daily  cholecalciferol 1000 Unit(s) Oral daily  enoxaparin Injectable 40 milliGRAM(s) SubCutaneous daily  letrozole 2.5 milliGRAM(s) Oral daily  polyethylene glycol 3350 17 Gram(s) Oral daily  senna 2 Tablet(s) Oral at bedtime  sodium chloride 0.9%. 1000 milliLiter(s) (125 mL/Hr) IV Continuous <Continuous>    MEDICATIONS  (PRN):  bisacodyl Suppository 10 milliGRAM(s) Rectal daily PRN Constipation  ondansetron Injectable 4 milliGRAM(s) IV Push every 6 hours PRN Nausea and/or Vomiting  oxyCODONE    IR 5 milliGRAM(s) Oral every 4 hours PRN Moderate Pain (4 - 6)  oxyCODONE    IR 10 milliGRAM(s) Oral every 4 hours PRN Severe Pain (7 - 10)  simethicone 80 milliGRAM(s) Chew every 6 hours PRN Gas      CAPILLARY BLOOD GLUCOSE        I&O's Summary    27 May 2021 07:01  -  28 May 2021 07:00  --------------------------------------------------------  IN: 150 mL / OUT: 950 mL / NET: -800 mL        PHYSICAL EXAM:  Vital Signs Last 24 Hrs  T(C): 36.9 (28 May 2021 13:11), Max: 37.1 (27 May 2021 13:58)  T(F): 98.5 (28 May 2021 13:11), Max: 98.8 (27 May 2021 13:58)  HR: 116 (28 May 2021 13:11) (109 - 120)  BP: 139/95 (28 May 2021 13:11) (129/77 - 139/95)  BP(mean): --  RR: 18 (28 May 2021 13:11) (17 - 18)  SpO2: 98% (28 May 2021 13:11) (97% - 99%)    CONSTITUTIONAL: NAD, well-developed, well-groomed  EYES: EOMI; conjunctiva and sclera clear  ENMT: Moist oral mucosa  RESPIRATORY: Normal respiratory effort; lungs are clear to auscultation bilaterally  CARDIOVASCULAR: tachycardic and regular rhythm, normal S1 and S2, no murmur; No lower extremity edema  ABDOMEN: Nontender to palpation, normoactive bowel sounds, no rebound/guarding  MUSCULOSKELETAL:  no clubbing or cyanosis of digits; no joint swelling or tenderness to palpation  PSYCH: calm and very pleasant  NEUROLOGY: CN 2-12 are intact and symmetric; no gross sensory deficits   SKIN: L breast 8 o'clock hard lesion- no changes    LABS:                        10.9   6.90  )-----------( 392      ( 28 May 2021 07:20 )             33.8     05-28    135  |  98  |  9   ----------------------------<  92  3.5   |  22  |  0.71    Ca    8.0<L>      28 May 2021 07:20  Phos  1.9     05-28  Mg     1.8     05-28    TPro  7.7  /  Alb  3.8  /  TBili  0.2  /  DBili  <0.2  /  AST  70<H>  /  ALT  65<H>  /  AlkPhos  191<H>  05-28                RADIOLOGY & ADDITIONAL TESTS:  Results Reviewed:   Imaging Personally Reviewed:  EXAM:  MR SPINE LUMBAR IC        PROCEDURE DATE:  May 27 2021         INTERPRETATION:  CLINICAL INDICATION: Breast cancer, osseous metastases    Magnetic resonance imaging of the lumbosacral spine was carried out with sagittal surface coil imaging from T10 to S2-S3 using T1 and fast spin echo T2 weighted images with and without fat saturation technique with axial T1 and fast spin echo T2 weighted imaging on a 1.5 [place] magnet. Post contrast axial and sagittal T1 weighted images were obtained. 5 cc of Gadavist were intravenously injected, 2.5 cc were discarded.      Comparison is made with the prior outside MRI of 4/22/2021.    The visualized thoracic and lumbosacral vertebral bodies are normal in height with the exception of L2 which demonstrates a the marked compression deformity. There is heterogeneous abnormal low signal intensity involving all the visualized vertebral bodies as well as the posterior elements at L1, L2 and S1 consistent with osseous metastasis. At the L2 level there is bony retropulsion with moderate thecal sac compression. After contrast administration there is heterogeneous enhancement of the vertebral bodies and posterior elements.    The conus medullaris terminates normally at the T12-L1 level. There is no cord compression. There is moderate cauda equina compression at the L2 level.  There is no significant change since the outside examination.    Abnormal signal involving the sacrum and the iliac bones are identified bilaterally. The paraspinal soft tissues are unremarkable.            IMPRESSION: Diffuse heterogeneously enhancing bony metastases involving the visualized vertebral bodies as well as the posterior elements. Marked compression deformity L2 with bony retropulsion and moderate thecal sac compression. No cord compression. No significant change since 4/22/2021.        Electrocardiogram Personally Reviewed:    COORDINATION OF CARE:  Care Discussed with Consultants/Other Providers [Y/N]:  Prior or Outpatient Records Reviewed [Y/N]:

## 2021-05-29 LAB
A1AT SERPL-MCNC: 323 MG/DL — HIGH (ref 90–200)
ANION GAP SERPL CALC-SCNC: 18 MMOL/L — HIGH (ref 7–14)
BUN SERPL-MCNC: 10 MG/DL — SIGNIFICANT CHANGE UP (ref 7–23)
CALCIUM SERPL-MCNC: 9 MG/DL — SIGNIFICANT CHANGE UP (ref 8.4–10.5)
CHLORIDE SERPL-SCNC: 99 MMOL/L — SIGNIFICANT CHANGE UP (ref 98–107)
CO2 SERPL-SCNC: 20 MMOL/L — LOW (ref 22–31)
CREAT SERPL-MCNC: 0.64 MG/DL — SIGNIFICANT CHANGE UP (ref 0.5–1.3)
GLUCOSE SERPL-MCNC: 89 MG/DL — SIGNIFICANT CHANGE UP (ref 70–99)
HCT VFR BLD CALC: 38 % — SIGNIFICANT CHANGE UP (ref 34.5–45)
HGB BLD-MCNC: 12 G/DL — SIGNIFICANT CHANGE UP (ref 11.5–15.5)
HSV1 IGG SER-ACNC: 2.34 INDEX — HIGH
HSV1 IGG SERPL QL IA: POSITIVE
HSV2 IGG FLD-ACNC: 0.07 INDEX — SIGNIFICANT CHANGE UP
HSV2 IGG SERPL QL IA: NEGATIVE — SIGNIFICANT CHANGE UP
LKM AB SER-ACNC: <20.1 UNITS — SIGNIFICANT CHANGE UP (ref 0–20)
MAGNESIUM SERPL-MCNC: 1.7 MG/DL — SIGNIFICANT CHANGE UP (ref 1.6–2.6)
MCHC RBC-ENTMCNC: 25.5 PG — LOW (ref 27–34)
MCHC RBC-ENTMCNC: 31.6 GM/DL — LOW (ref 32–36)
MCV RBC AUTO: 80.7 FL — SIGNIFICANT CHANGE UP (ref 80–100)
NRBC # BLD: 0 /100 WBCS — SIGNIFICANT CHANGE UP
NRBC # FLD: 0 K/UL — SIGNIFICANT CHANGE UP
PHOSPHATE SERPL-MCNC: 1.9 MG/DL — LOW (ref 2.5–4.5)
PLATELET # BLD AUTO: 473 K/UL — HIGH (ref 150–400)
POTASSIUM SERPL-MCNC: 3.7 MMOL/L — SIGNIFICANT CHANGE UP (ref 3.5–5.3)
POTASSIUM SERPL-SCNC: 3.7 MMOL/L — SIGNIFICANT CHANGE UP (ref 3.5–5.3)
RBC # BLD: 4.71 M/UL — SIGNIFICANT CHANGE UP (ref 3.8–5.2)
RBC # FLD: 14.9 % — HIGH (ref 10.3–14.5)
SODIUM SERPL-SCNC: 137 MMOL/L — SIGNIFICANT CHANGE UP (ref 135–145)
WBC # BLD: 7.12 K/UL — SIGNIFICANT CHANGE UP (ref 3.8–10.5)
WBC # FLD AUTO: 7.12 K/UL — SIGNIFICANT CHANGE UP (ref 3.8–10.5)

## 2021-05-29 PROCEDURE — 99232 SBSQ HOSP IP/OBS MODERATE 35: CPT

## 2021-05-29 PROCEDURE — 74183 MRI ABD W/O CNTR FLWD CNTR: CPT | Mod: 26

## 2021-05-29 RX ADMIN — OXYCODONE HYDROCHLORIDE 10 MILLIGRAM(S): 5 TABLET ORAL at 23:41

## 2021-05-29 RX ADMIN — AMLODIPINE BESYLATE 5 MILLIGRAM(S): 2.5 TABLET ORAL at 06:54

## 2021-05-29 RX ADMIN — ENOXAPARIN SODIUM 40 MILLIGRAM(S): 100 INJECTION SUBCUTANEOUS at 11:49

## 2021-05-29 RX ADMIN — POLYETHYLENE GLYCOL 3350 17 GRAM(S): 17 POWDER, FOR SOLUTION ORAL at 11:49

## 2021-05-29 RX ADMIN — LETROZOLE 2.5 MILLIGRAM(S): 2.5 TABLET, FILM COATED ORAL at 11:50

## 2021-05-29 RX ADMIN — Medication 1000 UNIT(S): at 11:50

## 2021-05-29 NOTE — PROGRESS NOTE ADULT - PROBLEM SELECTOR PLAN 4
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  - Oncology consult obtained. Appreciate recs.  - C/w Letrozole 2.5 mg daily  - PET/CT scan on 5/6/21 showing lesion in the posterior left iliac bone. S/P IR bone biopsy confirming cancer. After MRI then IR can also perform kyphoplasty   - Lactate elevated to 2.4 on admission. Likely from metastatic breast cancer.

## 2021-05-29 NOTE — PROGRESS NOTE ADULT - PROBLEM SELECTOR PLAN 1
MRI lumbar spine showing metastatic disease in the lower thoracic, lumbar, and upper sacral spine with severe pathologic fracture of L2 with retropulsion of the posterior endplate causing moderate canal stenosis. No alarm S/S.  - Pain control with Tylenol PRN for mild pain, oxycodone 5 mg q6hrs PRN for moderate pain, and oxycodone 10 mg q6hrs PRN for severe pain  - Rad-Onc consult for possible palliative RT to spine, after declining, patient now amenable  - New lytic lesions in in the ribs   - TLSO brace present  - Pt seen by neurosurgery as outpatient for severe pathologic fracture of L2 and deemed not a surgical candidate - will need IR for possible kyphoplasty, though patient will require MRI prior to doing so. After refusing MRI earlier this week, now the patient is open to MRI and RT. S/P Lumbar spine MRI- unchanged since last one in April. Will f/u with recs from IR next week. As per email discussion with radiation oncology, if pain does not improve after IR intervention, then RT would be considered.    s/p biopsy on 5/24- confirming cancer

## 2021-05-29 NOTE — PROGRESS NOTE ADULT - SUBJECTIVE AND OBJECTIVE BOX
Obey Henry MD  Academic Hospitalist  Pager [phone]  Email: [email or web address]          PROGRESS NOTE:     Patient is a 56y old  Female who presents with a chief complaint of Hypercalcemia, hypokalemia (28 May 2021 13:34)      SUBJECTIVE / OVERNIGHT EVENTS:  Patient seen and examined this morning. Denies any new complaints, no f/c/n/v.  ADDITIONAL REVIEW OF SYSTEMS:    MEDICATIONS  (STANDING):  amLODIPine   Tablet 5 milliGRAM(s) Oral daily  cholecalciferol 1000 Unit(s) Oral daily  enoxaparin Injectable 40 milliGRAM(s) SubCutaneous daily  letrozole 2.5 milliGRAM(s) Oral daily  polyethylene glycol 3350 17 Gram(s) Oral daily  senna 2 Tablet(s) Oral at bedtime  sodium chloride 0.9%. 1000 milliLiter(s) (125 mL/Hr) IV Continuous <Continuous>    MEDICATIONS  (PRN):  bisacodyl Suppository 10 milliGRAM(s) Rectal daily PRN Constipation  ondansetron Injectable 4 milliGRAM(s) IV Push every 6 hours PRN Nausea and/or Vomiting  oxyCODONE    IR 5 milliGRAM(s) Oral every 4 hours PRN Moderate Pain (4 - 6)  oxyCODONE    IR 10 milliGRAM(s) Oral every 4 hours PRN Severe Pain (7 - 10)  simethicone 80 milliGRAM(s) Chew every 6 hours PRN Gas      CAPILLARY BLOOD GLUCOSE        I&O's Summary    28 May 2021 07:01  -  29 May 2021 07:00  --------------------------------------------------------  IN: 300 mL / OUT: 850 mL / NET: -550 mL        PHYSICAL EXAM:  Vital Signs Last 24 Hrs  T(C): 37.1 (29 May 2021 06:54), Max: 37.2 (28 May 2021 21:00)  T(F): 98.7 (29 May 2021 06:54), Max: 98.9 (28 May 2021 21:00)  HR: 109 (29 May 2021 06:54) (109 - 116)  BP: 140/95 (29 May 2021 06:54) (139/95 - 144/92)  BP(mean): --  RR: 17 (29 May 2021 06:54) (17 - 18)  SpO2: 100% (29 May 2021 06:54) (98% - 100%)    CONSTITUTIONAL: NAD, well-developed, well-groomed  EYES: EOMI; conjunctiva and sclera clear  ENMT: Moist oral mucosa  RESPIRATORY: Normal respiratory effort; lungs are clear to auscultation bilaterally  CARDIOVASCULAR: tachycardic and regular rhythm, normal S1 and S2, no murmur; No lower extremity edema  ABDOMEN: Nontender to palpation, normoactive bowel sounds, no rebound/guarding  MUSCULOSKELETAL:  no clubbing or cyanosis of digits; no joint swelling or tenderness to palpation  PSYCH: calm and very pleasant  NEUROLOGY: CN 2-12 are intact and symmetric; no gross sensory deficits   SKIN: L breast 8 o'clock hard lesion- no changes      LABS:                        12.0   7.12  )-----------( 473      ( 29 May 2021 06:37 )             38.0     05-29    137  |  99  |  10  ----------------------------<  89  3.7   |  20<L>  |  0.64    Ca    9.0      29 May 2021 06:37  Phos  1.9     05-29  Mg     1.7     05-29    TPro  7.7  /  Alb  3.8  /  TBili  0.2  /  DBili  <0.2  /  AST  70<H>  /  ALT  65<H>  /  AlkPhos  191<H>  05-28                RADIOLOGY & ADDITIONAL TESTS:  Results Reviewed:   Imaging Personally Reviewed:  Electrocardiogram Personally Reviewed:    COORDINATION OF CARE:  Care Discussed with Consultants/Other Providers [Y/N]:  Prior or Outpatient Records Reviewed [Y/N]:

## 2021-05-30 LAB
ANION GAP SERPL CALC-SCNC: 17 MMOL/L — HIGH (ref 7–14)
BUN SERPL-MCNC: 13 MG/DL — SIGNIFICANT CHANGE UP (ref 7–23)
CALCIUM SERPL-MCNC: 9.3 MG/DL — SIGNIFICANT CHANGE UP (ref 8.4–10.5)
CHLORIDE SERPL-SCNC: 97 MMOL/L — LOW (ref 98–107)
CO2 SERPL-SCNC: 22 MMOL/L — SIGNIFICANT CHANGE UP (ref 22–31)
CREAT SERPL-MCNC: 0.71 MG/DL — SIGNIFICANT CHANGE UP (ref 0.5–1.3)
CULTURE RESULTS: SIGNIFICANT CHANGE UP
CULTURE RESULTS: SIGNIFICANT CHANGE UP
GLUCOSE SERPL-MCNC: 83 MG/DL — SIGNIFICANT CHANGE UP (ref 70–99)
HCT VFR BLD CALC: 40.3 % — SIGNIFICANT CHANGE UP (ref 34.5–45)
HGB BLD-MCNC: 12.7 G/DL — SIGNIFICANT CHANGE UP (ref 11.5–15.5)
MAGNESIUM SERPL-MCNC: 1.6 MG/DL — SIGNIFICANT CHANGE UP (ref 1.6–2.6)
MCHC RBC-ENTMCNC: 25.3 PG — LOW (ref 27–34)
MCHC RBC-ENTMCNC: 31.5 GM/DL — LOW (ref 32–36)
MCV RBC AUTO: 80.3 FL — SIGNIFICANT CHANGE UP (ref 80–100)
NRBC # BLD: 0 /100 WBCS — SIGNIFICANT CHANGE UP
NRBC # FLD: 0 K/UL — SIGNIFICANT CHANGE UP
PHOSPHATE SERPL-MCNC: 2 MG/DL — LOW (ref 2.5–4.5)
PLATELET # BLD AUTO: 505 K/UL — HIGH (ref 150–400)
POTASSIUM SERPL-MCNC: 3.6 MMOL/L — SIGNIFICANT CHANGE UP (ref 3.5–5.3)
POTASSIUM SERPL-SCNC: 3.6 MMOL/L — SIGNIFICANT CHANGE UP (ref 3.5–5.3)
RBC # BLD: 5.02 M/UL — SIGNIFICANT CHANGE UP (ref 3.8–5.2)
RBC # FLD: 15.1 % — HIGH (ref 10.3–14.5)
SODIUM SERPL-SCNC: 136 MMOL/L — SIGNIFICANT CHANGE UP (ref 135–145)
SPECIMEN SOURCE: SIGNIFICANT CHANGE UP
SPECIMEN SOURCE: SIGNIFICANT CHANGE UP
WBC # BLD: 7.19 K/UL — SIGNIFICANT CHANGE UP (ref 3.8–10.5)
WBC # FLD AUTO: 7.19 K/UL — SIGNIFICANT CHANGE UP (ref 3.8–10.5)

## 2021-05-30 PROCEDURE — 99233 SBSQ HOSP IP/OBS HIGH 50: CPT

## 2021-05-30 RX ADMIN — OXYCODONE HYDROCHLORIDE 10 MILLIGRAM(S): 5 TABLET ORAL at 20:14

## 2021-05-30 RX ADMIN — OXYCODONE HYDROCHLORIDE 10 MILLIGRAM(S): 5 TABLET ORAL at 00:41

## 2021-05-30 RX ADMIN — Medication 1000 UNIT(S): at 13:06

## 2021-05-30 RX ADMIN — ENOXAPARIN SODIUM 40 MILLIGRAM(S): 100 INJECTION SUBCUTANEOUS at 13:07

## 2021-05-30 RX ADMIN — AMLODIPINE BESYLATE 5 MILLIGRAM(S): 2.5 TABLET ORAL at 05:26

## 2021-05-30 RX ADMIN — LETROZOLE 2.5 MILLIGRAM(S): 2.5 TABLET, FILM COATED ORAL at 13:06

## 2021-05-30 RX ADMIN — OXYCODONE HYDROCHLORIDE 10 MILLIGRAM(S): 5 TABLET ORAL at 21:14

## 2021-05-30 NOTE — PROGRESS NOTE ADULT - SUBJECTIVE AND OBJECTIVE BOX
Obey Henry MD  Academic Hospitalist  Pager [phone]  Email: [email or web address]          PROGRESS NOTE:     Patient is a 56y old  Female who presents with a chief complaint of Hypercalcemia, hypokalemia (29 May 2021 12:36)      SUBJECTIVE / OVERNIGHT EVENTS:  Patient seen and examined this morning. S/P MRI abdomen earlier, awaiting results. No f/c/n/v  ADDITIONAL REVIEW OF SYSTEMS:    MEDICATIONS  (STANDING):  amLODIPine   Tablet 5 milliGRAM(s) Oral daily  cholecalciferol 1000 Unit(s) Oral daily  enoxaparin Injectable 40 milliGRAM(s) SubCutaneous daily  letrozole 2.5 milliGRAM(s) Oral daily  polyethylene glycol 3350 17 Gram(s) Oral daily  senna 2 Tablet(s) Oral at bedtime  sodium chloride 0.9%. 1000 milliLiter(s) (125 mL/Hr) IV Continuous <Continuous>    MEDICATIONS  (PRN):  bisacodyl Suppository 10 milliGRAM(s) Rectal daily PRN Constipation  ondansetron Injectable 4 milliGRAM(s) IV Push every 6 hours PRN Nausea and/or Vomiting  oxyCODONE    IR 5 milliGRAM(s) Oral every 4 hours PRN Moderate Pain (4 - 6)  oxyCODONE    IR 10 milliGRAM(s) Oral every 4 hours PRN Severe Pain (7 - 10)  simethicone 80 milliGRAM(s) Chew every 6 hours PRN Gas      CAPILLARY BLOOD GLUCOSE        I&O's Summary    29 May 2021 07:01  -  30 May 2021 07:00  --------------------------------------------------------  IN: 1750 mL / OUT: 1375 mL / NET: 375 mL        PHYSICAL EXAM:  Vital Signs Last 24 Hrs  T(C): 36.7 (30 May 2021 05:24), Max: 36.9 (29 May 2021 16:20)  T(F): 98 (30 May 2021 05:24), Max: 98.5 (29 May 2021 16:20)  HR: 100 (30 May 2021 05:24) (100 - 113)  BP: 130/77 (30 May 2021 05:24) (130/77 - 145/81)  BP(mean): --  RR: 18 (30 May 2021 05:24) (17 - 18)  SpO2: 97% (30 May 2021 05:24) (96% - 99%)    CONSTITUTIONAL: NAD, well-developed, well-groomed  EYES: EOMI; conjunctiva and sclera clear  ENMT: Moist oral mucosa  RESPIRATORY: Normal respiratory effort; lungs are clear to auscultation bilaterally  CARDIOVASCULAR: tachycardic and regular rhythm, normal S1 and S2, no murmur; No lower extremity edema  ABDOMEN: Nontender to palpation, normoactive bowel sounds, no rebound/guarding  MUSCULOSKELETAL:  no clubbing or cyanosis of digits; no joint swelling or tenderness to palpation  PSYCH: calm and very pleasant  NEUROLOGY: CN 2-12 are intact and symmetric; no gross sensory deficits   SKIN: L breast 8 o'clock hard lesion- no changes  LABS:                        12.7   7.19  )-----------( 505      ( 30 May 2021 07:42 )             40.3     05-30    136  |  97<L>  |  13  ----------------------------<  83  3.6   |  22  |  0.71    Ca    9.3      30 May 2021 07:36  Phos  2.0     05-30  Mg     1.6     05-30                  RADIOLOGY & ADDITIONAL TESTS:  Results Reviewed:   Imaging Personally Reviewed:  EXAM:  MR ABDOMEN WAW         PROCEDURE DATE:  May 29 2021         INTERPRETATION:  CLINICAL INFORMATION: Elevated LFTs, breast cancer. Known osseous metastatic disease.    COMPARISON: PET CT 5/6/2021.    CONTRAST/COMPLICATIONS:  IV Contrast: Gadavist  6.0 cc administered   1.5 cc discarded  Oral Contrast: NONE  Complications: None reported at time of study completion    PROCEDURE:  MRI of the abdomen was performed.  MRCP was performed.    FINDINGS:  LOWER CHEST: Small left pleural effusion with adjacent atelectasis. Trace right pleural effusion. Known left breast mass medially with involvement of the chest wall. Multiple pulmonary nodules. Soft tissue nodules along the right pleura.    LIVER: A 1.3 cm minimally T2 hyperintense lesion in segment 7 which fills in on delayed images. A 1.1 cm minimally T2 hyperintense lesion in segment 4 (5, 10), which demonstrates progressive enhancement and may represent a hemangioma. No evidence of restricted diffusion. Multiple additional cysts. No evidence of steatosis.  BILE DUCTS: Normal caliber.  GALLBLADDER: Within normal limits.  SPLEEN: Within normal limits.  PANCREAS: Within normal limits.  ADRENALS: Within normal limits.  KIDNEYS/URETERS: 7 mm slightly T1 hyperintense and a T2 hypointense cystic lesion in the upper pole of right kidney with rim enhancement. There is also suggestion of minimal internal enhancement    VISUALIZED PORTIONS:  BOWEL: Within normal limits.  PERITONEUM: No ascites.  VESSELS: Within normal limits.  RETROPERITONEUM/LYMPH NODES: No lymphadenopathy.  ABDOMINAL WALL: Within normal limits.  BONES: Multiple metastatic lesions, better visualized on recent MRI, including pathologic compression fracture of L2.    IMPRESSION:  Possible hemangiomas in the right hepatic dome and in the left hepatic lobe. Multiple hepatic cysts. No evidence of steatosis. Normal biliary tree.  A cystic lesion in the right kidney which demonstrates minimal internal enhancement.  Extensive osseous and pulmonary metastatic disease.            Electrocardiogram Personally Reviewed:    COORDINATION OF CARE:  Care Discussed with Consultants/Other Providers [Y/N]:  Prior or Outpatient Records Reviewed [Y/N]:

## 2021-05-30 NOTE — PROGRESS NOTE ADULT - PROBLEM SELECTOR PLAN 4
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  - Oncology consult obtained. Appreciate recs.  - C/w Letrozole 2.5 mg daily  - PET/CT scan on 5/6/21 showing lesion in the posterior left iliac bone. S/P IR bone biopsy confirming cancer. Will discuss with IR this week for possible kyphoplasty.   - Lactate elevated to 2.4 on admission. Likely from metastatic breast cancer.

## 2021-05-31 ENCOUNTER — TRANSCRIPTION ENCOUNTER (OUTPATIENT)
Age: 56
End: 2021-05-31

## 2021-05-31 LAB
ANION GAP SERPL CALC-SCNC: 16 MMOL/L — HIGH (ref 7–14)
BUN SERPL-MCNC: 11 MG/DL — SIGNIFICANT CHANGE UP (ref 7–23)
CALCIUM SERPL-MCNC: 10 MG/DL — SIGNIFICANT CHANGE UP (ref 8.4–10.5)
CHLORIDE SERPL-SCNC: 96 MMOL/L — LOW (ref 98–107)
CO2 SERPL-SCNC: 23 MMOL/L — SIGNIFICANT CHANGE UP (ref 22–31)
CREAT SERPL-MCNC: 0.61 MG/DL — SIGNIFICANT CHANGE UP (ref 0.5–1.3)
GLUCOSE SERPL-MCNC: 87 MG/DL — SIGNIFICANT CHANGE UP (ref 70–99)
HCT VFR BLD CALC: 38.1 % — SIGNIFICANT CHANGE UP (ref 34.5–45)
HGB BLD-MCNC: 11.9 G/DL — SIGNIFICANT CHANGE UP (ref 11.5–15.5)
MAGNESIUM SERPL-MCNC: 1.6 MG/DL — SIGNIFICANT CHANGE UP (ref 1.6–2.6)
MCHC RBC-ENTMCNC: 25.3 PG — LOW (ref 27–34)
MCHC RBC-ENTMCNC: 31.2 GM/DL — LOW (ref 32–36)
MCV RBC AUTO: 81.1 FL — SIGNIFICANT CHANGE UP (ref 80–100)
NRBC # BLD: 0 /100 WBCS — SIGNIFICANT CHANGE UP
NRBC # FLD: 0 K/UL — SIGNIFICANT CHANGE UP
PHOSPHATE SERPL-MCNC: 2.4 MG/DL — LOW (ref 2.5–4.5)
PLATELET # BLD AUTO: 503 K/UL — HIGH (ref 150–400)
POTASSIUM SERPL-MCNC: 3.5 MMOL/L — SIGNIFICANT CHANGE UP (ref 3.5–5.3)
POTASSIUM SERPL-SCNC: 3.5 MMOL/L — SIGNIFICANT CHANGE UP (ref 3.5–5.3)
RBC # BLD: 4.7 M/UL — SIGNIFICANT CHANGE UP (ref 3.8–5.2)
RBC # FLD: 15 % — HIGH (ref 10.3–14.5)
SODIUM SERPL-SCNC: 135 MMOL/L — SIGNIFICANT CHANGE UP (ref 135–145)
WBC # BLD: 7.32 K/UL — SIGNIFICANT CHANGE UP (ref 3.8–10.5)
WBC # FLD AUTO: 7.32 K/UL — SIGNIFICANT CHANGE UP (ref 3.8–10.5)

## 2021-05-31 PROCEDURE — 99232 SBSQ HOSP IP/OBS MODERATE 35: CPT

## 2021-05-31 RX ORDER — POTASSIUM PHOSPHATE, MONOBASIC POTASSIUM PHOSPHATE, DIBASIC 236; 224 MG/ML; MG/ML
15 INJECTION, SOLUTION INTRAVENOUS ONCE
Refills: 0 | Status: COMPLETED | OUTPATIENT
Start: 2021-05-31 | End: 2021-05-31

## 2021-05-31 RX ADMIN — POTASSIUM PHOSPHATE, MONOBASIC POTASSIUM PHOSPHATE, DIBASIC 62.5 MILLIMOLE(S): 236; 224 INJECTION, SOLUTION INTRAVENOUS at 08:19

## 2021-05-31 RX ADMIN — LETROZOLE 2.5 MILLIGRAM(S): 2.5 TABLET, FILM COATED ORAL at 13:14

## 2021-05-31 RX ADMIN — OXYCODONE HYDROCHLORIDE 10 MILLIGRAM(S): 5 TABLET ORAL at 19:30

## 2021-05-31 RX ADMIN — ENOXAPARIN SODIUM 40 MILLIGRAM(S): 100 INJECTION SUBCUTANEOUS at 13:13

## 2021-05-31 RX ADMIN — POLYETHYLENE GLYCOL 3350 17 GRAM(S): 17 POWDER, FOR SOLUTION ORAL at 13:13

## 2021-05-31 RX ADMIN — Medication 1000 UNIT(S): at 13:14

## 2021-05-31 RX ADMIN — OXYCODONE HYDROCHLORIDE 10 MILLIGRAM(S): 5 TABLET ORAL at 18:50

## 2021-05-31 RX ADMIN — AMLODIPINE BESYLATE 5 MILLIGRAM(S): 2.5 TABLET ORAL at 05:30

## 2021-05-31 NOTE — DISCHARGE NOTE PROVIDER - NSDCCPCAREPLAN_GEN_ALL_CORE_FT
PRINCIPAL DISCHARGE DIAGNOSIS  Diagnosis: Compression fracture of L2 vertebra, initial encounter  Assessment and Plan of Treatment: MRI lumbar spine showing metastatic disease in the lower thoracic, lumbar, and upper sacral spine with severe pathologic fracture of L2 with retropulsion of the posterior endplate causing moderate canal stenosis. You were evaluated by Neurosurgery and not a surgical candidate. You were evaluated by the IR team for Kyphoplasty procedure------------------------------------      SECONDARY DISCHARGE DIAGNOSES  Diagnosis: Hypercalcemia  Assessment and Plan of Treatment: You were give medication, and has resolved.    Diagnosis: Hypokalemia  Assessment and Plan of Treatment: Received supplementation, and resolved    Diagnosis: Breast cancer  Assessment and Plan of Treatment: You were evaluated by Oncology and started on Letrozole, continue on your medications as directed and follow up outpatient with your Oncologist in 1 week for further medical management.     PRINCIPAL DISCHARGE DIAGNOSIS  Diagnosis: Compression fracture of L2 vertebra, initial encounter  Assessment and Plan of Treatment: MRI lumbar spine showing metastatic disease in the lower thoracic, lumbar, and upper sacral spine with severe pathologic fracture of L2 with retropulsion of the posterior endplate causing moderate canal stenosis. You were evaluated by Neurosurgery and not a surgical candidate. You were evaluated by the IR team for Kyphoplasty procedure and had a kyphoplasy on June 4th.  Follow up with interventional radiology.      SECONDARY DISCHARGE DIAGNOSES  Diagnosis: Hypokalemia  Assessment and Plan of Treatment: Received supplementation, and resolved    Diagnosis: Breast cancer  Assessment and Plan of Treatment: You were evaluated by Oncology and started on Letrozole, continue on your medications as directed and follow up outpatient with your Oncologist in 1 week for further medical management.    Diagnosis: Hypercalcemia  Assessment and Plan of Treatment: You were give medication, and has resolved.     PRINCIPAL DISCHARGE DIAGNOSIS  Diagnosis: Compression fracture of L2 vertebra, initial encounter  Assessment and Plan of Treatment: MRI lumbar spine showing metastatic disease in the lower thoracic, lumbar, and upper sacral spine with severe pathologic fracture of L2 with retropulsion of the posterior endplate causing moderate canal stenosis. You had a biopsy confirming  cancer.  You were evaluated by Neurosurgery and not a surgical candidate. You were evaluated by the IR team for Kyphoplasty procedure and had a kyphoplasy on June 4th.  Follow up with interventional radiology. Continue TLSO brace.  You had a status post single fraction Radiation therapy to the Lumbar-spine 6/10  -Pain control.      SECONDARY DISCHARGE DIAGNOSES  Diagnosis: Transaminitis  Assessment and Plan of Treatment: Please follow up with the medical doctors upon arrival to rehab  -LFTs elevated last week  D/W oncology, they are recommending hepatology consult  D/W hepatology, for now they recommended obtaining MRI abdomen/pelvis w/wo contrast, which shows hemangiomas, hepatic cysts, osseous and pulmonary metastatic disease.   no further work up a this time   Please contineu to [facility] liver function test .        Diagnosis: Hypokalemia  Assessment and Plan of Treatment: Received supplementation, and resolved    Diagnosis: Tachycardia  Assessment and Plan of Treatment: Please follow up with he medical doctors upon arrival to rehab  Pt tachycardic to 110s. Per outpatient records, pt with documented heart rates in 90s. EKG showing sinus tachycardia  - TSH normal  - you had an ultrasound of your heart  -Uninterpretable study.  - Low suspicion for PE at this time given that pt is not tachypneic and is maintaining O2 sats in high 90s-100% on RA  - Likely deconditioning in patient with advanced malignancy      Diagnosis: Breast cancer  Assessment and Plan of Treatment: You were evaluated by Oncology and started on Letrozole, continue on your medications as directed and follow up outpatient with your Oncologist in 1 week for further medical management.    Diagnosis: Hypercalcemia  Assessment and Plan of Treatment: You were give medication, and has resolved.  -Zometa 4 mg x1, calcitonin 220 IU q12hrs x2 as per oncology       Diagnosis: Fever  Assessment and Plan of Treatment: Please follow up with the medical doctors upon arrival to rehab.   You were seen by the infectious disease physician.   fevers resolved. There was concern for sepsis earlier on in her admission, but sepsis was ruled out -fevers are likely secondary to metastatic cancer. Will continue to monitor off antibiotics.        PRINCIPAL DISCHARGE DIAGNOSIS  Diagnosis: Compression fracture of L2 vertebra, initial encounter  Assessment and Plan of Treatment: MRI lumbar spine showing metastatic disease in the lower thoracic, lumbar, and upper sacral spine with severe pathologic fracture of L2 with retropulsion of the posterior endplate causing moderate canal stenosis. You had a biopsy confirming  cancer.  You were evaluated by Neurosurgery and not a surgical candidate. You were evaluated by the IR team for Kyphoplasty procedure and had a kyphoplasy on June 4th.  Follow up with interventional radiology. Continue TLSO brace.  You had a status post single fraction Radiation therapy to the Lumbar-spine 6/10  -Pain control.      SECONDARY DISCHARGE DIAGNOSES  Diagnosis: Transaminitis  Assessment and Plan of Treatment: Please follow up with the medical doctors upon arrival to rehab  -LFTs elevated last week  D/W oncology, they are recommending hepatology consult  D/W hepatology, for now they recommended obtaining MRI abdomen/pelvis w/wo contrast, which shows hemangiomas, hepatic cysts, osseous and pulmonary metastatic disease.   no further work up a this time   Please contineu to [facility] liver function test .        Diagnosis: Hypokalemia  Assessment and Plan of Treatment: Received supplementation, and resolved    Diagnosis: Tachycardia  Assessment and Plan of Treatment: Please follow up with he medical doctors upon arrival to rehab  Pt tachycardic to 110s. Per outpatient records, pt with documented heart rates in 90s. EKG showing sinus tachycardia  - TSH normal  - you had an ultrasound of your heart  -Uninterpretable study.  - Low suspicion for PE at this time given that pt is not tachypneic and is maintaining O2 sats in high 90s-100% on RA  - Likely deconditioning in patient with advanced malignancy      Diagnosis: Breast cancer  Assessment and Plan of Treatment: You were evaluated by Oncology and started on Letrozole, continue on your medications as directed and follow up outpatient with your Oncologist in 1 week for further medical management.    Diagnosis: Hypercalcemia  Assessment and Plan of Treatment: You were give medication, and has resolved.  -Zometa 4 mg x1, calcitonin 220 IU q12hrs x2 as per oncology       Diagnosis: Fever  Assessment and Plan of Treatment: Please follow up with the medical doctors upon arrival to rehab.   You were seen by the infectious disease physician.   fevers resolved. There was concern for sepsis earlier on in her admission, but sepsis was ruled out -fevers are likely secondary to metastatic cancer. Will continue to monitor off antibiotics.  Pt received steroids prior to radiation therapy most likely the reason for leukocytosis: no signs of infection.

## 2021-05-31 NOTE — DISCHARGE NOTE PROVIDER - HOSPITAL COURSE
55 yo woman with history of recent diagnosis of L breast cancer with metastatic disease to spine with severe pathologic fracture of L2 with moderate canal stenosis presents with hypercalcemia (13.5) and hypokalemia (2.6) on outpatient labs likely due to metastatic disease.    Compression fracture of L2 vertebra, initial encounter.    Plan: MRI lumbar spine showing metastatic disease in the lower thoracic, lumbar, and upper sacral spine with severe pathologic fracture of L2 with retropulsion of the posterior endplate causing moderate canal stenosis. No alarm S/S.  - Pain control with Tylenol PRN for mild pain, oxycodone 5 mg q6hrs PRN for moderate pain, and oxycodone 10 mg q6hrs PRN for severe pain  - Rad-Onc consult for possible palliative RT to spine, after declining, patient now amenable  - New lytic lesions in in the ribs   - TLSO brace present  - Pt seen by neurosurgery as outpatient for severe pathologic fracture of L2 and deemed not a surgical candidate - will need IR for possible kyphoplasty, though patient will require MRI prior to doing so. After refusing MRI earlier this week, now the patient is open to MRI and RT. S/P Lumbar spine MRI- unchanged since last one in April. Will f/u with recs from IR next week. As per email discussion with radiation oncology, if pain does not improve after IR intervention, then RT would be considered.    s/p biopsy on 5/24- confirming cancer.   -****************************? s/p Kyphoplasty ------------------------------------------------------------------Incomplete    Hypercalcemia.    Plan: Serum Ca 15 on admission, 13.5 on outpatient labs. Pt symptomatic with fatigue and constipation but with no altered sensorium. Likely hypercalcemia of malignancy 2/2 metastatic breast cancer.  - Oncology consult obtained. Appreciate recs.  - S/p Zometa 4 mg x1, calcitonin 220 IU q12hrs x2 doses as per Oncology recs  Now resolved.     Fever, unspecified fever cause.    Plan: ID consulted, fevers resolved  ID recommends to monitor patient off of antibiotics.     Breast cancer.    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  - Oncology consult obtained. Appreciate recs.  - C/w Letrozole 2.5 mg daily  - PET/CT scan on 5/6/21 showing lesion in the posterior left iliac bone. S/P IR bone biopsy confirming cancer. Will discuss with IR this week for possible kyphoplasty.   - Lactate elevated to 2.4 on admission. Likely from metastatic breast cancer.     Transaminitis.    Plan: Patient continues to have increased LFTs  D/W oncology, they are recommending hepatology consult  D/W hepatology, for now they recommend ordering MRI abdomen/pelvis w/wo contrast, findings discussed above, patient with cysts in liver, will follow up final recs.     Tachycardia.   Plan: Pt tachycardic to 110s. Per outpatient records, pt with documented heart rates in 90s. EKG showing sinus tachycardia  - TSH normal  - TTE -Uninterpretable study.  - Low suspicion for PE at this time given that pt is not tachypneic and is maintaining O2 sats in high 90s-100% on RA  - Monitor on tele.    Need for prophylactic measure.    Plan: - DVT ppx: Will hold pharmacologic ppx for now given possible bone biopsy by IR. Otherwise, start Lovenox 40 mg daily.     On ___ this case was reviewed with  ____, the patient is medically stable and optimized for discharge. All medications were reviewed and prescriptions were sent to mutually agreed upon pharmacy.   55 yo woman with history of recent diagnosis of L breast cancer with metastatic disease to spine with severe pathologic fracture of L2 with moderate canal stenosis presents with hypercalcemia (13.5) and hypokalemia (2.6) on outpatient labs likely due to metastatic disease.    Compression fracture of L2 vertebra, initial encounter.    Plan: MRI lumbar spine showing metastatic disease in the lower thoracic, lumbar, and upper sacral spine with severe pathologic fracture of L2 with retropulsion of the posterior endplate causing moderate canal stenosis. No alarm S/S.  - Pain control with Tylenol PRN for mild pain, oxycodone 5 mg q6hrs PRN for moderate pain, and oxycodone 10 mg q6hrs PRN for severe pain  - Rad-Onc consult for possible palliative RT to spine, after declining, patient now amenable  - New lytic lesions in in the ribs   - TLSO brace present  - Pt seen by neurosurgery as outpatient for severe pathologic fracture of L2 and deemed not a surgical candidate - will need IR for possible kyphoplasty, though patient will require MRI prior to doing so. After refusing MRI earlier this week, now the patient is open to MRI and RT. S/P Lumbar spine MRI- unchanged since last one in April. Will f/u with recs from IR next week. As per email discussion with radiation oncology, if pain does not improve after IR intervention, then RT would be considered.    s/p biopsy on 5/24- confirming cancer.    s/p Kyphoplasty on 6/4     Hypercalcemia.    Plan: Serum Ca 15 on admission, 13.5 on outpatient labs. Pt symptomatic with fatigue and constipation but with no altered sensorium. Likely hypercalcemia of malignancy 2/2 metastatic breast cancer.  - Oncology consult obtained. Appreciate recs.  - S/p Zometa 4 mg x1, calcitonin 220 IU q12hrs x2 doses as per Oncology recs  Now resolved.     Fever, unspecified fever cause.    Plan: ID consulted, fevers resolved  ID recommends to monitor patient off of antibiotics.     Breast cancer.    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  - Oncology consult obtained. Appreciate recs.  - C/w Letrozole 2.5 mg daily  - PET/CT scan on 5/6/21 showing lesion in the posterior left iliac bone. S/P IR bone biopsy confirming cancer. Will discuss with IR this week for possible kyphoplasty.   - Lactate elevated to 2.4 on admission. Likely from metastatic breast cancer.     Transaminitis.    Plan: Patient continues to have increased LFTs  D/W oncology, they are recommending hepatology consult  D/W hepatology, for now they recommend ordering MRI abdomen/pelvis w/wo contrast, findings discussed above, patient with cysts in liver, will follow up final recs.     Tachycardia.   Plan: Pt tachycardic to 110s. Per outpatient records, pt with documented heart rates in 90s. EKG showing sinus tachycardia  - TSH normal  - TTE -Uninterpretable study.  - Low suspicion for PE at this time given that pt is not tachypneic and is maintaining O2 sats in high 90s-100% on RA  - Monitor on tele.    Need for prophylactic measure.    Plan: - DVT ppx: Will hold pharmacologic ppx for now given possible bone biopsy by IR. Otherwise, start Lovenox 40 mg daily.     On ___ this case was reviewed with  ____, the patient is medically stable and optimized for discharge. All medications were reviewed and prescriptions were sent to mutually agreed upon pharmacy.   55 yo woman with history of recent diagnosis of L breast cancer with metastatic disease to spine with severe pathologic fracture of L2 with moderate canal stenosis presents with hypercalcemia (13.5) and hypokalemia (2.6) on outpatient labs likely due to metastatic disease.    Compression fracture of L2 vertebra, initial encounter.  Plan: MRI lumbar spine showing metastatic disease in the lower thoracic, lumbar, and upper sacral spine with severe pathologic fracture of L2 with retropulsion of the posterior endplate causing moderate canal stenosis.    s/p biopsy on 5/24- confirming cancer  - Pain control   - s/p L2 vertebral body augmentation with biopsy on June 4  - TLSO brace   - s/p single fraction RT to the L-spine 6/10  - s/p Decadron   - PT: rehab.      Problem/Plan - 2:  ·  Problem: Hypercalcemia.  Plan: Serum Ca 15 on admission, 13.5 on outpatient labs. Pt symptomatic with fatigue and constipation but with no altered sensorium. Likely hypercalcemia of malignancy 2/2 metastatic breast cancer.  - Oncology consult obtained. Appreciate recs.  - S/p Zometa 4 mg x1, calcitonin 220 IU q12hrs x2 doses as per Oncology recs  - hypercalcemia improved.      Problem/Plan - 3:  ·  Problem: Fever, unspecified fever cause.  Plan: ID consulted, fevers resolved. There was concern for sepsis earlier on in her admission, but sepsis was ruled out - her fevers are likely secondary to metastatic cancer. Will continue to monitor off antibiotics.      Problem/Plan - 4:  ·  Problem: Breast cancer.  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  - Oncology consult obtained. Appreciate recs.  - C/w Letrozole 2.5 mg daily  - PET/CT scan on 5/6/21 showing lesion in the posterior left iliac bone. S/P IR bone biopsy confirming cancer. Will discuss with IR this week for possible kyphoplasty.   - Lactate elevated to 2.4 on admission. Likely from metastatic breast cancer.      Problem/Plan - 5:  ·  Problem: Transaminitis.  Plan: LFTs elevated last week  D/W oncology, they are recommending hepatology consult  D/W hepatology, for now they recommended obtaining MRI abdomen/pelvis w/wo contrast, which shows hemangiomas, hepatic cysts, osseous and pulmonary metastatic disease.      Problem/Plan - 6:  Problem: Tachycardia. Plan: Pt tachycardic to 110s. Per outpatient records, pt with documented heart rates in 90s. EKG showing sinus tachycardia  - TSH normal  - TTE -Uninterpretable study.  - Low suspicion for PE at this time given that pt is not tachypneic and is maintaining O2 sats in high 90s-100% on RA  - Likely deconditioning in patient with advanced malignancy  - Monitor on tele.     Problem/Plan - 7:  ·  Problem: Need for prophylactic measure.  Plan: - DVT ppx: Lovenox 40 mg daily.    Pt declined Covid 19 vaccine : never received it and doesn't want to receive it       On 6/11/2021 this case was reviewed with  , the patient is medically stable and optimized for discharge. All medications were reviewed and prescriptions were sent to mutually agreed upon pharmacy.   55 yo woman with history of recent diagnosis of L breast cancer with metastatic disease to spine with severe pathologic fracture of L2 with moderate canal stenosis presents with hypercalcemia (13.5) and hypokalemia (2.6) on outpatient labs likely due to metastatic disease.    Compression fracture of L2 vertebra, initial encounter.  Plan: MRI lumbar spine showing metastatic disease in the lower thoracic, lumbar, and upper sacral spine with severe pathologic fracture of L2 with retropulsion of the posterior endplate causing moderate canal stenosis.    s/p biopsy on 5/24- confirming cancer  - Pain control   - s/p L2 vertebral body augmentation with biopsy on June 4  - TLSO brace   - s/p single fraction RT to the L-spine 6/10  - s/p Decadron   - PT: rehab.      Problem/Plan - 2:  ·  Problem: Hypercalcemia.  Plan: Serum Ca 15 on admission, 13.5 on outpatient labs. Pt symptomatic with fatigue and constipation but with no altered sensorium. Likely hypercalcemia of malignancy 2/2 metastatic breast cancer.  - Oncology consult obtained. Appreciate recs.  - S/p Zometa 4 mg x1, calcitonin 220 IU q12hrs x2 doses as per Oncology recs  - hypercalcemia improved.      Problem/Plan - 3:  ·  Problem: Fever, unspecified fever cause.  Plan: ID consulted, fevers resolved. There was concern for sepsis earlier on in her admission, but sepsis was ruled out - her fevers are likely secondary to metastatic cancer. Will continue to monitor off antibiotics.      Problem/Plan - 4:  ·  Problem: Breast cancer.  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  - Oncology consult obtained. Appreciate recs.  - C/w Letrozole 2.5 mg daily  - PET/CT scan on 5/6/21 showing lesion in the posterior left iliac bone. S/P IR bone biopsy confirming cancer. Will discuss with IR this week for possible kyphoplasty.   - Lactate elevated to 2.4 on admission. Likely from metastatic breast cancer.      Problem/Plan - 5:  ·  Problem: Transaminitis.  Plan: LFTs elevated last week  D/W oncology, they are recommending hepatology consult  D/W hepatology, for now they recommended obtaining MRI abdomen/pelvis w/wo contrast, which shows hemangiomas, hepatic cysts, osseous and pulmonary metastatic disease.      Problem/Plan - 6:  Problem: Tachycardia. Plan: Pt tachycardic to 110s. Per outpatient records, pt with documented heart rates in 90s. EKG showing sinus tachycardia  - TSH normal  - TTE -Uninterpretable study.  - Low suspicion for PE at this time given that pt is not tachypneic and is maintaining O2 sats in high 90s-100% on RA  - Likely deconditioning in patient with advanced malignancy  - Monitor on tele.     Problem/Plan - 7:  ·  Problem: Need for prophylactic measure.  Plan: - DVT ppx: Lovenox 40 mg daily.    Pt declined Covid 19 vaccine : never received it and doesn't want to receive it     leukocytosis reviewed with medical attending most likely from steroids no evidence of infection       On 6/11/2021 this case was reviewed with  , the patient is medically stable and optimized for discharge. All medications were reviewed and prescriptions were sent to mutually agreed upon pharmacy.   57 yo woman with history of recent diagnosis of L breast cancer with metastatic disease to spine with severe pathologic fracture of L2 with moderate canal stenosis presents with hypercalcemia (13.5) and hypokalemia (2.6) on outpatient labs likely due to metastatic disease.     Problem/Plan - 1:  Compression fracture of L2 vertebra, initial encounter.  Plan: MRI lumbar spine showing metastatic disease in the lower thoracic, lumbar, and upper sacral spine with severe pathologic fracture of L2 with retropulsion of the posterior endplate causing moderate canal stenosis.  - s/p biopsy on 5/24- confirming cancer  - s/p L2 vertebral body augmentation with biopsy on June 4   - Pain control   - TLSO brace   - s/p single fraction RT to the L-spine 6/10  - s/p Decadron   - PT: rehab.      Problem/Plan - 2:  ·  Problem: Hypercalcemia.  Plan: Serum Ca 15 on admission, 13.5 on outpatient labs. Pt symptomatic with fatigue and constipation but with no altered sensorium. Likely hypercalcemia of malignancy 2/2 metastatic breast cancer.  - Oncology consult obtained.  - S/p Zometa 4 mg x1, calcitonin 220 IU q12hrs x2 doses as per Oncology recs  - hypercalcemia improved.      Problem/Plan - 3:  ·  Problem: Fever, unspecified fever cause.  Plan: ID consulted, fevers resolved. There was concern for sepsis earlier on in her admission, but sepsis was ruled out - her fevers are likely secondary to metastatic cancer. Will continue to monitor off antibiotics.      Problem/Plan - 4:  ·  Problem: Breast cancer.  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  - C/w Letrozole 2.5 mg daily  - PET/CT scan on 5/6/21 showing lesion in the posterior left iliac bone. S/P IR bone biopsy confirming cancer.   - Outpatient Onc f/up      Problem/Plan - 5:  ·  Problem: Transaminitis.  Plan: LFTs elevated last week  D/W oncology, they are recommending hepatology consult  D/W hepatology, for now they recommended obtaining MRI abdomen/pelvis w/wo contrast, which shows hemangiomas, hepatic cysts, osseous and pulmonary metastatic disease.      Problem/Plan - 6:  Problem: Tachycardia. Plan: Pt tachycardic to 110s. Per outpatient records, pt with documented heart rates in 90s. EKG showing sinus tachycardia  - TSH normal  - TTE -Uninterpretable study.  - Low suspicion for PE at this time given that pt is not tachypneic and is maintaining O2 sats in high 90s-100% on RA  - Likely deconditioning in patient with advanced malignancy  - Monitored on tele.      Pt declined Covid 19 vaccine : never received it and doesn't want to receive it     leukocytosis reviewed with medical attending most likely from steroids no evidence of infection       On 6/11/2021 this case was reviewed with  , the patient is medically stable and optimized for discharge. All medications were reviewed and prescriptions were sent to mutually agreed upon pharmacy.

## 2021-05-31 NOTE — DISCHARGE NOTE PROVIDER - CARE PROVIDER_API CALL
Julio Gordon)  Diagnostic Radiology; [facility]  [address] [place] [id number]  Phone: [phone]  Fax: [phone]  Follow Up Time:     Rekha Dowd)  Hematology; Medical Oncology  [address] [place] [address]  Phone: [phone]  Fax: ()-  Follow Up Time:

## 2021-05-31 NOTE — DISCHARGE NOTE PROVIDER - NSDCFUSCHEDAPPT_GEN_ALL_CORE_FT
ROGELIO PEARSON ; 06/01/2021 ; [facility]  ROGELIO PEARSON ; 07/29/2021 ; [facility] [address] ROGELIO PEARSON ; 06/23/2021 ; [facility] [address] [place] [address]  ROGELIO PEARSON ; 07/29/2021 ; [facility] [address] [place] [address] ROGELIO PEARSON ; 08/13/2021 ; [facility]  ROGELIO PEARSON ; 08/16/2021 ; [facility] [address]  ROGELIO PEARSON ; 08/24/2021 ; [facility] ROGELIO PEARSON ; 09/07/2021 ; [facility]  ROGELIO PEARSON ; 11/11/2021 ; [facility] [address]

## 2021-05-31 NOTE — DISCHARGE NOTE PROVIDER - INSTRUCTIONS
Regular Diet Regular Diet  Will send Magic Cup daily (290kcal, 9gm Protein) - 2x daily as discussed with Pt;

## 2021-05-31 NOTE — DISCHARGE NOTE PROVIDER - DETAILS OF MALNUTRITION DIAGNOSIS/DIAGNOSES
This patient has been assessed with a concern for Malnutrition and was treated during this hospitalization for the following Nutrition diagnosis/diagnoses:     -  05/26/2021: Moderate protein-calorie malnutrition

## 2021-05-31 NOTE — PROGRESS NOTE ADULT - PROBLEM SELECTOR PLAN 1
MRI lumbar spine showing metastatic disease in the lower thoracic, lumbar, and upper sacral spine with severe pathologic fracture of L2 with retropulsion of the posterior endplate causing moderate canal stenosis. No alarm S/S.  - Pain control with Tylenol PRN for mild pain, oxycodone 5 mg q6hrs PRN for moderate pain, and oxycodone 10 mg q6hrs PRN for severe pain  - Rad-Onc consult for possible palliative RT to spine, after declining, patient now amenable  - New lytic lesions in in the ribs   - TLSO brace present  - Pt seen by neurosurgery as outpatient for severe pathologic fracture of L2 and deemed not a surgical candidate - will need IR for possible kyphoplasty, though patient will require MRI prior to doing so. After refusing MRI earlier last week and after a few days she became amenable MRI and RT.   S/P Lumbar spine MRI- unchanged since last one in April. IR consulted for kyphoplasty, hopefully to occur sometime this week. As per email discussion with radiation oncology, if pain does not improve after IR intervention, then RT would be considered.    s/p biopsy on 5/24- confirming cancer MRI lumbar spine showing metastatic disease in the lower thoracic, lumbar, and upper sacral spine with severe pathologic fracture of L2 with retropulsion of the posterior endplate causing moderate canal stenosis.    s/p biopsy on 5/24- confirming cancer  - Pain control with Tylenol PRN for mild pain, oxycodone 5 mg q6hrs PRN for moderate pain, and oxycodone 10 mg q6hrs PRN for severe pain  - Rad-Onc consult for possible palliative RT to spine, after declining, patient now amenable- though d/w radiation oncologist, who recommend first to attempt kyphoplasty and systemic therapy.   - New lytic lesions in in the ribs   - TLSO brace present  - Pt seen by neurosurgery as outpatient for severe pathologic fracture of L2 and deemed not a surgical candidate - will need IR for possible kyphoplasty, though patient will require MRI prior to doing so. After refusing MRI earlier last week and after a few days she became amenable MRI and RT.   S/P Lumbar spine MRI- unchanged since last one in April. IR consulted for kyphoplasty, hopefully to occur sometime this week. As per email discussion with radiation oncology, if pain does not improve after IR intervention, then RT would be considered.

## 2021-05-31 NOTE — PROGRESS NOTE ADULT - SUBJECTIVE AND OBJECTIVE BOX
Obey Henry MD  [employer]ist  Pager [phone]  Email: [email or web address]          PROGRESS NOTE:     Patient is a 56y old  Female who presents with a chief complaint of Hypercalcemia, hypokalemia (31 May 2021 08:48)      SUBJECTIVE / OVERNIGHT EVENTS:  Patient seen and examined this morning. No acute events overnight. She denies f/c/n/v.   ADDITIONAL REVIEW OF SYSTEMS:    MEDICATIONS  (STANDING):  amLODIPine   Tablet 5 milliGRAM(s) Oral daily  cholecalciferol 1000 Unit(s) Oral daily  enoxaparin Injectable 40 milliGRAM(s) SubCutaneous daily  letrozole 2.5 milliGRAM(s) Oral daily  polyethylene glycol 3350 17 Gram(s) Oral daily  senna 2 Tablet(s) Oral at bedtime  sodium chloride 0.9%. 1000 milliLiter(s) (125 mL/Hr) IV Continuous <Continuous>    MEDICATIONS  (PRN):  bisacodyl Suppository 10 milliGRAM(s) Rectal daily PRN Constipation  ondansetron Injectable 4 milliGRAM(s) IV Push every 6 hours PRN Nausea and/or Vomiting  oxyCODONE    IR 5 milliGRAM(s) Oral every 4 hours PRN Moderate Pain (4 - 6)  oxyCODONE    IR 10 milliGRAM(s) Oral every 4 hours PRN Severe Pain (7 - 10)  simethicone 80 milliGRAM(s) Chew every 6 hours PRN Gas      CAPILLARY BLOOD GLUCOSE        I&O's Summary    30 May 2021 07:01  -  31 May 2021 07:00  --------------------------------------------------------  IN: 930 mL / OUT: 1100 mL / NET: -170 mL        PHYSICAL EXAM:  Vital Signs Last 24 Hrs  T(C): 37.1 (31 May 2021 12:20), Max: 37.1 (30 May 2021 20:29)  T(F): 98.8 (31 May 2021 12:20), Max: 98.8 (31 May 2021 12:20)  HR: 104 (31 May 2021 12:20) (100 - 114)  BP: 137/92 (31 May 2021 12:20) (134/88 - 140/85)  BP(mean): --  RR: 18 (31 May 2021 12:20) (18 - 18)  SpO2: 100% (31 May 2021 12:20) (100% - 100%)    CONSTITUTIONAL: NAD, well-developed, well-groomed  EYES: EOMI; conjunctiva and sclera clear  ENMT: Moist oral mucosa  RESPIRATORY: Normal respiratory effort; lungs are clear to auscultation bilaterally  CARDIOVASCULAR: tachycardic and regular rhythm, normal S1 and S2, no murmur; No lower extremity edema  ABDOMEN: Nontender to palpation, normoactive bowel sounds, no rebound/guarding  MUSCULOSKELETAL:  no clubbing or cyanosis of digits; no joint swelling or tenderness to palpation  PSYCH: calm and very pleasant  NEUROLOGY: CN 2-12 are intact and symmetric; no gross sensory deficits   SKIN: L breast 8 o'clock hard lesion- no changes    LABS:                        11.9   7.32  )-----------( 503      ( 31 May 2021 06:46 )             38.1     05-31    135  |  96<L>  |  11  ----------------------------<  87  3.5   |  23  |  0.61    Ca    10.0      31 May 2021 06:46  Phos  2.4     05-31  Mg     1.6     05-31                  RADIOLOGY & ADDITIONAL TESTS:  Results Reviewed:   Imaging Personally Reviewed:  Electrocardiogram Personally Reviewed:    COORDINATION OF CARE:  Care Discussed with Consultants/Other Providers [Y/N]:  Prior or Outpatient Records Reviewed [Y/N]:

## 2021-05-31 NOTE — DISCHARGE NOTE PROVIDER - NSDCMRMEDTOKEN_GEN_ALL_CORE_FT
letrozole 2.5 mg oral tablet: 1 tab(s) orally once a day  oxyCODONE 5 mg oral tablet: 1 tab(s) orally every 6 hours, As Needed for Pain  Senna Plus 50 mg-8.6 mg oral tablet: 1 tab(s) orally once a day, As Needed for Constipation   amLODIPine 5 mg oral tablet: 1 tab(s) orally once a day  bisacodyl 10 mg rectal suppository: 1 suppository(ies) rectal once a day, As needed, Constipation  cholecalciferol oral tablet: 1000 unit(s) orally once a day  enoxaparin: 40 milligram(s) subcutaneous once a day  letrozole 2.5 mg oral tablet: 1 tab(s) orally once a day  oxyCODONE 5 mg oral tablet: 1 tab(s) orally every 6 hours, As Needed for Pain  polyethylene glycol 3350 oral powder for reconstitution: 17 gram(s) orally once a day (at bedtime)  senna oral tablet: 2 tab(s) orally once a day (at bedtime)  simethicone 80 mg oral tablet, chewable: 1 tab(s) orally every 6 hours, As needed, Gas

## 2021-06-01 ENCOUNTER — APPOINTMENT (OUTPATIENT)
Dept: HEMATOLOGY ONCOLOGY | Facility: CLINIC | Age: 56
End: 2021-06-01

## 2021-06-01 ENCOUNTER — OUTPATIENT (OUTPATIENT)
Dept: OUTPATIENT SERVICES | Facility: HOSPITAL | Age: 56
LOS: 1 days | End: 2021-06-01
Payer: MEDICAID

## 2021-06-01 LAB
ANA TITR SER: NEGATIVE — SIGNIFICANT CHANGE UP
ANION GAP SERPL CALC-SCNC: 15 MMOL/L — HIGH (ref 7–14)
APTT BLD: 28.2 SEC — SIGNIFICANT CHANGE UP (ref 27–36.3)
BUN SERPL-MCNC: 14 MG/DL — SIGNIFICANT CHANGE UP (ref 7–23)
CALCIUM SERPL-MCNC: 9.6 MG/DL — SIGNIFICANT CHANGE UP (ref 8.4–10.5)
CHLORIDE SERPL-SCNC: 97 MMOL/L — LOW (ref 98–107)
CO2 SERPL-SCNC: 23 MMOL/L — SIGNIFICANT CHANGE UP (ref 22–31)
CREAT SERPL-MCNC: 0.72 MG/DL — SIGNIFICANT CHANGE UP (ref 0.5–1.3)
GLUCOSE SERPL-MCNC: 84 MG/DL — SIGNIFICANT CHANGE UP (ref 70–99)
HCT VFR BLD CALC: 35 % — SIGNIFICANT CHANGE UP (ref 34.5–45)
HGB BLD-MCNC: 11 G/DL — LOW (ref 11.5–15.5)
INR BLD: 1.11 RATIO — SIGNIFICANT CHANGE UP (ref 0.88–1.16)
MAGNESIUM SERPL-MCNC: 1.6 MG/DL — SIGNIFICANT CHANGE UP (ref 1.6–2.6)
MCHC RBC-ENTMCNC: 25.2 PG — LOW (ref 27–34)
MCHC RBC-ENTMCNC: 31.4 GM/DL — LOW (ref 32–36)
MCV RBC AUTO: 80.3 FL — SIGNIFICANT CHANGE UP (ref 80–100)
MITOCHONDRIA AB SER-ACNC: SIGNIFICANT CHANGE UP
NRBC # BLD: 0 /100 WBCS — SIGNIFICANT CHANGE UP
NRBC # FLD: 0 K/UL — SIGNIFICANT CHANGE UP
PHOSPHATE SERPL-MCNC: 3.2 MG/DL — SIGNIFICANT CHANGE UP (ref 2.5–4.5)
PLATELET # BLD AUTO: 491 K/UL — HIGH (ref 150–400)
POTASSIUM SERPL-MCNC: 4.1 MMOL/L — SIGNIFICANT CHANGE UP (ref 3.5–5.3)
POTASSIUM SERPL-SCNC: 4.1 MMOL/L — SIGNIFICANT CHANGE UP (ref 3.5–5.3)
PROTHROM AB SERPL-ACNC: 12.7 SEC — SIGNIFICANT CHANGE UP (ref 10.6–13.6)
RBC # BLD: 4.36 M/UL — SIGNIFICANT CHANGE UP (ref 3.8–5.2)
RBC # FLD: 15.1 % — HIGH (ref 10.3–14.5)
SMOOTH MUSCLE AB SER-ACNC: SIGNIFICANT CHANGE UP
SODIUM SERPL-SCNC: 135 MMOL/L — SIGNIFICANT CHANGE UP (ref 135–145)
WBC # BLD: 7.38 K/UL — SIGNIFICANT CHANGE UP (ref 3.8–10.5)
WBC # FLD AUTO: 7.38 K/UL — SIGNIFICANT CHANGE UP (ref 3.8–10.5)

## 2021-06-01 PROCEDURE — 99232 SBSQ HOSP IP/OBS MODERATE 35: CPT

## 2021-06-01 PROCEDURE — 99232 SBSQ HOSP IP/OBS MODERATE 35: CPT | Mod: GC

## 2021-06-01 RX ADMIN — LETROZOLE 2.5 MILLIGRAM(S): 2.5 TABLET, FILM COATED ORAL at 13:17

## 2021-06-01 RX ADMIN — AMLODIPINE BESYLATE 5 MILLIGRAM(S): 2.5 TABLET ORAL at 05:35

## 2021-06-01 RX ADMIN — ENOXAPARIN SODIUM 40 MILLIGRAM(S): 100 INJECTION SUBCUTANEOUS at 13:18

## 2021-06-01 RX ADMIN — Medication 1000 UNIT(S): at 13:18

## 2021-06-01 NOTE — PROGRESS NOTE ADULT - SUBJECTIVE AND OBJECTIVE BOX
INTERVAL HPI/OVERNIGHT EVENTS:  Patient seen at bedside.  Patient with improved symptoms of back pain  Has been working with PT  Encouraged NOT to over -exert herself  Inquiring about next plan of care      VITAL SIGNS:  T(F): 98.2 (06-01-21 @ 05:32)  HR: 115 (06-01-21 @ 12:40)  BP: 128/86 (06-01-21 @ 12:40)  RR: 18 (06-01-21 @ 05:32)  SpO2: 99% (06-01-21 @ 12:40)  Wt(kg): --    PHYSICAL EXAM:  In accordance with current standard limiting patient contact, deferred physical exam  2/2 COVID pandemic  Please refer to physical exam of primary team.    MEDICATIONS  (STANDING):  amLODIPine   Tablet 5 milliGRAM(s) Oral daily  cholecalciferol 1000 Unit(s) Oral daily  enoxaparin Injectable 40 milliGRAM(s) SubCutaneous daily  letrozole 2.5 milliGRAM(s) Oral daily  polyethylene glycol 3350 17 Gram(s) Oral daily  senna 2 Tablet(s) Oral at bedtime  sodium chloride 0.9%. 1000 milliLiter(s) (125 mL/Hr) IV Continuous <Continuous>    MEDICATIONS  (PRN):  bisacodyl Suppository 10 milliGRAM(s) Rectal daily PRN Constipation  ondansetron Injectable 4 milliGRAM(s) IV Push every 6 hours PRN Nausea and/or Vomiting  simethicone 80 milliGRAM(s) Chew every 6 hours PRN Gas      Allergies    No Known Allergies    Intolerances        LABS:                        11.0   7.38  )-----------( 491      ( 01 Jun 2021 05:59 )             35.0     06-01    135  |  97<L>  |  14  ----------------------------<  84  4.1   |  23  |  0.72    Ca    9.6      01 Jun 2021 05:59  Phos  3.2     06-01  Mg     1.6     06-01      PT/INR - ( 01 Jun 2021 05:59 )   PT: 12.7 sec;   INR: 1.11 ratio         PTT - ( 01 Jun 2021 05:59 )  PTT:28.2 sec      RADIOLOGY & ADDITIONAL TESTS:  Studies reviewed.

## 2021-06-01 NOTE — PROGRESS NOTE ADULT - ASSESSMENT
56f with metastatic breast ca, Hormone receptor positive, not started on treatment as of yet, referred to the ED from [facility] with hypercalcemia, hypokalemia and pain.      -Noted to fevers on admission, last temp 100.2  s/p antibiotics,  infectious workup with NGTD,    -S/p CXR 5/25 : innumerable bilateral pulmonary nodules again seen.  New ill-defined medial right upper lung opacities, nonspecific findings, which may be due to subsegmental atelectasis or pneumonia. New pathologic right second rib fracture.- S/p  Zosyn  -Patient with significant bony  mets which may explain hypercalcemia s/p IVF, Calcitonin and S/p Zometa 4mg x 1  Now with improved levels, will continue to monitor  -Noted to have transaminitis, no evidence of liver mets on most recent PET scan, appreciate Hepatology consult. -S/p MRI of abdomen Possible hemangiomas in the right hepatic dome and in the left hepatic lobe. Multiple hepatic cysts. No evidence of steatosis. Normal biliary tree. A cystic lesion in the right kidney which demonstrates minimal internal enhancement. Extensive osseous and pulmonary metastatic disease.  -Work Up: IgG, IgA, IgM wnl, Hep A, B, C serologies negative, EBV IGM Neg, LKM wnl, A1AT 321 (elevated), Ceruloplasmin wnl. Pending KATRIN, AMA, ASMA, HSV IgM  -Appreciate IR consult for biopsy of bone lesion of posterior iliac bone, consistent with ER+ME+HER+ met disease  -S/p MRI lumber spine : Diffuse heterogeneously enhancing bony metastases involving the visualized vertebral bodies as well as the posterior elements. Marked compression deformity L2 with bony retropulsion and moderate thecal sac compression. No cord compression.   -Appreciate Rad Onc consult, s/p CT SIM for radiation on 5/27. As per outpatient NeuroSx note, kyphoplasty to be considered AFTER chemo/RT, will followup with Rad Onc re further recs   -Consider aggressive bowel regimen for constipation, which may also be opioid induced, patient on senna and miralax  -Patient to followup with Dr. Rekha Dowd ([facility]) upon discharge  -C/w Supportive care, pain control, Nutrition, PT, DVT ppx  -Oncology will continue to follow with you      Case d/w Dr. Abril Antonio PA  Oncology Physician Assistant  Bridgette SANTOS/[facility]  Pager [phone]    If after 5pm or weekends please page On-call Oncology Fellow

## 2021-06-01 NOTE — PROGRESS NOTE ADULT - SUBJECTIVE AND OBJECTIVE BOX
Patient is a 56y old  Female who presents with a chief complaint of Hypercalcemia, hypokalemia (01 Jun 2021 14:08)      SUBJECTIVE / OVERNIGHT EVENTS:    No events overnight. This AM, patient without n/v/d/cp/sob.  Still w/some back pain but able to walk with walker.     MEDICATIONS  (STANDING):  amLODIPine   Tablet 5 milliGRAM(s) Oral daily  cholecalciferol 1000 Unit(s) Oral daily  enoxaparin Injectable 40 milliGRAM(s) SubCutaneous daily  letrozole 2.5 milliGRAM(s) Oral daily  polyethylene glycol 3350 17 Gram(s) Oral daily  senna 2 Tablet(s) Oral at bedtime  sodium chloride 0.9%. 1000 milliLiter(s) (125 mL/Hr) IV Continuous <Continuous>    MEDICATIONS  (PRN):  bisacodyl Suppository 10 milliGRAM(s) Rectal daily PRN Constipation  ondansetron Injectable 4 milliGRAM(s) IV Push every 6 hours PRN Nausea and/or Vomiting  simethicone 80 milliGRAM(s) Chew every 6 hours PRN Gas      PHYSICAL EXAM:  T(C): 37.5 (06-01-21 @ 12:40), Max: 37.5 (06-01-21 @ 12:40)  HR: 115 (06-01-21 @ 12:40) (98 - 115)  BP: 128/86 (06-01-21 @ 12:40) (126/86 - 130/77)  RR: 18 (06-01-21 @ 05:32) (18 - 18)  SpO2: 99% (06-01-21 @ 12:40) (99% - 100%)  I&O's Summary    31 May 2021 07:01  -  01 Jun 2021 07:00  --------------------------------------------------------  IN: 1420 mL / OUT: 2180 mL / NET: -760 mL    01 Jun 2021 07:01  -  01 Jun 2021 17:48  --------------------------------------------------------  IN: 0 mL / OUT: 880 mL / NET: -880 mL      GENERAL: NAD, well-developed  HEAD:  Atraumatic, Normocephalic, MMM  CHEST/LUNG: No use of accessory muscles, CTAB, breathing non-labored  COR: RR, no mrcg  ABD: Soft, ND/NT, +BS  PSYCH: AAOx3  NEUROLOGY: CN II-XII grossly intact, moving all extremities  SKIN: No rashes or lesions    LABS:  CAPILLARY BLOOD GLUCOSE                              11.0   7.38  )-----------( 491      ( 01 Jun 2021 05:59 )             35.0     06-01    135  |  97<L>  |  14  ----------------------------<  84  4.1   |  23  |  0.72    Ca    9.6      01 Jun 2021 05:59  Phos  3.2     06-01  Mg     1.6     06-01      PT/INR - ( 01 Jun 2021 05:59 )   PT: 12.7 sec;   INR: 1.11 ratio         PTT - ( 01 Jun 2021 05:59 )  PTT:28.2 sec            RADIOLOGY & ADDITIONAL TESTS:    Telemetry Personally Reviewed - sinus tach 110s, goes up to 140s with exertion    Imaging Personally Reviewed -     Imaging Reviewed -     Consultant(s) Notes Reviewed -       Care Discussed with Consultants/Other Providers - d/w Dr Ramirez - recommends reaching out to Dr Horn of neurosx as he recommended RT prior to surgical intervention

## 2021-06-01 NOTE — PROGRESS NOTE ADULT - ASSESSMENT
Impression:  57 yo woman with history of recent diagnosis of L breast cancer with metastatic disease to spine with severe pathologic fracture of L2 with moderate canal stenosis (started Letrozole 5/19) presents with hypercalcemia (13.5) and hypokalemia (2.6) on outpatient labs. Hepatology now consulted for elevated liver enzymes.     # Elevated liver enzymes- Patient endorses taking NSAIDs for back pain, which could contribute to liver enzyme elevation. MRI negative for hepatic metastasis but with hemangiomas and cysts. Differential diagnosis includes metastatic disease, viral hepatitis, autoimmune etiology, Wilsons's. No prior liver disease in the past. No recent drug use.  Work Up: IgG, IgA, IgM wnl, Hep A, B, C serologies negative, EBV IGM Neg, LKM wnl, A1AT 321 (elevated), Ceruloplasmin wnl. Pending KATRIN, AMA, ASMA, HSV IgM  # Recently diagnosed metastatic breast cancer    Recommendations:  - Follow up liver work up  - Monitor CBC, CMP, coags  - Rest of care per primary team    Willard Gray, PGY6  Gastroenterology Fellow  Pager # [phone] / [phone]  Can be contacted via [employer] Teams    For nights/weekends/holidays, contact on-call GI fellow via answering service ([phone])  Impression:  57 yo woman with history of recent diagnosis of L breast cancer with metastatic disease to spine with severe pathologic fracture of L2 with moderate canal stenosis (started Letrozole 5/19) presents with hypercalcemia (13.5) and hypokalemia (2.6) on outpatient labs. Hepatology now consulted for elevated liver enzymes.     # Elevated liver enzymes- Patient endorses taking NSAIDs for back pain, which could contribute to liver enzyme elevation. MRI negative for hepatic metastasis but with hemangiomas and cysts. Differential diagnosis includes metastatic disease, viral hepatitis, autoimmune etiology, Wilsons's. No prior liver disease in the past. No recent drug use.  Work Up: IgG, IgA, IgM wnl, Hep A, B, C serologies negative, EBV IGM Neg, LKM wnl, A1AT 321 (elevated), Ceruloplasmin wnl. Pending KATRIN, AMA, ASMA, HSV IgM  # Recently diagnosed metastatic breast cancer    Recommendations:  - Follow up liver work up  - Monitor CBC, CMP, coags  - Rest of care per primary team  - Will sign off; Please call back with questions    Willard Gray, PGY6  Gastroenterology Fellow  Pager # [phone] / [phone]  Can be contacted via [employer] Teams    For nights/weekends/holidays, contact on-call GI fellow via answering service ([phone])

## 2021-06-01 NOTE — PROGRESS NOTE ADULT - PROBLEM SELECTOR PLAN 1
MRI lumbar spine showing metastatic disease in the lower thoracic, lumbar, and upper sacral spine with severe pathologic fracture of L2 with retropulsion of the posterior endplate causing moderate canal stenosis.    s/p biopsy on 5/24- confirming cancer  - Pain control with Tylenol PRN for mild pain, oxycodone 5 mg q6hrs PRN for moderate pain, and oxycodone 10 mg q6hrs PRN for severe pain  - Rad-Onc consult for possible palliative RT to spine, after declining, patient now amenable- though d/w radiation oncologist, who recommend first to attempt kyphoplasty and systemic therapy. This may differ from outpatient neurosurgery recommendations - reached out to Dr Agrawal on MS Teams for comment. IR consulted in interim.  - New lytic lesions in in the ribs   - TLSO brace present  - S/P Lumbar spine MRI- unchanged since last one in April. IR consulted for kyphoplasty, hopefully to occur sometime this week. As per email discussion with radiation oncology, if pain does not improve after IR intervention, then RT would be considered.

## 2021-06-01 NOTE — PROGRESS NOTE ADULT - SUBJECTIVE AND OBJECTIVE BOX
Chief Complaint:  Patient is a 56y old  Female who presents with a chief complaint of Hypercalcemia, hypokalemia (31 May 2021 12:33)      Interval Events: Pt denies nausea, vomiting, abd pain. Liver enzymes unchanged.     Allergies:  No Known Allergies        Hospital Medications:  amLODIPine   Tablet 5 milliGRAM(s) Oral daily  bisacodyl Suppository 10 milliGRAM(s) Rectal daily PRN  cholecalciferol 1000 Unit(s) Oral daily  enoxaparin Injectable 40 milliGRAM(s) SubCutaneous daily  letrozole 2.5 milliGRAM(s) Oral daily  ondansetron Injectable 4 milliGRAM(s) IV Push every 6 hours PRN  polyethylene glycol 3350 17 Gram(s) Oral daily  senna 2 Tablet(s) Oral at bedtime  simethicone 80 milliGRAM(s) Chew every 6 hours PRN  sodium chloride 0.9%. 1000 milliLiter(s) IV Continuous <Continuous>      PMHX/PSHX:  Breast CA    Breast cancer    Lumbar compression fracture    No significant past surgical history        Family history:  Family history of cervical cancer (Mother)    Family history of prostate cancer (Father)      There is no family history of peptic ulcer disease, gastric cancer, colon polyps, colon cancer, celiac disease, biliary, hepatic, or pancreatic disease.  None of the female relatives have breast, uterine, or ovarian cancer.     PHYSICAL EXAM:   Vital Signs:  Vital Signs Last 24 Hrs  T(C): 36.8 (01 Jun 2021 05:32), Max: 37.1 (31 May 2021 12:20)  T(F): 98.2 (01 Jun 2021 05:32), Max: 98.8 (31 May 2021 12:20)  HR: 98 (01 Jun 2021 05:32) (98 - 105)  BP: 126/86 (01 Jun 2021 05:32) (126/86 - 137/92)  BP(mean): --  RR: 18 (01 Jun 2021 05:32) (18 - 18)  SpO2: 100% (01 Jun 2021 05:32) (100% - 100%)  Daily     Daily     ROS:     General:  No wt loss, fevers, chills, night sweats, fatigue,   Eyes:  Good vision, no reported pain  ENT:  No sore throat, pain, runny nose, dysphagia  CV:  No pain, palpitations, hypo/hypertension  Resp:  No dyspnea, cough, tachypnea, wheezing  GI:  See HPI   :  No pain, bleeding, incontinence, nocturia  Muscle:  No pain, weakness  Neuro:  No weakness, tingling, memory problems  Psych:  No fatigue, insomnia, mood problems, depression  Endocrine:  No polyuria, polydipsia, cold/heat intolerance  Heme:  No petechiae, ecchymosis, easy bruisability  Skin:  No rash, tattoos, scars, edema      PHYSICAL EXAM:     GENERAL:  Appears stated age, well-groomed  HEENT:  NC/AT,  conjunctivae clear and pink, no thyromegaly  CHEST:  Full & symmetric excursion, no increased effort, breath sounds clear  HEART:  Regular rhythm, S1, S2  ABDOMEN:  Soft, non-tender, non-distended, normoactive bowel sounds  EXTEREMITIES:  no cyanosis,clubbing or edema  SKIN:  No rash/erythema/ecchymoses  NEURO:  Alert, oriented, no asterixis      LABS:                        11.0   7.38  )-----------( 491      ( 01 Jun 2021 05:59 )             35.0     Mean Cell Volume: 80.3 fL (06-01-21 @ 05:59)    06-01    135  |  97<L>  |  14  ----------------------------<  84  4.1   |  23  |  0.72    Ca    9.6      01 Jun 2021 05:59  Phos  3.2     06-01  Mg     1.6     06-01        PT/INR - ( 01 Jun 2021 05:59 )   PT: 12.7 sec;   INR: 1.11 ratio         PTT - ( 01 Jun 2021 05:59 )  PTT:28.2 sec                            11.0   7.38  )-----------( 491      ( 01 Jun 2021 05:59 )             35.0                         11.9   7.32  )-----------( 503      ( 31 May 2021 06:46 )             38.1                         12.7   7.19  )-----------( 505      ( 30 May 2021 07:42 )             40.3     Imaging:  < from: MR Abdomen w/wo IV Cont (05.29.21 @ 18:50) >  MRI of the abdomen was performed.  MRCP was performed.    FINDINGS:  LOWER CHEST: Small left pleural effusion with adjacent atelectasis. Trace right pleural effusion. Known left breast mass medially with involvement of the chest wall. Multiple pulmonary nodules. Soft tissue nodules along the right pleura.    LIVER: A 1.3 cm minimally T2 hyperintense lesion in segment 7 which fills in on delayed images. A 1.1 cm minimally T2 hyperintense lesion in segment 4 (5, 10), which demonstrates progressive enhancement and may represent a hemangioma. No evidence of restricted diffusion. Multiple additional cysts. No evidence of steatosis.  BILE DUCTS: Normal caliber.  GALLBLADDER: Within normal limits.  SPLEEN: Within normal limits.  PANCREAS: Within normal limits.  ADRENALS: Within normal limits.  KIDNEYS/URETERS: 7 mm slightly T1 hyperintense and a T2 hypointense cystic lesion in the upper pole of right kidney with rim enhancement. There is also suggestion of minimal internal enhancement    VISUALIZED PORTIONS:  BOWEL: Within normal limits.  PERITONEUM: No ascites.  VESSELS: Within normal limits.  RETROPERITONEUM/LYMPH NODES: No lymphadenopathy.  ABDOMINAL WALL: Within normal limits.  BONES: Multiple metastatic lesions, better visualized on recent MRI, including pathologic compression fracture of L2.    IMPRESSION:  Possible hemangiomas in the right hepatic dome and in the left hepatic lobe. Multiple hepatic cysts. No evidence of steatosis. Normal biliary tree.  A cystic lesion in the right kidney which demonstrates minimal internal enhancement.  Extensive osseous and pulmonary metastatic disease.      < end of copied text >

## 2021-06-02 LAB
ALBUMIN SERPL ELPH-MCNC: 3.8 G/DL — SIGNIFICANT CHANGE UP (ref 3.3–5)
ALP SERPL-CCNC: 202 U/L — HIGH (ref 40–120)
ALT FLD-CCNC: 40 U/L — HIGH (ref 4–33)
ANION GAP SERPL CALC-SCNC: 14 MMOL/L — SIGNIFICANT CHANGE UP (ref 7–14)
AST SERPL-CCNC: 44 U/L — HIGH (ref 4–32)
BILIRUB DIRECT SERPL-MCNC: <0.2 MG/DL — SIGNIFICANT CHANGE UP (ref 0–0.2)
BILIRUB INDIRECT FLD-MCNC: >0 MG/DL — SIGNIFICANT CHANGE UP (ref 0–1)
BILIRUB SERPL-MCNC: 0.2 MG/DL — SIGNIFICANT CHANGE UP (ref 0.2–1.2)
BUN SERPL-MCNC: 15 MG/DL — SIGNIFICANT CHANGE UP (ref 7–23)
CALCIUM SERPL-MCNC: 9.6 MG/DL — SIGNIFICANT CHANGE UP (ref 8.4–10.5)
CHLORIDE SERPL-SCNC: 100 MMOL/L — SIGNIFICANT CHANGE UP (ref 98–107)
CO2 SERPL-SCNC: 24 MMOL/L — SIGNIFICANT CHANGE UP (ref 22–31)
CREAT SERPL-MCNC: 0.66 MG/DL — SIGNIFICANT CHANGE UP (ref 0.5–1.3)
GLUCOSE SERPL-MCNC: 78 MG/DL — SIGNIFICANT CHANGE UP (ref 70–99)
HCT VFR BLD CALC: 34.5 % — SIGNIFICANT CHANGE UP (ref 34.5–45)
HGB BLD-MCNC: 10.7 G/DL — LOW (ref 11.5–15.5)
HSV1 AB FLD QL: SIGNIFICANT CHANGE UP TITER
HSV2 AB FLD-ACNC: SIGNIFICANT CHANGE UP TITER
MAGNESIUM SERPL-MCNC: 1.6 MG/DL — SIGNIFICANT CHANGE UP (ref 1.6–2.6)
MCHC RBC-ENTMCNC: 25.2 PG — LOW (ref 27–34)
MCHC RBC-ENTMCNC: 31 GM/DL — LOW (ref 32–36)
MCV RBC AUTO: 81.2 FL — SIGNIFICANT CHANGE UP (ref 80–100)
NRBC # BLD: 0 /100 WBCS — SIGNIFICANT CHANGE UP
NRBC # FLD: 0 K/UL — SIGNIFICANT CHANGE UP
PHOSPHATE SERPL-MCNC: 2.7 MG/DL — SIGNIFICANT CHANGE UP (ref 2.5–4.5)
PLATELET # BLD AUTO: 534 K/UL — HIGH (ref 150–400)
POTASSIUM SERPL-MCNC: 4 MMOL/L — SIGNIFICANT CHANGE UP (ref 3.5–5.3)
POTASSIUM SERPL-SCNC: 4 MMOL/L — SIGNIFICANT CHANGE UP (ref 3.5–5.3)
PROT SERPL-MCNC: 7.5 G/DL — SIGNIFICANT CHANGE UP (ref 6–8.3)
RBC # BLD: 4.25 M/UL — SIGNIFICANT CHANGE UP (ref 3.8–5.2)
RBC # FLD: 15.4 % — HIGH (ref 10.3–14.5)
SODIUM SERPL-SCNC: 138 MMOL/L — SIGNIFICANT CHANGE UP (ref 135–145)
WBC # BLD: 6.71 K/UL — SIGNIFICANT CHANGE UP (ref 3.8–10.5)
WBC # FLD AUTO: 6.71 K/UL — SIGNIFICANT CHANGE UP (ref 3.8–10.5)

## 2021-06-02 PROCEDURE — 99232 SBSQ HOSP IP/OBS MODERATE 35: CPT

## 2021-06-02 RX ORDER — OXYCODONE HYDROCHLORIDE 5 MG/1
5 TABLET ORAL EVERY 4 HOURS
Refills: 0 | Status: DISCONTINUED | OUTPATIENT
Start: 2021-06-02 | End: 2021-06-07

## 2021-06-02 RX ORDER — ACETAMINOPHEN 500 MG
650 TABLET ORAL EVERY 6 HOURS
Refills: 0 | Status: DISCONTINUED | OUTPATIENT
Start: 2021-06-02 | End: 2021-06-02

## 2021-06-02 RX ADMIN — POLYETHYLENE GLYCOL 3350 17 GRAM(S): 17 POWDER, FOR SOLUTION ORAL at 22:10

## 2021-06-02 RX ADMIN — OXYCODONE HYDROCHLORIDE 5 MILLIGRAM(S): 5 TABLET ORAL at 18:41

## 2021-06-02 RX ADMIN — ENOXAPARIN SODIUM 40 MILLIGRAM(S): 100 INJECTION SUBCUTANEOUS at 12:33

## 2021-06-02 RX ADMIN — OXYCODONE HYDROCHLORIDE 5 MILLIGRAM(S): 5 TABLET ORAL at 19:20

## 2021-06-02 RX ADMIN — AMLODIPINE BESYLATE 5 MILLIGRAM(S): 2.5 TABLET ORAL at 06:08

## 2021-06-02 RX ADMIN — Medication 1000 UNIT(S): at 12:32

## 2021-06-02 RX ADMIN — LETROZOLE 2.5 MILLIGRAM(S): 2.5 TABLET, FILM COATED ORAL at 12:32

## 2021-06-02 NOTE — PROGRESS NOTE ADULT - SUBJECTIVE AND OBJECTIVE BOX
Patient is a 56y old  Female who presents with a chief complaint of Hypercalcemia, hypokalemia (02 Jun 2021 13:12)      SUBJECTIVE / OVERNIGHT EVENTS:    No events overnight. This AM, patient without n/v/d/cp/sob.      MEDICATIONS  (STANDING):  amLODIPine   Tablet 5 milliGRAM(s) Oral daily  cholecalciferol 1000 Unit(s) Oral daily  enoxaparin Injectable 40 milliGRAM(s) SubCutaneous daily  letrozole 2.5 milliGRAM(s) Oral daily  polyethylene glycol 3350 17 Gram(s) Oral daily  senna 2 Tablet(s) Oral at bedtime  sodium chloride 0.9%. 1000 milliLiter(s) (125 mL/Hr) IV Continuous <Continuous>    MEDICATIONS  (PRN):  bisacodyl Suppository 10 milliGRAM(s) Rectal daily PRN Constipation  ondansetron Injectable 4 milliGRAM(s) IV Push every 6 hours PRN Nausea and/or Vomiting  simethicone 80 milliGRAM(s) Chew every 6 hours PRN Gas      PHYSICAL EXAM:  T(C): 37 (06-02-21 @ 11:14), Max: 37.1 (06-01-21 @ 21:45)  HR: 106 (06-02-21 @ 11:14) (100 - 106)  BP: 115/83 (06-02-21 @ 11:14) (115/83 - 133/89)  RR: 18 (06-02-21 @ 11:14) (18 - 18)  SpO2: 100% (06-02-21 @ 11:14) (100% - 100%)  I&O's Summary    01 Jun 2021 07:01  -  02 Jun 2021 07:00  --------------------------------------------------------  IN: 300 mL / OUT: 1100 mL / NET: -800 mL    02 Jun 2021 07:01  -  02 Jun 2021 15:32  --------------------------------------------------------  IN: 472 mL / OUT: 800 mL / NET: -328 mL      GENERAL: NAD, well-developed  HEAD:  Atraumatic, Normocephalic, MMM  CHEST/LUNG: No use of accessory muscles, CTAB, breathing non-labored  COR: RR, no mrcg  ABD: Soft, ND/NT, +BS  PSYCH: AAOx3  NEUROLOGY: CN II-XII grossly intact, moving all extremities  SKIN: No rashes or lesions    LABS:  CAPILLARY BLOOD GLUCOSE                              10.7   6.71  )-----------( 534      ( 02 Jun 2021 08:58 )             34.5     06-02    138  |  100  |  15  ----------------------------<  78  4.0   |  24  |  0.66    Ca    9.6      02 Jun 2021 08:58  Phos  2.7     06-02  Mg     1.6     06-02    TPro  7.5  /  Alb  3.8  /  TBili  0.2  /  DBili  <0.2  /  AST  44<H>  /  ALT  40<H>  /  AlkPhos  202<H>  06-02    PT/INR - ( 01 Jun 2021 05:59 )   PT: 12.7 sec;   INR: 1.11 ratio         PTT - ( 01 Jun 2021 05:59 )  PTT:28.2 sec            RADIOLOGY & ADDITIONAL TESTS:    Telemetry Personally Reviewed - sinus tach 110s at rest, 140s with exertion    Imaging Personally Reviewed -     Imaging Reviewed -     Consultant(s) Notes Reviewed -       Care Discussed with Consultants/Other Providers - care d/w outpt neurosurgeon Dr Agrawal - he will speak with neuro-IR Dr Chong for inpatient kyphoplasty

## 2021-06-02 NOTE — PROGRESS NOTE ADULT - PROBLEM SELECTOR PLAN 1
MRI lumbar spine showing metastatic disease in the lower thoracic, lumbar, and upper sacral spine with severe pathologic fracture of L2 with retropulsion of the posterior endplate causing moderate canal stenosis.    s/p biopsy on 5/24- confirming cancer  - Pain control with Tylenol PRN for mild pain, oxycodone 5 mg q6hrs PRN for moderate pain, and oxycodone 10 mg q6hrs PRN for severe pain  - Rad-Onc consult for possible palliative RT to spine, after declining, patient now amenable- though d/w radiation oncologist, who recommend first to attempt kyphoplasty and systemic therapy. Outpatient neurosurgeon Dr Agrawal in agreement and will reach out to Dr Cohng from neuro IR.    - New lytic lesions in in the ribs   - TLSO brace present  - S/P Lumbar spine MRI- unchanged since last one in April. IR consulted for kyphoplasty, hopefully to occur sometime this week. As per email discussion with radiation oncology, if pain does not improve after IR intervention, then RT would be considered.

## 2021-06-02 NOTE — PROGRESS NOTE ADULT - SUBJECTIVE AND OBJECTIVE BOX
INTERVAL HPI/OVERNIGHT EVENTS:  Patient seen at bedside.  Patient with improving pain symptoms  However says she also hasnt ambulated much out of bed  No acute events or complaints reported      VITAL SIGNS:  T(F): 98.6 (06-02-21 @ 11:14)  HR: 106 (06-02-21 @ 11:14)  BP: 115/83 (06-02-21 @ 11:14)  RR: 18 (06-02-21 @ 11:14)  SpO2: 100% (06-02-21 @ 11:14)  Wt(kg): --    PHYSICAL EXAM:    In accordance with current standard limiting patient contact, deferred physical exam  2/2 COVID pandemic  Please refer to physical exam of primary team.    MEDICATIONS  (STANDING):  amLODIPine   Tablet 5 milliGRAM(s) Oral daily  cholecalciferol 1000 Unit(s) Oral daily  enoxaparin Injectable 40 milliGRAM(s) SubCutaneous daily  letrozole 2.5 milliGRAM(s) Oral daily  polyethylene glycol 3350 17 Gram(s) Oral daily  senna 2 Tablet(s) Oral at bedtime  sodium chloride 0.9%. 1000 milliLiter(s) (125 mL/Hr) IV Continuous <Continuous>    MEDICATIONS  (PRN):  bisacodyl Suppository 10 milliGRAM(s) Rectal daily PRN Constipation  ondansetron Injectable 4 milliGRAM(s) IV Push every 6 hours PRN Nausea and/or Vomiting  simethicone 80 milliGRAM(s) Chew every 6 hours PRN Gas      Allergies    No Known Allergies    Intolerances        LABS:                        10.7   6.71  )-----------( 534      ( 02 Jun 2021 08:58 )             34.5     06-02    138  |  100  |  15  ----------------------------<  78  4.0   |  24  |  0.66    Ca    9.6      02 Jun 2021 08:58  Phos  2.7     06-02  Mg     1.6     06-02    TPro  7.5  /  Alb  3.8  /  TBili  0.2  /  DBili  <0.2  /  AST  44<H>  /  ALT  40<H>  /  AlkPhos  202<H>  06-02    PT/INR - ( 01 Jun 2021 05:59 )   PT: 12.7 sec;   INR: 1.11 ratio         PTT - ( 01 Jun 2021 05:59 )  PTT:28.2 sec      RADIOLOGY & ADDITIONAL TESTS:  Studies reviewed.

## 2021-06-02 NOTE — PROGRESS NOTE ADULT - ASSESSMENT
56f with metastatic breast ca, Hormone receptor positive, not started on treatment as of yet, referred to the ED from [facility] with hypercalcemia, hypokalemia and pain.      -Noted to fevers on admission, last temp 100.2  s/p antibiotics,  infectious workup with NGTD,    -S/p CXR 5/25 : innumerable bilateral pulmonary nodules again seen.  New ill-defined medial right upper lung opacities, nonspecific findings, which may be due to subsegmental atelectasis or pneumonia. New pathologic right second rib fracture.- S/p  Zosyn  -Patient with significant bony  mets which may explain hypercalcemia s/p IVF, Calcitonin and S/p Zometa 4mg x 1  Now with improved levels, will continue to monitor  -Noted to have transaminitis, no evidence of liver mets on most recent PET scan, appreciate Hepatology consult. -S/p MRI of abdomen Possible hemangiomas in the right hepatic dome and in the left hepatic lobe. Multiple hepatic cysts. No evidence of steatosis. Normal biliary tree. A cystic lesion in the right kidney which demonstrates minimal internal enhancement. Extensive osseous and pulmonary metastatic disease.  -Work Up: IgG, IgA, IgM wnl, Hep A, B, C serologies negative, EBV IGM Neg, LKM wnl, A1AT 321 (elevated), Ceruloplasmin wnl. Pending KATRIN, AMA, ASMA, HSV IgM  -LFTS have been improving  -Appreciate IR consult for biopsy of bone lesion of posterior iliac bone, consistent with ER+IA+HER+ met disease  -S/p MRI lumber spine : Diffuse heterogeneously enhancing bony metastases involving the visualized vertebral bodies as well as the posterior elements. Marked compression deformity L2 with bony retropulsion and moderate thecal sac compression. No cord compression.   -Appreciate Rad Onc consult, s/p CT SIM for radiation on 5/27. As per primary team, plan is now to have kyphoplasty first and if symptoms not improved , initiate radiation later.     -Patient to followup with Dr. Rekha Dowd ([facility]) upon discharge  -C/w Supportive care, pain control, Nutrition, PT, DVT ppx  -Oncology will continue to follow with you      Case d/w Dr. Abril PAL  Oncology Physician Assistant  Bridgette SANTOS/[facility]  Pager [phone]    If after 5pm or weekends please page On-call Oncology Fellow

## 2021-06-03 LAB
ALBUMIN SERPL ELPH-MCNC: 3.7 G/DL — SIGNIFICANT CHANGE UP (ref 3.3–5)
ALP SERPL-CCNC: 218 U/L — HIGH (ref 40–120)
ALT FLD-CCNC: 32 U/L — SIGNIFICANT CHANGE UP (ref 4–33)
ANION GAP SERPL CALC-SCNC: 13 MMOL/L — SIGNIFICANT CHANGE UP (ref 7–14)
AST SERPL-CCNC: 36 U/L — HIGH (ref 4–32)
BILIRUB DIRECT SERPL-MCNC: <0.2 MG/DL — SIGNIFICANT CHANGE UP (ref 0–0.2)
BILIRUB INDIRECT FLD-MCNC: SIGNIFICANT CHANGE UP MG/DL (ref 0–1)
BILIRUB SERPL-MCNC: <0.2 MG/DL — SIGNIFICANT CHANGE UP (ref 0.2–1.2)
BUN SERPL-MCNC: 15 MG/DL — SIGNIFICANT CHANGE UP (ref 7–23)
CALCIUM SERPL-MCNC: 9.7 MG/DL — SIGNIFICANT CHANGE UP (ref 8.4–10.5)
CHLORIDE SERPL-SCNC: 101 MMOL/L — SIGNIFICANT CHANGE UP (ref 98–107)
CMV DNA CSF QL NAA+PROBE: SIGNIFICANT CHANGE UP
CO2 SERPL-SCNC: 23 MMOL/L — SIGNIFICANT CHANGE UP (ref 22–31)
CREAT SERPL-MCNC: 0.68 MG/DL — SIGNIFICANT CHANGE UP (ref 0.5–1.3)
GLUCOSE SERPL-MCNC: 82 MG/DL — SIGNIFICANT CHANGE UP (ref 70–99)
HCT VFR BLD CALC: 33.9 % — LOW (ref 34.5–45)
HGB BLD-MCNC: 10.4 G/DL — LOW (ref 11.5–15.5)
MAGNESIUM SERPL-MCNC: 1.7 MG/DL — SIGNIFICANT CHANGE UP (ref 1.6–2.6)
MCHC RBC-ENTMCNC: 25 PG — LOW (ref 27–34)
MCHC RBC-ENTMCNC: 30.7 GM/DL — LOW (ref 32–36)
MCV RBC AUTO: 81.5 FL — SIGNIFICANT CHANGE UP (ref 80–100)
NRBC # BLD: 0 /100 WBCS — SIGNIFICANT CHANGE UP
NRBC # FLD: 0 K/UL — SIGNIFICANT CHANGE UP
PHOSPHATE SERPL-MCNC: 2.9 MG/DL — SIGNIFICANT CHANGE UP (ref 2.5–4.5)
PLATELET # BLD AUTO: 516 K/UL — HIGH (ref 150–400)
POTASSIUM SERPL-MCNC: 4.1 MMOL/L — SIGNIFICANT CHANGE UP (ref 3.5–5.3)
POTASSIUM SERPL-SCNC: 4.1 MMOL/L — SIGNIFICANT CHANGE UP (ref 3.5–5.3)
PROT SERPL-MCNC: 7.4 G/DL — SIGNIFICANT CHANGE UP (ref 6–8.3)
RBC # BLD: 4.16 M/UL — SIGNIFICANT CHANGE UP (ref 3.8–5.2)
RBC # FLD: 15.1 % — HIGH (ref 10.3–14.5)
SODIUM SERPL-SCNC: 137 MMOL/L — SIGNIFICANT CHANGE UP (ref 135–145)
WBC # BLD: 6.22 K/UL — SIGNIFICANT CHANGE UP (ref 3.8–10.5)
WBC # FLD AUTO: 6.22 K/UL — SIGNIFICANT CHANGE UP (ref 3.8–10.5)

## 2021-06-03 PROCEDURE — 99233 SBSQ HOSP IP/OBS HIGH 50: CPT

## 2021-06-03 PROCEDURE — 99232 SBSQ HOSP IP/OBS MODERATE 35: CPT

## 2021-06-03 RX ADMIN — OXYCODONE HYDROCHLORIDE 5 MILLIGRAM(S): 5 TABLET ORAL at 22:07

## 2021-06-03 RX ADMIN — Medication 10 MILLIGRAM(S): at 15:27

## 2021-06-03 RX ADMIN — Medication 1000 UNIT(S): at 15:28

## 2021-06-03 RX ADMIN — LETROZOLE 2.5 MILLIGRAM(S): 2.5 TABLET, FILM COATED ORAL at 17:40

## 2021-06-03 RX ADMIN — AMLODIPINE BESYLATE 5 MILLIGRAM(S): 2.5 TABLET ORAL at 06:37

## 2021-06-03 RX ADMIN — OXYCODONE HYDROCHLORIDE 5 MILLIGRAM(S): 5 TABLET ORAL at 23:07

## 2021-06-03 NOTE — CONSULT NOTE ADULT - CONSULT REQUESTED DATE/TIME
03-Jun-2021
19-May-2021 13:00
26-May-2021 12:33
20-May-2021 19:02
27-May-2021 12:24
03-Jun-2021 14:55
20-May-2021 14:40

## 2021-06-03 NOTE — PROGRESS NOTE ADULT - PROBLEM SELECTOR PLAN 1
MRI lumbar spine showing metastatic disease in the lower thoracic, lumbar, and upper sacral spine with severe pathologic fracture of L2 with retropulsion of the posterior endplate causing moderate canal stenosis.    s/p biopsy on 5/24- confirming cancer  - Pain control with Tylenol PRN for mild pain, oxycodone 5 mg q6hrs PRN for moderate pain, and oxycodone 10 mg q6hrs PRN for severe pain  - Rad-Onc consult for possible palliative RT to spine, after declining, patient now amenable- though d/w radiation oncologist, who recommend first to attempt kyphoplasty and systemic therapy. Outpatient neurosurgeon Dr Agrawal in agreement. Dr Gordon to perform kyphoplasty on 6/4  - New lytic lesions in in the ribs   - TLSO brace present  - S/P Lumbar spine MRI- unchanged since last one in April. IR consulted for kyphoplasty, hopefully to occur sometime this week. As per email discussion with radiation oncology, if pain does not improve after IR intervention, then RT would be considered.

## 2021-06-03 NOTE — CHART NOTE - NSCHARTNOTEFT_GEN_A_CORE
PRE-INTERVENTIONAL RADIOLOGY PROCEDURE NOTE      Patient Age: 55 YO    Patient Gender: Female    Procedure: Kyphoplasty    Diagnosis/Indication: Met breast cancer     Interventional Radiology Attending Physician: Dr. Gordon    Ordering Attending Physician: Dr. Lomas     Pertinent Medical History:    Pertinent labs:                      10.4   6.22  )-----------( 516      ( 03 Jun 2021 06:40 )             33.9       06-03    137  |  101  |  15  ----------------------------<  82  4.1   |  23  |  0.68    Ca    9.7      03 Jun 2021 06:40  Phos  2.9     06-03  Mg     1.7     06-03    TPro  7.5  /  Alb  3.8  /  TBili  0.2  /  DBili  <0.2  /  AST  44<H>  /  ALT  40<H>  /  AlkPhos  202<H>  06-02              Patient and Family Aware ? Yes

## 2021-06-03 NOTE — CONSULT NOTE ADULT - SUBJECTIVE AND OBJECTIVE BOX
Interventional Radiology    Evaluate for Procedure:     HPI: 56y Female with breast cancer with likely metastatic bone lesions on PET CT and acute L2 compression fracture on MRI 4/2021, with continuing back pain, IR consulted for biopsy of bone lesion and possible L2 vertebral augmentation for pain control.     Allergies:   Medications (Abx/Cardiac/Anticoagulation/Blood Products)    amLODIPine   Tablet: 5 milliGRAM(s) Oral (06-03 @ 06:37)  enoxaparin Injectable: 40 milliGRAM(s) SubCutaneous (06-02 @ 12:33)    Data:    T(C): 36.8  HR: 99  BP: 117/79  RR: 18  SpO2: 100%    -WBC 6.22 / HgB 10.4 / Hct 33.9 / Plt 516  -Na -- / Cl -- / BUN -- / Glucose --  -K -- / CO2 -- / Cr --  -ALT 32 / Alk Phos 218 / T.Bili <0.2  -INR 1.11 / PTT 28.2      Radiology:     Assessment/Plan:     -- IR will plan to perform L2 vertebroplasty on 6/4  -- NPO at midnight   -- hold a.m. anticoagulation  -- AM labs including coags  -- please place IR procedure request order under Dr. Gordon

## 2021-06-03 NOTE — CONSULT NOTE ADULT - PROBLEM SELECTOR RECOMMENDATION 9
1. Monitor pt for increasing back pain, or weakness/ paraesthesias.   2. Plan for kyphoplasty at L2 with Dr. Chong at [facility] in IR suite. Plan for procedure TOMORROW, Will contact transfer center and arrange for transfer tomorrow at 1pm for procedure and plan for return to [facility] after procedure.   3. Continue pain medication PRN  4. Case d/w Dr. Chong  5. Please hold SQ lovenox

## 2021-06-03 NOTE — CONSULT NOTE ADULT - CONSULT REASON
Kyphoplasty
back pain, L2
Fever
Metastatic breast ca
Elevated liver enzymes
evaluate bony lesions , possible metastatic
L2 compression fracture

## 2021-06-03 NOTE — CONSULT NOTE ADULT - SUBJECTIVE AND OBJECTIVE BOX
HPI:  56 year old woman with history of recent diagnosis of L breast cancer with metastatic disease to spine with severe pathologic fracture of L2 with moderate canal stenosis presents to [facility] ED on 5/19 with hypercalcemia (13.5) and hypokalemia (2.6).   Pt was diagnosed with L breast cancer in April 2021 after core biopsy of a L breast mass revealed invasive moderately differentiated ductal carcinoma and was started on Letrozole on Tuesday. Soon after her breast cancer diagnosis, pt had an MRI lumbar spine for persistent lower back pain X 3months. Patient admits pain is better with oxycodone and worse when lying in one position. Describes the back pain as a tightness feeling and it radiates from the right side of the lower back to the right. MRI showed metastatic disease in the lower thoracic, lumbar, and upper sacral spine with severe pathologic fracture of L2 with retropulsion of the posterior endplate causing moderate canal stenosis.      Pt denies any worsening or new back pain, weakness, numbness/tingling, urinary/ fecal incontinence or saddle anesthesia.         In the ED,  T 98-98.5, HR 80-86, -128/81-90, RR 14-18, O2 sats 96-99% RA.   (19 May 2021 20:30)    PAST MEDICAL & SURGICAL HISTORY:  Breast cancer    Lumbar compression fracture    No significant past surgical history      Allergies    No Known Allergies    Intolerances      amLODIPine   Tablet 5 milliGRAM(s) Oral daily  bisacodyl Suppository 10 milliGRAM(s) Rectal daily PRN  cholecalciferol 1000 Unit(s) Oral daily  enoxaparin Injectable 40 milliGRAM(s) SubCutaneous daily  letrozole 2.5 milliGRAM(s) Oral daily  ondansetron Injectable 4 milliGRAM(s) IV Push every 6 hours PRN  oxyCODONE    IR 5 milliGRAM(s) Oral every 4 hours PRN  polyethylene glycol 3350 17 Gram(s) Oral daily  senna 2 Tablet(s) Oral at bedtime  simethicone 80 milliGRAM(s) Chew every 6 hours PRN    SOCIAL HISTORY:  FAMILY HISTORY:  Family history of cervical cancer (Mother)    Family history of prostate cancer (Father)      Vital Signs Last 24 Hrs  T(C): 36.8 (03 Jun 2021 06:35), Max: 37 (02 Jun 2021 11:14)  T(F): 98.3 (03 Jun 2021 06:35), Max: 98.6 (02 Jun 2021 11:14)  HR: 99 (03 Jun 2021 06:35) (99 - 106)  BP: 117/79 (03 Jun 2021 06:35) (115/83 - 120/78)  BP(mean): --  RR: 18 (03 Jun 2021 06:35) (18 - 18)  SpO2: 100% (03 Jun 2021 06:35) (100% - 100%)    PHYSICAL EXAM:  Pt sitting upright in bed.   Awake Alert and  Orientated x3, pt follows commands, normal affect and facial expressions.   PERRL EOMI, peripheral vision intact  Tone: normal.       Pt able to smile, frown and raise eyebrows. Sensation in face intact.   Able to shrug shoulders     Motor  Upper extremities- biceps, triceps and hand  5/5 bilaterally.    Lower extremities-  Plantar flexion and dorsiflexion 5/5 bilaterally,                 No clonus, Negative Pronator Drift,     LABS:                          10.4   6.22  )-----------( 516      ( 03 Jun 2021 06:40 )             33.9     06-03    137  |  101  |  15  ----------------------------<  82  4.1   |  23  |  0.68    Ca    9.7      03 Jun 2021 06:40  Phos  2.9     06-03  Mg     1.7     06-03    TPro  7.5  /  Alb  3.8  /  TBili  0.2  /  DBili  <0.2  /  AST  44<H>  /  ALT  40<H>  /  AlkPhos  202<H>  06-02          RADIOLOGY & ADDITIONAL STUDIES:      MRI  Diffuse heterogeneously enhancing bony metastases involving the visualized vertebral bodies as well as the posterior elements. Marked compression deformity L2 with bony retropulsion and moderate thecal sac compression. No cord compression. No significant change since 4/22/2021.

## 2021-06-03 NOTE — CONSULT NOTE ADULT - REASON FOR ADMISSION
Hypercalcemia, hypokalemia

## 2021-06-03 NOTE — CONSULT NOTE ADULT - ASSESSMENT
56 year old female with pmh of L breast cancer with metastatic disease to the spine , MRI shows L2 Compression fracture

## 2021-06-03 NOTE — PROGRESS NOTE ADULT - SUBJECTIVE AND OBJECTIVE BOX
Patient is a 56y old  Female who presents with a chief complaint of Hypercalcemia, hypokalemia (03 Jun 2021 14:54)      SUBJECTIVE / OVERNIGHT EVENTS:    No events overnight. This AM, patient without n/v/d/cp/sob.      MEDICATIONS  (STANDING):  amLODIPine   Tablet 5 milliGRAM(s) Oral daily  cholecalciferol 1000 Unit(s) Oral daily  letrozole 2.5 milliGRAM(s) Oral daily  polyethylene glycol 3350 17 Gram(s) Oral daily  senna 2 Tablet(s) Oral at bedtime    MEDICATIONS  (PRN):  bisacodyl Suppository 10 milliGRAM(s) Rectal daily PRN Constipation  ondansetron Injectable 4 milliGRAM(s) IV Push every 6 hours PRN Nausea and/or Vomiting  oxyCODONE    IR 5 milliGRAM(s) Oral every 4 hours PRN Severe Pain (7 - 10)  simethicone 80 milliGRAM(s) Chew every 6 hours PRN Gas      PHYSICAL EXAM:  T(C): 36.8 (06-03-21 @ 15:19), Max: 36.9 (06-02-21 @ 22:30)  HR: 107 (06-03-21 @ 15:19) (99 - 107)  BP: 125/72 (06-03-21 @ 15:19) (117/79 - 125/72)  RR: 18 (06-03-21 @ 15:19) (18 - 18)  SpO2: 91% (06-03-21 @ 15:19) (91% - 100%)  I&O's Summary    02 Jun 2021 07:01  -  03 Jun 2021 07:00  --------------------------------------------------------  IN: 908 mL / OUT: 1100 mL / NET: -192 mL    03 Jun 2021 07:01  -  03 Jun 2021 18:15  --------------------------------------------------------  IN: 480 mL / OUT: 700 mL / NET: -220 mL      GENERAL: NAD, well-developed  HEAD:  Atraumatic, Normocephalic, MMM  CHEST/LUNG: No use of accessory muscles, CTAB, breathing non-labored  COR: RR, no mrcg  ABD: Soft, ND/NT, +BS  PSYCH: AAOx3  NEUROLOGY: CN II-XII grossly intact, moving all extremities  SKIN: No rashes or lesions    LABS:  CAPILLARY BLOOD GLUCOSE                              10.4   6.22  )-----------( 516      ( 03 Jun 2021 06:40 )             33.9     06-03    137  |  101  |  15  ----------------------------<  82  4.1   |  23  |  0.68    Ca    9.7      03 Jun 2021 06:40  Phos  2.9     06-03  Mg     1.7     06-03    TPro  7.4  /  Alb  3.7  /  TBili  <0.2  /  DBili  <0.2  /  AST  36<H>  /  ALT  32  /  AlkPhos  218<H>  06-03                RADIOLOGY & ADDITIONAL TESTS:    Telemetry Personally Reviewed - SR 100s    Imaging Personally Reviewed -     Imaging Reviewed -     Consultant(s) Notes Reviewed -       Care Discussed with Consultants/Other Providers - d/w Dr Gordon who will perform kyphoplasty tomorrow

## 2021-06-04 ENCOUNTER — RESULT REVIEW (OUTPATIENT)
Age: 56
End: 2021-06-04

## 2021-06-04 LAB
ANION GAP SERPL CALC-SCNC: 13 MMOL/L — SIGNIFICANT CHANGE UP (ref 7–14)
BUN SERPL-MCNC: 18 MG/DL — SIGNIFICANT CHANGE UP (ref 7–23)
CALCIUM SERPL-MCNC: 10.1 MG/DL — SIGNIFICANT CHANGE UP (ref 8.4–10.5)
CHLORIDE SERPL-SCNC: 98 MMOL/L — SIGNIFICANT CHANGE UP (ref 98–107)
CO2 SERPL-SCNC: 24 MMOL/L — SIGNIFICANT CHANGE UP (ref 22–31)
CREAT SERPL-MCNC: 0.78 MG/DL — SIGNIFICANT CHANGE UP (ref 0.5–1.3)
GLUCOSE SERPL-MCNC: 84 MG/DL — SIGNIFICANT CHANGE UP (ref 70–99)
HCT VFR BLD CALC: 38.8 % — SIGNIFICANT CHANGE UP (ref 34.5–45)
HGB BLD-MCNC: 11.6 G/DL — SIGNIFICANT CHANGE UP (ref 11.5–15.5)
INR BLD: 1.08 RATIO — SIGNIFICANT CHANGE UP (ref 0.88–1.16)
MAGNESIUM SERPL-MCNC: 1.9 MG/DL — SIGNIFICANT CHANGE UP (ref 1.6–2.6)
MCHC RBC-ENTMCNC: 24.9 PG — LOW (ref 27–34)
MCHC RBC-ENTMCNC: 29.9 GM/DL — LOW (ref 32–36)
MCV RBC AUTO: 83.3 FL — SIGNIFICANT CHANGE UP (ref 80–100)
NRBC # BLD: 0 /100 WBCS — SIGNIFICANT CHANGE UP
NRBC # FLD: 0 K/UL — SIGNIFICANT CHANGE UP
PHOSPHATE SERPL-MCNC: 3.3 MG/DL — SIGNIFICANT CHANGE UP (ref 2.5–4.5)
PLATELET # BLD AUTO: 561 K/UL — HIGH (ref 150–400)
POTASSIUM SERPL-MCNC: 4.4 MMOL/L — SIGNIFICANT CHANGE UP (ref 3.5–5.3)
POTASSIUM SERPL-SCNC: 4.4 MMOL/L — SIGNIFICANT CHANGE UP (ref 3.5–5.3)
PROTHROM AB SERPL-ACNC: 12.4 SEC — SIGNIFICANT CHANGE UP (ref 10.6–13.6)
RBC # BLD: 4.66 M/UL — SIGNIFICANT CHANGE UP (ref 3.8–5.2)
RBC # FLD: 15.6 % — HIGH (ref 10.3–14.5)
SODIUM SERPL-SCNC: 135 MMOL/L — SIGNIFICANT CHANGE UP (ref 135–145)
WBC # BLD: 6.07 K/UL — SIGNIFICANT CHANGE UP (ref 3.8–10.5)
WBC # FLD AUTO: 6.07 K/UL — SIGNIFICANT CHANGE UP (ref 3.8–10.5)

## 2021-06-04 PROCEDURE — 99232 SBSQ HOSP IP/OBS MODERATE 35: CPT

## 2021-06-04 PROCEDURE — 88342 IMHCHEM/IMCYTCHM 1ST ANTB: CPT | Mod: 26,59

## 2021-06-04 PROCEDURE — 88360 TUMOR IMMUNOHISTOCHEM/MANUAL: CPT | Mod: 26

## 2021-06-04 PROCEDURE — 88341 IMHCHEM/IMCYTCHM EA ADD ANTB: CPT | Mod: 26,59

## 2021-06-04 PROCEDURE — 88307 TISSUE EXAM BY PATHOLOGIST: CPT | Mod: 26

## 2021-06-04 PROCEDURE — 22514 PERQ VERTEBRAL AUGMENTATION: CPT

## 2021-06-04 PROCEDURE — 88311 DECALCIFY TISSUE: CPT | Mod: 26

## 2021-06-04 PROCEDURE — 88377 M/PHMTRC ALYS ISHQUANT/SEMIQ: CPT | Mod: 26

## 2021-06-04 RX ORDER — ACETAMINOPHEN 500 MG
1000 TABLET ORAL ONCE
Refills: 0 | Status: DISCONTINUED | OUTPATIENT
Start: 2021-06-04 | End: 2021-06-11

## 2021-06-04 RX ADMIN — LETROZOLE 2.5 MILLIGRAM(S): 2.5 TABLET, FILM COATED ORAL at 12:59

## 2021-06-04 RX ADMIN — Medication 1000 UNIT(S): at 12:59

## 2021-06-04 NOTE — PROCEDURE NOTE - PROCEDURE FINDINGS AND DETAILS
Right iliac bone lesion CT-guided biopsy.
L2 severe compression fracture deformity, s/p biopsy and vertebral augmentation. no acute complications

## 2021-06-04 NOTE — PROCEDURE NOTE - PLAN
bedrest x 2 hours  pain control prn per karan team  c/w physical therapy  advance diet as tolerated -advance diet as tolerated  -monitor for signs of bleeding, monitor AM CBC  -follow up pathology results  -may restart SQH @ 24h if no signs of bleeding   -bedrest 2 hrs  -PT in AM   -Follow up with Dr. Gordon in 2 weeks as outpatient. Please call [phone] for appointment

## 2021-06-04 NOTE — PROGRESS NOTE ADULT - PROBLEM SELECTOR PLAN 3
ID consulted, fevers resolved  ID recommends to monitor patient off of antibiotics ID consulted, fevers resolved. There was concern for sepsis earlier on in her admission, but sepsis was ruled out - her fevers are likely secondary to metastatic cancer. Will continue to monitor off antibiotics.

## 2021-06-04 NOTE — PRE PROCEDURE NOTE - PRE PROCEDURE EVALUATION
------------------------------------------------------------  Interventional Radiology Pre-Procedure Note  ------------------------------------------------------------    Indication: 56y Female with breast cancer and symptomatic compression fracture    Past Medical History:  Breast CA    Breast cancer    Lumbar compression fracture        Allergies: No Known Allergies      Medications:    amLODIPine   Tablet: 5 milliGRAM(s) Oral (06-03-21 @ 06:37)  enoxaparin Injectable: 40 milliGRAM(s) SubCutaneous (06-02-21 @ 12:33)      Vital Signs:   T(F): 98.4 (05:32), Max: 98.4 (05:32)  HR: 96 (05:32)  BP: 106/69 (05:32)  RR: 18 (05:32)  SpO2: 99% (05:32)    Labs:           11.6  6.07)-----(561     (06-04-21 @ 05:20)         38.8     135 | 98 | 18  --------------------< 84     (06-04-21 @ 05:20)  4.4 | 24 | 0.78       PT: 12.4 06-04-21 @ 05:20  aPTT: -- 06-04-21 @ 05:20   INR: 1.08 06-04-21 @ 05:20    Imaging:  < from: MR Lumbar Spine w/ IV Cont (05.27.21 @ 21:53) >  EXAM:  MR SPINE LUMBAR IC        PROCEDURE DATE:  May 27 2021         INTERPRETATION:  CLINICAL INDICATION: Breast cancer, osseous metastases    Magnetic resonance imaging of the lumbosacral spine was carried out with sagittal surface coil imaging from T10 to S2-S3 using T1 and fast spin echo T2 weighted images with and without fat saturation technique with axial T1 and fast spin echo T2 weighted imaging on a 1.5 [place] magnet. Post contrast axial and sagittal T1 weighted images were obtained. 5cc of Gadavist were intravenously injected, 2.5 cc were discarded.      Comparison is made with the prior outside MRI of 4/22/2021.    The visualized thoracic and lumbosacral vertebral bodies are normal in height with the exception of L2 which demonstrates a the marked compression deformity. There is heterogeneous abnormal low signal intensity involving all the visualized vertebral bodies as well as the posterior elements at L1, L2 and S1 consistent with osseous metastasis. At the L2 level there is bony retropulsion with moderate thecal sac compression. After contrast administration there is heterogeneous enhancement of the vertebral bodies and posterior elements.    The conus medullaris terminates normally at the T12-L1 level. There is no cord compression. There is moderate cauda equina compression at the L2 level.  There is no significant change since the outside examination.    Abnormal signal involving the sacrum and the iliac bones are identified bilaterally. The paraspinal soft tissues are unremarkable.            IMPRESSION: Diffuse heterogeneously enhancing bony metastases involving the visualized vertebral bodies as well as the posterior elements. Marked compression deformity L2 with bony retropulsion and moderate thecal sac compression. No cord compression. No significant change since 4/22/2021.              JIGNESH BATISTA MD; Attending Radiologist  This document has been electronically signed. May 28 2021  9:04AM    < end of copied text >      Consent: Risks, benefits, and alternatives to procedure discussed with patient and informed consent obtained    Procedure Plan:  L2 vertebral augmentation

## 2021-06-04 NOTE — PROGRESS NOTE ADULT - PROBLEM SELECTOR PLAN 1
MRI lumbar spine showing metastatic disease in the lower thoracic, lumbar, and upper sacral spine with severe pathologic fracture of L2 with retropulsion of the posterior endplate causing moderate canal stenosis.    s/p biopsy on 5/24- confirming cancer  - Pain control with Tylenol PRN for mild pain, oxycodone 5 mg q6hrs PRN for moderate pain, and oxycodone 10 mg q6hrs PRN for severe pain  - Rad-Onc consult for possible palliative RT to spine, after declining, patient now amenable- though d/w radiation oncologist, who recommend first to attempt kyphoplasty and systemic therapy. Outpatient neurosurgeon Dr Agrawal in agreement. Dr Gordon performed L2 vertebral body augmentation with biopsy on June 4.  - New lytic lesions in in the ribs   - TLSO brace present  - S/P Lumbar spine MRI- unchanged since last one in April. As per email discussion with radiation oncology, if pain does not improve after kyphoplasty then RT would be considered.

## 2021-06-04 NOTE — PROGRESS NOTE ADULT - SUBJECTIVE AND OBJECTIVE BOX
Patient is a 56y old  Female who presents with a chief complaint of Hypercalcemia, hypokalemia (03 Jun 2021 18:15)      SUBJECTIVE / OVERNIGHT EVENTS:    No events overnight. This AM, patient without n/v/d/cp/sob.      MEDICATIONS  (STANDING):  acetaminophen  IVPB .. 1000 milliGRAM(s) IV Intermittent once  amLODIPine   Tablet 5 milliGRAM(s) Oral daily  cholecalciferol 1000 Unit(s) Oral daily  letrozole 2.5 milliGRAM(s) Oral daily  polyethylene glycol 3350 17 Gram(s) Oral daily  senna 2 Tablet(s) Oral at bedtime    MEDICATIONS  (PRN):  bisacodyl Suppository 10 milliGRAM(s) Rectal daily PRN Constipation  ondansetron Injectable 4 milliGRAM(s) IV Push every 6 hours PRN Nausea and/or Vomiting  oxyCODONE    IR 5 milliGRAM(s) Oral every 4 hours PRN Severe Pain (7 - 10)  simethicone 80 milliGRAM(s) Chew every 6 hours PRN Gas      PHYSICAL EXAM:  T(C): 36.6 (06-04-21 @ 12:53), Max: 36.9 (06-04-21 @ 05:32)  HR: 95 (06-04-21 @ 12:53) (95 - 108)  BP: 109/75 (06-04-21 @ 12:53) (106/69 - 115/75)  RR: 16 (06-04-21 @ 12:53) (16 - 18)  SpO2: 99% (06-04-21 @ 12:53) (98% - 99%)  I&O's Summary    03 Jun 2021 07:01  -  04 Jun 2021 07:00  --------------------------------------------------------  IN: 720 mL / OUT: 1100 mL / NET: -380 mL      GENERAL: NAD, well-developed  HEAD:  Atraumatic, Normocephalic, MMM  CHEST/LUNG: No use of accessory muscles, CTAB, breathing non-labored  COR: RR, no mrcg  ABD: Soft, ND/NT, +BS  PSYCH: AAOx3  NEUROLOGY: CN II-XII grossly intact, moving all extremities  SKIN: No rashes or lesions    LABS:  CAPILLARY BLOOD GLUCOSE                              11.6   6.07  )-----------( 561      ( 04 Jun 2021 05:20 )             38.8     06-04    135  |  98  |  18  ----------------------------<  84  4.4   |  24  |  0.78    Ca    10.1      04 Jun 2021 05:20  Phos  3.3     06-04  Mg     1.9     06-04    TPro  7.4  /  Alb  3.7  /  TBili  <0.2  /  DBili  <0.2  /  AST  36<H>  /  ALT  32  /  AlkPhos  218<H>  06-03    PT/INR - ( 04 Jun 2021 05:20 )   PT: 12.4 sec;   INR: 1.08 ratio                     RADIOLOGY & ADDITIONAL TESTS:    Telemetry Personally Reviewed - sinus tach    Imaging Personally Reviewed -     Imaging Reviewed -     Consultant(s) Notes Reviewed -       Care Discussed with Consultants/Other Providers -

## 2021-06-05 LAB
ANION GAP SERPL CALC-SCNC: 15 MMOL/L — HIGH (ref 7–14)
BUN SERPL-MCNC: 17 MG/DL — SIGNIFICANT CHANGE UP (ref 7–23)
CALCIUM SERPL-MCNC: 10 MG/DL — SIGNIFICANT CHANGE UP (ref 8.4–10.5)
CHLORIDE SERPL-SCNC: 100 MMOL/L — SIGNIFICANT CHANGE UP (ref 98–107)
CO2 SERPL-SCNC: 22 MMOL/L — SIGNIFICANT CHANGE UP (ref 22–31)
CREAT SERPL-MCNC: 0.68 MG/DL — SIGNIFICANT CHANGE UP (ref 0.5–1.3)
GLUCOSE SERPL-MCNC: 99 MG/DL — SIGNIFICANT CHANGE UP (ref 70–99)
HCT VFR BLD CALC: 33.9 % — LOW (ref 34.5–45)
HGB BLD-MCNC: 10.3 G/DL — LOW (ref 11.5–15.5)
MAGNESIUM SERPL-MCNC: 1.8 MG/DL — SIGNIFICANT CHANGE UP (ref 1.6–2.6)
MCHC RBC-ENTMCNC: 25.2 PG — LOW (ref 27–34)
MCHC RBC-ENTMCNC: 30.4 GM/DL — LOW (ref 32–36)
MCV RBC AUTO: 82.9 FL — SIGNIFICANT CHANGE UP (ref 80–100)
NRBC # BLD: 0 /100 WBCS — SIGNIFICANT CHANGE UP
NRBC # FLD: 0 K/UL — SIGNIFICANT CHANGE UP
PHOSPHATE SERPL-MCNC: 1.8 MG/DL — LOW (ref 2.5–4.5)
PLATELET # BLD AUTO: 549 K/UL — HIGH (ref 150–400)
POTASSIUM SERPL-MCNC: 4.2 MMOL/L — SIGNIFICANT CHANGE UP (ref 3.5–5.3)
POTASSIUM SERPL-SCNC: 4.2 MMOL/L — SIGNIFICANT CHANGE UP (ref 3.5–5.3)
RBC # BLD: 4.09 M/UL — SIGNIFICANT CHANGE UP (ref 3.8–5.2)
RBC # FLD: 15.4 % — HIGH (ref 10.3–14.5)
SODIUM SERPL-SCNC: 137 MMOL/L — SIGNIFICANT CHANGE UP (ref 135–145)
WBC # BLD: 12.18 K/UL — HIGH (ref 3.8–10.5)
WBC # FLD AUTO: 12.18 K/UL — HIGH (ref 3.8–10.5)

## 2021-06-05 PROCEDURE — 99232 SBSQ HOSP IP/OBS MODERATE 35: CPT

## 2021-06-05 RX ORDER — SODIUM,POTASSIUM PHOSPHATES 278-250MG
1 POWDER IN PACKET (EA) ORAL
Refills: 0 | Status: COMPLETED | OUTPATIENT
Start: 2021-06-05 | End: 2021-06-06

## 2021-06-05 RX ORDER — ENOXAPARIN SODIUM 100 MG/ML
40 INJECTION SUBCUTANEOUS DAILY
Refills: 0 | Status: DISCONTINUED | OUTPATIENT
Start: 2021-06-05 | End: 2021-06-11

## 2021-06-05 RX ADMIN — Medication 1 PACKET(S): at 09:41

## 2021-06-05 RX ADMIN — Medication 1 PACKET(S): at 12:40

## 2021-06-05 RX ADMIN — ENOXAPARIN SODIUM 40 MILLIGRAM(S): 100 INJECTION SUBCUTANEOUS at 16:57

## 2021-06-05 RX ADMIN — AMLODIPINE BESYLATE 5 MILLIGRAM(S): 2.5 TABLET ORAL at 05:46

## 2021-06-05 RX ADMIN — LETROZOLE 2.5 MILLIGRAM(S): 2.5 TABLET, FILM COATED ORAL at 12:40

## 2021-06-05 RX ADMIN — OXYCODONE HYDROCHLORIDE 5 MILLIGRAM(S): 5 TABLET ORAL at 17:50

## 2021-06-05 RX ADMIN — Medication 1 PACKET(S): at 16:57

## 2021-06-05 RX ADMIN — Medication 1000 UNIT(S): at 12:40

## 2021-06-05 RX ADMIN — POLYETHYLENE GLYCOL 3350 17 GRAM(S): 17 POWDER, FOR SOLUTION ORAL at 21:55

## 2021-06-05 RX ADMIN — OXYCODONE HYDROCHLORIDE 5 MILLIGRAM(S): 5 TABLET ORAL at 16:57

## 2021-06-05 NOTE — PROGRESS NOTE ADULT - PROBLEM SELECTOR PLAN 3
ID consulted, fevers resolved. There was concern for sepsis earlier on in her admission, but sepsis was ruled out - her fevers are likely secondary to metastatic cancer. Will continue to monitor off antibiotics.

## 2021-06-05 NOTE — PROGRESS NOTE ADULT - SUBJECTIVE AND OBJECTIVE BOX
Patient is a 56y old  Female who presents with a chief complaint of Hypercalcemia, hypokalemia (04 Jun 2021 16:28)    SUBJECTIVE / OVERNIGHT EVENTS:    s/p kypphoplasty yesterday. This AM, patient without n/v/d/cp/sob.  Pain well-controlled.    MEDICATIONS  (STANDING):  acetaminophen  IVPB .. 1000 milliGRAM(s) IV Intermittent once  amLODIPine   Tablet 5 milliGRAM(s) Oral daily  cholecalciferol 1000 Unit(s) Oral daily  letrozole 2.5 milliGRAM(s) Oral daily  polyethylene glycol 3350 17 Gram(s) Oral daily  potassium phosphate / sodium phosphate Powder (PHOS-NaK) 1 Packet(s) Oral three times a day with meals  senna 2 Tablet(s) Oral at bedtime    MEDICATIONS  (PRN):  bisacodyl Suppository 10 milliGRAM(s) Rectal daily PRN Constipation  ondansetron Injectable 4 milliGRAM(s) IV Push every 6 hours PRN Nausea and/or Vomiting  oxyCODONE    IR 5 milliGRAM(s) Oral every 4 hours PRN Severe Pain (7 - 10)  simethicone 80 milliGRAM(s) Chew every 6 hours PRN Gas      PHYSICAL EXAM:  T(C): 37 (06-05-21 @ 05:45), Max: 37 (06-04-21 @ 21:23)  HR: 98 (06-05-21 @ 05:45) (95 - 110)  BP: 130/76 (06-05-21 @ 05:45) (109/75 - 154/86)  RR: 18 (06-05-21 @ 05:45) (16 - 18)  SpO2: 100% (06-05-21 @ 05:45) (99% - 100%)  I&O's Summary    04 Jun 2021 07:01  -  05 Jun 2021 07:00  --------------------------------------------------------  IN: 865 mL / OUT: 1450 mL / NET: -585 mL      GENERAL: NAD, well-developed  HEAD:  Atraumatic, Normocephalic, MMM  CHEST/LUNG: No use of accessory muscles, CTAB, breathing non-labored  COR: RR, no mrcg  ABD: Soft, ND/NT, +BS  PSYCH: AAOx3  NEUROLOGY: CN II-XII grossly intact, moving all extremities  SKIN: No rashes or lesions  EXT: wwp, no cce    LABS:  CAPILLARY BLOOD GLUCOSE                              10.3   12.18 )-----------( 549      ( 05 Jun 2021 06:46 )             33.9     06-05    137  |  100  |  17  ----------------------------<  99  4.2   |  22  |  0.68    Ca    10.0      05 Jun 2021 06:46  Phos  1.8     06-05  Mg     1.8     06-05      PT/INR - ( 04 Jun 2021 05:20 )   PT: 12.4 sec;   INR: 1.08 ratio                     RADIOLOGY & ADDITIONAL TESTS:    Telemetry Personally Reviewed - sinus tach 100s at rest    Imaging Personally Reviewed -     Imaging Reviewed -     Consultant(s) Notes Reviewed -       Care Discussed with Consultants/Other Providers -

## 2021-06-06 LAB
ANION GAP SERPL CALC-SCNC: 13 MMOL/L — SIGNIFICANT CHANGE UP (ref 7–14)
BUN SERPL-MCNC: 16 MG/DL — SIGNIFICANT CHANGE UP (ref 7–23)
CALCIUM SERPL-MCNC: 9.1 MG/DL — SIGNIFICANT CHANGE UP (ref 8.4–10.5)
CHLORIDE SERPL-SCNC: 99 MMOL/L — SIGNIFICANT CHANGE UP (ref 98–107)
CO2 SERPL-SCNC: 23 MMOL/L — SIGNIFICANT CHANGE UP (ref 22–31)
CREAT SERPL-MCNC: 0.69 MG/DL — SIGNIFICANT CHANGE UP (ref 0.5–1.3)
GLUCOSE SERPL-MCNC: 85 MG/DL — SIGNIFICANT CHANGE UP (ref 70–99)
HCT VFR BLD CALC: 34.3 % — LOW (ref 34.5–45)
HGB BLD-MCNC: 10.7 G/DL — LOW (ref 11.5–15.5)
MAGNESIUM SERPL-MCNC: 1.7 MG/DL — SIGNIFICANT CHANGE UP (ref 1.6–2.6)
MCHC RBC-ENTMCNC: 25.7 PG — LOW (ref 27–34)
MCHC RBC-ENTMCNC: 31.2 GM/DL — LOW (ref 32–36)
MCV RBC AUTO: 82.5 FL — SIGNIFICANT CHANGE UP (ref 80–100)
NRBC # BLD: 0 /100 WBCS — SIGNIFICANT CHANGE UP
NRBC # FLD: 0 K/UL — SIGNIFICANT CHANGE UP
PHOSPHATE SERPL-MCNC: 2.9 MG/DL — SIGNIFICANT CHANGE UP (ref 2.5–4.5)
PLATELET # BLD AUTO: 502 K/UL — HIGH (ref 150–400)
POTASSIUM SERPL-MCNC: 4.1 MMOL/L — SIGNIFICANT CHANGE UP (ref 3.5–5.3)
POTASSIUM SERPL-SCNC: 4.1 MMOL/L — SIGNIFICANT CHANGE UP (ref 3.5–5.3)
RBC # BLD: 4.16 M/UL — SIGNIFICANT CHANGE UP (ref 3.8–5.2)
RBC # FLD: 15.8 % — HIGH (ref 10.3–14.5)
SODIUM SERPL-SCNC: 135 MMOL/L — SIGNIFICANT CHANGE UP (ref 135–145)
WBC # BLD: 8.04 K/UL — SIGNIFICANT CHANGE UP (ref 3.8–10.5)
WBC # FLD AUTO: 8.04 K/UL — SIGNIFICANT CHANGE UP (ref 3.8–10.5)

## 2021-06-06 PROCEDURE — 99232 SBSQ HOSP IP/OBS MODERATE 35: CPT

## 2021-06-06 RX ADMIN — OXYCODONE HYDROCHLORIDE 5 MILLIGRAM(S): 5 TABLET ORAL at 16:37

## 2021-06-06 RX ADMIN — Medication 1 PACKET(S): at 16:37

## 2021-06-06 RX ADMIN — ENOXAPARIN SODIUM 40 MILLIGRAM(S): 100 INJECTION SUBCUTANEOUS at 11:53

## 2021-06-06 RX ADMIN — Medication 10 MILLIGRAM(S): at 22:10

## 2021-06-06 RX ADMIN — AMLODIPINE BESYLATE 5 MILLIGRAM(S): 2.5 TABLET ORAL at 05:06

## 2021-06-06 RX ADMIN — OXYCODONE HYDROCHLORIDE 5 MILLIGRAM(S): 5 TABLET ORAL at 17:30

## 2021-06-06 RX ADMIN — Medication 1000 UNIT(S): at 11:53

## 2021-06-06 RX ADMIN — POLYETHYLENE GLYCOL 3350 17 GRAM(S): 17 POWDER, FOR SOLUTION ORAL at 11:53

## 2021-06-06 RX ADMIN — LETROZOLE 2.5 MILLIGRAM(S): 2.5 TABLET, FILM COATED ORAL at 11:53

## 2021-06-06 NOTE — PROGRESS NOTE ADULT - SUBJECTIVE AND OBJECTIVE BOX
Patient is a 56y old  Female who presents with a chief complaint of Hypercalcemia, hypokalemia (05 Jun 2021 11:25)      SUBJECTIVE / OVERNIGHT EVENTS:    No events overnight. This AM, patient without n/v/d/cp/sob.      MEDICATIONS  (STANDING):  acetaminophen  IVPB .. 1000 milliGRAM(s) IV Intermittent once  amLODIPine   Tablet 5 milliGRAM(s) Oral daily  cholecalciferol 1000 Unit(s) Oral daily  enoxaparin Injectable 40 milliGRAM(s) SubCutaneous daily  letrozole 2.5 milliGRAM(s) Oral daily  polyethylene glycol 3350 17 Gram(s) Oral daily  senna 2 Tablet(s) Oral at bedtime    MEDICATIONS  (PRN):  bisacodyl Suppository 10 milliGRAM(s) Rectal daily PRN Constipation  ondansetron Injectable 4 milliGRAM(s) IV Push every 6 hours PRN Nausea and/or Vomiting  oxyCODONE    IR 5 milliGRAM(s) Oral every 4 hours PRN Severe Pain (7 - 10)  simethicone 80 milliGRAM(s) Chew every 6 hours PRN Gas      PHYSICAL EXAM:  T(C): 36.8 (06-06-21 @ 11:58), Max: 37.1 (06-06-21 @ 05:04)  HR: 109 (06-06-21 @ 11:58) (104 - 109)  BP: 115/69 (06-06-21 @ 11:58) (111/68 - 115/69)  RR: 18 (06-06-21 @ 11:58) (17 - 18)  SpO2: 100% (06-06-21 @ 11:58) (100% - 100%)  I&O's Summary    05 Jun 2021 07:01  -  06 Jun 2021 07:00  --------------------------------------------------------  IN: 1560 mL / OUT: 1200 mL / NET: 360 mL      GENERAL: NAD, well-developed  HEAD:  Atraumatic, Normocephalic, MMM  CHEST/LUNG: No use of accessory muscles, CTAB, breathing non-labored  COR: RR, no mrcg  ABD: Soft, ND/NT, +BS  PSYCH: AAOx3  NEUROLOGY: CN II-XII grossly intact, moving all extremities  SKIN: No rashes or lesions  EXT: wwp, no cce    LABS:  CAPILLARY BLOOD GLUCOSE                              10.7   8.04  )-----------( 502      ( 06 Jun 2021 07:22 )             34.3     06-06    135  |  99  |  16  ----------------------------<  85  4.1   |  23  |  0.69    Ca    9.1      06 Jun 2021 07:22  Phos  2.9     06-06  Mg     1.7     06-06                  RADIOLOGY & ADDITIONAL TESTS:    Telemetry Personally Reviewed - sinus tach 100s    Imaging Personally Reviewed -     Imaging Reviewed -     Consultant(s) Notes Reviewed -       Care Discussed with Consultants/Other Providers -

## 2021-06-07 LAB
ANION GAP SERPL CALC-SCNC: 12 MMOL/L — SIGNIFICANT CHANGE UP (ref 7–14)
BUN SERPL-MCNC: 13 MG/DL — SIGNIFICANT CHANGE UP (ref 7–23)
CALCIUM SERPL-MCNC: 8.9 MG/DL — SIGNIFICANT CHANGE UP (ref 8.4–10.5)
CHLORIDE SERPL-SCNC: 101 MMOL/L — SIGNIFICANT CHANGE UP (ref 98–107)
CO2 SERPL-SCNC: 23 MMOL/L — SIGNIFICANT CHANGE UP (ref 22–31)
CREAT SERPL-MCNC: 0.67 MG/DL — SIGNIFICANT CHANGE UP (ref 0.5–1.3)
GLUCOSE SERPL-MCNC: 84 MG/DL — SIGNIFICANT CHANGE UP (ref 70–99)
HCT VFR BLD CALC: 33.2 % — LOW (ref 34.5–45)
HGB BLD-MCNC: 10.4 G/DL — LOW (ref 11.5–15.5)
MAGNESIUM SERPL-MCNC: 1.8 MG/DL — SIGNIFICANT CHANGE UP (ref 1.6–2.6)
MCHC RBC-ENTMCNC: 25.9 PG — LOW (ref 27–34)
MCHC RBC-ENTMCNC: 31.3 GM/DL — LOW (ref 32–36)
MCV RBC AUTO: 82.8 FL — SIGNIFICANT CHANGE UP (ref 80–100)
NRBC # BLD: 0 /100 WBCS — SIGNIFICANT CHANGE UP
NRBC # FLD: 0 K/UL — SIGNIFICANT CHANGE UP
PHOSPHATE SERPL-MCNC: 2.5 MG/DL — SIGNIFICANT CHANGE UP (ref 2.5–4.5)
PLATELET # BLD AUTO: 468 K/UL — HIGH (ref 150–400)
POTASSIUM SERPL-MCNC: 4.4 MMOL/L — SIGNIFICANT CHANGE UP (ref 3.5–5.3)
POTASSIUM SERPL-SCNC: 4.4 MMOL/L — SIGNIFICANT CHANGE UP (ref 3.5–5.3)
RBC # BLD: 4.01 M/UL — SIGNIFICANT CHANGE UP (ref 3.8–5.2)
RBC # FLD: 15.9 % — HIGH (ref 10.3–14.5)
SODIUM SERPL-SCNC: 136 MMOL/L — SIGNIFICANT CHANGE UP (ref 135–145)
WBC # BLD: 7.6 K/UL — SIGNIFICANT CHANGE UP (ref 3.8–10.5)
WBC # FLD AUTO: 7.6 K/UL — SIGNIFICANT CHANGE UP (ref 3.8–10.5)

## 2021-06-07 PROCEDURE — 99232 SBSQ HOSP IP/OBS MODERATE 35: CPT

## 2021-06-07 PROCEDURE — 99231 SBSQ HOSP IP/OBS SF/LOW 25: CPT

## 2021-06-07 RX ADMIN — POLYETHYLENE GLYCOL 3350 17 GRAM(S): 17 POWDER, FOR SOLUTION ORAL at 12:04

## 2021-06-07 RX ADMIN — LETROZOLE 2.5 MILLIGRAM(S): 2.5 TABLET, FILM COATED ORAL at 12:05

## 2021-06-07 RX ADMIN — ENOXAPARIN SODIUM 40 MILLIGRAM(S): 100 INJECTION SUBCUTANEOUS at 12:04

## 2021-06-07 RX ADMIN — OXYCODONE HYDROCHLORIDE 5 MILLIGRAM(S): 5 TABLET ORAL at 13:30

## 2021-06-07 RX ADMIN — Medication 1000 UNIT(S): at 12:04

## 2021-06-07 RX ADMIN — OXYCODONE HYDROCHLORIDE 5 MILLIGRAM(S): 5 TABLET ORAL at 13:02

## 2021-06-07 RX ADMIN — AMLODIPINE BESYLATE 5 MILLIGRAM(S): 2.5 TABLET ORAL at 06:17

## 2021-06-07 NOTE — PROGRESS NOTE ADULT - SUBJECTIVE AND OBJECTIVE BOX
INTERVAL HPI/OVERNIGHT EVENTS:  Patient seen at bedside.  Patient pain controlled on current regimen  S/p kyphoplasty 6/4- tolerated procedure well      VITAL SIGNS:  T(F): 98.8 (06-07-21 @ 12:01)  HR: 112 (06-07-21 @ 12:01)  BP: 113/64 (06-07-21 @ 12:01)  RR: 16 (06-07-21 @ 12:01)  SpO2: 98% (06-07-21 @ 12:01)  Wt(kg): --    PHYSICAL EXAM:    In accordance with current standard limiting patient contact, deferred physical exam  2/2 COVID pandemic  Please refer to physical exam of primary team.    MEDICATIONS  (STANDING):  acetaminophen  IVPB .. 1000 milliGRAM(s) IV Intermittent once  amLODIPine   Tablet 5 milliGRAM(s) Oral daily  cholecalciferol 1000 Unit(s) Oral daily  enoxaparin Injectable 40 milliGRAM(s) SubCutaneous daily  letrozole 2.5 milliGRAM(s) Oral daily  polyethylene glycol 3350 17 Gram(s) Oral daily  saline laxative (FLEET) Rectal Enema 1 Enema Rectal once  senna 2 Tablet(s) Oral at bedtime    MEDICATIONS  (PRN):  bisacodyl Suppository 10 milliGRAM(s) Rectal daily PRN Constipation  ondansetron Injectable 4 milliGRAM(s) IV Push every 6 hours PRN Nausea and/or Vomiting  oxyCODONE    IR 5 milliGRAM(s) Oral every 4 hours PRN Severe Pain (7 - 10)  simethicone 80 milliGRAM(s) Chew every 6 hours PRN Gas      Allergies    No Known Allergies    Intolerances        LABS:                        10.4   7.60  )-----------( 468      ( 07 Jun 2021 07:08 )             33.2     06-07    136  |  101  |  13  ----------------------------<  84  4.4   |  23  |  0.67    Ca    8.9      07 Jun 2021 07:08  Phos  2.5     06-07  Mg     1.8     06-07            RADIOLOGY & ADDITIONAL TESTS:  Studies reviewed.

## 2021-06-07 NOTE — PROGRESS NOTE ADULT - ASSESSMENT
57 yo female with metastatic breast CA and pathologic Fx of L2 s/p vertebral augmentation on 6/4      Plan:  F/u as outpatient with Dr. Gordon on 6/23 at 11:30 AM 2nd floor Radiology dept. [id number]

## 2021-06-07 NOTE — PROGRESS NOTE ADULT - ASSESSMENT
56f with metastatic breast ca, Hormone receptor positive, not started on treatment as of yet, referred to the ED from [facility] with hypercalcemia, hypokalemia and pain.      -Noted to fevers on admission, s/p antibiotics,  infectious workup with NGTD,    -S/p CXR 5/25 : innumerable bilateral pulmonary nodules again seen.  New ill-defined medial right upper lung opacities, nonspecific findings, which may be due to subsegmental atelectasis or pneumonia. New pathologic right second rib fracture.- S/p  Zosyn  -Patient with significant bony  mets which may explain hypercalcemia s/p IVF, Calcitonin and S/p Zometa 4mg x 1  Now with improved levels, will continue to monitor  -Noted to have transaminitis, no evidence of liver mets on most recent PET scan, appreciate Hepatology consult. -S/p MRI of abdomen Possible hemangiomas in the right hepatic dome and in the left hepatic lobe. Multiple hepatic cysts. No evidence of steatosis. Normal biliary tree. A cystic lesion in the right kidney which demonstrates minimal internal enhancement. Extensive osseous and pulmonary metastatic disease.  -Work Up: IgG, IgA, IgM wnl, Hep A, B, C serologies negative, EBV IGM Neg, LKM wnl, A1AT 321 (elevated), Ceruloplasmin wnl. Pending KATRIN, AMA, ASMA, HSV IgM  -LFTS have been improving  -Appreciate IR consult for biopsy of bone lesion of posterior iliac bone, consistent with ER+TN+HER+ met disease  -S/p MRI lumber spine : Diffuse heterogeneously enhancing bony metastases involving the visualized vertebral bodies as well as the posterior elements. Marked compression deformity L2 with bony retropulsion and moderate thecal sac compression. No cord compression.   -Appreciate Rad Onc consult, s/p CT SIM for radiation on 5/27.Patient is s/p kyphoplasty on 6/4 and if symptoms not improved , initiate radiation later.   Has followup appointment with Dr. Gordon on 6/23   -Patient to followup with Dr. Rekha Dowd ([facility]) upon discharge  -C/w Supportive care, pain control, Nutrition, PT, DVT ppx  -Oncology will continue to follow with you      Case d/w Dr. Abril PAL  Oncology Physician Assistant  Bridgette SANTOS/[facility]  Pager [phone]    If after 5pm or weekends please page On-call Oncology Fellow   56f with metastatic breast ca, Hormone receptor positive, not started on treatment as of yet, referred to the ED from [facility] with hypercalcemia, hypokalemia and pain.      -Noted to fevers on admission, s/p antibiotics,  infectious workup with NGTD,    -S/p CXR 5/25 : innumerable bilateral pulmonary nodules again seen.  New ill-defined medial right upper lung opacities, nonspecific findings, which may be due to subsegmental atelectasis or pneumonia. New pathologic right second rib fracture.- S/p  Zosyn  -Patient with significant bony  mets which may explain hypercalcemia s/p IVF, Calcitonin and S/p Zometa 4mg x 1  Now with improved levels, will continue to monitor  -Noted to have transaminitis, no evidence of liver mets on most recent PET scan, appreciate Hepatology consult. -S/p MRI of abdomen Possible hemangiomas in the right hepatic dome and in the left hepatic lobe. Multiple hepatic cysts. No evidence of steatosis. Normal biliary tree. A cystic lesion in the right kidney which demonstrates minimal internal enhancement. Extensive osseous and pulmonary metastatic disease.  -Work Up: IgG, IgA, IgM wnl, Hep A, B, C serologies negative, EBV IGM Neg, LKM wnl, A1AT 321 (elevated), Ceruloplasmin wnl. Pending KATRIN, AMA, ASMA, HSV IgM  -LFTS have been improving  -Appreciate IR consult for biopsy of bone lesion of posterior iliac bone, consistent with ER+DC+HER+ met disease  -S/p MRI lumber spine : Diffuse heterogeneously enhancing bony metastases involving the visualized vertebral bodies as well as the posterior elements. Marked compression deformity L2 with bony retropulsion and moderate thecal sac compression. No cord compression.   -Appreciate Rad Onc consult, s/p CT SIM for radiation on 5/27.  Patient is NOW s/p kyphoplasty on 6/4 and if symptoms not improved , initiate radiation later.   Has followup appointment with Dr. Gordon on 6/23   -Patient to followup with Dr. Rekha Dowd ([facility]) upon discharge  -C/w Supportive care, pain control, Nutrition, PT, DVT ppx  -Oncology will continue to follow with you      Case d/w Dr. Abril PAL  Oncology Physician Assistant  [facility]ridgette SANTOS/[facility]  Pager [phone]    If after 5pm or weekends please page On-call Oncology Fellow

## 2021-06-07 NOTE — PROGRESS NOTE ADULT - ATTENDING COMMENTS
Patient seen at bedside. Case discussed with DEMARCO Antonio. Plan as above.   Appreciate rad onc  follow bx  awaiting MRI and hepatology eval
Patient seen at bedside. Case discussed with DEMARCO Antonio. Plan as above.   RT vs kyphoplasty, plan not clear at this time. Discussed with Dr Lomas.   Hepatology input appreciated.  Bx reviewed, hormone + breast Adenoca, HER2 CISH pending.   C/w Letrozole.
Patient seen at bedside. Case discussed with DEMARCO Antonio. Plan as above.   Ca improved.   Rad onc consult  MRI spine  Hepatology consult
Patient seen at bedside. Case discussed with DEMARCO Antonio. Plan as above.   S/p kyphoplasty, pain is well controlled.   Rad onc input appreciated.   DC planning.
Patient with metastatic breast cancer and now with elevated liver tests. Patient has had PET scan showing PET positive cancer distribution, but liver was not hypermetabolic. Await abd MRI results. Patient relates taking NSAIDS for control of back pain over the last 3 months and this is one possible contributor to liver injury. Evaluation largely remains pending.  Patient also taking vitamin supplement which she has now stopped.
Patient seen and examined with the liver team I agree with the plan as above.  Mildly elevated liver chemistries that can be followed as outpatient  I explained the finding of a hemangioma

## 2021-06-07 NOTE — PROGRESS NOTE ADULT - SUBJECTIVE AND OBJECTIVE BOX
S/P vertebral augmentation L2  Pt. c/o low back discomfort, improved since procedure  denies LE weakness/numbness/tingling        Vital Signs Last 24 Hrs  T(C): 36.9 (07 Jun 2021 06:15), Max: 36.9 (06 Jun 2021 21:09)  T(F): 98.4 (07 Jun 2021 06:15), Max: 98.4 (06 Jun 2021 21:09)  HR: 97 (07 Jun 2021 06:15) (97 - 109)  BP: 117/81 (07 Jun 2021 06:15) (110/75 - 117/81)  BP(mean): --  RR: 16 (07 Jun 2021 06:15) (16 - 18)  SpO2: 100% (07 Jun 2021 06:15) (100% - 100%)    Focused Exam findings:    General: NAD  Back- dressing over L2 c/d/i, no hematoma        LABS:                        10.4   7.60  )-----------( 468      ( 07 Jun 2021 07:08 )             33.2     06-07    136  |  101  |  13  ----------------------------<  84  4.4   |  23  |  0.67    Ca    8.9      07 Jun 2021 07:08  Phos  2.5     06-07  Mg     1.8     06-07      I&O's Detail

## 2021-06-07 NOTE — PROGRESS NOTE ADULT - SUBJECTIVE AND OBJECTIVE BOX
PROGRESS NOTE:     Patient is a 56y old  Female who presents with a chief complaint of Hypercalcemia, hypokalemia (07 Jun 2021 13:57)      SUBJECTIVE / OVERNIGHT EVENTS: Pt's back pain is improving.     ADDITIONAL REVIEW OF SYSTEMS:    MEDICATIONS  (STANDING):  acetaminophen  IVPB .. 1000 milliGRAM(s) IV Intermittent once  amLODIPine   Tablet 5 milliGRAM(s) Oral daily  cholecalciferol 1000 Unit(s) Oral daily  enoxaparin Injectable 40 milliGRAM(s) SubCutaneous daily  letrozole 2.5 milliGRAM(s) Oral daily  polyethylene glycol 3350 17 Gram(s) Oral daily  saline laxative (FLEET) Rectal Enema 1 Enema Rectal once  senna 2 Tablet(s) Oral at bedtime    MEDICATIONS  (PRN):  bisacodyl Suppository 10 milliGRAM(s) Rectal daily PRN Constipation  ondansetron Injectable 4 milliGRAM(s) IV Push every 6 hours PRN Nausea and/or Vomiting  oxyCODONE    IR 5 milliGRAM(s) Oral every 4 hours PRN Severe Pain (7 - 10)  simethicone 80 milliGRAM(s) Chew every 6 hours PRN Gas      CAPILLARY BLOOD GLUCOSE        I&O's Summary    07 Jun 2021 07:01  -  07 Jun 2021 15:23  --------------------------------------------------------  IN: 240 mL / OUT: 450 mL / NET: -210 mL        PHYSICAL EXAM:  Vital Signs Last 24 Hrs  T(C): 37.1 (07 Jun 2021 12:01), Max: 37.1 (07 Jun 2021 12:01)  T(F): 98.8 (07 Jun 2021 12:01), Max: 98.8 (07 Jun 2021 12:01)  HR: 112 (07 Jun 2021 12:01) (97 - 112)  BP: 113/64 (07 Jun 2021 12:01) (110/75 - 117/81)  BP(mean): --  RR: 16 (07 Jun 2021 12:01) (16 - 17)  SpO2: 98% (07 Jun 2021 12:01) (98% - 100%)    GENERAL: NAD, well-developed  HEAD:  Atraumatic, Normocephalic, MMM  CHEST/LUNG: No use of accessory muscles, CTAB, breathing non-labored  COR: RR, no mrcg  ABD: Soft, ND/NT, +BS  PSYCH: AAOx3  NEUROLOGY: CN II-XII grossly intact, moving all extremities  SKIN: No rashes or lesions  EXT: No clubbing, cyanosis, or edema     LABS:                        10.4   7.60  )-----------( 468      ( 07 Jun 2021 07:08 )             33.2     06-07    136  |  101  |  13  ----------------------------<  84  4.4   |  23  |  0.67    Ca    8.9      07 Jun 2021 07:08  Phos  2.5     06-07  Mg     1.8     06-07                  RADIOLOGY & ADDITIONAL TESTS:  Results Reviewed:   Imaging Personally Reviewed:  Electrocardiogram Personally Reviewed:    COORDINATION OF CARE:  Care Discussed with Consultants/Other Providers [Y/N]:  Prior or Outpatient Records Reviewed [Y/N]:

## 2021-06-07 NOTE — PROGRESS NOTE ADULT - PROBLEM SELECTOR PLAN 1
MRI lumbar spine showing metastatic disease in the lower thoracic, lumbar, and upper sacral spine with severe pathologic fracture of L2 with retropulsion of the posterior endplate causing moderate canal stenosis.    s/p biopsy on 5/24- confirming cancer  - Pain control with Tylenol PRN for mild pain, oxycodone 5 mg q6hrs PRN for moderate pain, and oxycodone 10 mg q6hrs PRN for severe pain  - s/p L2 vertebral body augmentation with biopsy on June 4  - TLSO brace   - Rad-Onc consulted, outpatient RT

## 2021-06-08 LAB
ANION GAP SERPL CALC-SCNC: 14 MMOL/L — SIGNIFICANT CHANGE UP (ref 7–14)
BUN SERPL-MCNC: 18 MG/DL — SIGNIFICANT CHANGE UP (ref 7–23)
CALCIUM SERPL-MCNC: 10.1 MG/DL — SIGNIFICANT CHANGE UP (ref 8.4–10.5)
CHLORIDE SERPL-SCNC: 99 MMOL/L — SIGNIFICANT CHANGE UP (ref 98–107)
CO2 SERPL-SCNC: 23 MMOL/L — SIGNIFICANT CHANGE UP (ref 22–31)
CREAT SERPL-MCNC: 0.72 MG/DL — SIGNIFICANT CHANGE UP (ref 0.5–1.3)
GLUCOSE SERPL-MCNC: 76 MG/DL — SIGNIFICANT CHANGE UP (ref 70–99)
HCT VFR BLD CALC: 36.6 % — SIGNIFICANT CHANGE UP (ref 34.5–45)
HGB BLD-MCNC: 11.3 G/DL — LOW (ref 11.5–15.5)
MAGNESIUM SERPL-MCNC: 1.9 MG/DL — SIGNIFICANT CHANGE UP (ref 1.6–2.6)
MCHC RBC-ENTMCNC: 25.6 PG — LOW (ref 27–34)
MCHC RBC-ENTMCNC: 30.9 GM/DL — LOW (ref 32–36)
MCV RBC AUTO: 83 FL — SIGNIFICANT CHANGE UP (ref 80–100)
NRBC # BLD: 0 /100 WBCS — SIGNIFICANT CHANGE UP
NRBC # FLD: 0 K/UL — SIGNIFICANT CHANGE UP
PHOSPHATE SERPL-MCNC: 3.3 MG/DL — SIGNIFICANT CHANGE UP (ref 2.5–4.5)
PLATELET # BLD AUTO: 517 K/UL — HIGH (ref 150–400)
POTASSIUM SERPL-MCNC: 4.4 MMOL/L — SIGNIFICANT CHANGE UP (ref 3.5–5.3)
POTASSIUM SERPL-SCNC: 4.4 MMOL/L — SIGNIFICANT CHANGE UP (ref 3.5–5.3)
RBC # BLD: 4.41 M/UL — SIGNIFICANT CHANGE UP (ref 3.8–5.2)
RBC # FLD: 15.9 % — HIGH (ref 10.3–14.5)
SARS-COV-2 RNA SPEC QL NAA+PROBE: SIGNIFICANT CHANGE UP
SODIUM SERPL-SCNC: 136 MMOL/L — SIGNIFICANT CHANGE UP (ref 135–145)
WBC # BLD: 6.51 K/UL — SIGNIFICANT CHANGE UP (ref 3.8–10.5)
WBC # FLD AUTO: 6.51 K/UL — SIGNIFICANT CHANGE UP (ref 3.8–10.5)

## 2021-06-08 PROCEDURE — 99232 SBSQ HOSP IP/OBS MODERATE 35: CPT

## 2021-06-08 RX ADMIN — LETROZOLE 2.5 MILLIGRAM(S): 2.5 TABLET, FILM COATED ORAL at 12:09

## 2021-06-08 RX ADMIN — POLYETHYLENE GLYCOL 3350 17 GRAM(S): 17 POWDER, FOR SOLUTION ORAL at 12:09

## 2021-06-08 RX ADMIN — AMLODIPINE BESYLATE 5 MILLIGRAM(S): 2.5 TABLET ORAL at 06:05

## 2021-06-08 RX ADMIN — ENOXAPARIN SODIUM 40 MILLIGRAM(S): 100 INJECTION SUBCUTANEOUS at 12:09

## 2021-06-08 RX ADMIN — Medication 1000 UNIT(S): at 12:09

## 2021-06-08 NOTE — PROGRESS NOTE ADULT - SUBJECTIVE AND OBJECTIVE BOX
PROGRESS NOTE:     Patient is a 56y old  Female who presents with a chief complaint of Hypercalcemia, hypokalemia (07 Jun 2021 15:23)      SUBJECTIVE / OVERNIGHT EVENTS: Pain controlled.     ADDITIONAL REVIEW OF SYSTEMS:    MEDICATIONS  (STANDING):  acetaminophen  IVPB .. 1000 milliGRAM(s) IV Intermittent once  amLODIPine   Tablet 5 milliGRAM(s) Oral daily  cholecalciferol 1000 Unit(s) Oral daily  enoxaparin Injectable 40 milliGRAM(s) SubCutaneous daily  letrozole 2.5 milliGRAM(s) Oral daily  polyethylene glycol 3350 17 Gram(s) Oral daily  senna 2 Tablet(s) Oral at bedtime    MEDICATIONS  (PRN):  bisacodyl Suppository 10 milliGRAM(s) Rectal daily PRN Constipation  ondansetron Injectable 4 milliGRAM(s) IV Push every 6 hours PRN Nausea and/or Vomiting  oxyCODONE    IR 5 milliGRAM(s) Oral every 4 hours PRN Severe Pain (7 - 10)  simethicone 80 milliGRAM(s) Chew every 6 hours PRN Gas      CAPILLARY BLOOD GLUCOSE        I&O's Summary    07 Jun 2021 07:01  -  08 Jun 2021 07:00  --------------------------------------------------------  IN: 598 mL / OUT: 1600 mL / NET: -1002 mL        PHYSICAL EXAM:  Vital Signs Last 24 Hrs  T(C): 36.7 (08 Jun 2021 12:13), Max: 37.2 (07 Jun 2021 21:09)  T(F): 98.1 (08 Jun 2021 12:13), Max: 98.9 (07 Jun 2021 21:09)  HR: 80 (08 Jun 2021 12:13) (80 - 113)  BP: 119/80 (08 Jun 2021 12:13) (119/74 - 126/81)  BP(mean): --  RR: 16 (08 Jun 2021 12:13) (16 - 16)  SpO2: 100% (08 Jun 2021 12:13) (99% - 100%)    GENERAL: NAD, well-developed  HEAD:  Atraumatic, Normocephalic, MMM  CHEST/LUNG: No use of accessory muscles, CTAB, breathing non-labored  COR: RR, no mrcg  ABD: Soft, ND/NT, +BS  PSYCH: AAOx3  NEUROLOGY: CN II-XII grossly intact, moving all extremities  SKIN: No rashes or lesions  EXT: No clubbing, cyanosis, or edema     LABS:                        11.3   6.51  )-----------( 517      ( 08 Jun 2021 07:16 )             36.6     06-08    136  |  99  |  18  ----------------------------<  76  4.4   |  23  |  0.72    Ca    10.1      08 Jun 2021 07:16  Phos  3.3     06-08  Mg     1.9     06-08                  RADIOLOGY & ADDITIONAL TESTS:  Results Reviewed:   Imaging Personally Reviewed:  Electrocardiogram Personally Reviewed:    COORDINATION OF CARE:  Care Discussed with Consultants/Other Providers [Y/N]:  Prior or Outpatient Records Reviewed [Y/N]:

## 2021-06-08 NOTE — PROGRESS NOTE ADULT - PROBLEM SELECTOR PLAN 1
MRI lumbar spine showing metastatic disease in the lower thoracic, lumbar, and upper sacral spine with severe pathologic fracture of L2 with retropulsion of the posterior endplate causing moderate canal stenosis.    s/p biopsy on 5/24- confirming cancer  - Pain control with Tylenol PRN for mild pain, oxycodone 5 mg q6hrs PRN for moderate pain, and oxycodone 10 mg q6hrs PRN for severe pain  - s/p L2 vertebral body augmentation with biopsy on June 4  - TLSO brace   - Rad-Onc input noted  - PT

## 2021-06-08 NOTE — PROGRESS NOTE ADULT - ASSESSMENT
57 yo woman with history of recent diagnosis of L breast cancer with metastatic disease to spine with severe pathologic fracture of L2 with moderate canal stenosis presents with hypercalcemia (13.5) and hypokalemia (2.6) on outpatient labs likely due to metastatic disease.  Red

## 2021-06-09 LAB
ANION GAP SERPL CALC-SCNC: 14 MMOL/L — SIGNIFICANT CHANGE UP (ref 7–14)
BUN SERPL-MCNC: 16 MG/DL — SIGNIFICANT CHANGE UP (ref 7–23)
CALCIUM SERPL-MCNC: 10.2 MG/DL — SIGNIFICANT CHANGE UP (ref 8.4–10.5)
CHLORIDE SERPL-SCNC: 99 MMOL/L — SIGNIFICANT CHANGE UP (ref 98–107)
CO2 SERPL-SCNC: 22 MMOL/L — SIGNIFICANT CHANGE UP (ref 22–31)
CREAT SERPL-MCNC: 0.65 MG/DL — SIGNIFICANT CHANGE UP (ref 0.5–1.3)
GLUCOSE SERPL-MCNC: 86 MG/DL — SIGNIFICANT CHANGE UP (ref 70–99)
HCT VFR BLD CALC: 40.4 % — SIGNIFICANT CHANGE UP (ref 34.5–45)
HGB BLD-MCNC: 12.5 G/DL — SIGNIFICANT CHANGE UP (ref 11.5–15.5)
MAGNESIUM SERPL-MCNC: 2 MG/DL — SIGNIFICANT CHANGE UP (ref 1.6–2.6)
MCHC RBC-ENTMCNC: 25.7 PG — LOW (ref 27–34)
MCHC RBC-ENTMCNC: 30.9 GM/DL — LOW (ref 32–36)
MCV RBC AUTO: 83 FL — SIGNIFICANT CHANGE UP (ref 80–100)
NRBC # BLD: 0 /100 WBCS — SIGNIFICANT CHANGE UP
NRBC # FLD: 0 K/UL — SIGNIFICANT CHANGE UP
PHOSPHATE SERPL-MCNC: 3.1 MG/DL — SIGNIFICANT CHANGE UP (ref 2.5–4.5)
PLATELET # BLD AUTO: 534 K/UL — HIGH (ref 150–400)
POTASSIUM SERPL-MCNC: 4.5 MMOL/L — SIGNIFICANT CHANGE UP (ref 3.5–5.3)
POTASSIUM SERPL-SCNC: 4.5 MMOL/L — SIGNIFICANT CHANGE UP (ref 3.5–5.3)
RBC # BLD: 4.87 M/UL — SIGNIFICANT CHANGE UP (ref 3.8–5.2)
RBC # FLD: 16 % — HIGH (ref 10.3–14.5)
SODIUM SERPL-SCNC: 135 MMOL/L — SIGNIFICANT CHANGE UP (ref 135–145)
SURGICAL PATHOLOGY STUDY: SIGNIFICANT CHANGE UP
WBC # BLD: 5.52 K/UL — SIGNIFICANT CHANGE UP (ref 3.8–10.5)
WBC # FLD AUTO: 5.52 K/UL — SIGNIFICANT CHANGE UP (ref 3.8–10.5)

## 2021-06-09 PROCEDURE — 99232 SBSQ HOSP IP/OBS MODERATE 35: CPT

## 2021-06-09 PROCEDURE — 77307 TELETHX ISODOSE PLAN CPLX: CPT | Mod: 26

## 2021-06-09 PROCEDURE — 77334 RADIATION TREATMENT AID(S): CPT | Mod: 26

## 2021-06-09 RX ORDER — DEXAMETHASONE 0.5 MG/5ML
5 ELIXIR ORAL THREE TIMES A DAY
Refills: 0 | Status: COMPLETED | OUTPATIENT
Start: 2021-06-09 | End: 2021-06-10

## 2021-06-09 RX ADMIN — LETROZOLE 2.5 MILLIGRAM(S): 2.5 TABLET, FILM COATED ORAL at 11:44

## 2021-06-09 RX ADMIN — ENOXAPARIN SODIUM 40 MILLIGRAM(S): 100 INJECTION SUBCUTANEOUS at 11:44

## 2021-06-09 RX ADMIN — Medication 5 MILLIGRAM(S): at 18:20

## 2021-06-09 RX ADMIN — AMLODIPINE BESYLATE 5 MILLIGRAM(S): 2.5 TABLET ORAL at 05:02

## 2021-06-09 RX ADMIN — Medication 1000 UNIT(S): at 11:44

## 2021-06-09 NOTE — PROGRESS NOTE ADULT - SUBJECTIVE AND OBJECTIVE BOX
PROGRESS NOTE:     Patient is a 56y old  Female who presents with a chief complaint of Hypercalcemia, hypokalemia (08 Jun 2021 12:53)      SUBJECTIVE / OVERNIGHT EVENTS: No new complaints.     ADDITIONAL REVIEW OF SYSTEMS:    MEDICATIONS  (STANDING):  acetaminophen  IVPB .. 1000 milliGRAM(s) IV Intermittent once  amLODIPine   Tablet 5 milliGRAM(s) Oral daily  cholecalciferol 1000 Unit(s) Oral daily  enoxaparin Injectable 40 milliGRAM(s) SubCutaneous daily  letrozole 2.5 milliGRAM(s) Oral daily  polyethylene glycol 3350 17 Gram(s) Oral daily  senna 2 Tablet(s) Oral at bedtime    MEDICATIONS  (PRN):  bisacodyl Suppository 10 milliGRAM(s) Rectal daily PRN Constipation  ondansetron Injectable 4 milliGRAM(s) IV Push every 6 hours PRN Nausea and/or Vomiting  oxyCODONE    IR 5 milliGRAM(s) Oral every 4 hours PRN Severe Pain (7 - 10)  simethicone 80 milliGRAM(s) Chew every 6 hours PRN Gas      CAPILLARY BLOOD GLUCOSE        I&O's Summary    08 Jun 2021 07:01  -  09 Jun 2021 07:00  --------------------------------------------------------  IN: 1318 mL / OUT: 1500 mL / NET: -182 mL        PHYSICAL EXAM:  Vital Signs Last 24 Hrs  T(C): 36.9 (09 Jun 2021 11:56), Max: 37.2 (08 Jun 2021 20:46)  T(F): 98.5 (09 Jun 2021 11:56), Max: 98.9 (08 Jun 2021 20:46)  HR: 109 (09 Jun 2021 11:56) (97 - 109)  BP: 129/81 (09 Jun 2021 11:56) (109/73 - 129/81)  BP(mean): --  RR: 16 (09 Jun 2021 11:56) (16 - 16)  SpO2: 98% (09 Jun 2021 11:56) (98% - 100%)    GENERAL: NAD, well-developed  HEAD:  Atraumatic, Normocephalic, MMM  CHEST/LUNG: No use of accessory muscles, CTAB, breathing non-labored  COR: RR, no mrcg  ABD: Soft, ND/NT, +BS  PSYCH: AAOx3  NEUROLOGY: CN II-XII grossly intact, moving all extremities  SKIN: No rashes or lesions  EXT: No clubbing, cyanosis, or edema     LABS:                        12.5   5.52  )-----------( 534      ( 09 Jun 2021 06:31 )             40.4     06-09    135  |  99  |  16  ----------------------------<  86  4.5   |  22  |  0.65    Ca    10.2      09 Jun 2021 06:31  Phos  3.1     06-09  Mg     2.0     06-09                  RADIOLOGY & ADDITIONAL TESTS:  Results Reviewed:   Imaging Personally Reviewed:  Electrocardiogram Personally Reviewed:    COORDINATION OF CARE:  Care Discussed with Consultants/Other Providers [Y/N]:  Prior or Outpatient Records Reviewed [Y/N]:

## 2021-06-09 NOTE — CHART NOTE - NSCHARTNOTEFT_GEN_A_CORE
56 y.o. F with metastatic breast cancer, seen for prior RT consult regarding spine RT  to the L2 retropulsed bone with thecal sac compression, no cord compression.  RT was held to consider starting chemo or immunotherapy, and also she underwent  kyphoplasty on 6/4/21 and is planned for soon discharge to [place] rehab.    We rediscussed single fraction RT to the L-spine and she agreed.  She is already s/p CT simulation 5/27 so we plan for single fraction RT tomorrow.

## 2021-06-09 NOTE — PROGRESS NOTE ADULT - PROBLEM SELECTOR PLAN 1
MRI lumbar spine showing metastatic disease in the lower thoracic, lumbar, and upper sacral spine with severe pathologic fracture of L2 with retropulsion of the posterior endplate causing moderate canal stenosis.    s/p biopsy on 5/24- confirming cancer  - Pain control with Tylenol PRN for mild pain, oxycodone 5 mg q6hrs PRN for moderate pain, and oxycodone 10 mg q6hrs PRN for severe pain  - s/p L2 vertebral body augmentation with biopsy on June 4  - TLSO brace   - plan for radiation to spine in AM   - PT: rehab

## 2021-06-10 ENCOUNTER — NON-APPOINTMENT (OUTPATIENT)
Age: 56
End: 2021-06-10

## 2021-06-10 LAB
ANION GAP SERPL CALC-SCNC: 16 MMOL/L — HIGH (ref 7–14)
BUN SERPL-MCNC: 15 MG/DL — SIGNIFICANT CHANGE UP (ref 7–23)
CALCIUM SERPL-MCNC: 10.1 MG/DL — SIGNIFICANT CHANGE UP (ref 8.4–10.5)
CHLORIDE SERPL-SCNC: 99 MMOL/L — SIGNIFICANT CHANGE UP (ref 98–107)
CO2 SERPL-SCNC: 20 MMOL/L — LOW (ref 22–31)
CREAT SERPL-MCNC: 0.58 MG/DL — SIGNIFICANT CHANGE UP (ref 0.5–1.3)
GLUCOSE SERPL-MCNC: 144 MG/DL — HIGH (ref 70–99)
HCT VFR BLD CALC: 37 % — SIGNIFICANT CHANGE UP (ref 34.5–45)
HGB BLD-MCNC: 11.7 G/DL — SIGNIFICANT CHANGE UP (ref 11.5–15.5)
MAGNESIUM SERPL-MCNC: 2 MG/DL — SIGNIFICANT CHANGE UP (ref 1.6–2.6)
MCHC RBC-ENTMCNC: 25.4 PG — LOW (ref 27–34)
MCHC RBC-ENTMCNC: 31.6 GM/DL — LOW (ref 32–36)
MCV RBC AUTO: 80.3 FL — SIGNIFICANT CHANGE UP (ref 80–100)
NRBC # BLD: 0 /100 WBCS — SIGNIFICANT CHANGE UP
NRBC # FLD: 0 K/UL — SIGNIFICANT CHANGE UP
PHOSPHATE SERPL-MCNC: 2 MG/DL — LOW (ref 2.5–4.5)
PLATELET # BLD AUTO: 552 K/UL — HIGH (ref 150–400)
POTASSIUM SERPL-MCNC: 4.5 MMOL/L — SIGNIFICANT CHANGE UP (ref 3.5–5.3)
POTASSIUM SERPL-SCNC: 4.5 MMOL/L — SIGNIFICANT CHANGE UP (ref 3.5–5.3)
RBC # BLD: 4.61 M/UL — SIGNIFICANT CHANGE UP (ref 3.8–5.2)
RBC # FLD: 15.8 % — HIGH (ref 10.3–14.5)
SARS-COV-2 RNA SPEC QL NAA+PROBE: SIGNIFICANT CHANGE UP
SODIUM SERPL-SCNC: 135 MMOL/L — SIGNIFICANT CHANGE UP (ref 135–145)
WBC # BLD: 7.12 K/UL — SIGNIFICANT CHANGE UP (ref 3.8–10.5)
WBC # FLD AUTO: 7.12 K/UL — SIGNIFICANT CHANGE UP (ref 3.8–10.5)

## 2021-06-10 PROCEDURE — 93010 ELECTROCARDIOGRAM REPORT: CPT

## 2021-06-10 PROCEDURE — 77431 RADIATION THERAPY MANAGEMENT: CPT

## 2021-06-10 PROCEDURE — 77280 THER RAD SIMULAJ FIELD SMPL: CPT | Mod: 26

## 2021-06-10 PROCEDURE — 99232 SBSQ HOSP IP/OBS MODERATE 35: CPT

## 2021-06-10 RX ORDER — POLYETHYLENE GLYCOL 3350 17 G/17G
17 POWDER, FOR SOLUTION ORAL AT BEDTIME
Refills: 0 | Status: DISCONTINUED | OUTPATIENT
Start: 2021-06-10 | End: 2021-06-11

## 2021-06-10 RX ADMIN — Medication 5 MILLIGRAM(S): at 00:23

## 2021-06-10 RX ADMIN — ONDANSETRON 4 MILLIGRAM(S): 8 TABLET, FILM COATED ORAL at 08:02

## 2021-06-10 RX ADMIN — Medication 1000 UNIT(S): at 15:28

## 2021-06-10 RX ADMIN — Medication 63.75 MILLIMOLE(S): at 12:00

## 2021-06-10 RX ADMIN — LETROZOLE 2.5 MILLIGRAM(S): 2.5 TABLET, FILM COATED ORAL at 15:28

## 2021-06-10 RX ADMIN — Medication 5 MILLIGRAM(S): at 05:19

## 2021-06-10 RX ADMIN — ENOXAPARIN SODIUM 40 MILLIGRAM(S): 100 INJECTION SUBCUTANEOUS at 15:28

## 2021-06-10 RX ADMIN — AMLODIPINE BESYLATE 5 MILLIGRAM(S): 2.5 TABLET ORAL at 05:19

## 2021-06-10 NOTE — PROGRESS NOTE ADULT - PROBLEM SELECTOR PLAN 1
MRI lumbar spine showing metastatic disease in the lower thoracic, lumbar, and upper sacral spine with severe pathologic fracture of L2 with retropulsion of the posterior endplate causing moderate canal stenosis.    s/p biopsy on 5/24- confirming cancer  - Pain control   - s/p L2 vertebral body augmentation with biopsy on June 4  - TLSO brace   - s/p single fraction RT to the L-spine 6/10  - s/p Decadron   - PT: rehab

## 2021-06-10 NOTE — PROGRESS NOTE ADULT - ASSESSMENT
56f with metastatic breast ca, Hormone receptor positive, not started on treatment as of yet, referred to the ED from [facility] with hypercalcemia, hypokalemia and pain.      -Noted to fevers on admission, s/p antibiotics,  infectious workup with NGTD,    -S/p CXR 5/25 : innumerable bilateral pulmonary nodules again seen.  New ill-defined medial right upper lung opacities, nonspecific findings, which may be due to subsegmental atelectasis or pneumonia. New pathologic right second rib fracture.- S/p  Zosyn  -Patient with significant bony  mets which may explain hypercalcemia s/p IVF, Calcitonin and S/p Zometa 4mg x 1  Now with improved levels, will continue to monitor  -Noted to have transaminitis, no evidence of liver mets on most recent PET scan, appreciate Hepatology consult. -S/p MRI of abdomen Possible hemangiomas in the right hepatic dome and in the left hepatic lobe. Multiple hepatic cysts. No evidence of steatosis. Normal biliary tree. A cystic lesion in the right kidney which demonstrates minimal internal enhancement. Extensive osseous and pulmonary metastatic disease.  -LFTS have been improving  -Appreciate IR consult for biopsy of bone lesion of posterior iliac bone, consistent with ER+GA+HER+ met disease  -S/p MRI lumber spine : Diffuse heterogeneously enhancing bony metastases involving the visualized vertebral bodies as well as the posterior elements. Marked compression deformity L2 with bony retropulsion and moderate thecal sac compression. No cord compression.   -Appreciate Rad Onc consult, s/p CT SIM for radiation on 5/27 and single fraction of RT today 6/10  Patient is also s/p kyphoplasty on 6/4. Has followup appointment with Dr. Gordon on 6/23   -Patient to followup with Dr. Rekha Dowd ([facility]) upon discharge.   -C/w Supportive care, pain control, Nutrition, PT, DVT ppx  -Oncology will continue to follow with you      Case d/w Dr. Abril PAL  Oncology Physician Assistant  Bridgette SANTOS/[facility]  Pager [phone]    If after 5pm or weekends please page On-call Oncology Fellow   56f with metastatic breast ca, Hormone receptor positive, not started on treatment as of yet, referred to the ED from [facility] with hypercalcemia, hypokalemia and pain.      -Noted to fevers on admission, s/p antibiotics,  infectious workup with NGTD,    -S/p CXR 5/25 : innumerable bilateral pulmonary nodules again seen.  New ill-defined medial right upper lung opacities, nonspecific findings, which may be due to subsegmental atelectasis or pneumonia. New pathologic right second rib fracture.- S/p  Zosyn  -Patient with significant bony  mets which may explain hypercalcemia s/p IVF, Calcitonin and S/p Zometa 4mg x 1  Now with improved levels, will continue to monitor  -Noted to have transaminitis, no evidence of liver mets on most recent PET scan, appreciate Hepatology consult. -S/p MRI of abdomen Possible hemangiomas in the right hepatic dome and in the left hepatic lobe. Multiple hepatic cysts. No evidence of steatosis. Normal biliary tree. A cystic lesion in the right kidney which demonstrates minimal internal enhancement. Extensive osseous and pulmonary metastatic disease.  -LFTS have been improving  -Appreciate IR consult for biopsy of bone lesion of posterior iliac bone, consistent with ER+TX+HER+ met disease  -S/p MRI lumber spine : Diffuse heterogeneously enhancing bony metastases involving the visualized vertebral bodies as well as the posterior elements. Marked compression deformity L2 with bony retropulsion and moderate thecal sac compression. No cord compression.   -Appreciate Rad Onc consult, s/p CT SIM for radiation on 5/27 and single fraction of RT today 6/10  Patient is also s/p kyphoplasty on 6/4. Has followup appointment with Dr. Gordon on 6/23   Dispo: pending transfer to [facility] [place]  -Patient to followup with Dr. Rekha Dowd ([facility]) upon discharge.   -C/w Supportive care, pain control, Nutrition, PT, DVT ppx  -Oncology will continue to follow with you      Case d/w Dr. Abril PAL  Oncology Physician Assistant  Bridgette SANTOS/[facility]  Pager [phone]    If after 5pm or weekends please page On-call Oncology Fellow

## 2021-06-10 NOTE — CHART NOTE - NSCHARTNOTESELECT_GEN_ALL_CORE
Event Note
Follow-up/Nutrition Services
Interventional Radiology
rad onc/Event Note

## 2021-06-10 NOTE — CHART NOTE - NSCHARTNOTEFT_GEN_A_CORE
ACP MEDICINE COVERAGE - Medicine Subsequent Hospital Care Note    CC:  HPI/Subjective:    ROS:  Denies fever, chills, diaphoresis, malaise, night sweats, generalized weakness, headache, changes in vision, dizziness, cough, sputum production, wheezing, hemoptysis, chest pain, palpitations, shortness of breath, dyspnea on exertion, PND, dysphagia, new abdominal pain, nausea, vomiting, diarrhea, constipation, melena, hematochezia, dysuria, increased urinary frequency/urgency, hematuria, nocturia, numbness/weakness/tingling, any other complaints.    [  ] I spoke with the attending, nurse, and family to obtain the history.  [  ] Unable to obtain history due to ___________.    Vital Signs Last 24 Hrs  T(C): 36.9 (06-10-21 @ 11:00), Max: 37 (21 @ 16:49)  T(F): 98.5 (06-10-21 @ 11:00), Max: 98.6 (21 @ 16:49)  HR: 128 (06-10-21 @ 11:00) (103 - 128)  BP: 126/76 (06-10-21 @ 11:00) (118/75 - 128/76)  RR: 18 (06-10-21 @ 11:00) (16 - 18)  SpO2: 95% (06-10-21 @ 11:00) (95% - 100%) on (O2)    PHYSICAL EXAM:  Constitutional: NAD, well-developed, well-nourished  Ears, nose, Mouth, and Throat: normal external ears and nose, normal hearing, moist oral mucosa  Eyes: normal conjunctiva, EOMI, PEERL  Neck: supple, no JVD  Respiratory: Clear to auscultation bilaterally. No wheezes, rales or rhonchi. Normal respiratory effort  Cardiovascular: regular rate and rhythm, S1 and S2, no murmurs, rubs or gallops, no edema, 2+ peripheral pulses  Gastrointestinal: soft, nontender, nondistended, +bowel sounds, no hernia  Skin: warm, dry, no rash  Neurologic: sensation grossly intact, CN grossly intact, non-focal exam  Musculoskeletal: no clubbing, no cyanosis, no joint swelling  Psychiatric: A&Ox3, appropriate mood, affect    LABS:                        11.7   7.12  )-----------( 552      ( 10 Khari 2021 07:50 )             37.0     06-10    135  |  99  |  15  ----------------------------<  144<H>  4.5   |  20<L>  |  0.58    Ca    10.1      10 Khari 2021 07:50  Phos  2.0     06-10  Mg     2.0     10      PT/INR: PT: 12.4 sec | INR: 1.08 ratio (21 @ 05:20)  PTT: x (21 @ 05:20)  PT/INR: PT: 12.7 sec | INR: 1.11 ratio (21 @ 05:59)  PTT: 28.2 sec (21 @ 05:59)  PT/INR: PT: 13.5 sec | INR: 1.18 ratio (21 @ 05:02)  PTT: 26.4 sec (21 @ 05:02)  PT/INR: PT: 12.8 sec | INR: 1.12 ratio (21 @ 07:42)  PTT: 24.8 sec (21 @ 07:42)    CARDIAC ENZYMES            Serum Pro-Brain Natriuretic Peptide:   D-Dimer Assay:     Urinanalysis Basic (06-10-21 @ 13:16):  Color: Light Yellow / Appearance: Clear / S.010 / pH: --  Gluc: -- / Ketone: Small / Bili: Negative / Urobili: <2 mg/dL  Blood: -- / Protein: Trace / Nitrite: Negative  Leuk Esterase: Negative / RBC: -- / WBC: --  Sq Epi: -- / Non Sq Epi: -- / Bacteria: --      Blood Gas Venous (06-10-21 @ 13:16):  pH: 7.47 | HCO3: 32 | pCO2: 48 | pO2: 31 | Lactate: 2.4      Blood Gas Arterial (06-10-21 @ 13:16):      CAPILLARY BLOOD GLUCOSE:      COVID PCR:  NotDetec (21 @ 21:33)  NotDetec (21 @ 14:26)      RADIOLOGY:    MEDICATIONS  (STANDING):  acetaminophen  IVPB .. 1000 milliGRAM(s) IV Intermittent once  amLODIPine   Tablet 5 milliGRAM(s) Oral daily  cholecalciferol 1000 Unit(s) Oral daily  enoxaparin Injectable 40 milliGRAM(s) SubCutaneous daily  letrozole 2.5 milliGRAM(s) Oral daily  polyethylene glycol 3350 17 Gram(s) Oral daily  senna 2 Tablet(s) Oral at bedtime  sodium phosphate IVPB 15 milliMole(s) IV Intermittent once    MEDICATIONS  (PRN):  bisacodyl Suppository 10 milliGRAM(s) Rectal daily PRN Constipation  ondansetron Injectable 4 milliGRAM(s) IV Push every 6 hours PRN Nausea and/or Vomiting  simethicone 80 milliGRAM(s) Chew every 6 hours PRN Gas    I&O's Summary    2021 07:01  -  10 Khari 2021 07:00  --------------------------------------------------------  IN: 3385 mL / OUT: 1225 mL / NET: 2160 mL      I reviewed the above labs, radiology, medications, tests, telemetry, and EKG interpretation.    ASSESSMENT/PLAN:    - Clinical findings, labs, tests, telemetry, and ekg reviewed with attending. Will monitor patient closely.       Low complexity/risk (30min)  called by Rn pt with tachycardia as per medical attending Dr. Valenzuela most likely from steroids given overnight  and malignancy  pt is not in distress   check ekg ----------------  cont telemetry   no acute interventions   pt asymptomatic   review labs co2 20 and anion gap 16 will continue to monitor pt and repeat labs in am   D/w Bianca in agreement with plan

## 2021-06-10 NOTE — PROGRESS NOTE ADULT - SUBJECTIVE AND OBJECTIVE BOX
PROGRESS NOTE:     Patient is a 56y old  Female who presents with a chief complaint of Hypercalcemia, hypokalemia (10 Khari 2021 14:31)      SUBJECTIVE / OVERNIGHT EVENTS: No new complaints. Back pain improved.     ADDITIONAL REVIEW OF SYSTEMS:    MEDICATIONS  (STANDING):  acetaminophen  IVPB .. 1000 milliGRAM(s) IV Intermittent once  amLODIPine   Tablet 5 milliGRAM(s) Oral daily  cholecalciferol 1000 Unit(s) Oral daily  enoxaparin Injectable 40 milliGRAM(s) SubCutaneous daily  letrozole 2.5 milliGRAM(s) Oral daily  polyethylene glycol 3350 17 Gram(s) Oral daily  senna 2 Tablet(s) Oral at bedtime    MEDICATIONS  (PRN):  bisacodyl Suppository 10 milliGRAM(s) Rectal daily PRN Constipation  ondansetron Injectable 4 milliGRAM(s) IV Push every 6 hours PRN Nausea and/or Vomiting  simethicone 80 milliGRAM(s) Chew every 6 hours PRN Gas      CAPILLARY BLOOD GLUCOSE        I&O's Summary    09 Jun 2021 07:01  -  10 Khari 2021 07:00  --------------------------------------------------------  IN: 3385 mL / OUT: 1225 mL / NET: 2160 mL        PHYSICAL EXAM:  Vital Signs Last 24 Hrs  T(C): 36.9 (10 Khari 2021 11:00), Max: 37 (09 Jun 2021 16:49)  T(F): 98.5 (10 Khari 2021 11:00), Max: 98.6 (09 Jun 2021 16:49)  HR: 128 (10 Khari 2021 11:00) (103 - 128)  BP: 126/76 (10 Khari 2021 11:00) (118/75 - 128/76)  BP(mean): --  RR: 18 (10 Khari 2021 11:00) (16 - 18)  SpO2: 95% (10 Khari 2021 11:00) (95% - 100%)    GENERAL: NAD, well-developed  HEAD:  Atraumatic, Normocephalic, MMM  CHEST/LUNG: No use of accessory muscles, CTAB, breathing non-labored  COR: RR, no mrcg  ABD: Soft, ND/NT, +BS  PSYCH: AAOx3  NEUROLOGY: CN II-XII grossly intact, moving all extremities  SKIN: No rashes or lesions  EXT: No clubbing, cyanosis, or edema     LABS:                        11.7   7.12  )-----------( 552      ( 10 Khari 2021 07:50 )             37.0     06-10    135  |  99  |  15  ----------------------------<  144<H>  4.5   |  20<L>  |  0.58    Ca    10.1      10 Khari 2021 07:50  Phos  2.0     06-10  Mg     2.0     06-10                  RADIOLOGY & ADDITIONAL TESTS:  Results Reviewed:   Imaging Personally Reviewed:  Electrocardiogram Personally Reviewed:    COORDINATION OF CARE:  Care Discussed with Consultants/Other Providers [Y/N]:  Prior or Outpatient Records Reviewed [Y/N]:

## 2021-06-10 NOTE — PROGRESS NOTE ADULT - SUPERVISING ATTENDING
Case reviewed and discussed with DEMARCO Antonio. Plan as above.
Case reviewed and discussed with DEMARCO Antonio. Plan as above.
adriana wilks

## 2021-06-10 NOTE — PROGRESS NOTE ADULT - SUBJECTIVE AND OBJECTIVE BOX
INTERVAL HPI/OVERNIGHT EVENTS:  Patient seen at bedside.  Patient with improved symptoms of pain  S/p single fraction of RT this AM  Anticipating  discharge to [facility] soon      VITAL SIGNS:  T(F): 98.5 (06-10-21 @ 11:00)  HR: 128 (06-10-21 @ 11:00)  BP: 126/76 (06-10-21 @ 11:00)  RR: 18 (06-10-21 @ 11:00)  SpO2: 95% (06-10-21 @ 11:00)  Wt(kg): --    PHYSICAL EXAM:    In accordance with current standard limiting patient contact, deferred physical exam  2/2 COVID pandemic  Please refer to physical exam of primary team.    MEDICATIONS  (STANDING):  acetaminophen  IVPB .. 1000 milliGRAM(s) IV Intermittent once  amLODIPine   Tablet 5 milliGRAM(s) Oral daily  cholecalciferol 1000 Unit(s) Oral daily  enoxaparin Injectable 40 milliGRAM(s) SubCutaneous daily  letrozole 2.5 milliGRAM(s) Oral daily  polyethylene glycol 3350 17 Gram(s) Oral daily  senna 2 Tablet(s) Oral at bedtime    MEDICATIONS  (PRN):  bisacodyl Suppository 10 milliGRAM(s) Rectal daily PRN Constipation  ondansetron Injectable 4 milliGRAM(s) IV Push every 6 hours PRN Nausea and/or Vomiting  simethicone 80 milliGRAM(s) Chew every 6 hours PRN Gas      Allergies    No Known Allergies    Intolerances        LABS:                        11.7   7.12  )-----------( 552      ( 10 Khari 2021 07:50 )             37.0     06-10    135  |  99  |  15  ----------------------------<  144<H>  4.5   |  20<L>  |  0.58    Ca    10.1      10 Khari 2021 07:50  Phos  2.0     06-10  Mg     2.0     06-10            RADIOLOGY & ADDITIONAL TESTS:  Studies reviewed.

## 2021-06-10 NOTE — CHART NOTE - NSCHARTNOTEFT_GEN_A_CORE
Source: Patient [X]      other [X] Medical Chart   Diet rx: Regular: No Pork (05-19-21 @ 22:20) [Active]    Pt 55 yo female with recent diagnosis of L breast cancer with metastatic disease to spine with severe pathologic fracture of L2; s/p biopsy on 5/24 - confirming cancer, per chart review.     At time of visit, Pt appears alert, oriented. Per Pt, her appetite fair. Pt ate ~50% of breakfast this morning, per tray waste observation. Food preferences discussed. No report of chewing or swallowing difficulty; no report of nausea, vomiting or diarrhea @ this time. +BM (6/9) - per flow sheet. No other food related concerns voiced @ present. RDN remains available, Pt made aware.     Pt's height: 66" (per Pt)          IBW: 130#+/-10%            Pt's weight: 119# (per Pt)       Pertinent Medications: Lovenox, Mylicon (PRN), Vit D3, Zofran (PRN), Dulcolax (PRN), Miralax, Senna,   Pertinent Labs:  06-10 Na135 mmol/L Glu 144 mg/dL<H> K+ 4.5 mmol/L Cr  0.58 mg/dL BUN 15 mg/dL 06-10 Phos 2.0 mg/dL<L>  Skin: +IAD/MAD, +surgical incision, +dryness/ecchymosis - per flow sheet     Estimated Needs: [X] no change since previous assessment  Previous Nutrition Diagnosis: [X] Malnutrition, moderate     Nutrition Interventions/Recommendations:   1. Continue PO diet as ordered; Monitor PO diet tolerance; Honor food preferences;  2. Will send Magic Cup daily (290kcal, 9gm Protein) - 2x daily as discussed with Pt;   3. Monitor labs, weekly weights, hydration status;

## 2021-06-11 ENCOUNTER — TRANSCRIPTION ENCOUNTER (OUTPATIENT)
Age: 56
End: 2021-06-11

## 2021-06-11 VITALS
OXYGEN SATURATION: 100 % | SYSTOLIC BLOOD PRESSURE: 123 MMHG | DIASTOLIC BLOOD PRESSURE: 72 MMHG | HEART RATE: 98 BPM | RESPIRATION RATE: 16 BRPM | TEMPERATURE: 98 F

## 2021-06-11 LAB
ANION GAP SERPL CALC-SCNC: 14 MMOL/L — SIGNIFICANT CHANGE UP (ref 7–14)
BUN SERPL-MCNC: 15 MG/DL — SIGNIFICANT CHANGE UP (ref 7–23)
CALCIUM SERPL-MCNC: 9.6 MG/DL — SIGNIFICANT CHANGE UP (ref 8.4–10.5)
CHLORIDE SERPL-SCNC: 98 MMOL/L — SIGNIFICANT CHANGE UP (ref 98–107)
CO2 SERPL-SCNC: 20 MMOL/L — LOW (ref 22–31)
CREAT SERPL-MCNC: 0.58 MG/DL — SIGNIFICANT CHANGE UP (ref 0.5–1.3)
GLUCOSE SERPL-MCNC: 108 MG/DL — HIGH (ref 70–99)
HCT VFR BLD CALC: 32 % — LOW (ref 34.5–45)
HCT VFR BLD CALC: 33.7 % — LOW (ref 34.5–45)
HGB BLD-MCNC: 10.1 G/DL — LOW (ref 11.5–15.5)
HGB BLD-MCNC: 10.2 G/DL — LOW (ref 11.5–15.5)
MAGNESIUM SERPL-MCNC: 1.9 MG/DL — SIGNIFICANT CHANGE UP (ref 1.6–2.6)
MCHC RBC-ENTMCNC: 25.2 PG — LOW (ref 27–34)
MCHC RBC-ENTMCNC: 25.6 PG — LOW (ref 27–34)
MCHC RBC-ENTMCNC: 30 GM/DL — LOW (ref 32–36)
MCHC RBC-ENTMCNC: 31.9 GM/DL — LOW (ref 32–36)
MCV RBC AUTO: 80.4 FL — SIGNIFICANT CHANGE UP (ref 80–100)
MCV RBC AUTO: 84 FL — SIGNIFICANT CHANGE UP (ref 80–100)
NRBC # BLD: 0 /100 WBCS — SIGNIFICANT CHANGE UP
NRBC # BLD: 0 /100 WBCS — SIGNIFICANT CHANGE UP
NRBC # FLD: 0 K/UL — SIGNIFICANT CHANGE UP
NRBC # FLD: 0 K/UL — SIGNIFICANT CHANGE UP
PHOSPHATE SERPL-MCNC: 2.8 MG/DL — SIGNIFICANT CHANGE UP (ref 2.5–4.5)
PLATELET # BLD AUTO: 482 K/UL — HIGH (ref 150–400)
PLATELET # BLD AUTO: SIGNIFICANT CHANGE UP K/UL (ref 150–400)
POTASSIUM SERPL-MCNC: 4.2 MMOL/L — SIGNIFICANT CHANGE UP (ref 3.5–5.3)
POTASSIUM SERPL-SCNC: 4.2 MMOL/L — SIGNIFICANT CHANGE UP (ref 3.5–5.3)
RBC # BLD: 3.98 M/UL — SIGNIFICANT CHANGE UP (ref 3.8–5.2)
RBC # BLD: 4.01 M/UL — SIGNIFICANT CHANGE UP (ref 3.8–5.2)
RBC # FLD: 15.9 % — HIGH (ref 10.3–14.5)
RBC # FLD: 16 % — HIGH (ref 10.3–14.5)
SODIUM SERPL-SCNC: 132 MMOL/L — LOW (ref 135–145)
WBC # BLD: 11.67 K/UL — HIGH (ref 3.8–10.5)
WBC # BLD: 12.46 K/UL — HIGH (ref 3.8–10.5)
WBC # FLD AUTO: 11.67 K/UL — HIGH (ref 3.8–10.5)
WBC # FLD AUTO: 12.46 K/UL — HIGH (ref 3.8–10.5)

## 2021-06-11 PROCEDURE — 99239 HOSP IP/OBS DSCHRG MGMT >30: CPT

## 2021-06-11 RX ORDER — AMLODIPINE BESYLATE 2.5 MG/1
1 TABLET ORAL
Qty: 0 | Refills: 0 | DISCHARGE
Start: 2021-06-11

## 2021-06-11 RX ORDER — CHOLECALCIFEROL (VITAMIN D3) 125 MCG
1000 CAPSULE ORAL
Qty: 0 | Refills: 0 | DISCHARGE
Start: 2021-06-11

## 2021-06-11 RX ORDER — POLYETHYLENE GLYCOL 3350 17 G/17G
17 POWDER, FOR SOLUTION ORAL
Qty: 0 | Refills: 0 | DISCHARGE
Start: 2021-06-11

## 2021-06-11 RX ORDER — SENNA PLUS 8.6 MG/1
2 TABLET ORAL
Qty: 0 | Refills: 0 | DISCHARGE
Start: 2021-06-11

## 2021-06-11 RX ORDER — ENOXAPARIN SODIUM 100 MG/ML
40 INJECTION SUBCUTANEOUS
Qty: 0 | Refills: 0 | DISCHARGE
Start: 2021-06-11

## 2021-06-11 RX ORDER — SIMETHICONE 80 MG/1
1 TABLET, CHEWABLE ORAL
Qty: 0 | Refills: 0 | DISCHARGE
Start: 2021-06-11

## 2021-06-11 RX ADMIN — POLYETHYLENE GLYCOL 3350 17 GRAM(S): 17 POWDER, FOR SOLUTION ORAL at 03:07

## 2021-06-11 RX ADMIN — AMLODIPINE BESYLATE 5 MILLIGRAM(S): 2.5 TABLET ORAL at 05:33

## 2021-06-11 RX ADMIN — Medication 1000 UNIT(S): at 12:12

## 2021-06-11 RX ADMIN — POLYETHYLENE GLYCOL 3350 17 GRAM(S): 17 POWDER, FOR SOLUTION ORAL at 21:14

## 2021-06-11 RX ADMIN — LETROZOLE 2.5 MILLIGRAM(S): 2.5 TABLET, FILM COATED ORAL at 12:13

## 2021-06-11 RX ADMIN — ENOXAPARIN SODIUM 40 MILLIGRAM(S): 100 INJECTION SUBCUTANEOUS at 12:12

## 2021-06-11 RX ADMIN — Medication 10 MILLIGRAM(S): at 12:12

## 2021-06-11 NOTE — PROGRESS NOTE ADULT - PROBLEM SELECTOR PLAN 6
Pt tachycardic to 110s. Per outpatient records, pt with documented heart rates in 90s. EKG showing sinus tachycardia  - TSH normal  - TTE -Uninterpretable study.  - Low suspicion for PE at this time given that pt is not tachypneic and is maintaining O2 sats in high 90s-100% on RA  - Likely deconditioning in patient with advanced malignancy  - Monitor on tele
AST 90 and ALT 82 on admission. T bili wnl at 0.3, alk phos mildly elevated to 196 from mets to spine.  - Monitor LFTs - downtrending. If transaminitis worsens, will obtain viral hepatitis panel, abdominal imaging, and/or Hepatology consult.
Pt tachycardic to 110s overnight. Per outpatient records, pt with documented heart rates in 90s. EKG showing sinus tachycardia  - F/u TTE  - Low suspicion for PE at this time given that pt is not tachypneic and is maintaining O2 sats in high 90s-100% on RA  - Monitor on tele
Pt tachycardic to 110s. Per outpatient records, pt with documented heart rates in 90s. EKG showing sinus tachycardia  - TSH normal  - TTE -Uninterpretable study.  - Low suspicion for PE at this time given that pt is not tachypneic and is maintaining O2 sats in high 90s-100% on RA  - Likely deconditioning in patient with advanced malignancy  - Monitor on tele
- DVT ppx: Will hold pharmacologic ppx for now given possible bone biopsy by IR. Otherwise, start Lovenox 40 mg daily.
- DVT ppx: Will hold pharmacologic ppx for now given possible bone biopsy by IR. Otherwise, start Lovenox 40 mg daily.
Pt tachycardic to 110s. Per outpatient records, pt with documented heart rates in 90s. EKG showing sinus tachycardia  - TSH normal  - TTE -Uninterpretable study.  - Low suspicion for PE at this time given that pt is not tachypneic and is maintaining O2 sats in high 90s-100% on RA  - Likely deconditioning in patient with advanced malignancy  - Monitor on tele
Pt tachycardic to 110s. Per outpatient records, pt with documented heart rates in 90s. EKG showing sinus tachycardia  - TSH normal  - TTE -Uninterpretable study.  - Low suspicion for PE at this time given that pt is not tachypneic and is maintaining O2 sats in high 90s-100% on RA  - Likely deconditioning in patient with advanced malignancy  - Monitor on tele
Pt tachycardic to 110s overnight. Per outpatient records, pt with documented heart rates in 90s. EKG showing sinus tachycardia  - F/u TTE  - Low suspicion for PE at this time given that pt is not tachypneic and is maintaining O2 sats in high 90s-100% on RA  - Monitor on tele
Pt tachycardic to 110s. Per outpatient records, pt with documented heart rates in 90s. EKG showing sinus tachycardia  - TSH normal  - TTE ordered  - Low suspicion for PE at this time given that pt is not tachypneic and is maintaining O2 sats in high 90s-100% on RA  - Monitor on tele
Pt tachycardic to 110s. Per outpatient records, pt with documented heart rates in 90s. EKG showing sinus tachycardia  - TSH normal  - TTE ordered  - Low suspicion for PE at this time given that pt is not tachypneic and is maintaining O2 sats in high 90s-100% on RA  - Monitor on tele
Pt tachycardic to 110s. Per outpatient records, pt with documented heart rates in 90s. EKG showing sinus tachycardia  - TSH normal  - TTE -Uninterpretable study.  - Low suspicion for PE at this time given that pt is not tachypneic and is maintaining O2 sats in high 90s-100% on RA  - Likely deconditioning in patient with advanced malignancy  - Monitor on tele
Pt tachycardic to 110s. Per outpatient records, pt with documented heart rates in 90s. EKG showing sinus tachycardia  - TSH normal  - TTE -Uninterpretable study.  - Low suspicion for PE at this time given that pt is not tachypneic and is maintaining O2 sats in high 90s-100% on RA  - Monitor on tele
Pt tachycardic to 110s. Per outpatient records, pt with documented heart rates in 90s. EKG showing sinus tachycardia  - TSH normal  - TTE ordered  - Low suspicion for PE at this time given that pt is not tachypneic and is maintaining O2 sats in high 90s-100% on RA  - Monitor on tele
Pt tachycardic to 110s. Per outpatient records, pt with documented heart rates in 90s. EKG showing sinus tachycardia  - TSH normal  - TTE -Uninterpretable study.  - Low suspicion for PE at this time given that pt is not tachypneic and is maintaining O2 sats in high 90s-100% on RA  - Monitor on tele
Pt tachycardic to 110s overnight. Per outpatient records, pt with documented heart rates in 90s. EKG showing sinus tachycardia  - F/u TTE  - Low suspicion for PE at this time given that pt is not tachypneic and is maintaining O2 sats in high 90s-100% on RA  - Monitor on tele
Pt tachycardic to 110s. Per outpatient records, pt with documented heart rates in 90s. EKG showing sinus tachycardia  - TSH normal  - TTE -Uninterpretable study.  - Low suspicion for PE at this time given that pt is not tachypneic and is maintaining O2 sats in high 90s-100% on RA  - Likely deconditioning in patient with advanced malignancy  - Monitor on tele
Pt tachycardic to 110s. Per outpatient records, pt with documented heart rates in 90s. EKG showing sinus tachycardia  - TSH normal  - TTE ordered  - Low suspicion for PE at this time given that pt is not tachypneic and is maintaining O2 sats in high 90s-100% on RA  - Monitor on tele
Pt tachycardic to 110s. Per outpatient records, pt with documented heart rates in 90s. EKG showing sinus tachycardia  - TSH normal  - TTE -Uninterpretable study.  - Low suspicion for PE at this time given that pt is not tachypneic and is maintaining O2 sats in high 90s-100% on RA  - Likely deconditioning in patient with advanced malignancy  - Monitor on tele

## 2021-06-11 NOTE — PROGRESS NOTE ADULT - PROBLEM SELECTOR PROBLEM 1
Compression fracture of L2 vertebra, initial encounter
Sepsis, due to unspecified organism, unspecified whether acute organ dysfunction present
Compression fracture of L2 vertebra, initial encounter
Sepsis, due to unspecified organism, unspecified whether acute organ dysfunction present
Compression fracture of L2 vertebra, initial encounter
Sepsis, due to unspecified organism, unspecified whether acute organ dysfunction present
Compression fracture of L2 vertebra, initial encounter
Compression fracture of L2 vertebra, initial encounter
Sepsis, due to unspecified organism, unspecified whether acute organ dysfunction present
Compression fracture of L2 vertebra, initial encounter

## 2021-06-11 NOTE — PROGRESS NOTE ADULT - PROBLEM SELECTOR PLAN 5
LFTs elevated last week  D/W oncology, they are recommending hepatology consult  D/W hepatology, for now they recommend ordering MRI abdomen/pelvis w/wo contrast, which shows hemangiomas, hepatic cysts, osseous and pulmonary metastatic disease.
MRI lumbar spine showing metastatic disease in the lower thoracic, lumbar, and upper sacral spine with severe pathologic fracture of L2 with retropulsion of the posterior endplate causing moderate canal stenosis. No alarm S/S.  - Pain control with Tylenol PRN for mild pain, oxycodone 5 mg q6hrs PRN for moderate pain, and oxycodone 10 mg q6hrs PRN for severe pain  - Rad-Onc consult for possible palliative RT to spine  - Pt will need to be fitted for TLSO brace   - Pt seen by neurosurgery as outpatient for severe pathologic fracture of L2 and deemed not a surgical candidate - will discuss with IR for possible kyphoplasty
AST 90 and ALT 82 on admission. T bili wnl at 0.3, alk phos mildly elevated to 196 from mets to spine.  - Monitor LFTs - downtrending. If transaminitis worsens, will obtain viral hepatitis panel, abdominal imaging, and/or Hepatology consult.
Pt tachycardic to 110s overnight. Per outpatient records, pt with documented heart rates in 90s. EKG showing sinus tachycardia  - F/u TTE  - Low suspicion for PE at this time given that pt is not tachypneic and is maintaining O2 sats in high 90s-100% on RA  - Monitor on tele
AST 90 and ALT 82 on admission. T bili wnl at 0.3, alk phos mildly elevated to 196 from mets to spine.  - Monitor LFTs - downtrending. If transaminitis worsens, will obtain viral hepatitis panel, abdominal imaging, and/or Hepatology consult.
LFTs elevated last week  D/W oncology, they are recommending hepatology consult  D/W hepatology, for now they recommended obtaining MRI abdomen/pelvis w/wo contrast, which shows hemangiomas, hepatic cysts, osseous and pulmonary metastatic disease.
Patient continues to have increased LFTs  D/W oncology, they are recommending hepatology consult  D/W hepatology, for now they recommend ordering MRI abdomen/pelvis w/wo contrast, will follow up final recs
Pt tachycardic to 110s. Per outpatient records, pt with documented heart rates in 90s. EKG showing sinus tachycardia  - TSH normal  - TTE ordered  - Low suspicion for PE at this time given that pt is not tachypneic and is maintaining O2 sats in high 90s-100% on RA  - Monitor on tele
AST 90 and ALT 82 on admission. T bili wnl at 0.3, alk phos mildly elevated to 196 from mets to spine.  - Monitor LFTs - If worsens this week, will get liver u/s
LFTs elevated last week  D/W oncology, they are recommending hepatology consult  D/W hepatology, for now they recommended obtaining MRI abdomen/pelvis w/wo contrast, which shows hemangiomas, hepatic cysts, osseous and pulmonary metastatic disease.
LFTs elevated last week  D/W oncology, they are recommending hepatology consult  D/W hepatology, for now they recommended obtaining MRI abdomen/pelvis w/wo contrast, which shows hemangiomas, hepatic cysts, osseous and pulmonary metastatic disease.
LFTs elevated last week  D/W oncology, they are recommending hepatology consult  D/W hepatology, for now they recommend ordering MRI abdomen/pelvis w/wo contrast, which shows hemangiomas, hepatic cysts, osseous and pulmonary metastatic disease.
LFTs elevated last week  D/W oncology, they are recommending hepatology consult  D/W hepatology, for now they recommended obtaining MRI abdomen/pelvis w/wo contrast, which shows hemangiomas, hepatic cysts, osseous and pulmonary metastatic disease.
Patient continues to have increased LFTs  D/W oncology, they are recommending hepatology consult  D/W hepatology, for now they recommend ordering MRI abdomen/pelvis w/wo contrast, will follow up final recs
Patient continues to have increased LFTs  D/W oncology, they are recommending hepatology consult  D/W hepatology, for now they recommend ordering MRI abdomen/pelvis w/wo contrast, will follow up final recs
Patient continues to have increased LFTs  D/W oncology, they are recommending hepatology consult  D/W hepatology, for now they recommend ordering MRI abdomen/pelvis w/wo contrast, findings discussed above, patient with cysts in liver, will follow up final recs.
AST 90 and ALT 82 on admission. T bili wnl at 0.3, alk phos mildly elevated to 196 from mets to spine.  - Monitor LFTs - downtrending. If transaminitis worsens, will obtain viral hepatitis panel, abdominal imaging, and/or Hepatology consult.
LFTs elevated last week  D/W oncology, they are recommending hepatology consult  D/W hepatology, for now they recommend ordering MRI abdomen/pelvis w/wo contrast, which shows hemangiomas, hepatic cysts, osseous and pulmonary metastatic disease.
LFTs elevated last week  D/W oncology, they are recommending hepatology consult  D/W hepatology, for now they recommended obtaining MRI abdomen/pelvis w/wo contrast, which shows hemangiomas, hepatic cysts, osseous and pulmonary metastatic disease.

## 2021-06-11 NOTE — DISCHARGE NOTE NURSING/CASE MANAGEMENT/SOCIAL WORK - PATIENT PORTAL LINK FT
You can access the FollowMyHealth Patient Portal offered by [facility] by registering at the following website: [email or web address]. By joining [facility]’s FollowMyHealth portal, you will also be able to view your health information using other applications (apps) compatible with our system.

## 2021-06-11 NOTE — PROGRESS NOTE ADULT - PROBLEM SELECTOR PLAN 2
Serum Ca 15 on admission, 13.5 on outpatient labs. Pt symptomatic with fatigue and constipation but with no altered sensorium. Likely hypercalcemia of malignancy 2/2 metastatic breast cancer.  - Oncology consult obtained. Appreciate recs.  - S/p Zometa 4 mg x1, calcitonin 220 IU q12hrs x2 doses as per Oncology recs  Now resolved
Serum Ca 15 on admission, 13.5 on outpatient labs. Pt symptomatic with fatigue and constipation but with no altered sensorium. Likely hypercalcemia of malignancy 2/2 metastatic breast cancer.  - Oncology consult obtained. Appreciate recs.  - cont with IVF with NS  - S/p Zometa 4 mg x1, calcitonin 220 IU q12hrs x2 doses as per Oncology recs  - PTH, PTHrP, and 25-OH vitamin D levels  - Monitor serum Ca and ionized Ca  - EKG without any acute changes. Monitor on tele.
Serum Ca 15 on admission, 13.5 on outpatient labs. Pt symptomatic with fatigue and constipation but with no altered sensorium. Likely hypercalcemia of malignancy 2/2 metastatic breast cancer.  - Oncology consult obtained. Appreciate recs.  - S/p Zometa 4 mg x1, calcitonin 220 IU q12hrs x2 doses as per Oncology recs  Now resolved
Serum Ca 15 on admission, 13.5 on outpatient labs. Pt symptomatic with fatigue and constipation but with no altered sensorium. Likely hypercalcemia of malignancy 2/2 metastatic breast cancer.  - Oncology consult obtained. Appreciate recs.  - S/p Zometa 4 mg x1, calcitonin 220 IU q12hrs x2 doses as per Oncology recs  Now resolved
Serum Ca 15 on admission, 13.5 on outpatient labs. Pt symptomatic with fatigue and constipation but with no altered sensorium. Likely hypercalcemia of malignancy 2/2 metastatic breast cancer.  - Oncology consult obtained. Appreciate recs.  - S/p Zometa 4 mg x1, calcitonin 220 IU q12hrs x2 doses as per Oncology recs  Now resolved, slightly hypocalcemic for now
Serum Ca 15 on admission, 13.5 on outpatient labs. Pt symptomatic with fatigue and constipation but with no altered sensorium. Likely hypercalcemia of malignancy 2/2 metastatic breast cancer.  - Oncology consult obtained. Appreciate recs.  - S/p Zometa 4 mg x1, calcitonin 220 IU q12hrs x2 doses as per Oncology recs  - hypercalcemia improved
Serum Ca 15 on admission, 13.5 on outpatient labs. Pt symptomatic with fatigue and constipation but with no altered sensorium. Likely hypercalcemia of malignancy 2/2 metastatic breast cancer.  - Oncology consult obtained. Appreciate recs.  - S/p Zometa 4 mg x1, calcitonin 220 IU q12hrs x2 doses as per Oncology recs  Now resolved
Serum Ca 15 on admission, 13.5 on outpatient labs. Pt symptomatic with fatigue and constipation but with no altered sensorium. Likely hypercalcemia of malignancy 2/2 metastatic breast cancer.  - Oncology consult obtained. Appreciate recs.  - S/p Zometa 4 mg x1, calcitonin 220 IU q12hrs x2 doses as per Oncology recs  Resolved
Serum Ca 15 on admission, 13.5 on outpatient labs. Pt symptomatic with fatigue and constipation but with no altered sensorium. Likely hypercalcemia of malignancy 2/2 metastatic breast cancer.  - Oncology consult obtained. Appreciate recs.  - S/p Zometa 4 mg x1, calcitonin 220 IU q12hrs x2 doses as per Oncology recs  Now resolved
Serum Ca 15 on admission, 13.5 on outpatient labs. Pt symptomatic with fatigue and constipation but with no altered sensorium. Likely hypercalcemia of malignancy 2/2 metastatic breast cancer.  - Oncology consult obtained. Appreciate recs.  - S/p Zometa 4 mg x1, calcitonin 220 IU q12hrs x2 doses as per Oncology recs  Now resolved, slightly hypocalcemic for now
Serum Ca 15 on admission, 13.5 on outpatient labs. Pt symptomatic with fatigue and constipation but with no altered sensorium. Likely hypercalcemia of malignancy 2/2 metastatic breast cancer.  - Oncology consult obtained. Appreciate recs.  - S/p Zometa 4 mg x1, calcitonin 220 IU q12hrs x2 doses as per Oncology recs  - hypercalcemia improved
Serum Ca 15 on admission, 13.5 on outpatient labs. Pt symptomatic with fatigue and constipation but with no altered sensorium. Likely hypercalcemia of malignancy 2/2 metastatic breast cancer.  - Oncology consult obtained. Appreciate recs.  - S/p Zometa 4 mg x1, calcitonin 220 IU q12hrs x2 doses as per Oncology recs  - hypercalcemia improved
Serum Ca 15 on admission, 13.5 on outpatient labs. Pt symptomatic with fatigue and constipation but with no altered sensorium. Likely hypercalcemia of malignancy 2/2 metastatic breast cancer.  - Oncology consult obtained. Appreciate recs.  - S/p Zometa 4 mg x1, calcitonin 220 IU q12hrs x2 doses as per Oncology recs  Now resolved
Serum Ca 15 on admission, 13.5 on outpatient labs. Pt symptomatic with fatigue and constipation but with no altered sensorium. Likely hypercalcemia of malignancy 2/2 metastatic breast cancer.  - Oncology consult obtained. Appreciate recs.  - S/p Zometa 4 mg x1, calcitonin 220 IU q12hrs x2 doses as per Oncology recs  Now resolved
Serum Ca 15 on admission, 13.5 on outpatient labs. Pt symptomatic with fatigue and constipation but with no altered sensorium. Likely hypercalcemia of malignancy 2/2 metastatic breast cancer.  - Oncology consult obtained. Appreciate recs.  - cont with IVF with NS  - S/p Zometa 4 mg x1, calcitonin 220 IU q12hrs x2 doses as per Oncology recs  - PTH, PTHrP, and 25-OH vitamin D levels  - Monitor serum Ca and ionized Ca  - EKG without any acute changes. Monitor on tele.
Serum Ca 15 on admission, 13.5 on outpatient labs. Pt symptomatic with fatigue and constipation but with no altered sensorium. Likely hypercalcemia of malignancy 2/2 metastatic breast cancer.  - Oncology consult obtained. Appreciate recs.  - S/p Zometa 4 mg x1, calcitonin 220 IU q12hrs x2 doses as per Oncology recs  Now resolved
Serum Ca 15 on admission, 13.5 on outpatient labs. Pt symptomatic with fatigue and constipation but with no altered sensorium. Likely hypercalcemia of malignancy 2/2 metastatic breast cancer.  - Oncology consult obtained. Appreciate recs.  - cont with IVF with NS  - S/p Zometa 4 mg x1, calcitonin 220 IU q12hrs x2 doses as per Oncology recs  - PTH, PTHrP, and 25-OH vitamin D levels  - Monitor serum Ca and ionized Ca  - EKG without any acute changes. Monitor on tele.
Serum Ca 15 on admission, 13.5 on outpatient labs. Pt symptomatic with fatigue and constipation but with no altered sensorium. Likely hypercalcemia of malignancy 2/2 metastatic breast cancer.  - Oncology consult obtained. Appreciate recs.  - cont with IVF with NS  - S/p Zometa 4 mg x1, calcitonin 220 IU q12hrs x2 doses as per Oncology recs  - PTH, PTHrP, and 25-OH vitamin D levels  - Monitor serum Ca and ionized Ca  - EKG without any acute changes. Monitor on tele.
Serum Ca 15 on admission, 13.5 on outpatient labs. Pt symptomatic with fatigue and constipation but with no altered sensorium. Likely hypercalcemia of malignancy 2/2 metastatic breast cancer.  - Oncology consult obtained. Appreciate recs.  - S/p Zometa 4 mg x1, calcitonin 220 IU q12hrs x2 doses as per Oncology recs  - hypercalcemia improved
Serum Ca 15 on admission, 13.5 on outpatient labs. Pt symptomatic with fatigue and constipation but with no altered sensorium. Likely hypercalcemia of malignancy 2/2 metastatic breast cancer.  - Oncology consult obtained. Appreciate recs.  - S/p Zometa 4 mg x1, calcitonin 220 IU q12hrs x2 doses as per Oncology recs  Now resolved
Serum Ca 15 on admission, 13.5 on outpatient labs. Pt symptomatic with fatigue and constipation but with no altered sensorium. Likely hypercalcemia of malignancy 2/2 metastatic breast cancer.  - Oncology consult obtained. Appreciate recs.  - S/p Zometa 4 mg x1, calcitonin 220 IU q12hrs x2 doses as per Oncology recs  - hypercalcemia improved

## 2021-06-11 NOTE — PROGRESS NOTE ADULT - NSICDXPILOT_GEN_ALL_CORE
[place]

## 2021-06-11 NOTE — PROGRESS NOTE ADULT - PROVIDER SPECIALTY LIST ADULT
Heme/Onc
Hospitalist
Heme/Onc
Infectious Disease
Heme/Onc
Hospitalist
Heme/Onc
Heme/Onc
Intervent Radiology
Hepatology
Hepatology
Hospitalist

## 2021-06-11 NOTE — PROGRESS NOTE ADULT - PROBLEM SELECTOR PROBLEM 4
Breast cancer
Compression fracture of L2 vertebra, initial encounter
Compression fracture of L2 vertebra, initial encounter
Breast cancer
Transaminitis
Breast cancer
Breast cancer
Compression fracture of L2 vertebra, initial encounter
Breast cancer
Transaminitis
Breast cancer

## 2021-06-11 NOTE — PROGRESS NOTE ADULT - SUBJECTIVE AND OBJECTIVE BOX
PROGRESS NOTE:     Patient is a 56y old  Female who presents with a chief complaint of Hypercalcemia, hypokalemia (10 Khari 2021 14:53)      SUBJECTIVE / OVERNIGHT EVENTS: No new complaints.     ADDITIONAL REVIEW OF SYSTEMS:    MEDICATIONS  (STANDING):  acetaminophen  IVPB .. 1000 milliGRAM(s) IV Intermittent once  amLODIPine   Tablet 5 milliGRAM(s) Oral daily  cholecalciferol 1000 Unit(s) Oral daily  enoxaparin Injectable 40 milliGRAM(s) SubCutaneous daily  letrozole 2.5 milliGRAM(s) Oral daily  polyethylene glycol 3350 17 Gram(s) Oral at bedtime  senna 2 Tablet(s) Oral at bedtime    MEDICATIONS  (PRN):  bisacodyl Suppository 10 milliGRAM(s) Rectal daily PRN Constipation  ondansetron Injectable 4 milliGRAM(s) IV Push every 6 hours PRN Nausea and/or Vomiting  simethicone 80 milliGRAM(s) Chew every 6 hours PRN Gas      CAPILLARY BLOOD GLUCOSE        I&O's Summary    10 Khari 2021 07:01  -  11 Jun 2021 07:00  --------------------------------------------------------  IN: 200 mL / OUT: 1550 mL / NET: -1350 mL        PHYSICAL EXAM:  Vital Signs Last 24 Hrs  T(C): 36.3 (11 Jun 2021 12:30), Max: 37.1 (11 Jun 2021 05:15)  T(F): 97.4 (11 Jun 2021 12:30), Max: 98.7 (11 Jun 2021 05:15)  HR: 95 (11 Jun 2021 12:30) (90 - 114)  BP: 113/75 (11 Jun 2021 12:30) (113/75 - 126/78)  BP(mean): --  RR: 16 (11 Jun 2021 12:30) (16 - 18)  SpO2: 100% (11 Jun 2021 12:30) (99% - 100%)    GENERAL: NAD, well-developed  HEAD:  Atraumatic, Normocephalic, MMM  CHEST/LUNG: No use of accessory muscles, CTAB, breathing non-labored  COR: RR, no mrcg  ABD: Soft, ND/NT, +BS  PSYCH: AAOx3  NEUROLOGY: CN II-XII grossly intact, moving all extremities  SKIN: No rashes or lesions  EXT: No clubbing, cyanosis, or edema     LABS:                        10.2   12.46 )-----------( 482      ( 11 Jun 2021 07:15 )             32.0     06-11    132<L>  |  98  |  15  ----------------------------<  108<H>  4.2   |  20<L>  |  0.58    Ca    9.6      11 Jun 2021 06:32  Phos  2.8     06-11  Mg     1.9     06-11                  RADIOLOGY & ADDITIONAL TESTS:  Results Reviewed:   Imaging Personally Reviewed:  Electrocardiogram Personally Reviewed:    COORDINATION OF CARE:  Care Discussed with Consultants/Other Providers [Y/N]:  Prior or Outpatient Records Reviewed [Y/N]:

## 2021-06-11 NOTE — PROGRESS NOTE ADULT - PROBLEM SELECTOR PROBLEM 6
Tachycardia
Transaminitis
Tachycardia
Need for prophylactic measure
Tachycardia
Need for prophylactic measure
Tachycardia

## 2021-06-11 NOTE — PROGRESS NOTE ADULT - PROBLEM SELECTOR PROBLEM 2
Hypercalcemia

## 2021-06-11 NOTE — PROGRESS NOTE ADULT - PROBLEM SELECTOR PROBLEM 7
Need for prophylactic measure
Need for prophylactic measure
Tachycardia
Need for prophylactic measure

## 2021-06-11 NOTE — PROGRESS NOTE ADULT - NUTRITIONAL ASSESSMENT
This patient has been assessed with a concern for Malnutrition and has been determined to have a diagnosis/diagnoses of Moderate protein-calorie malnutrition.    This patient is being managed with:   Diet Regular-  No Pork  Entered: May 19 2021 10:18PM    
This patient has been assessed with a concern for Malnutrition and has been determined to have a diagnosis/diagnoses of Moderate protein-calorie malnutrition.    This patient is being managed with:   Diet Regular-  No Pork  Entered: May 19 2021 10:18PM    
This patient has been assessed with a concern for Malnutrition and has been determined to have a diagnosis/diagnoses of Moderate protein-calorie malnutrition.    This patient is being managed with:   Diet NPO after Midnight-     NPO Start Date: 31-May-2021   NPO Start Time: 23:59  Except Medications  Entered: Jun 1 2021  2:21AM    Diet Regular-  No Pork  Entered: May 19 2021 10:18PM    
This patient has been assessed with a concern for Malnutrition and has been determined to have a diagnosis/diagnoses of Moderate protein-calorie malnutrition.    This patient is being managed with:   Diet Regular-  No Pork  Entered: May 19 2021 10:18PM    
This patient has been assessed with a concern for Malnutrition and has been determined to have a diagnosis/diagnoses of Moderate protein-calorie malnutrition.    This patient is being managed with:   Diet NPO after Midnight-     NPO Start Date: 03-Jun-2021   NPO Start Time: 23:59  Entered: Khari  3 2021 10:09AM    Diet Regular-  No Pork  Entered: May 19 2021 10:18PM    
This patient has been assessed with a concern for Malnutrition and has been determined to have a diagnosis/diagnoses of Moderate protein-calorie malnutrition.    This patient is being managed with:   Diet Regular-  No Pork  Entered: May 19 2021 10:18PM    
This patient has been assessed with a concern for Malnutrition and has been determined to have a diagnosis/diagnoses of Moderate protein-calorie malnutrition.    This patient is being managed with:   Diet NPO after Midnight-     NPO Start Date: 31-May-2021   NPO Start Time: 23:59  Entered: May 31 2021  7:14AM    Diet Regular-  No Pork  Entered: May 19 2021 10:18PM    
This patient has been assessed with a concern for Malnutrition and has been determined to have a diagnosis/diagnoses of Moderate protein-calorie malnutrition.    This patient is being managed with:   Diet Regular-  No Pork  Entered: May 19 2021 10:18PM    
This patient has been assessed with a concern for Malnutrition and has been determined to have a diagnosis/diagnoses of Moderate protein-calorie malnutrition.    This patient is being managed with:   Diet Regular-  No Pork  Entered: May 19 2021 10:18PM

## 2021-06-11 NOTE — PROGRESS NOTE ADULT - PROBLEM SELECTOR PROBLEM 5
Transaminitis
Tachycardia
Transaminitis
Compression fracture of L2 vertebra, initial encounter
Transaminitis
Tachycardia
Transaminitis

## 2021-06-11 NOTE — PROGRESS NOTE ADULT - PROBLEM SELECTOR PLAN 7
- DVT ppx: Will hold pharmacologic ppx for now given possible bone biopsy by IR. Otherwise, start Lovenox 40 mg daily.
- DVT ppx: Will hold pharmacologic ppx for now given possible bone biopsy by IR. Otherwise, start Lovenox 40 mg daily.
- DVT ppx: Lovenox 40 mg daily.
- DVT ppx: Lovenox 40 mg daily.    DC planning to [facility], d/c time 32 minutes
- DVT ppx: Lovenox 40 mg daily.
- DVT ppx: Lovenox 40 mg daily.
- DVT ppx: Will hold pharmacologic ppx for now given possible bone biopsy by IR. Otherwise, start Lovenox 40 mg daily.  - Diet: Regular, NPO after MN
- DVT ppx: Lovenox 40 mg daily.
- DVT ppx: Will hold pharmacologic ppx for now given possible bone biopsy by IR. Otherwise, start Lovenox 40 mg daily.
- DVT ppx: Lovenox 40 mg daily.
- DVT ppx: Will hold pharmacologic ppx for now given possible bone biopsy by IR. Otherwise, start Lovenox 40 mg daily.
Pt tachycardic to 110s overnight. Per outpatient records, pt with documented heart rates in 90s. EKG showing sinus tachycardia  - F/u TTE  - Low suspicion for PE at this time given that pt is not tachypneic and is maintaining O2 sats in high 90s-100% on RA  - Monitor on tele
- DVT ppx: Will hold pharmacologic ppx for now given possible bone biopsy by IR. Otherwise, start Lovenox 40 mg daily.
- DVT ppx: Will hold pharmacologic ppx for now given possible bone biopsy by IR. Otherwise, start Lovenox 40 mg daily.  - Diet: Regular, NPO after MN
- DVT ppx: Will hold pharmacologic ppx for now given possible bone biopsy by IR. Otherwise, start Lovenox 40 mg daily.
- DVT ppx: Will hold pharmacologic ppx for now given possible bone biopsy by IR. Otherwise, start Lovenox 40 mg daily.  - Diet: Regular, NPO after MN
- DVT ppx: Lovenox 40 mg daily.    DC planning to [facility]
- DVT ppx: Lovenox 40 mg daily.
- DVT ppx: Lovenox 40 mg daily.
- DVT ppx: Lovenox 40 mg daily.    DC planning to [facility] after RT
- DVT ppx: Lovenox 40 mg daily.    DC planning to [facility], d/c time 32 minutes

## 2021-06-11 NOTE — PROGRESS NOTE ADULT - REASON FOR ADMISSION
Hypercalcemia, hypokalemia

## 2021-06-11 NOTE — DISCHARGE NOTE NURSING/CASE MANAGEMENT/SOCIAL WORK - NSDCCRNAME_GEN_ALL_CORE_FT
[place] address : [phone] [place] [address] room # [address], [facility] [place] address : [phone] [place] [address] room # [address]B, [facility] Ridsandy danielaette 9pm

## 2021-06-11 NOTE — PROGRESS NOTE ADULT - PROBLEM SELECTOR PROBLEM 3
Fever, unspecified fever cause
Breast cancer
Breast cancer
Acute blood loss anemia
Breast cancer
Breast cancer
Fever, unspecified fever cause
Breast cancer
Fever, unspecified fever cause
Hypokalemia
Fever, unspecified fever cause

## 2021-06-12 DIAGNOSIS — Z71.89 OTHER SPECIFIED COUNSELING: ICD-10-CM

## 2021-06-18 ENCOUNTER — RESULT REVIEW (OUTPATIENT)
Age: 56
End: 2021-06-18

## 2021-06-18 ENCOUNTER — APPOINTMENT (OUTPATIENT)
Dept: HEMATOLOGY ONCOLOGY | Facility: CLINIC | Age: 56
End: 2021-06-18
Payer: MEDICAID

## 2021-06-18 VITALS
HEART RATE: 104 BPM | RESPIRATION RATE: 18 BRPM | BODY MASS INDEX: 18.78 KG/M2 | DIASTOLIC BLOOD PRESSURE: 82 MMHG | TEMPERATURE: 97.9 F | OXYGEN SATURATION: 100 % | SYSTOLIC BLOOD PRESSURE: 116 MMHG | HEIGHT: 66.1 IN | WEIGHT: 116.84 LBS

## 2021-06-18 DIAGNOSIS — M54.16 RADICULOPATHY, LUMBAR REGION: ICD-10-CM

## 2021-06-18 LAB
BASOPHILS # BLD AUTO: 0.03 K/UL — SIGNIFICANT CHANGE UP (ref 0–0.2)
BASOPHILS NFR BLD AUTO: 0.6 % — SIGNIFICANT CHANGE UP (ref 0–2)
EOSINOPHIL # BLD AUTO: 0.22 K/UL — SIGNIFICANT CHANGE UP (ref 0–0.5)
EOSINOPHIL NFR BLD AUTO: 4.3 % — SIGNIFICANT CHANGE UP (ref 0–6)
HCT VFR BLD CALC: 36.8 % — SIGNIFICANT CHANGE UP (ref 34.5–45)
HGB BLD-MCNC: 11.5 G/DL — SIGNIFICANT CHANGE UP (ref 11.5–15.5)
IMM GRANULOCYTES NFR BLD AUTO: 0.6 % — SIGNIFICANT CHANGE UP (ref 0–1.5)
LYMPHOCYTES # BLD AUTO: 1.18 K/UL — SIGNIFICANT CHANGE UP (ref 1–3.3)
LYMPHOCYTES # BLD AUTO: 23.3 % — SIGNIFICANT CHANGE UP (ref 13–44)
MCHC RBC-ENTMCNC: 26.3 PG — LOW (ref 27–34)
MCHC RBC-ENTMCNC: 31.3 G/DL — LOW (ref 32–36)
MCV RBC AUTO: 84.2 FL — SIGNIFICANT CHANGE UP (ref 80–100)
MONOCYTES # BLD AUTO: 0.54 K/UL — SIGNIFICANT CHANGE UP (ref 0–0.9)
MONOCYTES NFR BLD AUTO: 10.7 % — SIGNIFICANT CHANGE UP (ref 2–14)
NEUTROPHILS # BLD AUTO: 3.07 K/UL — SIGNIFICANT CHANGE UP (ref 1.8–7.4)
NEUTROPHILS NFR BLD AUTO: 60.5 % — SIGNIFICANT CHANGE UP (ref 43–77)
NRBC # BLD: 0 /100 WBCS — SIGNIFICANT CHANGE UP (ref 0–0)
PLATELET # BLD AUTO: 332 K/UL — SIGNIFICANT CHANGE UP (ref 150–400)
RBC # BLD: 4.37 M/UL — SIGNIFICANT CHANGE UP (ref 3.8–5.2)
RBC # FLD: 16.9 % — HIGH (ref 10.3–14.5)
WBC # BLD: 5.07 K/UL — SIGNIFICANT CHANGE UP (ref 3.8–10.5)
WBC # FLD AUTO: 5.07 K/UL — SIGNIFICANT CHANGE UP (ref 3.8–10.5)

## 2021-06-18 PROCEDURE — 99215 OFFICE O/P EST HI 40 MIN: CPT

## 2021-06-18 NOTE — HISTORY OF PRESENT ILLNESS
[de-identified] : Pt noticed a left breast mass x 2 months for which she did not seek medical attention, denied any pain, ulceration, bleeding or nipple changes/discharge.  She also c/o lower back pain for 2-3 months, associated with pain radiating down the lower extremities relieved with Tylenol prn.  Also reports intermittent upper back pain.   No fevers, night sweats, weight loss.  Reports fatigue and poor appetite.  She went to an urgent care in [place] for back pain and breast mass and was given a referral for mammo/US.  \par \par Mammogram and breast US on 4/7/2021 showed  5.6x3.2x5.3cm hypoechoic irregularly-shaped mass with angular margins 9-11:00 5 to 9cm from nipple; left axilla - enlarged 4.0x2.1x3.4cm lymph node, US biopsy rec, BIRADS4.\par \par On 4/20/2021, pt underwent an US guided left breast core biopsy which showed invasive moderately differentiated ductal carcinoma, [place] score 6/9 (3+2+10), invasive tumor at least 1.8cm, no DCIS, no LVI, microcalcifications present, ER 90%, [place] 2%, HER2 2+, CISH negative.  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  \par \par For the back pain, pt underwent an MRI lumbar spine on 4/22/2021 which showed metastatic disease in lower thoracic spine, lumbar spine and upper sacrum with severe pathologic fracture of L2 with retropulsion of the posterior endplate causing moderate canal stenosis. \par \par Risk factors:  No prior disease or biopsies. Menarche: age 12, Postmenopausal age 51; 2 pregnancies, 1 miscarriage, 1 living child daughter 19, 2 years of breastfeeding. No OCP or HRT.  Family history is significant for mother had cervical cancer age 64 and father with prostate cancer age 67.  No cancer of the breast, ovary, endometrium, and pancreatic cancer.  The patient is of [place] ethnic background.  No ETOH or tobacco.\par Screening: No prior colonoscopy, pap smear 2-3 years ago reportedly normal.\par Previous certified nursing assistant. [de-identified] : \par Patient discharged from [facility], s/p kyphoplasty and RT\par Coming from [facility], doing PT/OT and walking with physical therapist with walker, will be discharged in 2-4 weeks, will continue home PT\par Continues on letrozole daily, tolerating well.\par Denies back pain, c/o mild dullness when lying down\par Breast mass stable, states it's hardening\par Denies breathing difficulties, chest pain, cough, fevers, abd pain, n/v/d.  \par Missed dental exam and will schedule at [facility] for clearance for bisphosphate.

## 2021-06-18 NOTE — PHYSICAL EXAM
[Restricted in physically strenuous activity but ambulatory and able to carry out work of a light or sedentary nature] : Status 1- Restricted in physically strenuous activity but ambulatory and able to carry out work of a light or sedentary nature, e.g., light house work, office work [Normal] : affect appropriate [de-identified] : left 9:00-10:00 breast mass 7x5cm reaching skin, s/p healing ulceration, left axillary LN enlarged, now not palpable; right breast no masses [de-identified] : wearing TLSO brace

## 2021-06-18 NOTE — ASSESSMENT
[FreeTextEntry1] : ROGELIO PEARSON  is a 56 year old female here for left breast cancer, ER 90%, ME 2%, HER2 negative, likely with metastatic disease to the spine, lymph nodes and lungs.  s/p kyphoplasty and RT for severe pathologic compression fracture of L2 vertebral body.  s/p bone biopsy confirming metastatic breast origin.  \par \par -labs today including tumor markers\par -c/w letrozole 2.5mg daily, tolerating well\par -will add Ibrance as pt is clinically improving.  Discussed re: starting after PT but decided to start now.  RX sent to pharmacy.\par -pt to return in 2 weeks for cbc check and FU\par -plan for bisphosphates to prevent skeletal related events, pt to get dental exam at rehab, will schedule \par -repeat imaging in 7/2021\par -I encouraged her to call me with any questions. \par

## 2021-06-20 LAB
25(OH)D3 SERPL-MCNC: 31.6 NG/ML
ALBUMIN SERPL ELPH-MCNC: 4.3 G/DL
ALP BLD-CCNC: 433 U/L
ALT SERPL-CCNC: 23 U/L
ANION GAP SERPL CALC-SCNC: 16 MMOL/L
AST SERPL-CCNC: 38 U/L
BILIRUB SERPL-MCNC: 0.2 MG/DL
BUN SERPL-MCNC: 12 MG/DL
CALCIUM SERPL-MCNC: 9.8 MG/DL
CEA SERPL-MCNC: 28.8 NG/ML
CHLORIDE SERPL-SCNC: 100 MMOL/L
CO2 SERPL-SCNC: 20 MMOL/L
CREAT SERPL-MCNC: 0.45 MG/DL
GLUCOSE SERPL-MCNC: 90 MG/DL
POTASSIUM SERPL-SCNC: 3.6 MMOL/L
PROT SERPL-MCNC: 7.5 G/DL
SODIUM SERPL-SCNC: 136 MMOL/L

## 2021-06-21 LAB — CANCER AG27-29 SERPL-ACNC: 66.3 U/ML

## 2021-06-23 ENCOUNTER — APPOINTMENT (OUTPATIENT)
Dept: INTERVENTIONAL RADIOLOGY/VASCULAR | Facility: CLINIC | Age: 56
End: 2021-06-23
Payer: MEDICAID

## 2021-06-23 ENCOUNTER — APPOINTMENT (OUTPATIENT)
Dept: RADIOLOGY | Facility: HOSPITAL | Age: 56
End: 2021-06-23

## 2021-06-23 ENCOUNTER — OUTPATIENT (OUTPATIENT)
Dept: OUTPATIENT SERVICES | Facility: HOSPITAL | Age: 56
LOS: 1 days | End: 2021-06-23
Payer: MEDICAID

## 2021-06-23 VITALS
SYSTOLIC BLOOD PRESSURE: 121 MMHG | OXYGEN SATURATION: 100 % | HEIGHT: 66 IN | RESPIRATION RATE: 17 BRPM | DIASTOLIC BLOOD PRESSURE: 87 MMHG | HEART RATE: 99 BPM | BODY MASS INDEX: 18.87 KG/M2 | WEIGHT: 117.4 LBS

## 2021-06-23 DIAGNOSIS — M54.5 LOW BACK PAIN: ICD-10-CM

## 2021-06-23 PROCEDURE — 72100 X-RAY EXAM L-S SPINE 2/3 VWS: CPT | Mod: 26

## 2021-06-23 PROCEDURE — 99215 OFFICE O/P EST HI 40 MIN: CPT

## 2021-07-02 ENCOUNTER — OUTPATIENT (OUTPATIENT)
Dept: OUTPATIENT SERVICES | Facility: HOSPITAL | Age: 56
LOS: 1 days | Discharge: HOME | End: 2021-07-02

## 2021-07-02 DIAGNOSIS — C96.9 MALIGNANT NEOPLASM OF LYMPHOID, HEMATOPOIETIC AND RELATED TISSUE, UNSPECIFIED: ICD-10-CM

## 2021-07-06 ENCOUNTER — RESULT REVIEW (OUTPATIENT)
Age: 56
End: 2021-07-06

## 2021-07-06 ENCOUNTER — APPOINTMENT (OUTPATIENT)
Dept: RADIATION ONCOLOGY | Facility: CLINIC | Age: 56
End: 2021-07-06
Payer: MEDICAID

## 2021-07-06 ENCOUNTER — APPOINTMENT (OUTPATIENT)
Dept: HEMATOLOGY ONCOLOGY | Facility: CLINIC | Age: 56
End: 2021-07-06
Payer: MEDICAID

## 2021-07-06 VITALS
SYSTOLIC BLOOD PRESSURE: 138 MMHG | DIASTOLIC BLOOD PRESSURE: 98 MMHG | HEIGHT: 65.98 IN | HEART RATE: 119 BPM | OXYGEN SATURATION: 99 % | WEIGHT: 113.54 LBS | TEMPERATURE: 97.9 F | RESPIRATION RATE: 16 BRPM | BODY MASS INDEX: 18.25 KG/M2

## 2021-07-06 LAB
BASOPHILS # BLD AUTO: 0.02 K/UL — SIGNIFICANT CHANGE UP (ref 0–0.2)
BASOPHILS NFR BLD AUTO: 1 % — SIGNIFICANT CHANGE UP (ref 0–2)
EOSINOPHIL # BLD AUTO: 0.03 K/UL — SIGNIFICANT CHANGE UP (ref 0–0.5)
EOSINOPHIL NFR BLD AUTO: 2 % — SIGNIFICANT CHANGE UP (ref 0–6)
HCT VFR BLD CALC: 37.7 % — SIGNIFICANT CHANGE UP (ref 34.5–45)
HGB BLD-MCNC: 12 G/DL — SIGNIFICANT CHANGE UP (ref 11.5–15.5)
LYMPHOCYTES # BLD AUTO: 1.19 K/UL — SIGNIFICANT CHANGE UP (ref 1–3.3)
LYMPHOCYTES # BLD AUTO: 69 % — HIGH (ref 13–44)
MCHC RBC-ENTMCNC: 27.8 PG — SIGNIFICANT CHANGE UP (ref 27–34)
MCHC RBC-ENTMCNC: 31.8 G/DL — LOW (ref 32–36)
MCV RBC AUTO: 87.3 FL — SIGNIFICANT CHANGE UP (ref 80–100)
MONOCYTES # BLD AUTO: 0.12 K/UL — SIGNIFICANT CHANGE UP (ref 0–0.9)
MONOCYTES NFR BLD AUTO: 7 % — SIGNIFICANT CHANGE UP (ref 2–14)
NEUTROPHILS # BLD AUTO: 0.36 K/UL — LOW (ref 1.8–7.4)
NEUTROPHILS NFR BLD AUTO: 21 % — LOW (ref 43–77)
NRBC # BLD: 0 /100 — SIGNIFICANT CHANGE UP (ref 0–0)
NRBC # BLD: SIGNIFICANT CHANGE UP /100 WBCS (ref 0–0)
PLAT MORPH BLD: NORMAL — SIGNIFICANT CHANGE UP
PLATELET # BLD AUTO: 245 K/UL — SIGNIFICANT CHANGE UP (ref 150–400)
RBC # BLD: 4.32 M/UL — SIGNIFICANT CHANGE UP (ref 3.8–5.2)
RBC # FLD: 18.3 % — HIGH (ref 10.3–14.5)
RBC BLD AUTO: SIGNIFICANT CHANGE UP
WBC # BLD: 1.73 K/UL — LOW (ref 3.8–10.5)
WBC # FLD AUTO: 1.73 K/UL — LOW (ref 3.8–10.5)

## 2021-07-06 PROCEDURE — 99215 OFFICE O/P EST HI 40 MIN: CPT

## 2021-07-06 PROCEDURE — 99024 POSTOP FOLLOW-UP VISIT: CPT

## 2021-07-06 NOTE — REASON FOR VISIT
[Post-Treatment Evaluation] : post-treatment evaluation for [Breast Cancer] : breast cancer [Bone Metastasis] : bone metastasis

## 2021-07-07 LAB
25(OH)D3 SERPL-MCNC: 32.5 NG/ML
ALBUMIN SERPL ELPH-MCNC: 4.7 G/DL
ALP BLD-CCNC: 495 U/L
ALT SERPL-CCNC: 16 U/L
ANION GAP SERPL CALC-SCNC: 15 MMOL/L
AST SERPL-CCNC: 27 U/L
BILIRUB SERPL-MCNC: 0.2 MG/DL
BUN SERPL-MCNC: 13 MG/DL
CALCIUM SERPL-MCNC: 9.9 MG/DL
CANCER AG27-29 SERPL-ACNC: 55.4 U/ML
CEA SERPL-MCNC: 21.7 NG/ML
CHLORIDE SERPL-SCNC: 101 MMOL/L
CO2 SERPL-SCNC: 21 MMOL/L
CREAT SERPL-MCNC: 0.68 MG/DL
GLUCOSE SERPL-MCNC: 84 MG/DL
POTASSIUM SERPL-SCNC: 4.5 MMOL/L
PROT SERPL-MCNC: 7.9 G/DL
SODIUM SERPL-SCNC: 136 MMOL/L

## 2021-07-08 NOTE — PHYSICAL EXAM
[] : no respiratory distress [Normal] : well developed, well nourished, in no acute distress [Heart Rate And Rhythm] : heart rate and rhythm were normal [de-identified] : in wheelchair, able to stand up straight and walk with assistance

## 2021-07-08 NOTE — REVIEW OF SYSTEMS
[Fatigue] : fatigue [Urinary Frequency] : urinary frequency [Muscle Weakness] : muscle weakness [Gait Disturbance] : gait disturbance [Negative] : Psychiatric

## 2021-07-08 NOTE — HISTORY OF PRESENT ILLNESS
[FreeTextEntry1] : Ms. Rudy Lopez is a 56 year old female with metastatic breast cancer.  For complaints of back pain, she had MRI spine on 4/22/2021, showing metastatic disease in lower thoracic spine, lumbar spine and upper sacrum with severe pathologic fracture of L2 with retropulsion of the posterior endplate causing moderate canal stenosis.  She is now s/p kyphoplasty and RT, a dose of 800cGy to the LS spine, received on 6/10/21.\par \par She comes in today for a post-treatment evaluation. She is improving each day, with better ambulation and strength, as she continues therapy at [facility]. She noticed a big improvement after the kyphoplasty and then more improvement since the radiation treatment. She is now able to stand upright and straight, instead of stooped over. She took one oxycodone yesterday, as she feels she pushed the therapy too much, with achiness in her lower back and right hip.  However, today she is doing better. Previous to yesterday, she did not take an oxycodone for about 3 weeks. She continues on letrozole and Ibrance under guidance of Dr. Dowd. She noticed that the breast tumor is shrinking since she started treatment.

## 2021-07-08 NOTE — VITALS
[Maximal Pain Intensity: 5/10] : 5/10 [Least Pain Intensity: 0/10] : 0/10 [Pain Duration: ___] : Pain duration: [unfilled] [Pain Location: ___] : Pain Location: [unfilled] [OTC] : OTC [Opioid] : opioid [Pain Interferes with ADLs] : Pain does not interfere with activities of daily living [60: Requires occasional assistance, but is able to care for most of his/her needs] : 60: Requires occasional assistance, but is able to care for most of his/her needs

## 2021-07-12 NOTE — ASSESSMENT
[FreeTextEntry1] : ROGELIO PEARSON  is here for metastatic breast cancer to spine, lymph nodes and lungs, ER 90%, OR 2%, HER2 negative,  She is s/p L2 kyphoplasty and RT for severe pathologic compression fracture.  Pt is currently on tx with ibrance and letrozole.\par \par -ibrance started on 6/22/2021, C1D15, wbc 1.73, \par -hold ibrance and check cbc in 1 week\par -rec to start levaquin ppx, [facility]\par -c/w letrozole 2.5mg daily\par -clinically improving, tumor markers downtrending\par -last PET/CT was on 5/6/2021\par -pt to return in 1 weeks for cbc check and FU\par -plan to continue bisphosphates to prevent skeletal related events, pending dental exam at rehab, will schedule \par -I encouraged her to call me with any questions. \par

## 2021-07-12 NOTE — PHYSICAL EXAM
[Restricted in physically strenuous activity but ambulatory and able to carry out work of a light or sedentary nature] : Status 1- Restricted in physically strenuous activity but ambulatory and able to carry out work of a light or sedentary nature, e.g., light house work, office work [Normal] : affect appropriate [de-identified] : left 9:00-10:00 breast mass 7x5cm reaching skin, s/p healing ulceration, left axillary LN enlarged, now not palpable; right breast no masses [de-identified] : wearing TLSO brace

## 2021-07-12 NOTE — HISTORY OF PRESENT ILLNESS
[de-identified] : Pt noticed a left breast mass x 2 months for which she did not seek medical attention, denied any pain, ulceration, bleeding or nipple changes/discharge.  She also c/o lower back pain for 2-3 months, associated with pain radiating down the lower extremities relieved with Tylenol prn.  Also reports intermittent upper back pain.   No fevers, night sweats, weight loss.  Reports fatigue and poor appetite.  She went to an urgent care in [place] for back pain and breast mass and was given a referral for mammo/US.  \par \par Mammogram and breast US on 4/7/2021 showed  5.6x3.2x5.3cm hypoechoic irregularly-shaped mass with angular margins 9-11:00 5 to 9cm from nipple; left axilla - enlarged 4.0x2.1x3.4cm lymph node, US biopsy rec, BIRADS4.\par \par On 4/20/2021, pt underwent an US guided left breast core biopsy which showed invasive moderately differentiated ductal carcinoma, [place] score 6/9 (3+2+10), invasive tumor at least 1.8cm, no DCIS, no LVI, microcalcifications present, ER 90%, MD 2%, HER2 2+, CISH negative.  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  \par \par For the back pain, pt underwent an MRI lumbar spine on 4/22/2021 which showed metastatic disease in lower thoracic spine, lumbar spine and upper sacrum with severe pathologic fracture of L2 with retropulsion of the posterior endplate causing moderate canal stenosis. \par \par Risk factors:  No prior disease or biopsies. Menarche: age 12, Postmenopausal age 51; 2 pregnancies, 1 miscarriage, 1 living child daughter 19, 2 years of breastfeeding. No OCP or HRT.  Family history is significant for mother had cervical cancer age 64 and father with prostate cancer age 67.  No cancer of the breast, ovary, endometrium, and pancreatic cancer.  The patient is of [place] ethnic background.  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  Ibrance added on 6/22/2021, tolerating well. \par Has appt with Dr. Cynthia Kim today for fu post RT.\par Saw IR Dr. Julio Gordon 6/23/2021, doing well post L2 vertebral augmentation. \par Denies back pain, c/o mild dullness when lying down.  last oxycodone use 3 weeks ago.  Feels pain on exertion.  \par Breast mass stable, states it's hardening, otherwise no ulceration or discharge.\par Denies breathing difficulties, chest pain, cough, fevers, abd pain, n/v/d.  \par Pending dental eval at [facility] for clearance for bisphosphate, emphasized importance.  Pt to schedule.

## 2021-07-13 ENCOUNTER — APPOINTMENT (OUTPATIENT)
Dept: HEMATOLOGY ONCOLOGY | Facility: CLINIC | Age: 56
End: 2021-07-13

## 2021-07-13 ENCOUNTER — NON-APPOINTMENT (OUTPATIENT)
Age: 56
End: 2021-07-13

## 2021-07-13 NOTE — PHYSICAL EXAM
[Restricted in physically strenuous activity but ambulatory and able to carry out work of a light or sedentary nature] : Status 1- Restricted in physically strenuous activity but ambulatory and able to carry out work of a light or sedentary nature, e.g., light house work, office work [Normal] : affect appropriate [de-identified] : left 9:00-10:00 breast mass 7x5cm reaching skin, s/p healing ulceration, left axillary LN enlarged, now not palpable; right breast no masses [de-identified] : wearing TLSO brace

## 2021-07-13 NOTE — ASSESSMENT
[FreeTextEntry1] : ROGELIO PEARSON  is here for metastatic breast cancer to spine, lymph nodes and lungs, ER 90%, ME 2%, HER2 negative,  She is s/p L2 kyphoplasty and RT for severe pathologic compression fracture.  Pt is currently on tx with ibrance and letrozole.\par \par -ibrance started on 6/22/2021, C1D15, wbc 1.73, \par -hold ibrance and check cbc in 1 week\par -rec to start levaquin ppx, [facility]\par -c/w letrozole 2.5mg daily\par -clinically improving, tumor markers downtrending\par -last PET/CT was on 5/6/2021\par -pt to return in 1 weeks for cbc check and FU\par -plan to continue bisphosphates to prevent skeletal related events, pending dental exam at rehab, will schedule \par -I encouraged her to call me with any questions. \par

## 2021-07-23 ENCOUNTER — APPOINTMENT (OUTPATIENT)
Dept: HEMATOLOGY ONCOLOGY | Facility: CLINIC | Age: 56
End: 2021-07-23
Payer: MEDICAID

## 2021-07-23 ENCOUNTER — RESULT REVIEW (OUTPATIENT)
Age: 56
End: 2021-07-23

## 2021-07-23 VITALS
TEMPERATURE: 97.8 F | RESPIRATION RATE: 16 BRPM | BODY MASS INDEX: 18.65 KG/M2 | HEART RATE: 111 BPM | DIASTOLIC BLOOD PRESSURE: 87 MMHG | SYSTOLIC BLOOD PRESSURE: 126 MMHG | OXYGEN SATURATION: 94 % | HEIGHT: 65.75 IN | WEIGHT: 114.64 LBS

## 2021-07-23 LAB
BASOPHILS # BLD AUTO: 0.05 K/UL — SIGNIFICANT CHANGE UP (ref 0–0.2)
BASOPHILS NFR BLD AUTO: 1 % — SIGNIFICANT CHANGE UP (ref 0–2)
EOSINOPHIL # BLD AUTO: 0.05 K/UL — SIGNIFICANT CHANGE UP (ref 0–0.5)
EOSINOPHIL NFR BLD AUTO: 1 % — SIGNIFICANT CHANGE UP (ref 0–6)
HCT VFR BLD CALC: 40 % — SIGNIFICANT CHANGE UP (ref 34.5–45)
HGB BLD-MCNC: 12.7 G/DL — SIGNIFICANT CHANGE UP (ref 11.5–15.5)
LYMPHOCYTES # BLD AUTO: 1.84 K/UL — SIGNIFICANT CHANGE UP (ref 1–3.3)
LYMPHOCYTES # BLD AUTO: 40 % — SIGNIFICANT CHANGE UP (ref 13–44)
MCHC RBC-ENTMCNC: 28.2 PG — SIGNIFICANT CHANGE UP (ref 27–34)
MCHC RBC-ENTMCNC: 31.8 G/DL — LOW (ref 32–36)
MCV RBC AUTO: 88.7 FL — SIGNIFICANT CHANGE UP (ref 80–100)
METAMYELOCYTES # FLD: 1 % — HIGH (ref 0–0)
MONOCYTES # BLD AUTO: 0.69 K/UL — SIGNIFICANT CHANGE UP (ref 0–0.9)
MONOCYTES NFR BLD AUTO: 15 % — HIGH (ref 2–14)
NEUTROPHILS # BLD AUTO: 1.93 K/UL — SIGNIFICANT CHANGE UP (ref 1.8–7.4)
NEUTROPHILS NFR BLD AUTO: 42 % — LOW (ref 43–77)
NRBC # BLD: 0 /100 — SIGNIFICANT CHANGE UP (ref 0–0)
NRBC # BLD: SIGNIFICANT CHANGE UP /100 WBCS (ref 0–0)
PLAT MORPH BLD: NORMAL — SIGNIFICANT CHANGE UP
PLATELET # BLD AUTO: 474 K/UL — HIGH (ref 150–400)
RBC # BLD: 4.51 M/UL — SIGNIFICANT CHANGE UP (ref 3.8–5.2)
RBC # FLD: 20.1 % — HIGH (ref 10.3–14.5)
RBC BLD AUTO: SIGNIFICANT CHANGE UP
WBC # BLD: 4.59 K/UL — SIGNIFICANT CHANGE UP (ref 3.8–10.5)
WBC # FLD AUTO: 4.59 K/UL — SIGNIFICANT CHANGE UP (ref 3.8–10.5)

## 2021-07-23 PROCEDURE — 99215 OFFICE O/P EST HI 40 MIN: CPT

## 2021-07-27 LAB
25(OH)D3 SERPL-MCNC: 30.9 NG/ML
ALBUMIN SERPL ELPH-MCNC: 4.5 G/DL
ALP BLD-CCNC: 514 U/L
ALT SERPL-CCNC: 63 U/L
ANION GAP SERPL CALC-SCNC: 14 MMOL/L
AST SERPL-CCNC: 50 U/L
BILIRUB SERPL-MCNC: <0.2 MG/DL
BUN SERPL-MCNC: 11 MG/DL
CALCIUM SERPL-MCNC: 9.9 MG/DL
CANCER AG27-29 SERPL-ACNC: 35.7 U/ML
CEA SERPL-MCNC: 14.3 NG/ML
CHLORIDE SERPL-SCNC: 104 MMOL/L
CO2 SERPL-SCNC: 22 MMOL/L
CREAT SERPL-MCNC: 0.53 MG/DL
GLUCOSE SERPL-MCNC: 80 MG/DL
POTASSIUM SERPL-SCNC: 4.7 MMOL/L
PROT SERPL-MCNC: 7.6 G/DL
SODIUM SERPL-SCNC: 139 MMOL/L

## 2021-07-29 ENCOUNTER — APPOINTMENT (OUTPATIENT)
Dept: SURGICAL ONCOLOGY | Facility: CLINIC | Age: 56
End: 2021-07-29
Payer: MEDICAID

## 2021-07-29 ENCOUNTER — OUTPATIENT (OUTPATIENT)
Dept: OUTPATIENT SERVICES | Facility: HOSPITAL | Age: 56
LOS: 1 days | End: 2021-07-29
Payer: MEDICAID

## 2021-07-29 ENCOUNTER — APPOINTMENT (OUTPATIENT)
Dept: ULTRASOUND IMAGING | Facility: CLINIC | Age: 56
End: 2021-07-29
Payer: MEDICAID

## 2021-07-29 DIAGNOSIS — C50.919 MALIGNANT NEOPLASM OF UNSPECIFIED SITE OF UNSPECIFIED FEMALE BREAST: ICD-10-CM

## 2021-07-29 PROCEDURE — 76700 US EXAM ABDOM COMPLETE: CPT | Mod: 26

## 2021-07-29 PROCEDURE — 76700 US EXAM ABDOM COMPLETE: CPT

## 2021-08-03 ENCOUNTER — APPOINTMENT (OUTPATIENT)
Dept: HEMATOLOGY ONCOLOGY | Facility: CLINIC | Age: 56
End: 2021-08-03

## 2021-08-03 NOTE — ASSESSMENT
[FreeTextEntry1] : ROGELIO PEARSON  is here for metastatic breast cancer to spine, lymph nodes and lungs, ER 90%, CT 2%, HER2 negative,  She is s/p L2 kyphoplasty and RT for severe pathologic compression fracture.  Pt is currently on tx with ibrance and letrozole.\par \par -ibrance started on 6/22/2021, c/b grade 4 neutropenia. Neutropenia now grade 1.  Will restart at lower dose of 100mg daily. Continue to monitor.\par -c/w letrozole 2.5mg daily\par -clinically improving, tumor markers downtrending\par -last PET/CT was on 5/6/2021.  Plan to repeat 8/2021.\par -plan to continue bisphosphates to prevent skeletal related events, obtain dental clearance\par -I encouraged her to call me with any questions. \par

## 2021-08-03 NOTE — PHYSICAL EXAM
[Restricted in physically strenuous activity but ambulatory and able to carry out work of a light or sedentary nature] : Status 1- Restricted in physically strenuous activity but ambulatory and able to carry out work of a light or sedentary nature, e.g., light house work, office work [Normal] : affect appropriate [de-identified] : left 9:00-10:00 breast mass 7x5cm reaching skin, s/p healing ulceration 4x3.5, left axillary LN enlarged, now not palpable; right breast no masses [de-identified] : wearing TLSO brace

## 2021-08-05 ENCOUNTER — OUTPATIENT (OUTPATIENT)
Dept: OUTPATIENT SERVICES | Facility: HOSPITAL | Age: 56
LOS: 1 days | Discharge: HOME | End: 2021-08-05

## 2021-08-05 ENCOUNTER — APPOINTMENT (OUTPATIENT)
Dept: SURGICAL ONCOLOGY | Facility: CLINIC | Age: 56
End: 2021-08-05
Payer: MEDICAID

## 2021-08-05 VITALS
BODY MASS INDEX: 18.77 KG/M2 | RESPIRATION RATE: 16 BRPM | OXYGEN SATURATION: 97 % | DIASTOLIC BLOOD PRESSURE: 86 MMHG | WEIGHT: 114 LBS | HEIGHT: 65.5 IN | TEMPERATURE: 98.1 F | HEART RATE: 88 BPM | SYSTOLIC BLOOD PRESSURE: 128 MMHG

## 2021-08-05 DIAGNOSIS — C96.9 MALIGNANT NEOPLASM OF LYMPHOID, HEMATOPOIETIC AND RELATED TISSUE, UNSPECIFIED: ICD-10-CM

## 2021-08-05 PROCEDURE — 99214 OFFICE O/P EST MOD 30 MIN: CPT

## 2021-08-05 NOTE — HISTORY OF PRESENT ILLNESS
[de-identified] : Ms. ROGELIO PEARSON is a 56 year old female who presents today for follow up for newly diagnoses metastatic left breast cancer.\par \par Bilateral mammo/sono 4/7/21: left breast mass at the site a palpable abnormality, enlarged left axillary lymph node, right breast unremarkable. BI-RADS 4. \par \par Ultrasound Guided core biopsy of left breast x 2 sites 4/20/21:\par 1. Breast, left, 9-11 o?clock, core biopsy: Invasive moderately differentiated ductal carcinoma; [place] score 6 / 9 (3 + 2 +1) in this limited material; Invasive tumor measures at least 1.8cm; Ductal carcinoma in situ not seen; Microcalcifications present; Lymphovascular permeation by tumor not seen. --ER+/LA+/HER-2 equivocal ([facility] study in progress)\par \par \par 2. Left axila, core biopsy: metastatic ductal carcinoma to lymph node.\par \par -ER+/LA+/HER-2 equivocal ([facility] study in progress)\par \par MRI Lumbar Spine 4/22/21: Metastatic disease to the lower thoracic spine, lumbar spine and upper sacrum with severe pathologic fracture of L2 with retropulsion of the posterior endplate causing moderate canal stenosis. No discogenic disease. Please correlate with any history of known malignancy. The patient is awaiting results of her left breast biopsy studies findings may represent metastatic breast cancer.\par \par She states she has noticed a palpable left breast mass for the past few months. She states she has had lower back pain  that radiates down her leg for approximately 3 months.  She has difficulty walking and bending over. \par \par She states she has not had a routine physical in few years and is unaware of any significant medical history. She denies a personal history of cancer, her family history of cancer includes her mother with cervical cancer. She is scheduled to have a physical on 5/5/21 and see a gynecologist on 5/2/21 at [facility]. \par \par PET/CT 5/10/21: \par Impression: \par 1. Large FDG-avid, centrally necrotic mass in medial left breast, involving overlyinh skin and adjacent chest walll musculature and sternum corresponds to known malignancy. Additional separate FDG-avid lesions in left breast are suspicious for additional sites of disease. Further evaluation may be performed with breast MRI\par 2. FDG-avid lymph nodes in b/l lower cervical, left supraclavicular, left axillary, and left retropectoral regions are compatible  with metastatic disease.\par 3. Multiple FDG-avid b/l pulmonary nodules are compatible with metastatic disease. \par 4. Small left pleural effusion with heterogeneous FDG activity suspicious for malignant effusion. \par 5. Multiple FDG-avid lytic metastases in axial skeleton with severe pathologic compression fracture of L2 vertebral body, as seen on MRI dated 4/22/21, where retropulsion of posterior endplate is noted, causing moderate canal stenosis \par \par Bloodwork 7/6/21: \par CEA 21.7ng/ml\par CA27-29 55.4 u/ml \par LFT's WNL; ALK phos 495 U/L \par \par Patient on Ibrance and Letrozole under the care of Dr. Dowd. \par

## 2021-08-05 NOTE — ASSESSMENT
[FreeTextEntry1] : IMP:\par 55 y/o with newly diagnosed invasive moderately differentiated ductal carcinoma, ER+/IA+/HER-2 negative with metastases to the axilla and lumbar spine. \par \par \par PLAN: \par continue care with Dr. Dowd ( patient on Ibrance and letrozole) \par RTO q3 months

## 2021-08-05 NOTE — PHYSICAL EXAM
[FreeTextEntry1] : \par  [Normal] : supple, no neck mass and thyroid not enlarged [Normal Neck Lymph Nodes] : normal neck lymph nodes  [Normal Supraclavicular Lymph Nodes] : normal supraclavicular lymph nodes [Normal] : oriented to person, place and time, with appropriate affect [de-identified] : 4 cm mass stable in the medial aspect of the left breast [de-identified] : left axillary adenopathy improved

## 2021-08-06 ENCOUNTER — RESULT REVIEW (OUTPATIENT)
Age: 56
End: 2021-08-06

## 2021-08-06 ENCOUNTER — APPOINTMENT (OUTPATIENT)
Dept: HEMATOLOGY ONCOLOGY | Facility: CLINIC | Age: 56
End: 2021-08-06
Payer: MEDICAID

## 2021-08-06 ENCOUNTER — APPOINTMENT (OUTPATIENT)
Dept: INFUSION THERAPY | Facility: HOSPITAL | Age: 56
End: 2021-08-06

## 2021-08-06 VITALS
HEART RATE: 87 BPM | OXYGEN SATURATION: 97 % | HEIGHT: 65.5 IN | RESPIRATION RATE: 16 BRPM | SYSTOLIC BLOOD PRESSURE: 129 MMHG | DIASTOLIC BLOOD PRESSURE: 85 MMHG | BODY MASS INDEX: 18.77 KG/M2 | WEIGHT: 114 LBS | TEMPERATURE: 97.2 F

## 2021-08-06 LAB
BASOPHILS # BLD AUTO: 0.02 K/UL — SIGNIFICANT CHANGE UP (ref 0–0.2)
BASOPHILS NFR BLD AUTO: 1 % — SIGNIFICANT CHANGE UP (ref 0–2)
EOSINOPHIL # BLD AUTO: 0.02 K/UL — SIGNIFICANT CHANGE UP (ref 0–0.5)
EOSINOPHIL NFR BLD AUTO: 1 % — SIGNIFICANT CHANGE UP (ref 0–6)
HCT VFR BLD CALC: 36.8 % — SIGNIFICANT CHANGE UP (ref 34.5–45)
HGB BLD-MCNC: 12 G/DL — SIGNIFICANT CHANGE UP (ref 11.5–15.5)
LYMPHOCYTES # BLD AUTO: 1.38 K/UL — SIGNIFICANT CHANGE UP (ref 1–3.3)
LYMPHOCYTES # BLD AUTO: 60 % — HIGH (ref 13–44)
MCHC RBC-ENTMCNC: 29.6 PG — SIGNIFICANT CHANGE UP (ref 27–34)
MCHC RBC-ENTMCNC: 32.6 G/DL — SIGNIFICANT CHANGE UP (ref 32–36)
MCV RBC AUTO: 90.9 FL — SIGNIFICANT CHANGE UP (ref 80–100)
MONOCYTES # BLD AUTO: 0.05 K/UL — SIGNIFICANT CHANGE UP (ref 0–0.9)
MONOCYTES NFR BLD AUTO: 2 % — SIGNIFICANT CHANGE UP (ref 2–14)
NEUTROPHILS # BLD AUTO: 0.83 K/UL — LOW (ref 1.8–7.4)
NEUTROPHILS NFR BLD AUTO: 36 % — LOW (ref 43–77)
NRBC # BLD: 0 /100 — SIGNIFICANT CHANGE UP (ref 0–0)
NRBC # BLD: SIGNIFICANT CHANGE UP /100 WBCS (ref 0–0)
PLAT MORPH BLD: NORMAL — SIGNIFICANT CHANGE UP
PLATELET # BLD AUTO: 254 K/UL — SIGNIFICANT CHANGE UP (ref 150–400)
RBC # BLD: 4.05 M/UL — SIGNIFICANT CHANGE UP (ref 3.8–5.2)
RBC # FLD: 19 % — HIGH (ref 10.3–14.5)
RBC BLD AUTO: SIGNIFICANT CHANGE UP
WBC # BLD: 2.3 K/UL — LOW (ref 3.8–10.5)
WBC # FLD AUTO: 2.3 K/UL — LOW (ref 3.8–10.5)

## 2021-08-06 PROCEDURE — 99215 OFFICE O/P EST HI 40 MIN: CPT

## 2021-08-09 LAB
25(OH)D3 SERPL-MCNC: 29.6 NG/ML
ALBUMIN SERPL ELPH-MCNC: 4.4 G/DL
ALP BLD-CCNC: 334 U/L
ALT SERPL-CCNC: 14 U/L
ANION GAP SERPL CALC-SCNC: 14 MMOL/L
AST SERPL-CCNC: 29 U/L
BILIRUB SERPL-MCNC: 0.2 MG/DL
BUN SERPL-MCNC: 12 MG/DL
CALCIUM SERPL-MCNC: 10 MG/DL
CANCER AG27-29 SERPL-ACNC: 29.6 U/ML
CEA SERPL-MCNC: 12.2 NG/ML
CHLORIDE SERPL-SCNC: 104 MMOL/L
CO2 SERPL-SCNC: 22 MMOL/L
CREAT SERPL-MCNC: 0.76 MG/DL
GLUCOSE SERPL-MCNC: 81 MG/DL
POTASSIUM SERPL-SCNC: 4.2 MMOL/L
PROT SERPL-MCNC: 7.5 G/DL
SODIUM SERPL-SCNC: 140 MMOL/L

## 2021-08-12 NOTE — ASSESSMENT
[FreeTextEntry1] : ROGELIO PEARSON  is here for metastatic breast cancer to spine, lymph nodes and lungs, ER 90%, SD 2%, HER2 negative,  She is s/p L2 kyphoplasty and RT for severe pathologic compression fracture.  Pt is currently on tx with ibrance and letrozole.\par \par -ibrance started on 6/22/2021, c/b grade 4 neutropenia.  Ibrance dose lowered to 100mg 3 weeks on 1 week off.  C2D14 today.   today, neutropenia grade 3.  Continue ibrance 100mg this week and recheck ANC in 1 week.\par -c/w letrozole 2.5mg daily\par -clinically improving, tumor markers downtrending\par -last PET/CT was on 5/6/2021.  Plan to repeat 8/2021, ordered.\par -plan to continue bisphosphates to prevent skeletal related events, obtain dental clearance\par -I encouraged her to call me with any questions. \par

## 2021-08-12 NOTE — HISTORY OF PRESENT ILLNESS
[de-identified] : Pt noticed a left breast mass x 2 months for which she did not seek medical attention, denied any pain, ulceration, bleeding or nipple changes/discharge.  She also c/o lower back pain for 2-3 months, associated with pain radiating down the lower extremities relieved with Tylenol prn.  Also reports intermittent upper back pain.   No fevers, night sweats, weight loss.  Reports fatigue and poor appetite.  She went to an urgent care in [place] for back pain and breast mass and was given a referral for mammo/US.  \par \par Mammogram and breast US on 4/7/2021 showed  5.6x3.2x5.3cm hypoechoic irregularly-shaped mass with angular margins 9-11:00 5 to 9cm from nipple; left axilla - enlarged 4.0x2.1x3.4cm lymph node, US biopsy rec, BIRADS4.\par \par On 4/20/2021, pt underwent an US guided left breast core biopsy which showed invasive moderately differentiated ductal carcinoma, [place] score 6/9 (3+2+10), invasive tumor at least 1.8cm, no DCIS, no LVI, microcalcifications present, ER 90%, [place] 2%, HER2 2+, CISH negative.  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  \par \par For the back pain, pt underwent an MRI lumbar spine on 4/22/2021 which showed metastatic disease in lower thoracic spine, lumbar spine and upper sacrum with severe pathologic fracture of L2 with retropulsion of the posterior endplate causing moderate canal stenosis. \par \par Risk factors:  No prior disease or biopsies. Menarche: age 12, Postmenopausal age 51; 2 pregnancies, 1 miscarriage, 1 living child daughter 19, 2 years of breastfeeding. No OCP or HRT.  Family history is significant for mother had cervical cancer age 64 and father with prostate cancer age 67.  No cancer of the breast, ovary, endometrium, and pancreatic cancer.  The patient is of [place] ethnic background.  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  \par Ibrance added on 6/22/2021, c/b grade 4 neutropenia. On Ibrance 100mg 3 weeks on 1 week off. C2D14 today\par Feels breast mass is getting smaller and left arm is less swollen. Denies breathing difficulties, chest pain, cough, fevers, abd pain, n/v/d.  \par saw Dr. Cynthia Kim 7/6/21 for fu post RT.\par saw IR Dr. Julio Gordon 6/23/2021, doing well post L2 vertebral augmentation. s/[facility]ph[facility] and [facility] evaluation\par walking 100ft with walker independently. \par Denies back pain, c/o mild dullness when lying down.  Last oxycodone use 3 weeks ago.  Feels pain on exertion.  \par Pending dental eval for clearance for bisphosphate, was seen and had cavities filled.  Pt to make appt.

## 2021-08-12 NOTE — PHYSICAL EXAM
[Restricted in physically strenuous activity but ambulatory and able to carry out work of a light or sedentary nature] : Status 1- Restricted in physically strenuous activity but ambulatory and able to carry out work of a light or sedentary nature, e.g., light house work, office work [Normal] : affect appropriate [de-identified] : left 9:00-10:00 breast mass 7x5cm reaching skin, s/p healing ulceration 4x3.5, left axillary LN enlarged, now not palpable; right breast no masses [de-identified] : wearing TLSO brace

## 2021-08-13 ENCOUNTER — RESULT REVIEW (OUTPATIENT)
Age: 56
End: 2021-08-13

## 2021-08-13 ENCOUNTER — APPOINTMENT (OUTPATIENT)
Dept: HEMATOLOGY ONCOLOGY | Facility: CLINIC | Age: 56
End: 2021-08-13

## 2021-08-13 LAB
BASOPHILS # BLD AUTO: 0.04 K/UL — SIGNIFICANT CHANGE UP (ref 0–0.2)
BASOPHILS NFR BLD AUTO: 2 % — SIGNIFICANT CHANGE UP (ref 0–2)
EOSINOPHIL # BLD AUTO: 0.02 K/UL — SIGNIFICANT CHANGE UP (ref 0–0.5)
EOSINOPHIL NFR BLD AUTO: 1 % — SIGNIFICANT CHANGE UP (ref 0–6)
HCT VFR BLD CALC: 36.6 % — SIGNIFICANT CHANGE UP (ref 34.5–45)
HGB BLD-MCNC: 12.3 G/DL — SIGNIFICANT CHANGE UP (ref 11.5–15.5)
LYMPHOCYTES # BLD AUTO: 0.99 K/UL — LOW (ref 1–3.3)
LYMPHOCYTES # BLD AUTO: 53 % — HIGH (ref 13–44)
MCHC RBC-ENTMCNC: 29.6 PG — SIGNIFICANT CHANGE UP (ref 27–34)
MCHC RBC-ENTMCNC: 33.6 G/DL — SIGNIFICANT CHANGE UP (ref 32–36)
MCV RBC AUTO: 88 FL — SIGNIFICANT CHANGE UP (ref 80–100)
MONOCYTES # BLD AUTO: 0.15 K/UL — SIGNIFICANT CHANGE UP (ref 0–0.9)
MONOCYTES NFR BLD AUTO: 8 % — SIGNIFICANT CHANGE UP (ref 2–14)
NEUTROPHILS # BLD AUTO: 0.67 K/UL — LOW (ref 1.8–7.4)
NEUTROPHILS NFR BLD AUTO: 36 % — LOW (ref 43–77)
NRBC # BLD: 0 /100 — SIGNIFICANT CHANGE UP (ref 0–0)
NRBC # BLD: SIGNIFICANT CHANGE UP /100 WBCS (ref 0–0)
PLAT MORPH BLD: NORMAL — SIGNIFICANT CHANGE UP
PLATELET # BLD AUTO: 168 K/UL — SIGNIFICANT CHANGE UP (ref 150–400)
RBC # BLD: 4.16 M/UL — SIGNIFICANT CHANGE UP (ref 3.8–5.2)
RBC # FLD: 18.6 % — HIGH (ref 10.3–14.5)
RBC BLD AUTO: SIGNIFICANT CHANGE UP
WBC # BLD: 1.87 K/UL — LOW (ref 3.8–10.5)
WBC # FLD AUTO: 1.87 K/UL — LOW (ref 3.8–10.5)

## 2021-08-16 ENCOUNTER — NON-APPOINTMENT (OUTPATIENT)
Age: 56
End: 2021-08-16

## 2021-08-16 ENCOUNTER — APPOINTMENT (OUTPATIENT)
Dept: OPHTHALMOLOGY | Facility: CLINIC | Age: 56
End: 2021-08-16
Payer: MEDICAID

## 2021-08-16 PROCEDURE — 92202 OPSCPY EXTND ON/MAC DRAW: CPT

## 2021-08-16 PROCEDURE — 92015 DETERMINE REFRACTIVE STATE: CPT | Mod: NC

## 2021-08-16 PROCEDURE — 92004 COMPRE OPH EXAM NEW PT 1/>: CPT

## 2021-08-24 ENCOUNTER — APPOINTMENT (OUTPATIENT)
Dept: HEMATOLOGY ONCOLOGY | Facility: CLINIC | Age: 56
End: 2021-08-24
Payer: MEDICAID

## 2021-08-24 ENCOUNTER — RESULT REVIEW (OUTPATIENT)
Age: 56
End: 2021-08-24

## 2021-08-24 VITALS
OXYGEN SATURATION: 94 % | DIASTOLIC BLOOD PRESSURE: 85 MMHG | RESPIRATION RATE: 16 BRPM | WEIGHT: 119.05 LBS | TEMPERATURE: 97.7 F | HEIGHT: 65.51 IN | HEART RATE: 98 BPM | BODY MASS INDEX: 19.6 KG/M2 | SYSTOLIC BLOOD PRESSURE: 114 MMHG

## 2021-08-24 LAB
BASOPHILS # BLD AUTO: 0.07 K/UL — SIGNIFICANT CHANGE UP (ref 0–0.2)
BASOPHILS NFR BLD AUTO: 1.8 % — SIGNIFICANT CHANGE UP (ref 0–2)
EOSINOPHIL # BLD AUTO: 0.02 K/UL — SIGNIFICANT CHANGE UP (ref 0–0.5)
EOSINOPHIL NFR BLD AUTO: 0.5 % — SIGNIFICANT CHANGE UP (ref 0–6)
HCT VFR BLD CALC: 39.6 % — SIGNIFICANT CHANGE UP (ref 34.5–45)
HGB BLD-MCNC: 13 G/DL — SIGNIFICANT CHANGE UP (ref 11.5–15.5)
IMM GRANULOCYTES NFR BLD AUTO: 0.3 % — SIGNIFICANT CHANGE UP (ref 0–1.5)
LYMPHOCYTES # BLD AUTO: 1.31 K/UL — SIGNIFICANT CHANGE UP (ref 1–3.3)
LYMPHOCYTES # BLD AUTO: 34.6 % — SIGNIFICANT CHANGE UP (ref 13–44)
MCHC RBC-ENTMCNC: 29.7 PG — SIGNIFICANT CHANGE UP (ref 27–34)
MCHC RBC-ENTMCNC: 32.8 G/DL — SIGNIFICANT CHANGE UP (ref 32–36)
MCV RBC AUTO: 90.4 FL — SIGNIFICANT CHANGE UP (ref 80–100)
MONOCYTES # BLD AUTO: 0.91 K/UL — HIGH (ref 0–0.9)
MONOCYTES NFR BLD AUTO: 24 % — HIGH (ref 2–14)
NEUTROPHILS # BLD AUTO: 1.47 K/UL — LOW (ref 1.8–7.4)
NEUTROPHILS NFR BLD AUTO: 38.8 % — LOW (ref 43–77)
NRBC # BLD: 0 /100 WBCS — SIGNIFICANT CHANGE UP (ref 0–0)
PLATELET # BLD AUTO: 309 K/UL — SIGNIFICANT CHANGE UP (ref 150–400)
RBC # BLD: 4.38 M/UL — SIGNIFICANT CHANGE UP (ref 3.8–5.2)
RBC # FLD: 19.2 % — HIGH (ref 10.3–14.5)
WBC # BLD: 3.79 K/UL — LOW (ref 3.8–10.5)
WBC # FLD AUTO: 3.79 K/UL — LOW (ref 3.8–10.5)

## 2021-08-24 PROCEDURE — 99215 OFFICE O/P EST HI 40 MIN: CPT

## 2021-08-25 ENCOUNTER — OUTPATIENT (OUTPATIENT)
Dept: OUTPATIENT SERVICES | Facility: HOSPITAL | Age: 56
LOS: 1 days | End: 2021-08-25
Payer: MEDICAID

## 2021-08-25 ENCOUNTER — APPOINTMENT (OUTPATIENT)
Dept: NUCLEAR MEDICINE | Facility: IMAGING CENTER | Age: 56
End: 2021-08-25
Payer: MEDICAID

## 2021-08-25 DIAGNOSIS — C50.919 MALIGNANT NEOPLASM OF UNSPECIFIED SITE OF UNSPECIFIED FEMALE BREAST: ICD-10-CM

## 2021-08-25 LAB
25(OH)D3 SERPL-MCNC: 28.3 NG/ML
ALBUMIN SERPL ELPH-MCNC: 4.6 G/DL
ALP BLD-CCNC: 336 U/L
ALT SERPL-CCNC: 14 U/L
ANION GAP SERPL CALC-SCNC: 14 MMOL/L
AST SERPL-CCNC: 31 U/L
BILIRUB SERPL-MCNC: 0.2 MG/DL
BUN SERPL-MCNC: 14 MG/DL
CALCIUM SERPL-MCNC: 9.7 MG/DL
CANCER AG27-29 SERPL-ACNC: 25.8 U/ML
CEA SERPL-MCNC: 12.9 NG/ML
CHLORIDE SERPL-SCNC: 101 MMOL/L
CO2 SERPL-SCNC: 22 MMOL/L
CREAT SERPL-MCNC: 0.62 MG/DL
GLUCOSE SERPL-MCNC: 81 MG/DL
POTASSIUM SERPL-SCNC: 4.4 MMOL/L
PROT SERPL-MCNC: 7.4 G/DL
SODIUM SERPL-SCNC: 137 MMOL/L

## 2021-08-25 PROCEDURE — 78815 PET IMAGE W/CT SKULL-THIGH: CPT | Mod: 26,PS

## 2021-08-25 PROCEDURE — 78815 PET IMAGE W/CT SKULL-THIGH: CPT

## 2021-08-25 PROCEDURE — A9552: CPT

## 2021-08-30 NOTE — PHYSICAL EXAM
[Restricted in physically strenuous activity but ambulatory and able to carry out work of a light or sedentary nature] : Status 1- Restricted in physically strenuous activity but ambulatory and able to carry out work of a light or sedentary nature, e.g., light house work, office work [Normal] : affect appropriate [de-identified] : left 9:00-10:00 breast mass 5x4cm reaching skin, s/p healing ulceration 4x3.5, left axillary LN now not palpable; right breast no masses [de-identified] : wearing TLSO brace

## 2021-08-30 NOTE — HISTORY OF PRESENT ILLNESS
[de-identified] : Pt noticed a left breast mass x 2 months for which she did not seek medical attention, denied any pain, ulceration, bleeding or nipple changes/discharge.  She also c/o lower back pain for 2-3 months, associated with pain radiating down the lower extremities relieved with Tylenol prn.  Also reports intermittent upper back pain.   No fevers, night sweats, weight loss.  Reports fatigue and poor appetite.  She went to an urgent care in [place] for back pain and breast mass and was given a referral for mammo/US.  \par \par Mammogram and breast US on 4/7/2021 showed  5.6x3.2x5.3cm hypoechoic irregularly-shaped mass with angular margins 9-11:00 5 to 9cm from nipple; left axilla - enlarged 4.0x2.1x3.4cm lymph node, US biopsy rec, BIRADS4.\par \par On 4/20/2021, pt underwent an US guided left breast core biopsy which showed invasive moderately differentiated ductal carcinoma, [place] score 6/9 (3+2+10), invasive tumor at least 1.8cm, no DCIS, no LVI, microcalcifications present, ER 90%, ID 2%, HER2 2+, CISH negative.  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  \par \par For the back pain, pt underwent an MRI lumbar spine on 4/22/2021 which showed metastatic disease in lower thoracic spine, lumbar spine and upper sacrum with severe pathologic fracture of L2 with retropulsion of the posterior endplate causing moderate canal stenosis. \par \par Risk factors:  No prior disease or biopsies. Menarche: age 12, Postmenopausal age 51; 2 pregnancies, 1 miscarriage, 1 living child daughter 19, 2 years of breastfeeding. No OCP or HRT.  Family history is significant for mother had cervical cancer age 64 and father with prostate cancer age 67.  No cancer of the breast, ovary, endometrium, and pancreatic cancer.  The patient is of [place] ethnic background.  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  \par Ibrance added on 6/22/2021, c/b grade 4 neutropenia. On Ibrance 100mg 3 weeks on 1 week off. C2D24 today\par Feels breast mass is getting smaller and left arm is less swollen. Denies breathing difficulties, chest pain, cough, fevers, abd pain, n/v/d.  \par Walking with walker\par saw Dr. Cynthia Kim 7/6/21 for fu post RT.\par saw IR Dr. Julio Gordon 6/23/2021, doing well post L2 vertebral augmentation. s/p kyphoplasty and RT at [facility]\par Denies back pain, c/o mild dullness when lying down. Rarely uses oxycodone. Feels pain on exertion.  \par 8/26 seeing new dentist Dr. Sandhya Power, finishing up cavities, needs clearance for bisphosphate\par [place]\par Pt is deferring COVID vaccine, recommended to get vaccine

## 2021-08-30 NOTE — ASSESSMENT
[FreeTextEntry1] : ROGELIO PEARSON  is here for metastatic breast cancer to spine, lymph nodes and lungs, ER 90%, [place] 2%, HER2 negative,  She is s/p L2 kyphoplasty and RT for severe pathologic compression fracture.  Pt is currently on tx with ibrance and letrozole.\par \par -ibrance started on 6/22/2021, c/b grade 4 neutropenia.  Ibrance dose lowered to 100mg 3 weeks on 1 week off.  C2D24 today.  ANC 1470 today, neutropenia grade 1.  Continue ibrance 100mg next cycle.\par -c/w letrozole 2.5mg daily\par -clinically improving, tumor markers downtrending and now stable\par -last PET/CT was on 5/6/2021.  Plan to repeat 8/2021, ordered.\par -plan to continue bisphosphates to prevent skeletal related events, obtain dental clearance\par -I encouraged her to call me with any questions. \par

## 2021-09-03 ENCOUNTER — OUTPATIENT (OUTPATIENT)
Dept: OUTPATIENT SERVICES | Facility: HOSPITAL | Age: 56
LOS: 1 days | Discharge: HOME | End: 2021-09-03

## 2021-09-03 DIAGNOSIS — C96.9 MALIGNANT NEOPLASM OF LYMPHOID, HEMATOPOIETIC AND RELATED TISSUE, UNSPECIFIED: ICD-10-CM

## 2021-09-07 ENCOUNTER — RESULT REVIEW (OUTPATIENT)
Age: 56
End: 2021-09-07

## 2021-09-07 ENCOUNTER — APPOINTMENT (OUTPATIENT)
Dept: HEMATOLOGY ONCOLOGY | Facility: CLINIC | Age: 56
End: 2021-09-07
Payer: MEDICAID

## 2021-09-07 VITALS
RESPIRATION RATE: 16 BRPM | WEIGHT: 116.84 LBS | BODY MASS INDEX: 19.14 KG/M2 | TEMPERATURE: 98 F | OXYGEN SATURATION: 95 % | HEART RATE: 84 BPM | DIASTOLIC BLOOD PRESSURE: 87 MMHG | SYSTOLIC BLOOD PRESSURE: 141 MMHG

## 2021-09-07 LAB
BASOPHILS # BLD AUTO: 0 K/UL — SIGNIFICANT CHANGE UP (ref 0–0.2)
BASOPHILS NFR BLD AUTO: 0 % — SIGNIFICANT CHANGE UP (ref 0–2)
EOSINOPHIL # BLD AUTO: 0 K/UL — SIGNIFICANT CHANGE UP (ref 0–0.5)
EOSINOPHIL NFR BLD AUTO: 0 % — SIGNIFICANT CHANGE UP (ref 0–6)
HCT VFR BLD CALC: 37.1 % — SIGNIFICANT CHANGE UP (ref 34.5–45)
HGB BLD-MCNC: 12.9 G/DL — SIGNIFICANT CHANGE UP (ref 11.5–15.5)
LYMPHOCYTES # BLD AUTO: 0.92 K/UL — LOW (ref 1–3.3)
LYMPHOCYTES # BLD AUTO: 29 % — SIGNIFICANT CHANGE UP (ref 13–44)
MCHC RBC-ENTMCNC: 30.9 PG — SIGNIFICANT CHANGE UP (ref 27–34)
MCHC RBC-ENTMCNC: 34.8 G/DL — SIGNIFICANT CHANGE UP (ref 32–36)
MCV RBC AUTO: 88.8 FL — SIGNIFICANT CHANGE UP (ref 80–100)
MONOCYTES # BLD AUTO: 0.32 K/UL — SIGNIFICANT CHANGE UP (ref 0–0.9)
MONOCYTES NFR BLD AUTO: 10 % — SIGNIFICANT CHANGE UP (ref 2–14)
NEUTROPHILS # BLD AUTO: 1.93 K/UL — SIGNIFICANT CHANGE UP (ref 1.8–7.4)
NEUTROPHILS NFR BLD AUTO: 61 % — SIGNIFICANT CHANGE UP (ref 43–77)
NRBC # BLD: 0 /100 — SIGNIFICANT CHANGE UP (ref 0–0)
NRBC # BLD: SIGNIFICANT CHANGE UP /100 WBCS (ref 0–0)
PLAT MORPH BLD: NORMAL — SIGNIFICANT CHANGE UP
PLATELET # BLD AUTO: 346 K/UL — SIGNIFICANT CHANGE UP (ref 150–400)
RBC # BLD: 4.18 M/UL — SIGNIFICANT CHANGE UP (ref 3.8–5.2)
RBC # FLD: 18.2 % — HIGH (ref 10.3–14.5)
RBC BLD AUTO: SIGNIFICANT CHANGE UP
WBC # BLD: 3.16 K/UL — LOW (ref 3.8–10.5)
WBC # FLD AUTO: 3.16 K/UL — LOW (ref 3.8–10.5)

## 2021-09-07 PROCEDURE — 99214 OFFICE O/P EST MOD 30 MIN: CPT

## 2021-09-15 ENCOUNTER — NON-APPOINTMENT (OUTPATIENT)
Age: 56
End: 2021-09-15

## 2021-09-15 NOTE — PHYSICAL EXAM
[Restricted in physically strenuous activity but ambulatory and able to carry out work of a light or sedentary nature] : Status 1- Restricted in physically strenuous activity but ambulatory and able to carry out work of a light or sedentary nature, e.g., light house work, office work [Normal] : affect appropriate [de-identified] : left 9:00-10:00 breast mass 5x4cm reaching skin, s/p healing ulceration 4x3.5, left axillary LN now not palpable; right breast no masses [de-identified] : wearing TLSO brace

## 2021-09-15 NOTE — HISTORY OF PRESENT ILLNESS
[de-identified] : Pt noticed a left breast mass x 2 months for which she did not seek medical attention, denied any pain, ulceration, bleeding or nipple changes/discharge.  She also c/o lower back pain for 2-3 months, associated with pain radiating down the lower extremities relieved with Tylenol prn.  Also reports intermittent upper back pain.   No fevers, night sweats, weight loss.  Reports fatigue and poor appetite.  She went to an urgent care in [place] for back pain and breast mass and was given a referral for mammo/US.  \par \par Mammogram and breast US on 4/7/2021 showed  5.6x3.2x5.3cm hypoechoic irregularly-shaped mass with angular margins 9-11:00 5 to 9cm from nipple; left axilla - enlarged 4.0x2.1x3.4cm lymph node, US biopsy rec, [id number].\par \par On 4/20/2021, pt underwent an US guided left breast core biopsy which showed invasive moderately differentiated ductal carcinoma, [place] score 6/9 (3+2+10), invasive tumor at least 1.8cm, no DCIS, no LVI, microcalcifications present, ER 90%, [place] 2%, HER2 2+, CISH negative.  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  \par \par For the back pain, pt underwent an MRI lumbar spine on 4/22/2021 which showed metastatic disease in lower thoracic spine, lumbar spine and upper sacrum with severe pathologic fracture of L2 with retropulsion of the posterior endplate causing moderate canal stenosis. \par \par Risk factors:  No prior disease or biopsies. Menarche: age 12, Postmenopausal age 51; 2 pregnancies, 1 miscarriage, 1 living child daughter 19, 2 years of breastfeeding. No OCP or HRT.  Family history is significant for mother had cervical cancer age 64 and father with prostate cancer age 67.  No cancer of the breast, ovary, endometrium, and pancreatic cancer.  The patient is of [place] ethnic background.  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  \par Ibrance added on 6/22/2021, c/b grade 4 neutropenia. On Ibrance 100mg 3 weeks on 1 week off. C3D14 today\par PET scan 8/31 mild increase in pleural effusion, otherwise partial response.\par Feels breast mass is getting smaller and left arm is less swollen. Denies breathing difficulties, chest pain, cough, fevers, abd pain, n/v/d.  \par Constipation relieved with miralax\par Walking with walker\par saw Dr. Cynthia Kim 7/6/21 for fu post RT.\par saw IR Dr. Julio Gordon 6/23/2021, doing well post L2 vertebral augmentation. s/p kyphoplasty and RT at [facility]\par Denies back pain, c/o mild dullness when lying down. Rarely uses oxycodone. Feels pain on exertion.  \par BP management at home, taking novasc 2.5mg daily\par 8/26 seeing new dentist Dr. Sandhya Power, finishing up cavities, needs clearance for bisphosphate\par Pt is deferring COVID vaccine, recommended to get vaccine

## 2021-09-15 NOTE — ASSESSMENT
[FreeTextEntry1] : ROGELIO PEARSON  is here for metastatic breast cancer to spine, lymph nodes and lungs, ER 90%, [place] 2%, HER2 negative,  She is s/p L2 kyphoplasty and RT for severe pathologic compression fracture.  Pt is currently on tx with ibrance and letrozole.\par \par -ibrance started on 6/22/2021, c/b grade 4 neutropenia.  Ibrance dose lowered to 100mg 3 weeks on 1 week off.  C3D14 today.  ANC 1.93 today, neutropenia grade 1.  Continue ibrance 100mg next cycle and check cbc monthly.\par -c/w letrozole 2.5mg daily\par -clinically improving, tumor markers downtrending and now stable\par -reviewed PET/CT 8/31/2021, partial response in all areas including breast, pulmonary nodules, lytic lesions in axial skeleton, resolution of bilateral, cervical and left supraclavicular LNs.  Small to moderate left pleural effusion mildly increased, pt asymptomatic.  Next PET 11/31/21.\par -plan to start bisphosphates pending dental clearance, pt will follow up\par -I encouraged her to call me with any questions. \par -FU in 1 month or sooner\par

## 2021-09-30 ENCOUNTER — OUTPATIENT (OUTPATIENT)
Dept: OUTPATIENT SERVICES | Facility: HOSPITAL | Age: 56
LOS: 1 days | Discharge: HOME | End: 2021-09-30

## 2021-09-30 DIAGNOSIS — C96.6 UNIFOCAL LANGERHANS-CELL HISTIOCYTOSIS: ICD-10-CM

## 2021-10-05 ENCOUNTER — RESULT REVIEW (OUTPATIENT)
Age: 56
End: 2021-10-05

## 2021-10-05 ENCOUNTER — APPOINTMENT (OUTPATIENT)
Dept: HEMATOLOGY ONCOLOGY | Facility: CLINIC | Age: 56
End: 2021-10-05
Payer: MEDICAID

## 2021-10-05 ENCOUNTER — APPOINTMENT (OUTPATIENT)
Dept: INFUSION THERAPY | Facility: HOSPITAL | Age: 56
End: 2021-10-05

## 2021-10-05 VITALS
RESPIRATION RATE: 16 BRPM | HEART RATE: 86 BPM | SYSTOLIC BLOOD PRESSURE: 135 MMHG | TEMPERATURE: 97.4 F | BODY MASS INDEX: 20.35 KG/M2 | WEIGHT: 114.86 LBS | HEIGHT: 62.99 IN | OXYGEN SATURATION: 96 % | DIASTOLIC BLOOD PRESSURE: 81 MMHG

## 2021-10-05 LAB
BASOPHILS # BLD AUTO: 0 K/UL — SIGNIFICANT CHANGE UP (ref 0–0.2)
BASOPHILS NFR BLD AUTO: 0 % — SIGNIFICANT CHANGE UP (ref 0–2)
EOSINOPHIL # BLD AUTO: 0.02 K/UL — SIGNIFICANT CHANGE UP (ref 0–0.5)
EOSINOPHIL NFR BLD AUTO: 1 % — SIGNIFICANT CHANGE UP (ref 0–6)
HCT VFR BLD CALC: 35.8 % — SIGNIFICANT CHANGE UP (ref 34.5–45)
HGB BLD-MCNC: 12.4 G/DL — SIGNIFICANT CHANGE UP (ref 11.5–15.5)
LYMPHOCYTES # BLD AUTO: 1.03 K/UL — SIGNIFICANT CHANGE UP (ref 1–3.3)
LYMPHOCYTES # BLD AUTO: 46 % — HIGH (ref 13–44)
MCHC RBC-ENTMCNC: 31.7 PG — SIGNIFICANT CHANGE UP (ref 27–34)
MCHC RBC-ENTMCNC: 34.6 G/DL — SIGNIFICANT CHANGE UP (ref 32–36)
MCV RBC AUTO: 91.6 FL — SIGNIFICANT CHANGE UP (ref 80–100)
MONOCYTES # BLD AUTO: 0.22 K/UL — SIGNIFICANT CHANGE UP (ref 0–0.9)
MONOCYTES NFR BLD AUTO: 10 % — SIGNIFICANT CHANGE UP (ref 2–14)
NEUTROPHILS # BLD AUTO: 0.96 K/UL — LOW (ref 1.8–7.4)
NEUTROPHILS NFR BLD AUTO: 43 % — SIGNIFICANT CHANGE UP (ref 43–77)
NRBC # BLD: 1 /100 — HIGH (ref 0–0)
NRBC # BLD: SIGNIFICANT CHANGE UP /100 WBCS (ref 0–0)
PLAT MORPH BLD: NORMAL — SIGNIFICANT CHANGE UP
PLATELET # BLD AUTO: 296 K/UL — SIGNIFICANT CHANGE UP (ref 150–400)
RBC # BLD: 3.91 M/UL — SIGNIFICANT CHANGE UP (ref 3.8–5.2)
RBC # FLD: 17.7 % — HIGH (ref 10.3–14.5)
RBC BLD AUTO: SIGNIFICANT CHANGE UP
WBC # BLD: 2.23 K/UL — LOW (ref 3.8–10.5)
WBC # FLD AUTO: 2.23 K/UL — LOW (ref 3.8–10.5)

## 2021-10-05 PROCEDURE — 99215 OFFICE O/P EST HI 40 MIN: CPT

## 2021-10-06 ENCOUNTER — NON-APPOINTMENT (OUTPATIENT)
Age: 56
End: 2021-10-06

## 2021-10-06 DIAGNOSIS — C79.51 SECONDARY MALIGNANT NEOPLASM OF BONE: ICD-10-CM

## 2021-10-06 LAB
ALBUMIN SERPL ELPH-MCNC: 4.5 G/DL
ALP BLD-CCNC: 283 U/L
ALT SERPL-CCNC: 11 U/L
ANION GAP SERPL CALC-SCNC: 11 MMOL/L
AST SERPL-CCNC: 21 U/L
BILIRUB SERPL-MCNC: 0.2 MG/DL
BUN SERPL-MCNC: 13 MG/DL
CALCIUM SERPL-MCNC: 9.7 MG/DL
CANCER AG27-29 SERPL-ACNC: 19.8 U/ML
CEA SERPL-MCNC: 10 NG/ML
CHLORIDE SERPL-SCNC: 105 MMOL/L
CHOLEST SERPL-MCNC: 235 MG/DL
CO2 SERPL-SCNC: 24 MMOL/L
CREAT SERPL-MCNC: 0.75 MG/DL
GLUCOSE SERPL-MCNC: 70 MG/DL
HDLC SERPL-MCNC: 58 MG/DL
LDLC SERPL CALC-MCNC: 147 MG/DL
NONHDLC SERPL-MCNC: 178 MG/DL
POTASSIUM SERPL-SCNC: 4 MMOL/L
PROT SERPL-MCNC: 7.6 G/DL
SODIUM SERPL-SCNC: 140 MMOL/L
TRIGL SERPL-MCNC: 153 MG/DL

## 2021-10-07 ENCOUNTER — NON-APPOINTMENT (OUTPATIENT)
Age: 56
End: 2021-10-07

## 2021-10-12 ENCOUNTER — RESULT REVIEW (OUTPATIENT)
Age: 56
End: 2021-10-12

## 2021-10-12 ENCOUNTER — APPOINTMENT (OUTPATIENT)
Dept: HEMATOLOGY ONCOLOGY | Facility: CLINIC | Age: 56
End: 2021-10-12

## 2021-10-12 LAB
BASOPHILS # BLD AUTO: 0 K/UL — SIGNIFICANT CHANGE UP (ref 0–0.2)
BASOPHILS NFR BLD AUTO: 0 % — SIGNIFICANT CHANGE UP (ref 0–2)
BLASTS # FLD: 1 % — HIGH (ref 0–0)
EOSINOPHIL # BLD AUTO: 0.02 K/UL — SIGNIFICANT CHANGE UP (ref 0–0.5)
EOSINOPHIL NFR BLD AUTO: 1 % — SIGNIFICANT CHANGE UP (ref 0–6)
HCT VFR BLD CALC: 35.8 % — SIGNIFICANT CHANGE UP (ref 34.5–45)
HGB BLD-MCNC: 12.1 G/DL — SIGNIFICANT CHANGE UP (ref 11.5–15.5)
LYMPHOCYTES # BLD AUTO: 1.06 K/UL — SIGNIFICANT CHANGE UP (ref 1–3.3)
LYMPHOCYTES # BLD AUTO: 59 % — HIGH (ref 13–44)
MCHC RBC-ENTMCNC: 31.9 PG — SIGNIFICANT CHANGE UP (ref 27–34)
MCHC RBC-ENTMCNC: 33.8 G/DL — SIGNIFICANT CHANGE UP (ref 32–36)
MCV RBC AUTO: 94.5 FL — SIGNIFICANT CHANGE UP (ref 80–100)
MONOCYTES # BLD AUTO: 0.22 K/UL — SIGNIFICANT CHANGE UP (ref 0–0.9)
MONOCYTES NFR BLD AUTO: 12 % — SIGNIFICANT CHANGE UP (ref 2–14)
NEUTROPHILS # BLD AUTO: 0.49 K/UL — LOW (ref 1.8–7.4)
NEUTROPHILS NFR BLD AUTO: 27 % — LOW (ref 43–77)
NRBC # BLD: 0 /100 — SIGNIFICANT CHANGE UP (ref 0–0)
NRBC # BLD: SIGNIFICANT CHANGE UP /100 WBCS (ref 0–0)
PLAT MORPH BLD: NORMAL — SIGNIFICANT CHANGE UP
PLATELET # BLD AUTO: 161 K/UL — SIGNIFICANT CHANGE UP (ref 150–400)
RBC # BLD: 3.79 M/UL — LOW (ref 3.8–5.2)
RBC # FLD: 17.7 % — HIGH (ref 10.3–14.5)
RBC BLD AUTO: SIGNIFICANT CHANGE UP
WBC # BLD: 1.8 K/UL — LOW (ref 3.8–10.5)
WBC # FLD AUTO: 1.8 K/UL — LOW (ref 3.8–10.5)

## 2021-10-12 NOTE — ASSESSMENT
[FreeTextEntry1] : ROGELIO PEARSON  is here for metastatic breast cancer to spine, lymph nodes and lungs, ER 90%, [place] 2%, HER2 negative,  She is s/p L2 kyphoplasty and RT for severe pathologic compression fracture.  Pt is currently on tx with ibrance and letrozole.\par \par -ibrance started on 6/22/2021, c/b grade 4 neutropenia.  Ibrance dose lowered to 100mg 3 weeks on 1 week off.  10/5 C4D15 , neutropenia grade 3.  Continue ibrance 100mg to finish the week and repeat CBC next week.\par -c/w letrozole 2.5mg daily\par -clinically improving, tumor markers downtrending and now stable\par -reviewed PET/CT 8/31/2021, partial response in all areas including breast, pulmonary nodules, lytic lesions in axial skeleton, resolution of bilateral, cervical and left supraclavicular LNs.  Small to moderate left pleural effusion mildly increased, pt asymptomatic.  Next PET 11/31/21.\par -c/w monthly xgeva\par -I encouraged her to call me with any questions. \par -FU 10/19\par

## 2021-10-12 NOTE — HISTORY OF PRESENT ILLNESS
[de-identified] : Pt noticed a left breast mass x 2 months for which she did not seek medical attention, denied any pain, ulceration, bleeding or nipple changes/discharge.  She also c/o lower back pain for 2-3 months, associated with pain radiating down the lower extremities relieved with Tylenol prn.  Also reports intermittent upper back pain.   No fevers, night sweats, weight loss.  Reports fatigue and poor appetite.  She went to an urgent care in [place] for back pain and breast mass and was given a referral for mammo/US.  \par \par Mammogram and breast US on 4/7/2021 showed  5.6x3.2x5.3cm hypoechoic irregularly-shaped mass with angular margins 9-11:00 5 to 9cm from nipple; left axilla - enlarged 4.0x2.1x3.4cm lymph node, US biopsy rec, [id number].\par \par On 4/20/2021, pt underwent an US guided left breast core biopsy which showed invasive moderately differentiated ductal carcinoma, [place] score 6/9 (3+2+10), invasive tumor at least 1.8cm, no DCIS, no LVI, microcalcifications present, ER 90%, [place] 2%, HER2 2+, CISH negative.  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  \par \par For the back pain, pt underwent an MRI lumbar spine on 4/22/2021 which showed metastatic disease in lower thoracic spine, lumbar spine and upper sacrum with severe pathologic fracture of L2 with retropulsion of the posterior endplate causing moderate canal stenosis. \par \par Risk factors:  No prior disease or biopsies. Menarche: age 12, Postmenopausal age 51; 2 pregnancies, 1 miscarriage, 1 living child daughter 19, 2 years of breastfeeding. No OCP or HRT.  Family history is significant for mother had cervical cancer age 64 and father with prostate cancer age 67.  No cancer of the breast, ovary, endometrium, and pancreatic cancer.  The patient is of [place] ethnic background.  No ETOH or tobacco.\par Screening: No prior colonoscopy, pap smear 2-3 years ago reportedly normal.\par Previous certified nursing assistant.\par \par 7/6 C1D15 - held and then restarted at Ibrance 100mg\par 7/23 C2 ANC 1930\par 8/6 C2D15 \par 8/13 C2D22 \par 8/24 C3D1 ANC 1470\par 9/7 C3D15 ANC 1930\par 9/21 C4D1\par 10/5 C4D15  [de-identified] : \par Continues on letrozole daily, tolerating well.  \par Ibrance added on 6/22/2021, c/b grade 4 neutropenia. Decreased Ibrance to 100mg 3 weeks on 1 week off. C4D15 today\par Denies fevers, cough, SOB, diarrhea, abd pain, n/v, LE swelling, rash.  Constipation improved.  Overall feels well. \par Feels breast mass is getting smaller and left arm is less swollen. Denies breathing difficulties, chest pain, cough, fevers, abd pain, n/v/d.  \par Walking with walker. \par saw Dr. Cynthia Kim 7/6/21 for fu post RT.\par saw IR Dr. Julio Gordon 6/23/2021, doing well post L2 vertebral augmentation. s/p kyphoplasty and RT at [facility]\par Denies back pain, c/o mild dullness when lying down. Rarely uses oxycodone. Feels pain on exertion.  \par BP management at home, taking novasc 2.5mg daily\par 8/26 seeing new dentist Dr. Sandhya Power, finishing up cavities, now on monthly xgeva\par Pt is deferring COVID vaccine, recommended to get vaccine

## 2021-10-12 NOTE — DISCHARGE NOTE NURSING/CASE MANAGEMENT/SOCIAL WORK - NSDCCRTYPESERV_GEN_ALL_CORE_FT
subacute rehab, ambulette  Cosentyx Counseling:  I discussed with the patient the risks of Cosentyx including but not limited to worsening of Crohn's disease, immunosuppression, allergic reactions and infections.  The patient understands that monitoring is required including a PPD at baseline and must alert us or the primary physician if symptoms of infection or other concerning signs are noted.

## 2021-10-12 NOTE — PHYSICAL EXAM
[Restricted in physically strenuous activity but ambulatory and able to carry out work of a light or sedentary nature] : Status 1- Restricted in physically strenuous activity but ambulatory and able to carry out work of a light or sedentary nature, e.g., light house work, office work [Normal] : affect appropriate [de-identified] : left 9:00-10:00 breast mass 5x4cm reaching skin, s/p healing ulceration 4x3.5, left axillary LN now not palpable; right breast no masses [de-identified] : wearing TLSO brace

## 2021-10-13 ENCOUNTER — NON-APPOINTMENT (OUTPATIENT)
Age: 56
End: 2021-10-13

## 2021-10-19 ENCOUNTER — RESULT REVIEW (OUTPATIENT)
Age: 56
End: 2021-10-19

## 2021-10-19 ENCOUNTER — APPOINTMENT (OUTPATIENT)
Dept: HEMATOLOGY ONCOLOGY | Facility: CLINIC | Age: 56
End: 2021-10-19
Payer: MEDICAID

## 2021-10-19 VITALS
HEART RATE: 105 BPM | SYSTOLIC BLOOD PRESSURE: 123 MMHG | OXYGEN SATURATION: 96 % | TEMPERATURE: 97.4 F | HEIGHT: 62.99 IN | BODY MASS INDEX: 20.31 KG/M2 | RESPIRATION RATE: 16 BRPM | DIASTOLIC BLOOD PRESSURE: 83 MMHG | WEIGHT: 114.64 LBS

## 2021-10-19 LAB
BASOPHILS # BLD AUTO: 0.04 K/UL — SIGNIFICANT CHANGE UP (ref 0–0.2)
BASOPHILS NFR BLD AUTO: 1.4 % — SIGNIFICANT CHANGE UP (ref 0–2)
EOSINOPHIL # BLD AUTO: 0.03 K/UL — SIGNIFICANT CHANGE UP (ref 0–0.5)
EOSINOPHIL NFR BLD AUTO: 1.1 % — SIGNIFICANT CHANGE UP (ref 0–6)
HCT VFR BLD CALC: 36.6 % — SIGNIFICANT CHANGE UP (ref 34.5–45)
HGB BLD-MCNC: 12.3 G/DL — SIGNIFICANT CHANGE UP (ref 11.5–15.5)
IMM GRANULOCYTES NFR BLD AUTO: 0 % — SIGNIFICANT CHANGE UP (ref 0–1.5)
LYMPHOCYTES # BLD AUTO: 1.16 K/UL — SIGNIFICANT CHANGE UP (ref 1–3.3)
LYMPHOCYTES # BLD AUTO: 42 % — SIGNIFICANT CHANGE UP (ref 13–44)
MCHC RBC-ENTMCNC: 31.9 PG — SIGNIFICANT CHANGE UP (ref 27–34)
MCHC RBC-ENTMCNC: 33.6 G/DL — SIGNIFICANT CHANGE UP (ref 32–36)
MCV RBC AUTO: 95.1 FL — SIGNIFICANT CHANGE UP (ref 80–100)
MONOCYTES # BLD AUTO: 0.62 K/UL — SIGNIFICANT CHANGE UP (ref 0–0.9)
MONOCYTES NFR BLD AUTO: 22.5 % — HIGH (ref 2–14)
NEUTROPHILS # BLD AUTO: 0.91 K/UL — LOW (ref 1.8–7.4)
NEUTROPHILS NFR BLD AUTO: 33 % — LOW (ref 43–77)
NRBC # BLD: 0 /100 WBCS — SIGNIFICANT CHANGE UP (ref 0–0)
PLATELET # BLD AUTO: 234 K/UL — SIGNIFICANT CHANGE UP (ref 150–400)
RBC # BLD: 3.85 M/UL — SIGNIFICANT CHANGE UP (ref 3.8–5.2)
RBC # FLD: 18.5 % — HIGH (ref 10.3–14.5)
WBC # BLD: 2.76 K/UL — LOW (ref 3.8–10.5)
WBC # FLD AUTO: 2.76 K/UL — LOW (ref 3.8–10.5)

## 2021-10-19 PROCEDURE — 99215 OFFICE O/P EST HI 40 MIN: CPT

## 2021-10-19 RX ORDER — PALBOCICLIB 100 MG/1
100 TABLET, FILM COATED ORAL
Qty: 21 | Refills: 1 | Status: DISCONTINUED | COMMUNITY
Start: 2021-07-23 | End: 2021-10-19

## 2021-10-19 RX ORDER — PALBOCICLIB 75 MG/1
75 CAPSULE ORAL DAILY
Qty: 21 | Refills: 1 | Status: DISCONTINUED | COMMUNITY
Start: 2021-10-19 | End: 2021-10-19

## 2021-10-19 NOTE — HISTORY OF PRESENT ILLNESS
[de-identified] : Pt noticed a left breast mass x 2 months for which she did not seek medical attention, denied any pain, ulceration, bleeding or nipple changes/discharge.  She also c/o lower back pain for 2-3 months, associated with pain radiating down the lower extremities relieved with Tylenol prn.  Also reports intermittent upper back pain.   No fevers, night sweats, weight loss.  Reports fatigue and poor appetite.  She went to an urgent care in [place] for back pain and breast mass and was given a referral for mammo/US.  \par \par Mammogram and breast US on 4/7/2021 showed  5.6x3.2x5.3cm hypoechoic irregularly-shaped mass with angular margins 9-11:00 5 to 9cm from nipple; left axilla - enlarged 4.0x2.1x3.4cm lymph node, US biopsy rec, [id number].\par \par On 4/20/2021, pt underwent an US guided left breast core biopsy which showed invasive moderately differentiated ductal carcinoma, [place] score 6/9 (3+2+10), invasive tumor at least 1.8cm, no DCIS, no LVI, microcalcifications present, ER 90%, [place] 2%, HER2 2+, CISH negative.  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  \par \par For the back pain, pt underwent an MRI lumbar spine on 4/22/2021 which showed metastatic disease in lower thoracic spine, lumbar spine and upper sacrum with severe pathologic fracture of L2 with retropulsion of the posterior endplate causing moderate canal stenosis. \par \par Risk factors:  No prior disease or biopsies. Menarche: age 12, Postmenopausal age 51; 2 pregnancies, 1 miscarriage, 1 living child daughter 19, 2 years of breastfeeding. No OCP or HRT.  Family history is significant for mother had cervical cancer age 64 and father with prostate cancer age 67.  No cancer of the breast, ovary, endometrium, and pancreatic cancer.  The patient is of [place] ethnic background.  No ETOH or tobacco.\par Screening: No prior colonoscopy, pap smear 2-3 years ago reportedly normal.\par Previous certified nursing assistant.\par \par 7/6 C1D15 - held and then restarted at Ibrance 100mg\par 7/23 C2 ANC 1930\par 8/6 C2D15 \par 8/13 C2D22 \par 8/24 C3D1 ANC 1470\par 9/7 C3D15 ANC 1930\par 9/21 C4D1\par 10/5 C4D15  [de-identified] : \par Continues on letrozole daily, tolerating well.  \par Ibrance added on 6/22/2021, c/b grade 4 neutropenia. Decreased Ibrance to 100mg 3 weeks on 1 week off. C5D1 today.\par Denies fevers, cough, SOB, diarrhea, abd pain, n/v, LE swelling, rash.  Constipation improved.  Overall feels well. \par Feels breast mass is getting smaller and left arm is less swollen. Denies breathing difficulties, chest pain, cough, fevers, abd pain, n/v/d.  \par Walking independently.\par saw Dr. Cynthia Kim 7/6/21 for fu post RT.\par saw IR Dr. Julio Gordon 6/23/2021, doing well post L2 vertebral augmentation. s/p kyphoplasty and RT at [facility]\par Denies back pain, c/o mild dullness when lying down. Rarely uses oxycodone. Feels pain on exertion.  \par BP management at home, taking novasc 2.5mg daily\par 8/26 seeing new dentist Dr. Sandhya Power, finishing up cavities, now on monthly xgeva\par Pt is deferring COVID vaccine, recommended to get vaccine

## 2021-10-19 NOTE — PHYSICAL EXAM
[Restricted in physically strenuous activity but ambulatory and able to carry out work of a light or sedentary nature] : Status 1- Restricted in physically strenuous activity but ambulatory and able to carry out work of a light or sedentary nature, e.g., light house work, office work [Normal] : affect appropriate [de-identified] : left 9:00-10:00 breast mass 5x4cm reaching skin, s/p healing ulceration 4x3.5, left axillary LN now not palpable; right breast no masses [de-identified] : wearing TLSO brace

## 2021-10-19 NOTE — ASSESSMENT
[FreeTextEntry1] : ROGELIO PEARSON  is here for metastatic breast cancer to spine, lymph nodes and lungs, ER 90%, [place] 2%, HER2 negative,  She is s/p L2 kyphoplasty and RT for severe pathologic compression fracture.  Pt is currently on tx with ibrance and letrozole.\par \par -C5D1 today, , grade 3 neuropenia.  Will decrease Ibrance to 75mg daily and fu in 2 weeks for cbc check. \par -c/w letrozole 2.5mg daily\par -clinically improving, tumor markers downtrending and now stable\par -reviewed PET/CT 8/31/2021, partial response in all areas including breast, pulmonary nodules, lytic lesions in axial skeleton, resolution of bilateral, cervical and left supraclavicular LNs.  Small to moderate left pleural effusion mildly increased, pt asymptomatic.  Next PET 11/31/21.\par -c/w monthly xgeva\par -I encouraged her to call me with any questions. \par -FU 10/19\par

## 2021-10-30 ENCOUNTER — OUTPATIENT (OUTPATIENT)
Dept: OUTPATIENT SERVICES | Facility: HOSPITAL | Age: 56
LOS: 1 days | Discharge: HOME | End: 2021-10-30

## 2021-10-30 DIAGNOSIS — Z98.890 OTHER SPECIFIED POSTPROCEDURAL STATES: Chronic | ICD-10-CM

## 2021-10-30 DIAGNOSIS — C96.9 MALIGNANT NEOPLASM OF LYMPHOID, HEMATOPOIETIC AND RELATED TISSUE, UNSPECIFIED: ICD-10-CM

## 2021-11-02 ENCOUNTER — RESULT REVIEW (OUTPATIENT)
Age: 56
End: 2021-11-02

## 2021-11-02 ENCOUNTER — APPOINTMENT (OUTPATIENT)
Dept: INFUSION THERAPY | Facility: HOSPITAL | Age: 56
End: 2021-11-02

## 2021-11-02 ENCOUNTER — APPOINTMENT (OUTPATIENT)
Dept: HEMATOLOGY ONCOLOGY | Facility: CLINIC | Age: 56
End: 2021-11-02
Payer: MEDICAID

## 2021-11-02 VITALS
RESPIRATION RATE: 16 BRPM | TEMPERATURE: 97.3 F | HEIGHT: 63 IN | HEART RATE: 93 BPM | SYSTOLIC BLOOD PRESSURE: 125 MMHG | DIASTOLIC BLOOD PRESSURE: 80 MMHG | OXYGEN SATURATION: 99 % | WEIGHT: 117.95 LBS | BODY MASS INDEX: 20.9 KG/M2

## 2021-11-02 LAB
BASOPHILS # BLD AUTO: 0.06 K/UL — SIGNIFICANT CHANGE UP (ref 0–0.2)
BASOPHILS NFR BLD AUTO: 2 % — SIGNIFICANT CHANGE UP (ref 0–2)
EOSINOPHIL # BLD AUTO: 0.03 K/UL — SIGNIFICANT CHANGE UP (ref 0–0.5)
EOSINOPHIL NFR BLD AUTO: 1 % — SIGNIFICANT CHANGE UP (ref 0–6)
HCT VFR BLD CALC: 36.3 % — SIGNIFICANT CHANGE UP (ref 34.5–45)
HGB BLD-MCNC: 12.2 G/DL — SIGNIFICANT CHANGE UP (ref 11.5–15.5)
LYMPHOCYTES # BLD AUTO: 0.84 K/UL — LOW (ref 1–3.3)
LYMPHOCYTES # BLD AUTO: 28 % — SIGNIFICANT CHANGE UP (ref 13–44)
MCHC RBC-ENTMCNC: 32.7 PG — SIGNIFICANT CHANGE UP (ref 27–34)
MCHC RBC-ENTMCNC: 33.6 G/DL — SIGNIFICANT CHANGE UP (ref 32–36)
MCV RBC AUTO: 97.3 FL — SIGNIFICANT CHANGE UP (ref 80–100)
MONOCYTES # BLD AUTO: 0.24 K/UL — SIGNIFICANT CHANGE UP (ref 0–0.9)
MONOCYTES NFR BLD AUTO: 8 % — SIGNIFICANT CHANGE UP (ref 2–14)
NEUTROPHILS # BLD AUTO: 1.82 K/UL — SIGNIFICANT CHANGE UP (ref 1.8–7.4)
NEUTROPHILS NFR BLD AUTO: 61 % — SIGNIFICANT CHANGE UP (ref 43–77)
NRBC # BLD: 0 /100 — SIGNIFICANT CHANGE UP (ref 0–0)
NRBC # BLD: SIGNIFICANT CHANGE UP /100 WBCS (ref 0–0)
PLAT MORPH BLD: NORMAL — SIGNIFICANT CHANGE UP
PLATELET # BLD AUTO: 365 K/UL — SIGNIFICANT CHANGE UP (ref 150–400)
RBC # BLD: 3.73 M/UL — LOW (ref 3.8–5.2)
RBC # FLD: 18.3 % — HIGH (ref 10.3–14.5)
RBC BLD AUTO: SIGNIFICANT CHANGE UP
WBC # BLD: 2.99 K/UL — LOW (ref 3.8–10.5)
WBC # FLD AUTO: 2.99 K/UL — LOW (ref 3.8–10.5)

## 2021-11-02 PROCEDURE — 99215 OFFICE O/P EST HI 40 MIN: CPT

## 2021-11-02 NOTE — HISTORY OF PRESENT ILLNESS
[de-identified] : Pt noticed a left breast mass x 2 months for which she did not seek medical attention, denied any pain, ulceration, bleeding or nipple changes/discharge.  She also c/o lower back pain for 2-3 months, associated with pain radiating down the lower extremities relieved with Tylenol prn.  Also reports intermittent upper back pain.   No fevers, night sweats, weight loss.  Reports fatigue and poor appetite.  She went to an urgent care in [place] for back pain and breast mass and was given a referral for mammo/US.  \par \par Mammogram and breast US on 4/7/2021 showed  5.6x3.2x5.3cm hypoechoic irregularly-shaped mass with angular margins 9-11:00 5 to 9cm from nipple; left axilla - enlarged 4.0x2.1x3.4cm lymph node, US biopsy rec, [id number].\par \par On 4/20/2021, pt underwent an US guided left breast core biopsy which showed invasive moderately differentiated ductal carcinoma, [place] score 6/9 (3+2+10), invasive tumor at least 1.8cm, no DCIS, no LVI, microcalcifications present, ER 90%, [place] 2%, HER2 2+, CISH negative.  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  \par \par For the back pain, pt underwent an MRI lumbar spine on 4/22/2021 which showed metastatic disease in lower thoracic spine, lumbar spine and upper sacrum with severe pathologic fracture of L2 with retropulsion of the posterior endplate causing moderate canal stenosis. \par \par Risk factors:  No prior disease or biopsies. Menarche: age 12, Postmenopausal age 51; 2 pregnancies, 1 miscarriage, 1 living child daughter 19, 2 years of breastfeeding. No OCP or HRT.  Family history is significant for mother had cervical cancer age 64 and father with prostate cancer age 67.  No cancer of the breast, ovary, endometrium, and pancreatic cancer.  The patient is of [place] ethnic background.  No ETOH or tobacco.\par Screening: No prior colonoscopy, pap smear 2-3 years ago reportedly normal.\par Previous certified nursing assistant.\par \par 7/6 C1D15 - held and then restarted at Ibrance 100mg\par 7/23 C2 ANC 1930\par 8/6 C2D15 \par 8/13 C2D22 \par 8/24 C3D1 ANC 1470\par 9/7 C3D15 ANC 1930\par 9/21 C4D1\par 10/5 C4D15  [de-identified] : \par Continues on letrozole daily, tolerating well.  \par Ibrance added on 6/22/2021, c/b grade 4 neutropenia. Decreased Ibrance to 100mg 3 weeks on 1 week off. C5D14 today.\par Denies fevers, cough, SOB, diarrhea, abd pain, n/v, LE swelling, rash.  Constipation improved.  Overall feels well. \par Feels breast mass is getting smaller and left arm is less swollen. Denies breathing difficulties, chest pain, cough, fevers, abd pain, n/v/d.  \par Walking independently, occasionally uses cane. Denies back pain, c/o mild dullness when lying down. Rarely uses oxycodone. Feels pain on exertion.  Uses back brace for stability. \par saw Dr. Cynthia Kim 7/6/21 for fu post RT.\par saw IR Dr. Julio Gordon 6/23/2021, doing well post L2 vertebral augmentation. s/p kyphoplasty and RT at [facility]\par BP management at home, taking novasc 2.5mg daily\par 8/26 seeing new dentist Dr. Sandhya Power, finishing up cavities, now on monthly xgeva\par Pt is deferring COVID vaccine, recommended to get vaccine

## 2021-11-02 NOTE — PHYSICAL EXAM
[Restricted in physically strenuous activity but ambulatory and able to carry out work of a light or sedentary nature] : Status 1- Restricted in physically strenuous activity but ambulatory and able to carry out work of a light or sedentary nature, e.g., light house work, office work [Normal] : affect appropriate [de-identified] : left 9:00-10:00 breast mass 5x4cm reaching skin, s/p healing ulceration 4x3.5, left axillary LN now not palpable; right breast no masses [de-identified] : wearing TLSO brace

## 2021-11-02 NOTE — ASSESSMENT
[FreeTextEntry1] : ROGELIO PEARSON  is here for metastatic breast cancer to spine, lymph nodes and lungs, ER 90%, [place] 2%, HER2 negative,  She is s/p L2 kyphoplasty and RT for severe pathologic compression fracture.  Pt is currently on tx with ibrance and letrozole.\par \par -C5D14 today, Ibrance decreased to 75mg daily starting C5.  ANC 1820 today, grade 1 neutropenia, continue Ibrance to 75mg daily and fu in 2 weeks.\par -c/w letrozole 2.5mg daily\par -clinically improving, tumor markers downtrending and now stable\par -reviewed PET/CT 8/31/2021, partial response in all areas including breast, pulmonary nodules, lytic lesions in axial skeleton, resolution of bilateral, cervical and left supraclavicular LNs.  Small to moderate left pleural effusion mildly increased, pt asymptomatic.  Next PET 11/31/21, script entered, pt to schedule.\par -c/w monthly xgeva for bone metastases\par -I encouraged her to call me with any questions. \par -FU 11/16\par

## 2021-11-03 ENCOUNTER — NON-APPOINTMENT (OUTPATIENT)
Age: 56
End: 2021-11-03

## 2021-11-03 DIAGNOSIS — C50.919 MALIGNANT NEOPLASM OF UNSPECIFIED SITE OF UNSPECIFIED FEMALE BREAST: ICD-10-CM

## 2021-11-03 DIAGNOSIS — C79.51 SECONDARY MALIGNANT NEOPLASM OF BONE: ICD-10-CM

## 2021-11-11 ENCOUNTER — APPOINTMENT (OUTPATIENT)
Dept: SURGICAL ONCOLOGY | Facility: CLINIC | Age: 56
End: 2021-11-11
Payer: MEDICAID

## 2021-11-11 VITALS
TEMPERATURE: 97.7 F | HEART RATE: 97 BPM | SYSTOLIC BLOOD PRESSURE: 115 MMHG | OXYGEN SATURATION: 98 % | RESPIRATION RATE: 16 BRPM | DIASTOLIC BLOOD PRESSURE: 82 MMHG

## 2021-11-11 PROCEDURE — 99214 OFFICE O/P EST MOD 30 MIN: CPT

## 2021-11-11 NOTE — PHYSICAL EXAM
[Normal] : supple, no neck mass and thyroid not enlarged [Normal Supraclavicular Lymph Nodes] : normal supraclavicular lymph nodes [Normal] : oriented to person, place and time, with appropriate affect [de-identified] : 3 cm area medial aspect of left breast [de-identified] : left axillary adenopathy still present

## 2021-11-11 NOTE — ASSESSMENT
[FreeTextEntry1] : IMP:\par 55 y/o with newly diagnosed invasive moderately differentiated ductal carcinoma, ER+/ME+/HER-2 negative with metastases to the axilla and lumbar spine. \par \par \par PLAN: \par continue care with Dr. Dowd ( patient on Ibrance and letrozole) \par RTO q3 months\par PET/CT November 2021 as per Dr. Dowd

## 2021-11-11 NOTE — HISTORY OF PRESENT ILLNESS
[de-identified] : Ms. ROGELIO PEARSON is a 56 year old female who presents today for follow up for metastatic left breast cancer.\par \par Bilateral mammo/sono 4/7/21: left breast mass at the site a palpable abnormality, enlarged left axillary lymph node, right breast unremarkable. BI-RADS 4. \par \par Ultrasound Guided core biopsy of left breast x 2 sites 4/20/21:\par 1. Breast, left, 9-11 o?clock, core biopsy: Invasive moderately differentiated ductal carcinoma; [place] score 6 / 9 (3 + 2 +1) in this limited material; Invasive tumor measures at least 1.8cm; Ductal carcinoma in situ not seen; Microcalcifications present; Lymphovascular permeation by tumor not seen. --ER+/[place]+/HER-2 equivocal ([facility] study in progress)\par \par \par 2. Left axila, core biopsy: metastatic ductal carcinoma to lymph node.\par \par -ER+/KY+/HER-2 equivocal ([facility] study in progress)\par \par MRI Lumbar Spine 4/22/21: Metastatic disease to the lower thoracic spine, lumbar spine and upper sacrum with severe pathologic fracture of L2 with retropulsion of the posterior endplate causing moderate canal stenosis. No discogenic disease. Please correlate with any history of known malignancy. The patient is awaiting results of her left breast biopsy studies findings may represent metastatic breast cancer.\par \par She states she has noticed a palpable left breast mass for the past few months. She states she has had lower back pain  that radiates down her leg for approximately 3 months.  She has difficulty walking and bending over. \par \par She states she has not had a routine physical in few years and is unaware of any significant medical history. She denies a personal history of cancer, her family history of cancer includes her mother with cervical cancer. She is scheduled to have a physical on 5/5/21 and see a gynecologist on 5/2/21 at [facility]. \par \par PET/CT 5/10/21: \par Impression: \par 1. Large FDG-avid, centrally necrotic mass in medial left breast, involving overlyinh skin and adjacent chest walll musculature and sternum corresponds to known malignancy. Additional separate FDG-avid lesions in left breast are suspicious for additional sites of disease. Further evaluation may be performed with breast MRI\par 2. FDG-avid lymph nodes in b/l lower cervical, left supraclavicular, left axillary, and left retropectoral regions are compatible  with metastatic disease.\par 3. Multiple FDG-avid b/l pulmonary nodules are compatible with metastatic disease. \par 4. Small left pleural effusion with heterogeneous FDG activity suspicious for malignant effusion. \par 5. Multiple FDG-avid lytic metastases in axial skeleton with severe pathologic compression fracture of L2 vertebral body, as seen on MRI dated 4/22/21, where retropulsion of posterior endplate is noted, causing moderate canal stenosis \par \par Patient on Ibrance and Letrozole under the care of Dr. Dowd. \par \par PET/CT 8/31/2021: Mildly FDG-avid mass in medial left breast and adjacent difficult to delineate mildly FDG of left breast lesions are decreased in size and metabolism, compatible with a partial response to interval therapy.Resolution of FDG-avid bilateral cervical and left supraclavicular lymph nodes and interval decrease in size, number, and metabolism of mildly FDG-avid left axillary and retropectoral lymph nodes.Multiple mildly FDG-avid and non-FDG-avid bilateral pulmonary nodules are similar, or decreased in size and are decreased metabolism. Small to moderate left pleural effusion is mildly INCREASED. Previously seen FDG-avid lytic lesions in the axial skeleton are decreased in metabolism and demonstrate new sclerosis, compatible with a partial response to interval therapy. Status post interval vertebroplasty of L2 compression fracture.

## 2021-11-16 ENCOUNTER — RESULT REVIEW (OUTPATIENT)
Age: 56
End: 2021-11-16

## 2021-11-16 ENCOUNTER — APPOINTMENT (OUTPATIENT)
Dept: HEMATOLOGY ONCOLOGY | Facility: CLINIC | Age: 56
End: 2021-11-16
Payer: MEDICAID

## 2021-11-16 VITALS
TEMPERATURE: 97.3 F | BODY MASS INDEX: 21.25 KG/M2 | DIASTOLIC BLOOD PRESSURE: 89 MMHG | RESPIRATION RATE: 16 BRPM | SYSTOLIC BLOOD PRESSURE: 133 MMHG | OXYGEN SATURATION: 99 % | HEART RATE: 84 BPM | HEIGHT: 62.99 IN | WEIGHT: 119.93 LBS

## 2021-11-16 LAB
BASOPHILS # BLD AUTO: 0.06 K/UL — SIGNIFICANT CHANGE UP (ref 0–0.2)
BASOPHILS NFR BLD AUTO: 1.8 % — SIGNIFICANT CHANGE UP (ref 0–2)
EOSINOPHIL # BLD AUTO: 0.03 K/UL — SIGNIFICANT CHANGE UP (ref 0–0.5)
EOSINOPHIL NFR BLD AUTO: 0.9 % — SIGNIFICANT CHANGE UP (ref 0–6)
HCT VFR BLD CALC: 36.1 % — SIGNIFICANT CHANGE UP (ref 34.5–45)
HGB BLD-MCNC: 12.5 G/DL — SIGNIFICANT CHANGE UP (ref 11.5–15.5)
IMM GRANULOCYTES NFR BLD AUTO: 0.3 % — SIGNIFICANT CHANGE UP (ref 0–1.5)
LYMPHOCYTES # BLD AUTO: 1.34 K/UL — SIGNIFICANT CHANGE UP (ref 1–3.3)
LYMPHOCYTES # BLD AUTO: 39.5 % — SIGNIFICANT CHANGE UP (ref 13–44)
MCHC RBC-ENTMCNC: 33.3 PG — SIGNIFICANT CHANGE UP (ref 27–34)
MCHC RBC-ENTMCNC: 34.6 G/DL — SIGNIFICANT CHANGE UP (ref 32–36)
MCV RBC AUTO: 96.3 FL — SIGNIFICANT CHANGE UP (ref 80–100)
MONOCYTES # BLD AUTO: 0.74 K/UL — SIGNIFICANT CHANGE UP (ref 0–0.9)
MONOCYTES NFR BLD AUTO: 21.8 % — HIGH (ref 2–14)
NEUTROPHILS # BLD AUTO: 1.21 K/UL — LOW (ref 1.8–7.4)
NEUTROPHILS NFR BLD AUTO: 35.7 % — LOW (ref 43–77)
NRBC # BLD: 0 /100 WBCS — SIGNIFICANT CHANGE UP (ref 0–0)
PLATELET # BLD AUTO: 216 K/UL — SIGNIFICANT CHANGE UP (ref 150–400)
RBC # BLD: 3.75 M/UL — LOW (ref 3.8–5.2)
RBC # FLD: 17.2 % — HIGH (ref 10.3–14.5)
WBC # BLD: 3.39 K/UL — LOW (ref 3.8–10.5)
WBC # FLD AUTO: 3.39 K/UL — LOW (ref 3.8–10.5)

## 2021-11-16 PROCEDURE — 99214 OFFICE O/P EST MOD 30 MIN: CPT

## 2021-11-16 NOTE — ASSESSMENT
[FreeTextEntry1] : ROGELIO PEARSON  is here for metastatic breast cancer to spine, lymph nodes and lungs, ER 90%, [place] 2%, HER2 negative,  She is s/p L2 kyphoplasty and RT for severe pathologic compression fracture.  Pt is currently on tx with ibrance and letrozole.\par \par -C6 Day 1today, Ibrance decreased to 75mg daily starting C5.  ANC 1200 today, grade 1 neutropenia, continue Ibrance to 75mg daily and fu in 1 month\par -c/w letrozole 2.5mg daily\par -clinically improving, tumor markers downtrending and now stable\par -reviewed PET/CT 8/31/2021, partial response in all areas including breast, pulmonary nodules, lytic lesions in axial skeleton, resolution of bilateral, cervical and left supraclavicular LNs.  Small to moderate left pleural effusion mildly increased, pt asymptomatic.  Next PET 11/31/21, - ordered today \par -c/w monthly xgeva for bone metastases- will resume next month to coincide w/ office visit and Day 1 of cycle.\par -I encouraged her to call me with any questions. \par -FU 4 weeks - OV, BW, Xgeva and to review pET/CT \par

## 2021-11-16 NOTE — PHYSICAL EXAM
[Restricted in physically strenuous activity but ambulatory and able to carry out work of a light or sedentary nature] : Status 1- Restricted in physically strenuous activity but ambulatory and able to carry out work of a light or sedentary nature, e.g., light house work, office work [Normal] : affect appropriate [de-identified] : left 9:00-10:00 , s/p healing ulceration 3 cm induration; no adenopathy b/l ; no mass right breast,  [de-identified] : wearing TLSO brace

## 2021-11-16 NOTE — HISTORY OF PRESENT ILLNESS
[de-identified] : Pt noticed a left breast mass x 2 months for which she did not seek medical attention, denied any pain, ulceration, bleeding or nipple changes/discharge.  She also c/o lower back pain for 2-3 months, associated with pain radiating down the lower extremities relieved with Tylenol prn.  Also reports intermittent upper back pain.   No fevers, night sweats, weight loss.  Reports fatigue and poor appetite.  She went to an urgent care in [place] for back pain and breast mass and was given a referral for mammo/US.  \par \par Mammogram and breast US on 4/7/2021 showed  5.6x3.2x5.3cm hypoechoic irregularly-shaped mass with angular margins 9-11:00 5 to 9cm from nipple; left axilla - enlarged 4.0x2.1x3.4cm lymph node, US biopsy rec, [id number].\par \par On 4/20/2021, pt underwent an US guided left breast core biopsy which showed invasive moderately differentiated ductal carcinoma, [place] score 6/9 (3+2+10), invasive tumor at least 1.8cm, no DCIS, no LVI, microcalcifications present, ER 90%, LA 2%, HER2 2+, CISH negative.  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  \par \par For the back pain, pt underwent an MRI lumbar spine on 4/22/2021 which showed metastatic disease in lower thoracic spine, lumbar spine and upper sacrum with severe pathologic fracture of L2 with retropulsion of the posterior endplate causing moderate canal stenosis. \par \par Risk factors:  No prior disease or biopsies. Menarche: age 12, Postmenopausal age 51; 2 pregnancies, 1 miscarriage, 1 living child daughter 19, 2 years of breastfeeding. No OCP or HRT.  Family history is significant for mother had cervical cancer age 64 and father with prostate cancer age 67.  No cancer of the breast, ovary, endometrium, and pancreatic cancer.  The patient is of [place] ethnic background.  No ETOH or tobacco.\par Screening: No prior colonoscopy, pap smear 2-3 years ago reportedly normal.\par Previous certified nursing assistant.\par \par 7/6 C1D15 - held and then restarted at Ibrance 100mg\par 7/23 C2 ANC 1930\par 8/6 C2D15 \par 8/13 C2D22 \par 8/24 C3D1 ANC 1470\par 9/7 C3D15 ANC 1930\par 9/21 C4D1\par 10/5 C4D15  [de-identified] : 11/16/2021\par Patient returns today to rule out progression of metastatic breast cancer and assess treatment toxicity.\par Continues on letrozole 2.5 mg daily  + Ibrance 75 mg days 1-21 Q 28 days y, tolerating well. \par Patient also on Xgeva monthly (she received two weeks ago) \par Today is the first day of her cycle and she is scheduled to start Ibrance tomorrow. \par She presents to check bw prior to starting cycle. \par Denies fevers, cough, SOB, diarrhea, abd pain, n/v, LE swelling, rash. \par Denies bone pain\par Feels breast mass is getting smaller  - seen by Dr. Rodríguez last week; 3 month f/up \par \par PET/CT scan due 11/30/21 \par \par saw Dr. Cynthia Kim 7/6/21 for fu post RT.\par saw IR Dr. Julio Gordon 6/23/2021, doing well post L2 vertebral augmentation. s/p kyphoplasty and RT at [facility]\par \par BP management at home, taking novasc 2.5mg daily\par \par

## 2021-12-01 PROCEDURE — G9005: CPT

## 2021-12-10 ENCOUNTER — OUTPATIENT (OUTPATIENT)
Dept: OUTPATIENT SERVICES | Facility: HOSPITAL | Age: 56
LOS: 1 days | Discharge: HOME | End: 2021-12-10

## 2021-12-10 DIAGNOSIS — C50.919 MALIGNANT NEOPLASM OF UNSPECIFIED SITE OF UNSPECIFIED FEMALE BREAST: ICD-10-CM

## 2021-12-10 DIAGNOSIS — Z98.890 OTHER SPECIFIED POSTPROCEDURAL STATES: Chronic | ICD-10-CM

## 2021-12-14 ENCOUNTER — RESULT REVIEW (OUTPATIENT)
Age: 56
End: 2021-12-14

## 2021-12-14 ENCOUNTER — APPOINTMENT (OUTPATIENT)
Dept: INFUSION THERAPY | Facility: HOSPITAL | Age: 56
End: 2021-12-14

## 2021-12-14 ENCOUNTER — APPOINTMENT (OUTPATIENT)
Dept: HEMATOLOGY ONCOLOGY | Facility: CLINIC | Age: 56
End: 2021-12-14
Payer: MEDICAID

## 2021-12-14 VITALS
SYSTOLIC BLOOD PRESSURE: 129 MMHG | HEIGHT: 62.99 IN | BODY MASS INDEX: 21.48 KG/M2 | TEMPERATURE: 97.6 F | HEART RATE: 86 BPM | RESPIRATION RATE: 16 BRPM | DIASTOLIC BLOOD PRESSURE: 84 MMHG | OXYGEN SATURATION: 99 % | WEIGHT: 121.25 LBS

## 2021-12-14 LAB
BASOPHILS # BLD AUTO: 0.07 K/UL — SIGNIFICANT CHANGE UP (ref 0–0.2)
BASOPHILS NFR BLD AUTO: 2.4 % — HIGH (ref 0–2)
EOSINOPHIL # BLD AUTO: 0.03 K/UL — SIGNIFICANT CHANGE UP (ref 0–0.5)
EOSINOPHIL NFR BLD AUTO: 1 % — SIGNIFICANT CHANGE UP (ref 0–6)
HCT VFR BLD CALC: 37.4 % — SIGNIFICANT CHANGE UP (ref 34.5–45)
HGB BLD-MCNC: 12.5 G/DL — SIGNIFICANT CHANGE UP (ref 11.5–15.5)
IMM GRANULOCYTES NFR BLD AUTO: 0 % — SIGNIFICANT CHANGE UP (ref 0–1.5)
LYMPHOCYTES # BLD AUTO: 1.1 K/UL — SIGNIFICANT CHANGE UP (ref 1–3.3)
LYMPHOCYTES # BLD AUTO: 37.7 % — SIGNIFICANT CHANGE UP (ref 13–44)
MCHC RBC-ENTMCNC: 33.4 G/DL — SIGNIFICANT CHANGE UP (ref 32–36)
MCHC RBC-ENTMCNC: 33.4 PG — SIGNIFICANT CHANGE UP (ref 27–34)
MCV RBC AUTO: 100 FL — SIGNIFICANT CHANGE UP (ref 80–100)
MONOCYTES # BLD AUTO: 0.46 K/UL — SIGNIFICANT CHANGE UP (ref 0–0.9)
MONOCYTES NFR BLD AUTO: 15.8 % — HIGH (ref 2–14)
NEUTROPHILS # BLD AUTO: 1.26 K/UL — LOW (ref 1.8–7.4)
NEUTROPHILS NFR BLD AUTO: 43.1 % — SIGNIFICANT CHANGE UP (ref 43–77)
NRBC # BLD: 0 /100 WBCS — SIGNIFICANT CHANGE UP (ref 0–0)
PLATELET # BLD AUTO: 214 K/UL — SIGNIFICANT CHANGE UP (ref 150–400)
RBC # BLD: 3.74 M/UL — LOW (ref 3.8–5.2)
RBC # FLD: 15.3 % — HIGH (ref 10.3–14.5)
WBC # BLD: 2.92 K/UL — LOW (ref 3.8–10.5)
WBC # FLD AUTO: 2.92 K/UL — LOW (ref 3.8–10.5)

## 2021-12-14 PROCEDURE — 99214 OFFICE O/P EST MOD 30 MIN: CPT

## 2021-12-15 DIAGNOSIS — C79.51 SECONDARY MALIGNANT NEOPLASM OF BONE: ICD-10-CM

## 2021-12-15 LAB
ALBUMIN SERPL ELPH-MCNC: 4.4 G/DL
ALP BLD-CCNC: 234 U/L
ALT SERPL-CCNC: 9 U/L
ANION GAP SERPL CALC-SCNC: 13 MMOL/L
AST SERPL-CCNC: 21 U/L
BILIRUB SERPL-MCNC: <0.2 MG/DL
BUN SERPL-MCNC: 13 MG/DL
CALCIUM SERPL-MCNC: 9.1 MG/DL
CANCER AG27-29 SERPL-ACNC: 18.4 U/ML
CEA SERPL-MCNC: 9.9 NG/ML
CHLORIDE SERPL-SCNC: 105 MMOL/L
CO2 SERPL-SCNC: 22 MMOL/L
CREAT SERPL-MCNC: 0.66 MG/DL
GLUCOSE SERPL-MCNC: 133 MG/DL
POTASSIUM SERPL-SCNC: 3.9 MMOL/L
PROT SERPL-MCNC: 7.4 G/DL
SODIUM SERPL-SCNC: 140 MMOL/L

## 2021-12-15 NOTE — PHYSICAL EXAM
[Restricted in physically strenuous activity but ambulatory and able to carry out work of a light or sedentary nature] : Status 1- Restricted in physically strenuous activity but ambulatory and able to carry out work of a light or sedentary nature, e.g., light house work, office work [Normal] : affect appropriate [de-identified] : left 9:00-10:00 , s/p healing ulceration induration; no adenopathy b/l ; no mass right breast,  [de-identified] : wearing TLSO brace

## 2021-12-15 NOTE — HISTORY OF PRESENT ILLNESS
[de-identified] : Pt noticed a left breast mass x 2 months for which she did not seek medical attention, denied any pain, ulceration, bleeding or nipple changes/discharge.  She also c/o lower back pain for 2-3 months, associated with pain radiating down the lower extremities relieved with Tylenol prn.  Also reports intermittent upper back pain.   No fevers, night sweats, weight loss.  Reports fatigue and poor appetite.  She went to an urgent care in [place] for back pain and breast mass and was given a referral for mammo/US.  \par \par Mammogram and breast US on 4/7/2021 showed  5.6x3.2x5.3cm hypoechoic irregularly-shaped mass with angular margins 9-11:00 5 to 9cm from nipple; left axilla - enlarged 4.0x2.1x3.4cm lymph node, US biopsy rec, [id number].\par \par On 4/20/2021, pt underwent an US guided left breast core biopsy which showed invasive moderately differentiated ductal carcinoma, [place] score 6/9 (3+2+10), invasive tumor at least 1.8cm, no DCIS, no LVI, microcalcifications present, ER 90%, CA 2%, HER2 2+, CISH negative.  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  \par \par For the back pain, pt underwent an MRI lumbar spine on 4/22/2021 which showed metastatic disease in lower thoracic spine, lumbar spine and upper sacrum with severe pathologic fracture of L2 with retropulsion of the posterior endplate causing moderate canal stenosis. \par \par Risk factors:  No prior disease or biopsies. Menarche: age 12, Postmenopausal age 51; 2 pregnancies, 1 miscarriage, 1 living child daughter 19, 2 years of breastfeeding. No OCP or HRT.  Family history is significant for mother had cervical cancer age 64 and father with prostate cancer age 67.  No cancer of the breast, ovary, endometrium, and pancreatic cancer.  The patient is of [place] ethnic background.  No ETOH or tobacco.\par Screening: No prior colonoscopy, pap smear 2-3 years ago reportedly normal.\par Previous certified nursing assistant.\par \par 7/6 C1D15 - held and then restarted at Ibrance 100mg\par 7/23 C2 ANC 1930\par 8/6 C2D15 \par 8/13 C2D22 \par 8/24 C3D1 ANC 1470\par 9/7 C3D15 ANC 1930\par 9/21 C4D1\par 10/5 C4D15  [de-identified] : 12/14/2021\par Patient returns today to rule out progression of metastatic breast cancer and assess treatment toxicity.\par Continues on letrozole 2.5 mg daily  + Ibrance 75 mg days 1-21 Q 28 days y, tolerating well. \par Patient also on Xgeva monthly (she received two weeks ago) \par Today is the first day of her cycle and she is scheduled to start Ibrance tomorrow. \par She presents to check bw prior to starting cycle. \par Denies fevers, cough, SOB, diarrhea, abd pain, n/v, LE swelling, rash. \par Denies bone pain\par \par PET/CT scan scheduled for 12/16/2021\par \par saw Dr. Cynthia Kim 7/6/21 for fu post RT.\par saw IR Dr. Julio Gordon 6/23/2021, doing well post L2 vertebral augmentation. s/p kyphoplasty and RT at [facility]\par \par \par

## 2021-12-15 NOTE — ASSESSMENT
[FreeTextEntry1] : ROGELIO PEARSON  is here for metastatic breast cancer to spine, lymph nodes and lungs, ER 90%, NC 2%, HER2 negative,  She is s/p L2 kyphoplasty and RT for severe pathologic compression fracture.  Pt is currently on tx with ibrance and letrozole.\par \par -C7 Day 1today, Ibrance decreased to 75mg daily starting C5.  ANC 1260 today, grade 1 neutropenia, continue Ibrance to 75mg daily and fu in 1 month\par -c/w letrozole 2.5mg daily\par -clinically improved, tumor markers downtrending and now stable\par -reviewed PET/CT 8/31/2021, partial response in all areas including breast, pulmonary nodules, lytic lesions in axial skeleton, resolution of bilateral, cervical and left supraclavicular LNs.  Small to moderate left pleural effusion mildly increased, pt asymptomatic.  Next PET 12/16/21, -  \par -c/w monthly xgeva for bone metastases- resume today.\par - discussed w/ patient that treatment w/ ibrance/letrozole and xgeva would continue until disease progression - patient acknowledges understanding. \par -I encouraged her to call me with any questions. \par -FU 4 weeks - OV, BW, Xgeva and to review pET/CT \par

## 2021-12-16 ENCOUNTER — OUTPATIENT (OUTPATIENT)
Dept: OUTPATIENT SERVICES | Facility: HOSPITAL | Age: 56
LOS: 1 days | End: 2021-12-16
Payer: MEDICAID

## 2021-12-16 ENCOUNTER — APPOINTMENT (OUTPATIENT)
Dept: NUCLEAR MEDICINE | Facility: IMAGING CENTER | Age: 56
End: 2021-12-16
Payer: MEDICAID

## 2021-12-16 DIAGNOSIS — Z98.890 OTHER SPECIFIED POSTPROCEDURAL STATES: Chronic | ICD-10-CM

## 2021-12-16 DIAGNOSIS — C50.919 MALIGNANT NEOPLASM OF UNSPECIFIED SITE OF UNSPECIFIED FEMALE BREAST: ICD-10-CM

## 2021-12-16 PROCEDURE — 78815 PET IMAGE W/CT SKULL-THIGH: CPT | Mod: 26,PS

## 2021-12-16 PROCEDURE — A9552: CPT

## 2021-12-16 PROCEDURE — 78815 PET IMAGE W/CT SKULL-THIGH: CPT

## 2021-12-20 LAB — 25(OH)D3 SERPL-MCNC: 27.3 NG/ML

## 2022-01-08 ENCOUNTER — OUTPATIENT (OUTPATIENT)
Dept: OUTPATIENT SERVICES | Facility: HOSPITAL | Age: 57
LOS: 1 days | Discharge: HOME | End: 2022-01-08

## 2022-01-08 DIAGNOSIS — C79.51 SECONDARY MALIGNANT NEOPLASM OF BONE: ICD-10-CM

## 2022-01-08 DIAGNOSIS — C50.919 MALIGNANT NEOPLASM OF UNSPECIFIED SITE OF UNSPECIFIED FEMALE BREAST: ICD-10-CM

## 2022-01-08 DIAGNOSIS — Z98.890 OTHER SPECIFIED POSTPROCEDURAL STATES: Chronic | ICD-10-CM

## 2022-01-10 ENCOUNTER — NON-APPOINTMENT (OUTPATIENT)
Age: 57
End: 2022-01-10

## 2022-01-11 ENCOUNTER — RESULT REVIEW (OUTPATIENT)
Age: 57
End: 2022-01-11

## 2022-01-11 ENCOUNTER — APPOINTMENT (OUTPATIENT)
Dept: INFUSION THERAPY | Facility: HOSPITAL | Age: 57
End: 2022-01-11

## 2022-01-11 ENCOUNTER — APPOINTMENT (OUTPATIENT)
Dept: HEMATOLOGY ONCOLOGY | Facility: CLINIC | Age: 57
End: 2022-01-11
Payer: MEDICAID

## 2022-01-11 VITALS
BODY MASS INDEX: 21.68 KG/M2 | HEIGHT: 62.99 IN | SYSTOLIC BLOOD PRESSURE: 114 MMHG | RESPIRATION RATE: 16 BRPM | OXYGEN SATURATION: 98 % | WEIGHT: 122.33 LBS | DIASTOLIC BLOOD PRESSURE: 84 MMHG | TEMPERATURE: 97.2 F | HEART RATE: 92 BPM

## 2022-01-11 LAB
BASOPHILS # BLD AUTO: 0.05 K/UL — SIGNIFICANT CHANGE UP (ref 0–0.2)
BASOPHILS NFR BLD AUTO: 1.5 % — SIGNIFICANT CHANGE UP (ref 0–2)
EOSINOPHIL # BLD AUTO: 0.04 K/UL — SIGNIFICANT CHANGE UP (ref 0–0.5)
EOSINOPHIL NFR BLD AUTO: 1.2 % — SIGNIFICANT CHANGE UP (ref 0–6)
HCT VFR BLD CALC: 37.5 % — SIGNIFICANT CHANGE UP (ref 34.5–45)
HGB BLD-MCNC: 12.9 G/DL — SIGNIFICANT CHANGE UP (ref 11.5–15.5)
IMM GRANULOCYTES NFR BLD AUTO: 0.6 % — SIGNIFICANT CHANGE UP (ref 0–1.5)
LYMPHOCYTES # BLD AUTO: 1.4 K/UL — SIGNIFICANT CHANGE UP (ref 1–3.3)
LYMPHOCYTES # BLD AUTO: 40.7 % — SIGNIFICANT CHANGE UP (ref 13–44)
MCHC RBC-ENTMCNC: 33.8 PG — SIGNIFICANT CHANGE UP (ref 27–34)
MCHC RBC-ENTMCNC: 34.4 G/DL — SIGNIFICANT CHANGE UP (ref 32–36)
MCV RBC AUTO: 98.2 FL — SIGNIFICANT CHANGE UP (ref 80–100)
MONOCYTES # BLD AUTO: 0.54 K/UL — SIGNIFICANT CHANGE UP (ref 0–0.9)
MONOCYTES NFR BLD AUTO: 15.7 % — HIGH (ref 2–14)
NEUTROPHILS # BLD AUTO: 1.39 K/UL — LOW (ref 1.8–7.4)
NEUTROPHILS NFR BLD AUTO: 40.3 % — LOW (ref 43–77)
NRBC # BLD: 0 /100 WBCS — SIGNIFICANT CHANGE UP (ref 0–0)
PLATELET # BLD AUTO: 217 K/UL — SIGNIFICANT CHANGE UP (ref 150–400)
RBC # BLD: 3.82 M/UL — SIGNIFICANT CHANGE UP (ref 3.8–5.2)
RBC # FLD: 14 % — SIGNIFICANT CHANGE UP (ref 10.3–14.5)
WBC # BLD: 3.44 K/UL — LOW (ref 3.8–10.5)
WBC # FLD AUTO: 3.44 K/UL — LOW (ref 3.8–10.5)

## 2022-01-11 PROCEDURE — 99215 OFFICE O/P EST HI 40 MIN: CPT

## 2022-01-16 NOTE — ASSESSMENT
[FreeTextEntry1] : ROGELIO PEARSON  is here for metastatic breast cancer to spine, lymph nodes and lungs, ER 90%, MT 2%, HER2 negative,  She is s/p L2 kyphoplasty and RT for severe pathologic compression fracture.  Pt is currently on tx with ibrance and letrozole.\par \par -C7 Day 27 today, Ibrance decreased to 75mg daily starting C5.  ANC 1390 today, grade 1 neutropenia, continue Ibrance 75mg daily and fu in 1 month\par -c/w letrozole 2.5mg daily\par -clinically improved, tumor markers downtrending and now stable\par -reviewed PET/CT 12/16/2021, stable disease.  Next PET 3/16/2022.\par -c/w monthly xgeva for bone metastases- resume today.\par -discussed w/ patient that treatment w/ ibrance/letrozole and xgeva would continue until disease progression - patient acknowledges understanding. \par -I encouraged her to call me with any questions. \par -FU 4 weeks - OV, BW, Xgeva \par

## 2022-01-16 NOTE — HISTORY OF PRESENT ILLNESS
[Treatment Protocol] : Treatment Protocol [de-identified] : Pt noticed a left breast mass x 2 months for which she did not seek medical attention, denied any pain, ulceration, bleeding or nipple changes/discharge.  She also c/o lower back pain for 2-3 months, associated with pain radiating down the lower extremities relieved with Tylenol prn.  Also reports intermittent upper back pain.   No fevers, night sweats, weight loss.  Reports fatigue and poor appetite.  She went to an urgent care in [place] for back pain and breast mass and was given a referral for mammo/US.  \par \par Mammogram and breast US on 4/7/2021 showed  5.6x3.2x5.3cm hypoechoic irregularly-shaped mass with angular margins 9-11:00 5 to 9cm from nipple; left axilla - enlarged 4.0x2.1x3.4cm lymph node, US biopsy rec, [id number].\par \par On 4/20/2021, pt underwent an US guided left breast core biopsy which showed invasive moderately differentiated ductal carcinoma, [place] score 6/9 (3+2+10), invasive tumor at least 1.8cm, no DCIS, no LVI, microcalcifications present, ER 90%, ID 2%, HER2 2+, CISH negative.  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  \par \par For the back pain, pt underwent an MRI lumbar spine on 4/22/2021 which showed metastatic disease in lower thoracic spine, lumbar spine and upper sacrum with severe pathologic fracture of L2 with retropulsion of the posterior endplate causing moderate canal stenosis. \par \par PET scan on 5/6/21 showed known left breast mass with Ls in b/l lower cervical, left supraclavicular, left axillary, left retropectoral, multiple FDG avid b/l pulmonary nodules, small left pleural effusion suspicious for malignant effusion, multiple lytic metastases in axial skeleton with severe pathologic compression fracture of oL2 vertebral body causing moderate canal stenosis. \par \par Pt underwent CT guided right iliac bone core biopsy on 5/24/21 which was consistent with metastatic adenocarcinoma, compatible with patknown hx of breast primary. \par ER 90%, ID 2%, HER2 2+IHC, CISH non amplified. \par \par s/p L2 vertebral augmentation. s/p kyphoplasty at [facility] with Dr. Julio Gordon IR and palliative radiation to the same area by Dr. Cynthia Kim.\par \par L2 spine excisional biopsy consistent with metastatic carcinoma from breast origin, ER 10-20%, ID 0%, HER2 IHC 2+ , CISH assay test failed due to lack of hybridization.\par \par Patient started treatment with Ibrance and Letrozole  7/6/21.  [FreeTextEntry1] : 7/6 C1D15 - held and then restarted at Ibrance 100mg\par 7/23 C2 ANC 1930\par 8/6 C2D15 \par 8/13 C2D22 \par 8/24 C3D1 ANC 1470\par 9/7 C3D15 ANC 1930\par 9/21 C4D1\par 10/5 C4D15  [de-identified] : \par Continues on letrozole 2.5 mg daily  + Ibrance 75 mg days 1-21 Q 28 days, tolerating well. \par PET/CT scan reviewed from 12/16/2021; next scan 3/4/2022.  \par Patient also on Xgeva monthly.\par Today is the first day of her cycle and she is scheduled to start Ibrance tomorrow. \par She presents to check bw prior to starting cycle. \par Denies fevers, cough, SOB, diarrhea, abd pain, n/v, LE swelling, rash. \par Good appetite.  \par Denies bone pain.

## 2022-01-16 NOTE — PHYSICAL EXAM
[Restricted in physically strenuous activity but ambulatory and able to carry out work of a light or sedentary nature] : Status 1- Restricted in physically strenuous activity but ambulatory and able to carry out work of a light or sedentary nature, e.g., light house work, office work [Normal] : affect appropriate [de-identified] : left 9:00-10:00 , s/p healing ulceration induration; no adenopathy b/l ; no mass right breast,  [de-identified] : wearing TLSO brace

## 2022-01-31 ENCOUNTER — NON-APPOINTMENT (OUTPATIENT)
Age: 57
End: 2022-01-31

## 2022-01-31 ENCOUNTER — APPOINTMENT (OUTPATIENT)
Dept: HEMATOLOGY ONCOLOGY | Facility: CLINIC | Age: 57
End: 2022-01-31

## 2022-02-01 ENCOUNTER — RESULT REVIEW (OUTPATIENT)
Age: 57
End: 2022-02-01

## 2022-02-01 ENCOUNTER — APPOINTMENT (OUTPATIENT)
Dept: HEMATOLOGY ONCOLOGY | Facility: CLINIC | Age: 57
End: 2022-02-01

## 2022-02-01 LAB
BASOPHILS # BLD AUTO: 0.03 K/UL — SIGNIFICANT CHANGE UP (ref 0–0.2)
BASOPHILS NFR BLD AUTO: 1.4 % — SIGNIFICANT CHANGE UP (ref 0–2)
EOSINOPHIL # BLD AUTO: 0.01 K/UL — SIGNIFICANT CHANGE UP (ref 0–0.5)
EOSINOPHIL NFR BLD AUTO: 0.5 % — SIGNIFICANT CHANGE UP (ref 0–6)
HCT VFR BLD CALC: 40.6 % — SIGNIFICANT CHANGE UP (ref 34.5–45)
HGB BLD-MCNC: 13.4 G/DL — SIGNIFICANT CHANGE UP (ref 11.5–15.5)
IMM GRANULOCYTES NFR BLD AUTO: 0 % — SIGNIFICANT CHANGE UP (ref 0–1.5)
LYMPHOCYTES # BLD AUTO: 1.03 K/UL — SIGNIFICANT CHANGE UP (ref 1–3.3)
LYMPHOCYTES # BLD AUTO: 46.8 % — HIGH (ref 13–44)
MCHC RBC-ENTMCNC: 33 G/DL — SIGNIFICANT CHANGE UP (ref 32–36)
MCHC RBC-ENTMCNC: 33.3 PG — SIGNIFICANT CHANGE UP (ref 27–34)
MCV RBC AUTO: 101 FL — HIGH (ref 80–100)
MONOCYTES # BLD AUTO: 0.15 K/UL — SIGNIFICANT CHANGE UP (ref 0–0.9)
MONOCYTES NFR BLD AUTO: 6.8 % — SIGNIFICANT CHANGE UP (ref 2–14)
NEUTROPHILS # BLD AUTO: 0.98 K/UL — LOW (ref 1.8–7.4)
NEUTROPHILS NFR BLD AUTO: 44.5 % — SIGNIFICANT CHANGE UP (ref 43–77)
NRBC # BLD: 0 /100 WBCS — SIGNIFICANT CHANGE UP (ref 0–0)
PLATELET # BLD AUTO: 226 K/UL — SIGNIFICANT CHANGE UP (ref 150–400)
RBC # BLD: 4.02 M/UL — SIGNIFICANT CHANGE UP (ref 3.8–5.2)
RBC # FLD: 13.7 % — SIGNIFICANT CHANGE UP (ref 10.3–14.5)
WBC # BLD: 2.2 K/UL — LOW (ref 3.8–10.5)
WBC # FLD AUTO: 2.2 K/UL — LOW (ref 3.8–10.5)

## 2022-02-02 LAB
ALBUMIN SERPL ELPH-MCNC: 4.5 G/DL
ALP BLD-CCNC: 189 U/L
ALT SERPL-CCNC: 8 U/L
ANION GAP SERPL CALC-SCNC: 14 MMOL/L
AST SERPL-CCNC: 20 U/L
BILIRUB SERPL-MCNC: 0.2 MG/DL
BUN SERPL-MCNC: 12 MG/DL
CALCIUM SERPL-MCNC: 9.3 MG/DL
CANCER AG27-29 SERPL-ACNC: 21.5 U/ML
CEA SERPL-MCNC: 10.4 NG/ML
CHLORIDE SERPL-SCNC: 105 MMOL/L
CO2 SERPL-SCNC: 22 MMOL/L
CREAT SERPL-MCNC: 0.77 MG/DL
GLUCOSE SERPL-MCNC: 72 MG/DL
POTASSIUM SERPL-SCNC: 4.3 MMOL/L
PROT SERPL-MCNC: 7.6 G/DL
SODIUM SERPL-SCNC: 140 MMOL/L

## 2022-02-06 ENCOUNTER — OUTPATIENT (OUTPATIENT)
Dept: OUTPATIENT SERVICES | Facility: HOSPITAL | Age: 57
LOS: 1 days | Discharge: HOME | End: 2022-02-06

## 2022-02-06 DIAGNOSIS — C50.919 MALIGNANT NEOPLASM OF UNSPECIFIED SITE OF UNSPECIFIED FEMALE BREAST: ICD-10-CM

## 2022-02-06 DIAGNOSIS — Z98.890 OTHER SPECIFIED POSTPROCEDURAL STATES: Chronic | ICD-10-CM

## 2022-02-07 ENCOUNTER — NON-APPOINTMENT (OUTPATIENT)
Age: 57
End: 2022-02-07

## 2022-02-07 ENCOUNTER — APPOINTMENT (OUTPATIENT)
Dept: OPHTHALMOLOGY | Facility: CLINIC | Age: 57
End: 2022-02-07
Payer: MEDICAID

## 2022-02-07 PROCEDURE — 92014 COMPRE OPH EXAM EST PT 1/>: CPT

## 2022-02-08 ENCOUNTER — APPOINTMENT (OUTPATIENT)
Dept: INFUSION THERAPY | Facility: HOSPITAL | Age: 57
End: 2022-02-08

## 2022-02-08 ENCOUNTER — APPOINTMENT (OUTPATIENT)
Dept: HEMATOLOGY ONCOLOGY | Facility: CLINIC | Age: 57
End: 2022-02-08
Payer: MEDICAID

## 2022-02-08 VITALS
RESPIRATION RATE: 17 BRPM | HEIGHT: 62.99 IN | WEIGHT: 123.46 LBS | OXYGEN SATURATION: 100 % | HEART RATE: 92 BPM | DIASTOLIC BLOOD PRESSURE: 83 MMHG | SYSTOLIC BLOOD PRESSURE: 136 MMHG | BODY MASS INDEX: 21.88 KG/M2 | TEMPERATURE: 97.3 F

## 2022-02-08 PROCEDURE — 99214 OFFICE O/P EST MOD 30 MIN: CPT

## 2022-02-09 DIAGNOSIS — C79.51 SECONDARY MALIGNANT NEOPLASM OF BONE: ICD-10-CM

## 2022-02-09 NOTE — PHYSICAL EXAM
[Restricted in physically strenuous activity but ambulatory and able to carry out work of a light or sedentary nature] : Status 1- Restricted in physically strenuous activity but ambulatory and able to carry out work of a light or sedentary nature, e.g., light house work, office work [Normal] : affect appropriate [de-identified] : left 9:00-10:00 , s/p healed ulceration 3 cm induration and vertical indentationn; satellite lesion at 11:00 1 cm;  no adenopathy b/l ; no mass right breast,  [de-identified] : wearing TLSO brace

## 2022-02-09 NOTE — ASSESSMENT
[FreeTextEntry1] : ROGELIO PEARSON  is here for metastatic breast cancer to spine, lymph nodes and lungs, ER 90%, OR 2%, HER2 negative,  She is s/p L2 kyphoplasty and RT for severe pathologic compression fracture.  Pt is currently on tx with ibrance and letrozole.\par \par -continue Ibrance 75 mg and Letrozole 2.5 mg daily \par - continue Xgeva monthly \par -clinically stable, tumor markers stable; normal CA 27.29 @ 21;  CEA 10.0 stable\par - PET/CT 11/2021 - stable; followup ordered for mid March \par -I encouraged her to call me with any questions. \par -FU 4 weeks - OV, BW, Xgeva \par - continue f/up as needed w/ Dr. raines and Dr. Kim\par

## 2022-02-09 NOTE — HISTORY OF PRESENT ILLNESS
[de-identified] : Pt noticed a left breast mass x 2 months for which she did not seek medical attention, denied any pain, ulceration, bleeding or nipple changes/discharge.  She also c/o lower back pain for 2-3 months, associated with pain radiating down the lower extremities relieved with Tylenol prn.  Also reports intermittent upper back pain.   No fevers, night sweats, weight loss.  Reports fatigue and poor appetite.  She went to an urgent care in [place] for back pain and breast mass and was given a referral for mammo/US.  \par \par Mammogram and breast US on 4/7/2021 showed  5.6x3.2x5.3cm hypoechoic irregularly-shaped mass with angular margins 9-11:00 5 to 9cm from nipple; left axilla - enlarged 4.0x2.1x3.4cm lymph node, US biopsy rec, [id number].\par \par On 4/20/2021, pt underwent an US guided left breast core biopsy which showed invasive moderately differentiated ductal carcinoma, [place] score 6/9 (3+2+10), invasive tumor at least 1.8cm, no DCIS, no LVI, microcalcifications present, ER 90%, AL 2%, HER2 2+, CISH negative.  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  \par \par For the back pain, pt underwent an MRI lumbar spine on 4/22/2021 which showed metastatic disease in lower thoracic spine, lumbar spine and upper sacrum with severe pathologic fracture of L2 with retropulsion of the posterior endplate causing moderate canal stenosis. \par \par Risk factors:  No prior disease or biopsies. Menarche: age 12, Postmenopausal age 51; 2 pregnancies, 1 miscarriage, 1 living child daughter 19, 2 years of breastfeeding. No OCP or HRT.  Family history is significant for mother had cervical cancer age 64 and father with prostate cancer age 67.  No cancer of the breast, ovary, endometrium, and pancreatic cancer.  The patient is of [place] ethnic background.  No ETOH or tobacco.\par Screening: No prior colonoscopy, pap smear 2-3 years ago reportedly normal.\par Previous certified nursing assistant.\par \par 7/6 C1D15 - held and then restarted at Ibrance 100mg\par 7/23 C2 ANC 1930\par 8/6 C2D15 \par 8/13 C2D22 \par 8/24 C3D1 ANC 1470\par 9/7 C3D15 ANC 1930\par 9/21 C4D1\par 10/5 C4D15  [de-identified] : 2/8/2022\par Patient returns today to rule out progression of metastatic breast cancer and assess treatment toxicity.\par Continues on letrozole 2.5 mg daily  + Ibrance 75 mg days 1-21 Q 28 days y, tolerating well. \par Patient also on Xgeva monthly - due today \par Denies fevers, cough, SOB, diarrhea, abd pain, n/v, LE swelling, rash. \par Denies bone pain\par Feels breast mass is getting smaller  - seen by Dr. Rodríguez last week \par \par PET/CT scan 12/16/2021 - patient was advised to continue on same regimen and repeat in 3 months\par 1. Increased hypermetabolism of the left breast mass which is slightly decreased in size.\par 2. Mildly FDG-avid left axillary and right hilar lymph nodes are mildly increased in metabolism.\par 3. Innumerable subcentimeter minimally FDG-avid and non-FDG-avid bilateral pulmonary nodules, not significantly changed in size, number, or metabolism.\par 4. Innumerable predominantly sclerotic osseous metastases in the axial skeleton are similar or mildly increased in density on CT, and predominantly demonstrate minimal or no FDG avidity, compatible with treated bone metastases. A few small foci of increased FDG activity suggests residual disease, for example, an FDG-avid lytic lesion in the anterior aspect of the left sixth rib (image 103).\par \par

## 2022-02-11 ENCOUNTER — NON-APPOINTMENT (OUTPATIENT)
Age: 57
End: 2022-02-11

## 2022-02-11 ENCOUNTER — APPOINTMENT (OUTPATIENT)
Dept: SURGICAL ONCOLOGY | Facility: CLINIC | Age: 57
End: 2022-02-11
Payer: MEDICAID

## 2022-02-11 VITALS
OXYGEN SATURATION: 97 % | HEART RATE: 96 BPM | RESPIRATION RATE: 16 BRPM | BODY MASS INDEX: 22.26 KG/M2 | TEMPERATURE: 98.3 F | WEIGHT: 121 LBS | DIASTOLIC BLOOD PRESSURE: 90 MMHG | HEIGHT: 62 IN | SYSTOLIC BLOOD PRESSURE: 145 MMHG

## 2022-02-11 PROCEDURE — 99214 OFFICE O/P EST MOD 30 MIN: CPT

## 2022-02-11 NOTE — ASSESSMENT
[FreeTextEntry1] : IMP:\par 55 y/o with newly diagnosed invasive moderately differentiated ductal carcinoma, ER+/[place]+/HER-2 negative with metastases to the axilla and lumbar spine. \par \par \par PLAN: \par continue care with Dr. Dowd ( patient on Ibrance and letrozole) \par RTO q3 months\par PET/CT March 2022 as per Dr. Dowd

## 2022-02-11 NOTE — HISTORY OF PRESENT ILLNESS
[de-identified] : Ms. ROGELIO PEARSON is a 56 year old female who presents today for a 3 month follow up for metastatic left breast cancer.\par \par Bilateral mammo/sono 4/7/21: left breast mass at the site a palpable abnormality, enlarged left axillary lymph node, right breast unremarkable. BI-RADS 4. \par \par Ultrasound Guided core biopsy of left breast x 2 sites 4/20/21:\par 1. Breast, left, 9-11 o?clock, core biopsy: Invasive moderately differentiated ductal carcinoma; [place] score 6 / 9 (3 + 2 +1) in this limited material; Invasive tumor measures at least 1.8cm; Ductal carcinoma in situ not seen; Microcalcifications present; Lymphovascular permeation by tumor not seen. --ER+/CO+/HER-2 equivocal ([facility] study in progress)\par \par 2. Left axila, core biopsy: metastatic ductal carcinoma to lymph node.\par \par -ER+/CO+/HER-2 equivocal ([facility] study in progress)\par \par MRI Lumbar Spine 4/22/21: Metastatic disease to the lower thoracic spine, lumbar spine and upper sacrum with severe pathologic fracture of L2 with retropulsion of the posterior endplate causing moderate canal stenosis. No discogenic disease. Please correlate with any history of known malignancy. The patient is awaiting results of her left breast biopsy studies findings may represent metastatic breast cancer.\par \par She states she has noticed a palpable left breast mass for the past few months. She states she has had lower back pain  that radiates down her leg for approximately 3 months.  She has difficulty walking and bending over. \par \par She states she has not had a routine physical in few years and is unaware of any significant medical history. She denies a personal history of cancer, her family history of cancer includes her mother with cervical cancer. She is scheduled to have a physical on 5/5/21 and see a gynecologist on 5/2/21 at [facility]. \par \par PET/CT 5/10/21: \par Impression: \par 1. Large FDG-avid, centrally necrotic mass in medial left breast, involving overlyinh skin and adjacent chest walll musculature and sternum corresponds to known malignancy. Additional separate FDG-avid lesions in left breast are suspicious for additional sites of disease. Further evaluation may be performed with breast MRI\par 2. FDG-avid lymph nodes in b/l lower cervical, left supraclavicular, left axillary, and left retropectoral regions are compatible  with metastatic disease.\par 3. Multiple FDG-avid b/l pulmonary nodules are compatible with metastatic disease. \par 4. Small left pleural effusion with heterogeneous FDG activity suspicious for malignant effusion. \par 5. Multiple FDG-avid lytic metastases in axial skeleton with severe pathologic compression fracture of L2 vertebral body, as seen on MRI dated 4/22/21, where retropulsion of posterior endplate is noted, causing moderate canal stenosis \par \par Patient on Ibrance and Letrozole under the care of Dr. Dowd. \par \par PET/CT 8/31/2021: Mildly FDG-avid mass in medial left breast and adjacent difficult to delineate mildly FDG of left breast lesions are decreased in size and metabolism, compatible with a partial response to interval therapy.Resolution of FDG-avid bilateral cervical and left supraclavicular lymph nodes and interval decrease in size, number, and metabolism of mildly FDG-avid left axillary and retropectoral lymph nodes.Multiple mildly FDG-avid and non-FDG-avid bilateral pulmonary nodules are similar, or decreased in size and are decreased metabolism. Small to moderate left pleural effusion is mildly INCREASED. Previously seen FDG-avid lytic lesions in the axial skeleton are decreased in metabolism and demonstrate new sclerosis, compatible with a partial response to interval therapy. Status post interval vertebroplasty of L2 compression fracture.\par \par PET/CT 12/16/21- Increased hypermetabolism of the left breast mass which is slightly decreased in size.  Mildly FDG-avid left axillary and right hilar lymph nodes are mildly increased in metabolism.  Innumerable subcentimeter minimally FDG-avid and non-FDG-avid bilateral pulmonary nodules, not significantly changed in size, number, or metabolism.  Innumerable predominantly sclerotic osseous metastases in the axial skeleton are similar or mildly increased in density on CT, and predominantly demonstrate minimal or no FDG avidity, compatible with treated bone metastases. A few small foci of increased FDG activity suggests residual disease, for example, an FDG-avid lytic lesion in the anterior aspect of the left sixth rib (image 103).\par \par Tumor Markers 2/1/22- CA 27-29: 21.5 | CEA: 10.4\par \par 2/11/22- Patient continues Ibrance and Letrozole per Dr. Dowd.  She is scheduled for a PET/CT in March 2022.  Feeling well overall.

## 2022-02-11 NOTE — PHYSICAL EXAM
[Normal] : supple, no neck mass and thyroid not enlarged [Normal Supraclavicular Lymph Nodes] : normal supraclavicular lymph nodes [Normal] : oriented to person, place and time, with appropriate affect [de-identified] : 3 cm area medial aspect of left breast which is now softer and mobile [de-identified] : left axillary adenopathy still present

## 2022-03-08 ENCOUNTER — APPOINTMENT (OUTPATIENT)
Dept: HEMATOLOGY ONCOLOGY | Facility: CLINIC | Age: 57
End: 2022-03-08
Payer: MEDICAID

## 2022-03-08 ENCOUNTER — RESULT REVIEW (OUTPATIENT)
Age: 57
End: 2022-03-08

## 2022-03-08 ENCOUNTER — APPOINTMENT (OUTPATIENT)
Dept: INFUSION THERAPY | Facility: HOSPITAL | Age: 57
End: 2022-03-08

## 2022-03-08 VITALS
DIASTOLIC BLOOD PRESSURE: 92 MMHG | TEMPERATURE: 97.3 F | RESPIRATION RATE: 16 BRPM | BODY MASS INDEX: 22.72 KG/M2 | WEIGHT: 123.46 LBS | HEART RATE: 92 BPM | OXYGEN SATURATION: 97 % | SYSTOLIC BLOOD PRESSURE: 131 MMHG | HEIGHT: 61.97 IN

## 2022-03-08 DIAGNOSIS — S32.020A WEDGE COMPRESSION FRACTURE OF SECOND LUMBAR VERTEBRA, INITIAL ENCOUNTER FOR CLOSED FRACTURE: ICD-10-CM

## 2022-03-08 LAB
BASOPHILS # BLD AUTO: 0.04 K/UL — SIGNIFICANT CHANGE UP (ref 0–0.2)
BASOPHILS NFR BLD AUTO: 1.3 % — SIGNIFICANT CHANGE UP (ref 0–2)
EOSINOPHIL # BLD AUTO: 0.06 K/UL — SIGNIFICANT CHANGE UP (ref 0–0.5)
EOSINOPHIL NFR BLD AUTO: 2 % — SIGNIFICANT CHANGE UP (ref 0–6)
HCT VFR BLD CALC: 38.5 % — SIGNIFICANT CHANGE UP (ref 34.5–45)
HGB BLD-MCNC: 13.2 G/DL — SIGNIFICANT CHANGE UP (ref 11.5–15.5)
IMM GRANULOCYTES NFR BLD AUTO: 1.3 % — SIGNIFICANT CHANGE UP (ref 0–1.5)
LYMPHOCYTES # BLD AUTO: 1.1 K/UL — SIGNIFICANT CHANGE UP (ref 1–3.3)
LYMPHOCYTES # BLD AUTO: 36.1 % — SIGNIFICANT CHANGE UP (ref 13–44)
MCHC RBC-ENTMCNC: 33.7 PG — SIGNIFICANT CHANGE UP (ref 27–34)
MCHC RBC-ENTMCNC: 34.3 G/DL — SIGNIFICANT CHANGE UP (ref 32–36)
MCV RBC AUTO: 98.2 FL — SIGNIFICANT CHANGE UP (ref 80–100)
MONOCYTES # BLD AUTO: 0.5 K/UL — SIGNIFICANT CHANGE UP (ref 0–0.9)
MONOCYTES NFR BLD AUTO: 16.4 % — HIGH (ref 2–14)
NEUTROPHILS # BLD AUTO: 1.31 K/UL — LOW (ref 1.8–7.4)
NEUTROPHILS NFR BLD AUTO: 42.9 % — LOW (ref 43–77)
NRBC # BLD: 0 /100 WBCS — SIGNIFICANT CHANGE UP (ref 0–0)
PLATELET # BLD AUTO: 206 K/UL — SIGNIFICANT CHANGE UP (ref 150–400)
RBC # BLD: 3.92 M/UL — SIGNIFICANT CHANGE UP (ref 3.8–5.2)
RBC # FLD: 14.1 % — SIGNIFICANT CHANGE UP (ref 10.3–14.5)
WBC # BLD: 3.05 K/UL — LOW (ref 3.8–10.5)
WBC # FLD AUTO: 3.05 K/UL — LOW (ref 3.8–10.5)

## 2022-03-08 PROCEDURE — 99214 OFFICE O/P EST MOD 30 MIN: CPT

## 2022-03-08 NOTE — HISTORY OF PRESENT ILLNESS
[M: ___] : M[unfilled] [AJCC Stage: ____] : AJCC Stage: [unfilled] [de-identified] : Pt noticed a left breast mass x 2 months for which she did not seek medical attention, denied any pain, ulceration, bleeding or nipple changes/discharge.  She also c/o lower back pain for 2-3 months, associated with pain radiating down the lower extremities relieved with Tylenol prn.  Also reports intermittent upper back pain.   No fevers, night sweats, weight loss.  Reports fatigue and poor appetite.  She went to an urgent care in [place] for back pain and breast mass and was given a referral for mammo/US.  \par \par Mammogram and breast US on 4/7/2021 showed  5.6x3.2x5.3cm hypoechoic irregularly-shaped mass with angular margins 9-11:00 5 to 9cm from nipple; left axilla - enlarged 4.0x2.1x3.4cm lymph node, US biopsy rec, [id number].\par \par On 4/20/2021, pt underwent an US guided left breast core biopsy which showed invasive moderately differentiated ductal carcinoma, [place] score 6/9 (3+2+10), invasive tumor at least 1.8cm, no DCIS, no LVI, microcalcifications present, ER 90%, [place] 2%, HER2 2+, CISH negative.  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  \par \par For the back pain, pt underwent an MRI lumbar spine on 4/22/2021 which showed metastatic disease in lower thoracic spine, lumbar spine and upper sacrum with severe pathologic fracture of L2 with retropulsion of the posterior endplate causing moderate canal stenosis. \par \par Admitted to [facility] 6/2021 - s/p L2 kyphoplasty and RT.  Discharged to rehab, started on letrozole. \par Ibrance added on 6/22/2021, s/p two dose reductions due to neutropenia.\par \par PET/CT scan 12/16/2021 - patient was advised to continue on same regimen and repeat in 3 months\par 1. Increased hypermetabolism of the left breast mass which is slightly decreased in size.\par 2. Mildly FDG-avid left axillary and right hilar lymph nodes are mildly increased in metabolism.\par 3. Innumerable subcentimeter minimally FDG-avid and non-FDG-avid bilateral pulmonary nodules, not significantly changed in size, number, or metabolism.\par 4. Innumerable predominantly sclerotic osseous metastases in the axial skeleton are similar or mildly increased in density on CT, and predominantly demonstrate minimal or no FDG avidity, compatible with treated bone metastases. A few small foci of increased FDG activity suggests residual disease, for example, an FDG-avid lytic lesion in the anterior aspect of the left sixth rib (image 103).\par \par Family history is significant for mother had cervical cancer age 64 and father with prostate cancer age 67.  No cancer of the breast, ovary, endometrium, and pancreatic cancer.  The patient is of [place] ethnic background.  No ETOH or tobacco. [de-identified] : \par Continues on letrozole 2.5 mg daily  + Ibrance 75 mg days 1-21 Q 28 days y, tolerating well. Tomorrow D1 of next cycle. \par Patient also on Xgeva monthly since 10/5/21- due today \par PET ordered for 3/2022\par Overall feels well.\par Denies fevers, cough, SOB, diarrhea, abd pain, n/v, LE swelling, rash.  Denies bone pain\par Feels breast mass is getting smaller  - followed by Dr. 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

## 2022-03-08 NOTE — ASSESSMENT
[FreeTextEntry1] : ROGELIO PEARSON  is here for metastatic breast cancer to spine, lymph nodes and lungs, ER 90%, [place] 2%, HER2 negative,  She is s/p L2 kyphoplasty and RT for severe pathologic compression fracture.  Pt is currently on tx with ibrance and letrozole.\par \par -continue Ibrance 75 mg and Letrozole 2.5 mg daily, cbc ok to continue current dosing of ibrance\par -continue Xgeva monthly \par -clinically stable, tumor markers slight uptrend\par -PET scan due now, order is in, pt aware to schedule\par -I encouraged her to call me with any questions. \par -FU 4 weeks - OV, BW, Xgeva \par -continue f/up as needed w/ Dr. raines and Dr. Kim\par -I encouraged her to call me with any questions.

## 2022-03-08 NOTE — PHYSICAL EXAM
[Restricted in physically strenuous activity but ambulatory and able to carry out work of a light or sedentary nature] : Status 1- Restricted in physically strenuous activity but ambulatory and able to carry out work of a light or sedentary nature, e.g., light house work, office work [Normal] : affect appropriate [de-identified] : left 9:00-10:00 , s/p healed ulceration 3 cm induration and vertical indentation unchanged; satellite lesion at 11:00 1 cm;  no adenopathy b/l ; no mass right breast,  [de-identified] : wearing TLSO brace

## 2022-03-29 NOTE — PROGRESS NOTE ADULT - PROBLEM SELECTOR PLAN 3
"Pt is c/o right-sided jaw dislocation which happened within the past hour.  Pt denies any specific injury and that it \"just happened.\"  Pt states this happened last night also, was seen here, and got it fixed.  " ID consulted, fevers resolved  ID recommends to monitor patient off of antibiotics

## 2022-03-31 ENCOUNTER — OUTPATIENT (OUTPATIENT)
Dept: OUTPATIENT SERVICES | Facility: HOSPITAL | Age: 57
LOS: 1 days | Discharge: HOME | End: 2022-03-31

## 2022-03-31 DIAGNOSIS — Z98.890 OTHER SPECIFIED POSTPROCEDURAL STATES: Chronic | ICD-10-CM

## 2022-03-31 DIAGNOSIS — C50.919 MALIGNANT NEOPLASM OF UNSPECIFIED SITE OF UNSPECIFIED FEMALE BREAST: ICD-10-CM

## 2022-04-04 ENCOUNTER — RESULT REVIEW (OUTPATIENT)
Age: 57
End: 2022-04-04

## 2022-04-04 ENCOUNTER — APPOINTMENT (OUTPATIENT)
Dept: HEMATOLOGY ONCOLOGY | Facility: CLINIC | Age: 57
End: 2022-04-04

## 2022-04-04 LAB
BASOPHILS # BLD AUTO: 0.05 K/UL — SIGNIFICANT CHANGE UP (ref 0–0.2)
BASOPHILS NFR BLD AUTO: 1.8 % — SIGNIFICANT CHANGE UP (ref 0–2)
EOSINOPHIL # BLD AUTO: 0.01 K/UL — SIGNIFICANT CHANGE UP (ref 0–0.5)
EOSINOPHIL NFR BLD AUTO: 0.4 % — SIGNIFICANT CHANGE UP (ref 0–6)
HCT VFR BLD CALC: 36.7 % — SIGNIFICANT CHANGE UP (ref 34.5–45)
HGB BLD-MCNC: 12.7 G/DL — SIGNIFICANT CHANGE UP (ref 11.5–15.5)
IMM GRANULOCYTES NFR BLD AUTO: 0.4 % — SIGNIFICANT CHANGE UP (ref 0–1.5)
LYMPHOCYTES # BLD AUTO: 1.09 K/UL — SIGNIFICANT CHANGE UP (ref 1–3.3)
LYMPHOCYTES # BLD AUTO: 39.1 % — SIGNIFICANT CHANGE UP (ref 13–44)
MCHC RBC-ENTMCNC: 34.5 PG — HIGH (ref 27–34)
MCHC RBC-ENTMCNC: 34.6 G/DL — SIGNIFICANT CHANGE UP (ref 32–36)
MCV RBC AUTO: 99.7 FL — SIGNIFICANT CHANGE UP (ref 80–100)
MONOCYTES # BLD AUTO: 0.49 K/UL — SIGNIFICANT CHANGE UP (ref 0–0.9)
MONOCYTES NFR BLD AUTO: 17.6 % — HIGH (ref 2–14)
NEUTROPHILS # BLD AUTO: 1.14 K/UL — LOW (ref 1.8–7.4)
NEUTROPHILS NFR BLD AUTO: 40.7 % — LOW (ref 43–77)
NRBC # BLD: 0 /100 WBCS — SIGNIFICANT CHANGE UP (ref 0–0)
PLATELET # BLD AUTO: 209 K/UL — SIGNIFICANT CHANGE UP (ref 150–400)
RBC # BLD: 3.68 M/UL — LOW (ref 3.8–5.2)
RBC # FLD: 14.6 % — HIGH (ref 10.3–14.5)
WBC # BLD: 2.79 K/UL — LOW (ref 3.8–10.5)
WBC # FLD AUTO: 2.79 K/UL — LOW (ref 3.8–10.5)

## 2022-04-05 ENCOUNTER — APPOINTMENT (OUTPATIENT)
Dept: HEMATOLOGY ONCOLOGY | Facility: CLINIC | Age: 57
End: 2022-04-05
Payer: MEDICAID

## 2022-04-05 ENCOUNTER — APPOINTMENT (OUTPATIENT)
Dept: INFUSION THERAPY | Facility: HOSPITAL | Age: 57
End: 2022-04-05

## 2022-04-05 VITALS
OXYGEN SATURATION: 98 % | HEIGHT: 62 IN | TEMPERATURE: 97.5 F | WEIGHT: 123.33 LBS | DIASTOLIC BLOOD PRESSURE: 87 MMHG | RESPIRATION RATE: 16 BRPM | SYSTOLIC BLOOD PRESSURE: 121 MMHG | HEART RATE: 84 BPM | BODY MASS INDEX: 22.69 KG/M2

## 2022-04-05 LAB
ALBUMIN SERPL ELPH-MCNC: 4.4 G/DL
ALP BLD-CCNC: 126 U/L
ALT SERPL-CCNC: 12 U/L
ANION GAP SERPL CALC-SCNC: 12 MMOL/L
AST SERPL-CCNC: 18 U/L
BILIRUB SERPL-MCNC: 0.2 MG/DL
BUN SERPL-MCNC: 17 MG/DL
CALCIUM SERPL-MCNC: 9.6 MG/DL
CANCER AG27-29 SERPL-ACNC: 20.2 U/ML
CEA SERPL-MCNC: 11.5 NG/ML
CHLORIDE SERPL-SCNC: 103 MMOL/L
CO2 SERPL-SCNC: 24 MMOL/L
CREAT SERPL-MCNC: 0.65 MG/DL
EGFR: 103 ML/MIN/1.73M2
GLUCOSE SERPL-MCNC: 102 MG/DL
POTASSIUM SERPL-SCNC: 4 MMOL/L
PROT SERPL-MCNC: 7.4 G/DL
SODIUM SERPL-SCNC: 139 MMOL/L

## 2022-04-05 PROCEDURE — 99214 OFFICE O/P EST MOD 30 MIN: CPT

## 2022-04-05 RX ORDER — AMLODIPINE BESYLATE 2.5 MG/1
2.5 TABLET ORAL DAILY
Qty: 30 | Refills: 0 | Status: DISCONTINUED | COMMUNITY
Start: 2021-08-06 | End: 2022-04-05

## 2022-04-05 RX ORDER — NAPROXEN SODIUM 220 MG
220 TABLET ORAL
Qty: 30 | Refills: 0 | Status: DISCONTINUED | COMMUNITY
Start: 2021-05-02 | End: 2022-04-05

## 2022-04-05 RX ORDER — LEVOFLOXACIN 500 MG/1
500 TABLET, FILM COATED ORAL
Qty: 7 | Refills: 2 | Status: DISCONTINUED | COMMUNITY
Start: 2021-10-12 | End: 2022-04-05

## 2022-04-05 RX ORDER — ACETAMINOPHEN 325 MG/1
325 TABLET, FILM COATED ORAL EVERY 6 HOURS
Qty: 30 | Refills: 5 | Status: DISCONTINUED | COMMUNITY
Start: 2021-05-02 | End: 2022-04-05

## 2022-04-05 RX ORDER — ENOXAPARIN SODIUM 300 MG/3ML
INJECTION INTRAVENOUS; SUBCUTANEOUS
Refills: 0 | Status: DISCONTINUED | COMMUNITY
End: 2022-04-05

## 2022-04-06 DIAGNOSIS — C79.51 SECONDARY MALIGNANT NEOPLASM OF BONE: ICD-10-CM

## 2022-04-06 NOTE — PHYSICAL EXAM
[Fully active, able to carry on all pre-disease performance without restriction] : Status 0 - Fully active, able to carry on all pre-disease performance without restriction [de-identified] : left 9:00-10:00 , s/p healed ulceration 3 -4 cm induration and vertical indentation unchanged; satellite lesion at 11:00 1 cm;  no adenopathy b/l ; no mass right breast,  [de-identified] : wearing TLSO brace

## 2022-04-06 NOTE — ASSESSMENT
[FreeTextEntry1] : ROGELIO PEARSON  is here for metastatic breast cancer to spine, lymph nodes and lungs, ER 90%, [place] 2%, HER2 negative,  She is s/p L2 kyphoplasty and RT for severe pathologic compression fracture.  Pt is currently on tx with ibrance and letrozole.\par \par -continue Ibrance 75 mg and Letrozole 2.5 mg daily, cbc ok to continue current dosing of ibrance\par -continue Xgeva monthly \par -clinically stable, tumor markers slight uptrend - f/up results of pet/ct scan\par -PET scan due now, order is in, pt aware to schedule\par - Order [facility] testing on most recent biopsy - patient informed we will be ordering\par - Patient also needs to have BRCA testing - discussed w/ patient and will draw at next visit.\par -I encouraged her to call me with any questions. \par -FU 4 weeks - OV, BW, Xgeva and genetic testing \par -continue f/up as needed w/ Dr. raines and Dr. Kim\par -I encouraged her to call me with any questions.

## 2022-04-06 NOTE — HISTORY OF PRESENT ILLNESS
[de-identified] : Pt noticed a left breast mass x 2 months for which she did not seek medical attention, denied any pain, ulceration, bleeding or nipple changes/discharge.  She also c/o lower back pain for 2-3 months, associated with pain radiating down the lower extremities relieved with Tylenol prn.  Also reports intermittent upper back pain.   No fevers, night sweats, weight loss.  Reports fatigue and poor appetite.  She went to an urgent care in [place] for back pain and breast mass and was given a referral for mammo/US.  \par \par Mammogram and breast US on 4/7/2021 showed  5.6x3.2x5.3cm hypoechoic irregularly-shaped mass with angular margins 9-11:00 5 to 9cm from nipple; left axilla - enlarged 4.0x2.1x3.4cm lymph node, US biopsy rec, [id number].\par \par On 4/20/2021, pt underwent an US guided left breast core biopsy which showed invasive moderately differentiated ductal carcinoma, [place] score 6/9 (3+2+10), invasive tumor at least 1.8cm, no DCIS, no LVI, microcalcifications present, ER 90%, [place] 2%, HER2 2+, CISH negative.  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  \par \par For the back pain, pt underwent an MRI lumbar spine on 4/22/2021 which showed metastatic disease in lower thoracic spine, lumbar spine and upper sacrum with severe pathologic fracture of L2 with retropulsion of the posterior endplate causing moderate canal stenosis. \par \par Admitted to [facility] 6/2021 - s/p L2 kyphoplasty and RT.  Discharged to rehab, started on letrozole. \par Ibrance added on 6/22/2021, s/p two dose reductions due to neutropenia.\par \par PET/CT scan 12/16/2021 - patient was advised to continue on same regimen and repeat in 3 months\par 1. Increased hypermetabolism of the left breast mass which is slightly decreased in size.\par 2. Mildly FDG-avid left axillary and right hilar lymph nodes are mildly increased in metabolism.\par 3. Innumerable subcentimeter minimally FDG-avid and non-FDG-avid bilateral pulmonary nodules, not significantly changed in size, number, or metabolism.\par 4. Innumerable predominantly sclerotic osseous metastases in the axial skeleton are similar or mildly increased in density on CT, and predominantly demonstrate minimal or no FDG avidity, compatible with treated bone metastases. A few small foci of increased FDG activity suggests residual disease, for example, an FDG-avid lytic lesion in the anterior aspect of the left sixth rib (image 103).\par \par Family history is significant for mother had cervical cancer age 64 and father with prostate cancer age 67.  No cancer of the breast, ovary, endometrium, and pancreatic cancer.  The patient is of [place] ethnic background.  No ETOH or tobacco. [de-identified] : 4/5/2022\par Continues on letrozole 2.5 mg daily  + Ibrance 75 mg days 1-21 Q 28 days y, tolerating well. Tomorrow D1 of next cycle. \par Patient also on Xgeva monthly since 10/5/21 - due to day\par PET scheduled for next week. \par Overall feels well.\par Denies fevers, cough, SOB, diarrhea, abd pain, n/v, LE swelling, rash.  Denies bone pain\par  followed by Dr. 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

## 2022-04-11 ENCOUNTER — OUTPATIENT (OUTPATIENT)
Dept: OUTPATIENT SERVICES | Facility: HOSPITAL | Age: 57
LOS: 1 days | End: 2022-04-11
Payer: MEDICAID

## 2022-04-11 ENCOUNTER — APPOINTMENT (OUTPATIENT)
Dept: NUCLEAR MEDICINE | Facility: IMAGING CENTER | Age: 57
End: 2022-04-11
Payer: MEDICAID

## 2022-04-11 DIAGNOSIS — Z98.890 OTHER SPECIFIED POSTPROCEDURAL STATES: Chronic | ICD-10-CM

## 2022-04-11 DIAGNOSIS — C79.51 SECONDARY MALIGNANT NEOPLASM OF BONE: ICD-10-CM

## 2022-04-11 DIAGNOSIS — C79.9 SECONDARY MALIGNANT NEOPLASM OF UNSPECIFIED SITE: ICD-10-CM

## 2022-04-11 PROCEDURE — 78815 PET IMAGE W/CT SKULL-THIGH: CPT | Mod: 26,PS

## 2022-04-11 PROCEDURE — A9552: CPT

## 2022-04-11 PROCEDURE — 78815 PET IMAGE W/CT SKULL-THIGH: CPT

## 2022-04-27 ENCOUNTER — OUTPATIENT (OUTPATIENT)
Dept: OUTPATIENT SERVICES | Facility: HOSPITAL | Age: 57
LOS: 1 days | Discharge: HOME | End: 2022-04-27

## 2022-04-27 DIAGNOSIS — Z98.890 OTHER SPECIFIED POSTPROCEDURAL STATES: Chronic | ICD-10-CM

## 2022-04-27 DIAGNOSIS — C50.919 MALIGNANT NEOPLASM OF UNSPECIFIED SITE OF UNSPECIFIED FEMALE BREAST: ICD-10-CM

## 2022-05-02 ENCOUNTER — NON-APPOINTMENT (OUTPATIENT)
Age: 57
End: 2022-05-02

## 2022-05-03 ENCOUNTER — RESULT REVIEW (OUTPATIENT)
Age: 57
End: 2022-05-03

## 2022-05-03 ENCOUNTER — APPOINTMENT (OUTPATIENT)
Dept: INFUSION THERAPY | Facility: HOSPITAL | Age: 57
End: 2022-05-03

## 2022-05-03 ENCOUNTER — APPOINTMENT (OUTPATIENT)
Dept: HEMATOLOGY ONCOLOGY | Facility: CLINIC | Age: 57
End: 2022-05-03
Payer: MEDICAID

## 2022-05-03 VITALS
DIASTOLIC BLOOD PRESSURE: 89 MMHG | TEMPERATURE: 97.2 F | OXYGEN SATURATION: 99 % | HEIGHT: 61.97 IN | RESPIRATION RATE: 16 BRPM | HEART RATE: 90 BPM | SYSTOLIC BLOOD PRESSURE: 138 MMHG | BODY MASS INDEX: 23.32 KG/M2 | WEIGHT: 126.74 LBS

## 2022-05-03 LAB
BASOPHILS # BLD AUTO: 0.03 K/UL — SIGNIFICANT CHANGE UP (ref 0–0.2)
BASOPHILS NFR BLD AUTO: 0.9 % — SIGNIFICANT CHANGE UP (ref 0–2)
EOSINOPHIL # BLD AUTO: 0.01 K/UL — SIGNIFICANT CHANGE UP (ref 0–0.5)
EOSINOPHIL NFR BLD AUTO: 0.3 % — SIGNIFICANT CHANGE UP (ref 0–6)
HCT VFR BLD CALC: 35.8 % — SIGNIFICANT CHANGE UP (ref 34.5–45)
HGB BLD-MCNC: 12.1 G/DL — SIGNIFICANT CHANGE UP (ref 11.5–15.5)
IMM GRANULOCYTES NFR BLD AUTO: 0.3 % — SIGNIFICANT CHANGE UP (ref 0–1.5)
LYMPHOCYTES # BLD AUTO: 1.21 K/UL — SIGNIFICANT CHANGE UP (ref 1–3.3)
LYMPHOCYTES # BLD AUTO: 37.7 % — SIGNIFICANT CHANGE UP (ref 13–44)
MCHC RBC-ENTMCNC: 33.8 G/DL — SIGNIFICANT CHANGE UP (ref 32–36)
MCHC RBC-ENTMCNC: 34.3 PG — HIGH (ref 27–34)
MCV RBC AUTO: 101.4 FL — HIGH (ref 80–100)
MONOCYTES # BLD AUTO: 0.61 K/UL — SIGNIFICANT CHANGE UP (ref 0–0.9)
MONOCYTES NFR BLD AUTO: 19 % — HIGH (ref 2–14)
NEUTROPHILS # BLD AUTO: 1.34 K/UL — LOW (ref 1.8–7.4)
NEUTROPHILS NFR BLD AUTO: 41.8 % — LOW (ref 43–77)
NRBC # BLD: 0 /100 WBCS — SIGNIFICANT CHANGE UP (ref 0–0)
PLATELET # BLD AUTO: 179 K/UL — SIGNIFICANT CHANGE UP (ref 150–400)
RBC # BLD: 3.53 M/UL — LOW (ref 3.8–5.2)
RBC # FLD: 14.7 % — HIGH (ref 10.3–14.5)
WBC # BLD: 3.21 K/UL — LOW (ref 3.8–10.5)
WBC # FLD AUTO: 3.21 K/UL — LOW (ref 3.8–10.5)

## 2022-05-03 PROCEDURE — 99215 OFFICE O/P EST HI 40 MIN: CPT

## 2022-05-03 RX ORDER — PALBOCICLIB 75 MG/1
75 TABLET, FILM COATED ORAL
Qty: 21 | Refills: 3 | Status: DISCONTINUED | COMMUNITY
Start: 2021-10-19 | End: 2022-05-03

## 2022-05-04 DIAGNOSIS — C79.51 SECONDARY MALIGNANT NEOPLASM OF BONE: ICD-10-CM

## 2022-05-04 LAB
25(OH)D3 SERPL-MCNC: 35.1 NG/ML
ALBUMIN SERPL ELPH-MCNC: 4.4 G/DL
ALP BLD-CCNC: 176 U/L
ALT SERPL-CCNC: 9 U/L
ANION GAP SERPL CALC-SCNC: 14 MMOL/L
AST SERPL-CCNC: 20 U/L
BILIRUB SERPL-MCNC: <0.2 MG/DL
BUN SERPL-MCNC: 15 MG/DL
CALCIUM SERPL-MCNC: 9.5 MG/DL
CANCER AG15-3 SERPL-ACNC: 26.8 U/ML
CANCER AG27-29 SERPL-ACNC: 24.6 U/ML
CEA SERPL-MCNC: 14.7 NG/ML
CHLORIDE SERPL-SCNC: 104 MMOL/L
CO2 SERPL-SCNC: 22 MMOL/L
CREAT SERPL-MCNC: 0.69 MG/DL
EGFR: 101 ML/MIN/1.73M2
GLUCOSE SERPL-MCNC: 79 MG/DL
POTASSIUM SERPL-SCNC: 4 MMOL/L
PROT SERPL-MCNC: 7.2 G/DL
SODIUM SERPL-SCNC: 140 MMOL/L

## 2022-05-11 NOTE — HISTORY OF PRESENT ILLNESS
[Disease: _____________________] : Disease: [unfilled] [M: ___] : M[unfilled] [AJCC Stage: ____] : AJCC Stage: [unfilled] [de-identified] : Pt noticed a left breast mass x 2 months for which she did not seek medical attention, denied any pain, ulceration, bleeding or nipple changes/discharge.  She also c/o lower back pain for 2-3 months, associated with pain radiating down the lower extremities relieved with Tylenol prn.  Also reports intermittent upper back pain.   No fevers, night sweats, weight loss.  Reports fatigue and poor appetite.  She went to an urgent care in [place] for back pain and breast mass and was given a referral for mammo/US.  \par \par Mammogram and breast US on 4/7/2021 showed  5.6x3.2x5.3cm hypoechoic irregularly-shaped mass with angular margins 9-11:00 5 to 9cm from nipple; left axilla - enlarged 4.0x2.1x3.4cm lymph node, US biopsy rec, BIRADS4.\par \par On 4/20/2021, pt underwent an US guided left breast core biopsy which showed invasive moderately differentiated ductal carcinoma, [place] score 6/9 (3+2+10), invasive tumor at least 1.8cm, no DCIS, no LVI, microcalcifications present, ER 90%, [place] 2%, HER2 2+, CISH negative.  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  \par \par For the back pain, pt underwent an MRI lumbar spine on 4/22/2021 which showed metastatic disease in lower thoracic spine, lumbar spine and upper sacrum with severe pathologic fracture of L2 with retropulsion of the posterior endplate causing moderate canal stenosis. \par \par Admitted to [facility] 6/2021 - s/p L2 kyphoplasty and RT.  \par \par 6/2021-4/2022 - letrozole and Ibrance, s/p two dose reductions due to neutropenia.\par \par PET scan 4/11/22 - POD\par \par Patient also on Xgeva monthly since 10/5/21.\par \par PET/CT scan \par Family history is significant for mother had cervical cancer age 64 and father with prostate cancer age 67.  No cancer of the breast, ovary, endometrium, and pancreatic cancer.  The patient is of [place] ethnic background.  No ETOH or tobacco. [de-identified] : ER 90%, PA 2%, HER2 2+, CISH negative [de-identified] : \par Pt is here to discuss PET scan 4/11/22 which showed increased in size of medial left breast mass, new FDG acitivity in upper central left breast and nodule in medial upper right breast, suspicious for new sites of disease. Lung and bone lesions stable and unchanged. \par Tumor marker uptrending\par Overall feels well. Denies fevers, cough, SOB, diarrhea, abd pain, n/v, LE swelling, rash.  Denies bone pain\par followed by Dr. 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

## 2022-05-11 NOTE — PHYSICAL EXAM
[Fully active, able to carry on all pre-disease performance without restriction] : Status 0 - Fully active, able to carry on all pre-disease performance without restriction [Normal] : affect appropriate [de-identified] : left 9:00-10:00 , s/p healed ulceration 3 -4 cm induration and vertical indentation unchanged; satellite lesion at 11:00 1 cm;  no adenopathy b/l ; no mass right breast,  [de-identified] : wearing TLSO brace

## 2022-05-11 NOTE — ASSESSMENT
[FreeTextEntry1] : ROGELIO PEARSON  is here for metastatic breast cancer to spine, lymph nodes and lungs, ER 90%, CT 2%, HER2 negative,  She is s/p L2 kyphoplasty and RT for severe pathologic compression fracture.  Pt is currently on tx with ibrance and letrozole, now with POD.\par \par -PET scan 4/11/22 which showed increased in size of medial left breast mass, new FDG acitivity in upper central left breast and nodule in medial upper right breast, suspicious for new sites of disease. Lung and bone lesions stable and unchanged. \par -CEA uptrending\par -Caris testing on most recent biopsy- no actionable mutations\par -DC ibrance and letrozole now and recommend to start next line therapy with faslodex and everolimus. \par -rx dexamethasone prophylactic rinse\par -continue Xgeva monthly \par -Patient also needs to have BRCA testing - discussed w/ patient and will draw at next visit.\par -continue f/up as needed w/ Dr. raines and Dr. Kim as recommended\par -I encouraged her to call me with any questions. \par -FU in 2 weeks [Palliative] : Goals of care discussed with patient: Palliative

## 2022-05-16 ENCOUNTER — APPOINTMENT (OUTPATIENT)
Dept: SURGICAL ONCOLOGY | Facility: CLINIC | Age: 57
End: 2022-05-16
Payer: MEDICAID

## 2022-05-16 VITALS
RESPIRATION RATE: 16 BRPM | OXYGEN SATURATION: 96 % | SYSTOLIC BLOOD PRESSURE: 157 MMHG | BODY MASS INDEX: 22.84 KG/M2 | DIASTOLIC BLOOD PRESSURE: 85 MMHG | TEMPERATURE: 97.8 F | HEART RATE: 96 BPM | WEIGHT: 121 LBS | HEIGHT: 61 IN

## 2022-05-16 PROCEDURE — 99214 OFFICE O/P EST MOD 30 MIN: CPT

## 2022-05-16 NOTE — PHYSICAL EXAM
[Normal] : supple, no neck mass and thyroid not enlarged [Normal Supraclavicular Lymph Nodes] : normal supraclavicular lymph nodes [Normal] : oriented to person, place and time, with appropriate affect [de-identified] : 3 cm area medial aspect of left breast which is now softer and mobile [de-identified] : left axillary adenopathy still present

## 2022-05-16 NOTE — HISTORY OF PRESENT ILLNESS
[de-identified] : Ms. ROGELIO PEARSON is a 57 year old female who presents today for a 3 month follow up for metastatic left breast cancer.\par \par Bilateral mammo/sono 4/7/21: left breast mass at the site a palpable abnormality, enlarged left axillary lymph node, right breast unremarkable. BI-RADS 4. \par \par Ultrasound Guided core biopsy of left breast x 2 sites 4/20/21:\par 1. Breast, left, 9-11 o?clock, core biopsy: Invasive moderately differentiated ductal carcinoma; [place] score 6 / 9 (3 + 2 +1) in this limited material; Invasive tumor measures at least 1.8cm; Ductal carcinoma in situ not seen; Microcalcifications present; Lymphovascular permeation by tumor not seen. --ER+/OR+/HER-2 equivocal ([facility] study in progress)\par \par 2. Left axila, core biopsy: metastatic ductal carcinoma to lymph node.\par \par -ER+/OR+/HER-2 equivocal ([facility] study in progress)\par \par MRI Lumbar Spine 4/22/21: Metastatic disease to the lower thoracic spine, lumbar spine and upper sacrum with severe pathologic fracture of L2 with retropulsion of the posterior endplate causing moderate canal stenosis. No discogenic disease. Please correlate with any history of known malignancy. The patient is awaiting results of her left breast biopsy studies findings may represent metastatic breast cancer.\par \par She states she has noticed a palpable left breast mass for the past few months. She states she has had lower back pain  that radiates down her leg for approximately 3 months.  She has difficulty walking and bending over. \par \par She states she has not had a routine physical in few years and is unaware of any significant medical history. She denies a personal history of cancer, her family history of cancer includes her mother with cervical cancer. She is scheduled to have a physical on 5/5/21 and see a gynecologist on 5/2/21 at [facility]. \par \par PET/CT 5/10/21: \par Impression: \par 1. Large FDG-avid, centrally necrotic mass in medial left breast, involving overlyinh skin and adjacent chest walll musculature and sternum corresponds to known malignancy. Additional separate FDG-avid lesions in left breast are suspicious for additional sites of disease. Further evaluation may be performed with breast MRI\par 2. FDG-avid lymph nodes in b/l lower cervical, left supraclavicular, left axillary, and left retropectoral regions are compatible  with metastatic disease.\par 3. Multiple FDG-avid b/l pulmonary nodules are compatible with metastatic disease. \par 4. Small left pleural effusion with heterogeneous FDG activity suspicious for malignant effusion. \par 5. Multiple FDG-avid lytic metastases in axial skeleton with severe pathologic compression fracture of L2 vertebral body, as seen on MRI dated 4/22/21, where retropulsion of posterior endplate is noted, causing moderate canal stenosis \par \par Patient on Ibrance and Letrozole under the care of Dr. Dowd. \par \par PET/CT 8/31/2021: Mildly FDG-avid mass in medial left breast and adjacent difficult to delineate mildly FDG of left breast lesions are decreased in size and metabolism, compatible with a partial response to interval therapy.Resolution of FDG-avid bilateral cervical and left supraclavicular lymph nodes and interval decrease in size, number, and metabolism of mildly FDG-avid left axillary and retropectoral lymph nodes.Multiple mildly FDG-avid and non-FDG-avid bilateral pulmonary nodules are similar, or decreased in size and are decreased metabolism. Small to moderate left pleural effusion is mildly INCREASED. Previously seen FDG-avid lytic lesions in the axial skeleton are decreased in metabolism and demonstrate new sclerosis, compatible with a partial response to interval therapy. Status post interval vertebroplasty of L2 compression fracture.\par \par PET/CT 12/16/21- Increased hypermetabolism of the left breast mass which is slightly decreased in size.  Mildly FDG-avid left axillary and right hilar lymph nodes are mildly increased in metabolism.  Innumerable subcentimeter minimally FDG-avid and non-FDG-avid bilateral pulmonary nodules, not significantly changed in size, number, or metabolism.  Innumerable predominantly sclerotic osseous metastases in the axial skeleton are similar or mildly increased in density on CT, and predominantly demonstrate minimal or no FDG avidity, compatible with treated bone metastases. A few small foci of increased FDG activity suggests residual disease, for example, an FDG-avid lytic lesion in the anterior aspect of the left sixth rib (image 103).\par \par Tumor Markers 2/1/22- CA 27-29: 21.5 | CEA: 10.4\par \par 2/11/22- Patient continues Ibrance and Letrozole per Dr. Dowd.  She is scheduled for a PET/CT in March 2022.  Feeling well overall.\par \par PET CT 4/11/22- Increased in size of medial left breast mass, new FDG acitivity in upper central left breast and nodule in medial upper right breast, suspicious for new sites of disease. Lung and bone lesions stable and unchanged. \par \par Tumor Markers 5/3/22: CEA (14.7); CA15.3 (26.8); CA27.29 (24.6); ALT (9); ALK PHOS (176) \par \par 5/16/22- PET scan 4/11/22 showed increased in size of medial left breast mass, new FDG acitivity in upper central left breast and nodule in medial upper right breast, suspicious for new sites of disease. Lung and bone lesions stable and unchanged. Tumor marker uptrending. [facility]is testing on most recent biopsy- no actionable mutations.  Ibrance and Letrozole stopped. Pt. was started on Faslodex and everolimus.  Will continue Xgeva monthly.  Today she is with c/o mild fatigue but otherwise doing ok. \par \par

## 2022-05-16 NOTE — ASSESSMENT
[FreeTextEntry1] : IMP:\par 56 y/o with newly diagnosed invasive moderately differentiated ductal carcinoma, ER+/VA+/HER-2 negative with metastases to the axilla and lumbar spine. \par \par PET scan 4/11/22 showed increased in size of medial left breast mass, new FDG acitivity in upper central left breast and nodule in medial upper right breast, suspicious for new sites of disease. Lung and bone lesions stable and unchanged. Tumor marker uptrending. Caris testing on most recent biopsy- no actionable mutations.  Ibrance and Letrozole stopped. Pt. was started on Faslodex and everolimus.  Will continue Xgeva monthly. \par \par \par PLAN: \par Continue care with Dr. Dowd (on Faslodex, everolimus, xgeva)\par RTO q3 months\par

## 2022-05-17 ENCOUNTER — RESULT REVIEW (OUTPATIENT)
Age: 57
End: 2022-05-17

## 2022-05-17 ENCOUNTER — APPOINTMENT (OUTPATIENT)
Dept: INFUSION THERAPY | Facility: HOSPITAL | Age: 57
End: 2022-05-17

## 2022-05-17 ENCOUNTER — APPOINTMENT (OUTPATIENT)
Dept: HEMATOLOGY ONCOLOGY | Facility: CLINIC | Age: 57
End: 2022-05-17
Payer: MEDICAID

## 2022-05-17 VITALS
WEIGHT: 123.46 LBS | TEMPERATURE: 98.5 F | RESPIRATION RATE: 16 BRPM | HEIGHT: 61 IN | HEART RATE: 89 BPM | DIASTOLIC BLOOD PRESSURE: 98 MMHG | SYSTOLIC BLOOD PRESSURE: 148 MMHG | OXYGEN SATURATION: 99 % | BODY MASS INDEX: 23.31 KG/M2

## 2022-05-17 LAB
BASOPHILS # BLD AUTO: 0.05 K/UL — SIGNIFICANT CHANGE UP (ref 0–0.2)
BASOPHILS NFR BLD AUTO: 1.3 % — SIGNIFICANT CHANGE UP (ref 0–2)
EOSINOPHIL # BLD AUTO: 0.11 K/UL — SIGNIFICANT CHANGE UP (ref 0–0.5)
EOSINOPHIL NFR BLD AUTO: 2.8 % — SIGNIFICANT CHANGE UP (ref 0–6)
HCT VFR BLD CALC: 39.7 % — SIGNIFICANT CHANGE UP (ref 34.5–45)
HGB BLD-MCNC: 13.3 G/DL — SIGNIFICANT CHANGE UP (ref 11.5–15.5)
IMM GRANULOCYTES NFR BLD AUTO: 0.5 % — SIGNIFICANT CHANGE UP (ref 0–1.5)
LYMPHOCYTES # BLD AUTO: 1.38 K/UL — SIGNIFICANT CHANGE UP (ref 1–3.3)
LYMPHOCYTES # BLD AUTO: 34.7 % — SIGNIFICANT CHANGE UP (ref 13–44)
MCHC RBC-ENTMCNC: 33 PG — SIGNIFICANT CHANGE UP (ref 27–34)
MCHC RBC-ENTMCNC: 33.5 G/DL — SIGNIFICANT CHANGE UP (ref 32–36)
MCV RBC AUTO: 98.5 FL — SIGNIFICANT CHANGE UP (ref 80–100)
MONOCYTES # BLD AUTO: 0.57 K/UL — SIGNIFICANT CHANGE UP (ref 0–0.9)
MONOCYTES NFR BLD AUTO: 14.3 % — HIGH (ref 2–14)
NEUTROPHILS # BLD AUTO: 1.85 K/UL — SIGNIFICANT CHANGE UP (ref 1.8–7.4)
NEUTROPHILS NFR BLD AUTO: 46.4 % — SIGNIFICANT CHANGE UP (ref 43–77)
NRBC # BLD: 0 /100 WBCS — SIGNIFICANT CHANGE UP (ref 0–0)
PLATELET # BLD AUTO: 199 K/UL — SIGNIFICANT CHANGE UP (ref 150–400)
RBC # BLD: 4.03 M/UL — SIGNIFICANT CHANGE UP (ref 3.8–5.2)
RBC # FLD: 13.4 % — SIGNIFICANT CHANGE UP (ref 10.3–14.5)
WBC # BLD: 3.98 K/UL — SIGNIFICANT CHANGE UP (ref 3.8–10.5)
WBC # FLD AUTO: 3.98 K/UL — SIGNIFICANT CHANGE UP (ref 3.8–10.5)

## 2022-05-17 PROCEDURE — 99214 OFFICE O/P EST MOD 30 MIN: CPT

## 2022-05-18 LAB
ALBUMIN SERPL ELPH-MCNC: 4.8 G/DL
ALP BLD-CCNC: 144 U/L
ALT SERPL-CCNC: 17 U/L
ANION GAP SERPL CALC-SCNC: 18 MMOL/L
AST SERPL-CCNC: 35 U/L
BILIRUB SERPL-MCNC: 0.2 MG/DL
BUN SERPL-MCNC: 15 MG/DL
CALCIUM SERPL-MCNC: 9.5 MG/DL
CANCER AG27-29 SERPL-ACNC: 24 U/ML
CEA SERPL-MCNC: 15.7 NG/ML
CHLORIDE SERPL-SCNC: 102 MMOL/L
CO2 SERPL-SCNC: 21 MMOL/L
CREAT SERPL-MCNC: 0.71 MG/DL
EGFR: 99 ML/MIN/1.73M2
GLUCOSE SERPL-MCNC: 88 MG/DL
POTASSIUM SERPL-SCNC: 3.9 MMOL/L
PROT SERPL-MCNC: 8.1 G/DL
SODIUM SERPL-SCNC: 141 MMOL/L

## 2022-05-19 LAB
MISCELLANEOUS TEST: NORMAL
PROC NAME: NORMAL

## 2022-05-19 NOTE — ASSESSMENT
[Palliative] : Goals of care discussed with patient: Palliative [FreeTextEntry1] : ROGELIO PEARSON  is here for metastatic breast cancer to spine, lymph nodes and lungs, ER 90%, [place] 2%, HER2 negative,  She is s/p L2 kyphoplasty and RT for severe pathologic compression fracture.  Pt is currently on tx with ibrance and letrozole, now with POD.\par \par -PET scan 4/11/22 which showed increased in size of medial left breast mass, new FDG acitivity in upper central left breast and nodule in medial upper right breast, suspicious for new sites of disease. Lung and bone lesions stable and unchanged. \par - Patient's CBC was reviewed and is acceptable to proceed w/ treatment today as planned.\par - Day 14 Faslodex due today. \par -Germline testing - negative\par -[facility]is testing on most recent biopsy- no actionable mutations\par -Grade 1 fatigue since starting everolimus\par -rx dexamethasone prophylactic rinse - advised to start now \par -continue Xgeva monthly \par -continue f/up as needed w/ Dr. raines and Dr. Kim as recommended\par -I encouraged her to call me with any questions. \par -FU in 2 weeks

## 2022-05-19 NOTE — PHYSICAL EXAM
[Fully active, able to carry on all pre-disease performance without restriction] : Status 0 - Fully active, able to carry on all pre-disease performance without restriction [Normal] : affect appropriate [de-identified] : left 9:00-10:00 , s/p healed ulceration 4 cm induration and vertical indentation unchanged; satellite lesion at 11:00 1 cm;  no adenopathy b/l ; no mass right breast,

## 2022-05-19 NOTE — HISTORY OF PRESENT ILLNESS
[Disease: _____________________] : Disease: [unfilled] [M: ___] : M[unfilled] [AJCC Stage: ____] : AJCC Stage: [unfilled] [de-identified] : Pt noticed a left breast mass x 2 months for which she did not seek medical attention, denied any pain, ulceration, bleeding or nipple changes/discharge.  She also c/o lower back pain for 2-3 months, associated with pain radiating down the lower extremities relieved with Tylenol prn.  Also reports intermittent upper back pain.   No fevers, night sweats, weight loss.  Reports fatigue and poor appetite.  She went to an urgent care in [place] for back pain and breast mass and was given a referral for mammo/US.  \par \par Mammogram and breast US on 4/7/2021 showed  5.6x3.2x5.3cm hypoechoic irregularly-shaped mass with angular margins 9-11:00 5 to 9cm from nipple; left axilla - enlarged 4.0x2.1x3.4cm lymph node, US biopsy rec, [id number].\par \par On 4/20/2021, pt underwent an US guided left breast core biopsy which showed invasive moderately differentiated ductal carcinoma, [place] score 6/9 (3+2+10), invasive tumor at least 1.8cm, no DCIS, no LVI, microcalcifications present, ER 90%, [place] 2%, HER2 2+, CISH negative.  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  \par \par For the back pain, pt underwent an MRI lumbar spine on 4/22/2021 which showed metastatic disease in lower thoracic spine, lumbar spine and upper sacrum with severe pathologic fracture of L2 with retropulsion of the posterior endplate causing moderate canal stenosis. \par \par Admitted to [facility] 6/2021 - s/p L2 kyphoplasty and RT.  \par \par 6/2021-4/2022 - letrozole and Ibrance, s/p two dose reductions due to neutropenia.\par \par PET scan 4/11/22 - POD\par \par 5/2022 - Started on everolimus and fulvestrant - continue xgeva\par \par Patient also on Xgeva monthly since 10/5/21.\par \par Family history is significant for mother had cervical cancer age 64 and father with prostate cancer age 67.  No cancer of the breast, ovary, endometrium, and pancreatic cancer.  The patient is of [place] ethnic background.  No ETOH or tobacco.\par \par Caris Summary Report\par Source: Spine: 6/4/2021\par ER 60%; ERBB2 Negative; \par PTEN pathogenic\par PIK3CA Negative;\par ESR1 not detected\par No other actionable mutations noted; full report to be filed. \par \par Germline Testing: No clinically significant mutation on [employer] testing [de-identified] : ER 90%, CT 2%, HER2 2+, CISH negative [de-identified] : 5/19/2022\par Patient returns today to rule out progression of metastatic breast cancer and assess treatment toxicity.\par Started everolimus and exemestane 5/5/2022\par patient feels fatigued and having difficulty feeling motivated\par no diarrhea, no mouth sores - not using dex swish/spit\par no sob, no coughing \par + decreased appetite; no weight loss\par no bone pain \par \par

## 2022-05-23 ENCOUNTER — OUTPATIENT (OUTPATIENT)
Dept: OUTPATIENT SERVICES | Facility: HOSPITAL | Age: 57
LOS: 1 days | Discharge: HOME | End: 2022-05-23

## 2022-05-23 DIAGNOSIS — Z98.890 OTHER SPECIFIED POSTPROCEDURAL STATES: Chronic | ICD-10-CM

## 2022-05-23 DIAGNOSIS — C50.919 MALIGNANT NEOPLASM OF UNSPECIFIED SITE OF UNSPECIFIED FEMALE BREAST: ICD-10-CM

## 2022-05-31 ENCOUNTER — APPOINTMENT (OUTPATIENT)
Dept: INFUSION THERAPY | Facility: HOSPITAL | Age: 57
End: 2022-05-31

## 2022-05-31 ENCOUNTER — RESULT REVIEW (OUTPATIENT)
Age: 57
End: 2022-05-31

## 2022-05-31 ENCOUNTER — APPOINTMENT (OUTPATIENT)
Dept: HEMATOLOGY ONCOLOGY | Facility: CLINIC | Age: 57
End: 2022-05-31
Payer: MEDICAID

## 2022-05-31 VITALS
OXYGEN SATURATION: 97 % | WEIGHT: 123.46 LBS | TEMPERATURE: 97.1 F | HEART RATE: 86 BPM | HEIGHT: 60.98 IN | RESPIRATION RATE: 16 BRPM | SYSTOLIC BLOOD PRESSURE: 149 MMHG | DIASTOLIC BLOOD PRESSURE: 96 MMHG | BODY MASS INDEX: 23.31 KG/M2

## 2022-05-31 DIAGNOSIS — M84.48XA PATHOLOGICAL FRACTURE, OTHER SITE, INITIAL ENCOUNTER FOR FRACTURE: ICD-10-CM

## 2022-05-31 LAB
BASOPHILS # BLD AUTO: 0.05 K/UL — SIGNIFICANT CHANGE UP (ref 0–0.2)
BASOPHILS NFR BLD AUTO: 1 % — SIGNIFICANT CHANGE UP (ref 0–2)
EOSINOPHIL # BLD AUTO: 0.24 K/UL — SIGNIFICANT CHANGE UP (ref 0–0.5)
EOSINOPHIL NFR BLD AUTO: 4.9 % — SIGNIFICANT CHANGE UP (ref 0–6)
HCT VFR BLD CALC: 38.2 % — SIGNIFICANT CHANGE UP (ref 34.5–45)
HGB BLD-MCNC: 12.6 G/DL — SIGNIFICANT CHANGE UP (ref 11.5–15.5)
IMM GRANULOCYTES NFR BLD AUTO: 1 % — SIGNIFICANT CHANGE UP (ref 0–1.5)
LYMPHOCYTES # BLD AUTO: 1.11 K/UL — SIGNIFICANT CHANGE UP (ref 1–3.3)
LYMPHOCYTES # BLD AUTO: 22.7 % — SIGNIFICANT CHANGE UP (ref 13–44)
MCHC RBC-ENTMCNC: 31.2 PG — SIGNIFICANT CHANGE UP (ref 27–34)
MCHC RBC-ENTMCNC: 33 G/DL — SIGNIFICANT CHANGE UP (ref 32–36)
MCV RBC AUTO: 94.6 FL — SIGNIFICANT CHANGE UP (ref 80–100)
MONOCYTES # BLD AUTO: 0.62 K/UL — SIGNIFICANT CHANGE UP (ref 0–0.9)
MONOCYTES NFR BLD AUTO: 12.7 % — SIGNIFICANT CHANGE UP (ref 2–14)
NEUTROPHILS # BLD AUTO: 2.82 K/UL — SIGNIFICANT CHANGE UP (ref 1.8–7.4)
NEUTROPHILS NFR BLD AUTO: 57.7 % — SIGNIFICANT CHANGE UP (ref 43–77)
NRBC # BLD: 0 /100 WBCS — SIGNIFICANT CHANGE UP (ref 0–0)
PLATELET # BLD AUTO: 203 K/UL — SIGNIFICANT CHANGE UP (ref 150–400)
RBC # BLD: 4.04 M/UL — SIGNIFICANT CHANGE UP (ref 3.8–5.2)
RBC # FLD: 13.6 % — SIGNIFICANT CHANGE UP (ref 10.3–14.5)
WBC # BLD: 4.89 K/UL — SIGNIFICANT CHANGE UP (ref 3.8–10.5)
WBC # FLD AUTO: 4.89 K/UL — SIGNIFICANT CHANGE UP (ref 3.8–10.5)

## 2022-05-31 PROCEDURE — 99214 OFFICE O/P EST MOD 30 MIN: CPT

## 2022-05-31 RX ORDER — DEXAMETHASONE 0.5 MG/5ML
0.5 ELIXIR ORAL
Qty: 1 | Refills: 3 | Status: DISCONTINUED | COMMUNITY
Start: 2022-05-03 | End: 2022-05-31

## 2022-05-31 RX ORDER — LETROZOLE TABLETS 2.5 MG/1
2.5 TABLET, FILM COATED ORAL DAILY
Qty: 90 | Refills: 3 | Status: DISCONTINUED | COMMUNITY
Start: 2021-07-23 | End: 2022-05-31

## 2022-05-31 NOTE — HISTORY OF PRESENT ILLNESS
[Disease: _____________________] : Disease: [unfilled] [M: ___] : M[unfilled] [AJCC Stage: ____] : AJCC Stage: [unfilled] [de-identified] : Pt noticed a left breast mass x 2 months for which she did not seek medical attention, denied any pain, ulceration, bleeding or nipple changes/discharge.  She also c/o lower back pain for 2-3 months, associated with pain radiating down the lower extremities relieved with Tylenol prn.  Also reports intermittent upper back pain.   No fevers, night sweats, weight loss.  Reports fatigue and poor appetite.  She went to an urgent care in [place] for back pain and breast mass and was given a referral for mammo/US.  \par \par Mammogram and breast US on 4/7/2021 showed  5.6x3.2x5.3cm hypoechoic irregularly-shaped mass with angular margins 9-11:00 5 to 9cm from nipple; left axilla - enlarged 4.0x2.1x3.4cm lymph node, US biopsy rec, [id number].\par \par On 4/20/2021, pt underwent an US guided left breast core biopsy which showed invasive moderately differentiated ductal carcinoma, [place] score 6/9 (3+2+10), invasive tumor at least 1.8cm, no DCIS, no LVI, microcalcifications present, ER 90%, LA 2%, HER2 2+, CISH negative.  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  \par \par For the back pain, pt underwent an MRI lumbar spine on 4/22/2021 which showed metastatic disease in lower thoracic spine, lumbar spine and upper sacrum with severe pathologic fracture of L2 with retropulsion of the posterior endplate causing moderate canal stenosis. \par \par Admitted to [facility] 6/2021 - s/p L2 kyphoplasty and RT.  \par \par 6/2021-4/2022 - letrozole and Ibrance, s/p two dose reductions due to neutropenia.\par \par PET scan 4/11/22 - POD\par \par 5/2022 - Started on everolimus and fulvestrant - continue xgeva\par \par Patient also on Xgeva monthly since 10/5/21.\par \par Family history is significant for mother had cervical cancer age 64 and father with prostate cancer age 67.  No cancer of the breast, ovary, endometrium, and pancreatic cancer.  The patient is of [place] ethnic background.  No ETOH or tobacco.\par \par Caris Summary Report\par Source: Spine: 6/4/2021\par ER 60%; ERBB2 Negative; \par PTEN pathogenic\par PIK3CA Negative;\par ESR1 not detected\par No other actionable mutations noted; full report to be filed. \par \par Germline Testing: No clinically significant mutation on [employer] testing [de-identified] : ER 90%, [place] 2%, HER2 2+, CISH negative [de-identified] : \par Patient returns today to rule out progression of metastatic breast cancer and assess treatment toxicity.\par Started everolimus  5/5/2022\par C2D1 Faslodex due today. Tolerating well other than local mild reaction.\par patient feels fatigued and having difficulty feeling motivated\par no diarrhea, no mouth sores - advised using dex swish/spit consistently\par no sob, no coughing \par + decreased appetite; no weight loss\par no bone pain \par \par

## 2022-05-31 NOTE — PHYSICAL EXAM
[Fully active, able to carry on all pre-disease performance without restriction] : Status 0 - Fully active, able to carry on all pre-disease performance without restriction [Normal] : affect appropriate [de-identified] : left 9:00-10:00 , s/p healed ulceration 3.5 cm induration (smaller) and vertical indentation unchanged; satellite lesion at 11:00 1 cm;  no adenopathy b/l ; no mass right breast,

## 2022-05-31 NOTE — ASSESSMENT
[Palliative] : Goals of care discussed with patient: Palliative [FreeTextEntry1] : ROGELIO PEARSON  is here for metastatic breast cancer to spine, lymph nodes and lungs, ER 90%, [place] 2%, HER2 negative,  She is s/p L2 kyphoplasty and RT for severe pathologic compression fracture. s/p ibrance and letrozole, now with POD.\par \par -PET scan 4/11/22 which showed increased in size of medial left breast mass, new FDG acitivity in upper central left breast and nodule in medial upper right breast, suspicious for new sites of disease. Lung and bone lesions stable and unchanged. \par -Started everolimus  5/5/2022\par -C2D1 Faslodex due today, started 5/3/22 \par -Germline testing - negative\par -Caris testing on most recent biopsy- no actionable mutations\par -Grade 1 fatigue since starting everolimus\par -c/w dexamethasone prophylactic rinse \par -continue Xgeva monthly \par -continue f/up as needed w/ Dr. raines and Dr. Kim as recommended\par -I encouraged her to call me with any questions. \par -FU in 1 month

## 2022-06-01 DIAGNOSIS — C79.51 SECONDARY MALIGNANT NEOPLASM OF BONE: ICD-10-CM

## 2022-06-01 LAB — CANCER AG27-29 SERPL-ACNC: 24.5 U/ML

## 2022-06-02 LAB
ALBUMIN SERPL ELPH-MCNC: 4.3 G/DL
ALP BLD-CCNC: 195 U/L
ALT SERPL-CCNC: 31 U/L
ANION GAP SERPL CALC-SCNC: 18 MMOL/L
AST SERPL-CCNC: 39 U/L
BILIRUB SERPL-MCNC: 0.2 MG/DL
BUN SERPL-MCNC: 17 MG/DL
CALCIUM SERPL-MCNC: 9.4 MG/DL
CEA SERPL-MCNC: 11.8 NG/ML
CHLORIDE SERPL-SCNC: 105 MMOL/L
CO2 SERPL-SCNC: 19 MMOL/L
CREAT SERPL-MCNC: 0.76 MG/DL
EGFR: 91 ML/MIN/1.73M2
GLUCOSE SERPL-MCNC: 135 MG/DL
POTASSIUM SERPL-SCNC: 3.8 MMOL/L
PROT SERPL-MCNC: 7.4 G/DL
SODIUM SERPL-SCNC: 142 MMOL/L

## 2022-06-10 ENCOUNTER — NON-APPOINTMENT (OUTPATIENT)
Age: 57
End: 2022-06-10

## 2022-06-13 ENCOUNTER — OUTPATIENT (OUTPATIENT)
Dept: OUTPATIENT SERVICES | Facility: HOSPITAL | Age: 57
LOS: 1 days | End: 2022-06-13
Payer: MEDICAID

## 2022-06-13 ENCOUNTER — APPOINTMENT (OUTPATIENT)
Dept: ULTRASOUND IMAGING | Facility: IMAGING CENTER | Age: 57
End: 2022-06-13
Payer: MEDICAID

## 2022-06-13 DIAGNOSIS — C50.919 MALIGNANT NEOPLASM OF UNSPECIFIED SITE OF UNSPECIFIED FEMALE BREAST: ICD-10-CM

## 2022-06-13 DIAGNOSIS — Z79.899 OTHER LONG TERM (CURRENT) DRUG THERAPY: ICD-10-CM

## 2022-06-13 DIAGNOSIS — Z98.890 OTHER SPECIFIED POSTPROCEDURAL STATES: Chronic | ICD-10-CM

## 2022-06-13 PROCEDURE — 93971 EXTREMITY STUDY: CPT

## 2022-06-13 PROCEDURE — 93971 EXTREMITY STUDY: CPT | Mod: 26,LT

## 2022-06-17 ENCOUNTER — NON-APPOINTMENT (OUTPATIENT)
Age: 57
End: 2022-06-17

## 2022-06-20 ENCOUNTER — APPOINTMENT (OUTPATIENT)
Dept: HEMATOLOGY ONCOLOGY | Facility: CLINIC | Age: 57
End: 2022-06-20
Payer: MEDICAID

## 2022-06-20 VITALS
BODY MASS INDEX: 23.51 KG/M2 | DIASTOLIC BLOOD PRESSURE: 96 MMHG | RESPIRATION RATE: 16 BRPM | OXYGEN SATURATION: 98 % | HEART RATE: 108 BPM | TEMPERATURE: 96.8 F | WEIGHT: 124.34 LBS | SYSTOLIC BLOOD PRESSURE: 149 MMHG

## 2022-06-20 PROCEDURE — 99214 OFFICE O/P EST MOD 30 MIN: CPT

## 2022-06-20 NOTE — DISCHARGE NOTE PROVIDER - DID THE PATIENT PRESENT WITH OR WAS TREATED FOR MALNUTRITION DURING THIS ADMISSION
Yes... Non-Graft Cartilage Fenestration Text: The cartilage was fenestrated with a 2mm punch biopsy to help facilitate healing.

## 2022-06-22 NOTE — END OF VISIT
[Time Spent: ___ minutes] : I have spent [unfilled] minutes of time on the encounter. [FreeTextEntry3] : Patient being seen as per physician's primary plan of care.\par d/w dr. morales

## 2022-06-22 NOTE — PHYSICAL EXAM
[Fully active, able to carry on all pre-disease performance without restriction] : Status 0 - Fully active, able to carry on all pre-disease performance without restriction [Normal] : affect appropriate [de-identified] : left 9:00-10:00 , s/p healed ulceration 3.5 cm induration (smaller) and vertical indentation unchanged; satellite lesion at 11:00 1 cm;  no adenopathy b/l ; no mass right breast,  [de-identified] : Left hand swollen diffusely and extending up forearm; edema noted;  no left axillary nodes palpated;

## 2022-06-22 NOTE — ASSESSMENT
[Palliative] : Goals of care discussed with patient: Palliative [FreeTextEntry1] : ROGELIO PEARSON  is here for metastatic breast cancer to spine, lymph nodes and lungs, ER 90%, [place] 2%, HER2 negative,  She is s/p L2 kyphoplasty and RT for severe pathologic compression fracture. s/p ibrance and letrozole, now with POD.\par \par -Reviewed PET scan 4/11/22 which showed increased in size of medial left breast mass, new FDG acitivity in upper central left breast and nodule in medial upper right breast, suspicious for new sites of disease. Lung and bone lesions stable and unchanged.  Left axillary node 1.3 cm with hypermetabolism\par -Started everolimus  5/5/2022\par -Nex week due for fulvestrant\par - Germlineline testing - negative\par -Caris testing on most recent biopsy- no actionable mutations\par -Grade 1 fatigue since starting everolimus\par -c/w dexamethasone prophylactic rinse \par -continue Xgeva monthly \par -continue f/up as needed w/ Dr. raines and Dr. Kim as recommended\par -I encouraged her to call me with any questions. \par -FU in 1 week\par \par New Left Upper Extremity Lymphedema\par - Doppler Negative for DVT\par - Axillary sono ordered stat for full evaluation of axillary nodes\par - if sono negative; consider CT chest or repeat Pet/CT \par - keep arm elevated; above heart level whenever possible\par -\par

## 2022-06-22 NOTE — HISTORY OF PRESENT ILLNESS
[Disease: _____________________] : Disease: [unfilled] [M: ___] : M[unfilled] [AJCC Stage: ____] : AJCC Stage: [unfilled] [de-identified] : Pt noticed a left breast mass x 2 months for which she did not seek medical attention, denied any pain, ulceration, bleeding or nipple changes/discharge.  She also c/o lower back pain for 2-3 months, associated with pain radiating down the lower extremities relieved with Tylenol prn.  Also reports intermittent upper back pain.   No fevers, night sweats, weight loss.  Reports fatigue and poor appetite.  She went to an urgent care in [place] for back pain and breast mass and was given a referral for mammo/US.  \par \par Mammogram and breast US on 4/7/2021 showed  5.6x3.2x5.3cm hypoechoic irregularly-shaped mass with angular margins 9-11:00 5 to 9cm from nipple; left axilla - enlarged 4.0x2.1x3.4cm lymph node, US biopsy rec, [id number].\par \par On 4/20/2021, pt underwent an US guided left breast core biopsy which showed invasive moderately differentiated ductal carcinoma, [place] score 6/9 (3+2+10), invasive tumor at least 1.8cm, no DCIS, no LVI, microcalcifications present, ER 90%, [place] 2%, HER2 2+, CISH negative.  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  \par \par For the back pain, pt underwent an MRI lumbar spine on 4/22/2021 which showed metastatic disease in lower thoracic spine, lumbar spine and upper sacrum with severe pathologic fracture of L2 with retropulsion of the posterior endplate causing moderate canal stenosis. \par \par Admitted to [facility] 6/2021 - s/p L2 kyphoplasty and RT.  \par \par 6/2021-4/2022 - letrozole and Ibrance, s/p two dose reductions due to neutropenia.\par \par PET scan 4/11/22 - POD\par \par 5/2022 - Started on everolimus and fulvestrant - continue xgeva\par \par Patient also on Xgeva monthly since 10/5/21.\par \par Family history is significant for mother had cervical cancer age 64 and father with prostate cancer age 67.  No cancer of the breast, ovary, endometrium, and pancreatic cancer.  The patient is of [place] ethnic background.  No ETOH or tobacco.\par \par Caris Summary Report\par Source: Spine: 6/4/2021\par ER 60%; ERBB2 Negative; \par PTEN pathogenic\par PIK3CA Negative;\par ESR1 not detected\par No other actionable mutations noted; full report to be filed. \par \par Germline Testing: No clinically significant mutation on [employer] testing [de-identified] : ER 90%, [place] 2%, HER2 2+, CISH negative [de-identified] : 6/20/2022\par Patient presents today urgently because of persistent left arm swelling; doppler was negative for DVT.  No pain is noted, but patient tis having difficulty closing her hand.  \par This started about 10 days ago and has not worsened or improved. \par \par Started everolimus  5/5/2022 (held x 3 days but no improvement)\par C2D1 Faslodex due today. Tolerating well other than local mild reaction.\par \par \par \par

## 2022-06-28 ENCOUNTER — RESULT REVIEW (OUTPATIENT)
Age: 57
End: 2022-06-28

## 2022-06-28 ENCOUNTER — APPOINTMENT (OUTPATIENT)
Dept: HEMATOLOGY ONCOLOGY | Facility: CLINIC | Age: 57
End: 2022-06-28

## 2022-06-28 ENCOUNTER — APPOINTMENT (OUTPATIENT)
Dept: INFUSION THERAPY | Facility: HOSPITAL | Age: 57
End: 2022-06-28

## 2022-06-28 VITALS
RESPIRATION RATE: 16 BRPM | OXYGEN SATURATION: 98 % | WEIGHT: 121.23 LBS | TEMPERATURE: 97.5 F | DIASTOLIC BLOOD PRESSURE: 111 MMHG | BODY MASS INDEX: 22.92 KG/M2 | HEART RATE: 105 BPM | SYSTOLIC BLOOD PRESSURE: 156 MMHG

## 2022-06-28 LAB
BASOPHILS # BLD AUTO: 0.04 K/UL — SIGNIFICANT CHANGE UP (ref 0–0.2)
BASOPHILS NFR BLD AUTO: 0.9 % — SIGNIFICANT CHANGE UP (ref 0–2)
EOSINOPHIL # BLD AUTO: 0.16 K/UL — SIGNIFICANT CHANGE UP (ref 0–0.5)
EOSINOPHIL NFR BLD AUTO: 3.4 % — SIGNIFICANT CHANGE UP (ref 0–6)
HCT VFR BLD CALC: 39.4 % — SIGNIFICANT CHANGE UP (ref 34.5–45)
HGB BLD-MCNC: 13 G/DL — SIGNIFICANT CHANGE UP (ref 11.5–15.5)
IMM GRANULOCYTES NFR BLD AUTO: 0.6 % — SIGNIFICANT CHANGE UP (ref 0–1.5)
LYMPHOCYTES # BLD AUTO: 1.05 K/UL — SIGNIFICANT CHANGE UP (ref 1–3.3)
LYMPHOCYTES # BLD AUTO: 22.6 % — SIGNIFICANT CHANGE UP (ref 13–44)
MCHC RBC-ENTMCNC: 29.3 PG — SIGNIFICANT CHANGE UP (ref 27–34)
MCHC RBC-ENTMCNC: 33 G/DL — SIGNIFICANT CHANGE UP (ref 32–36)
MCV RBC AUTO: 88.9 FL — SIGNIFICANT CHANGE UP (ref 80–100)
MONOCYTES # BLD AUTO: 0.64 K/UL — SIGNIFICANT CHANGE UP (ref 0–0.9)
MONOCYTES NFR BLD AUTO: 13.8 % — SIGNIFICANT CHANGE UP (ref 2–14)
NEUTROPHILS # BLD AUTO: 2.72 K/UL — SIGNIFICANT CHANGE UP (ref 1.8–7.4)
NEUTROPHILS NFR BLD AUTO: 58.7 % — SIGNIFICANT CHANGE UP (ref 43–77)
NRBC # BLD: 0 /100 WBCS — SIGNIFICANT CHANGE UP (ref 0–0)
PLATELET # BLD AUTO: 222 K/UL — SIGNIFICANT CHANGE UP (ref 150–400)
RBC # BLD: 4.43 M/UL — SIGNIFICANT CHANGE UP (ref 3.8–5.2)
RBC # FLD: 15.3 % — HIGH (ref 10.3–14.5)
WBC # BLD: 4.64 K/UL — SIGNIFICANT CHANGE UP (ref 3.8–10.5)
WBC # FLD AUTO: 4.64 K/UL — SIGNIFICANT CHANGE UP (ref 3.8–10.5)

## 2022-06-28 PROCEDURE — 99214 OFFICE O/P EST MOD 30 MIN: CPT

## 2022-06-29 LAB
25(OH)D3 SERPL-MCNC: 27.4 NG/ML
ALBUMIN SERPL ELPH-MCNC: 4.2 G/DL
ALP BLD-CCNC: 135 U/L
ALT SERPL-CCNC: 25 U/L
ANION GAP SERPL CALC-SCNC: 16 MMOL/L
AST SERPL-CCNC: 36 U/L
BILIRUB SERPL-MCNC: 0.2 MG/DL
BUN SERPL-MCNC: 18 MG/DL
CALCIUM SERPL-MCNC: 9.3 MG/DL
CANCER AG27-29 SERPL-ACNC: 23.4 U/ML
CEA SERPL-MCNC: 10.5 NG/ML
CHLORIDE SERPL-SCNC: 103 MMOL/L
CO2 SERPL-SCNC: 21 MMOL/L
CREAT SERPL-MCNC: 0.8 MG/DL
EGFR: 86 ML/MIN/1.73M2
GLUCOSE SERPL-MCNC: 131 MG/DL
POTASSIUM SERPL-SCNC: 3.6 MMOL/L
PROT SERPL-MCNC: 7.4 G/DL
SODIUM SERPL-SCNC: 139 MMOL/L

## 2022-07-07 ENCOUNTER — RESULT REVIEW (OUTPATIENT)
Age: 57
End: 2022-07-07

## 2022-07-07 ENCOUNTER — APPOINTMENT (OUTPATIENT)
Dept: HEMATOLOGY ONCOLOGY | Facility: CLINIC | Age: 57
End: 2022-07-07

## 2022-07-07 VITALS
OXYGEN SATURATION: 95 % | TEMPERATURE: 97 F | DIASTOLIC BLOOD PRESSURE: 95 MMHG | WEIGHT: 123.02 LBS | SYSTOLIC BLOOD PRESSURE: 152 MMHG | HEIGHT: 62 IN | RESPIRATION RATE: 16 BRPM | HEART RATE: 104 BPM | BODY MASS INDEX: 22.64 KG/M2

## 2022-07-07 LAB
BASOPHILS # BLD AUTO: 0.05 K/UL — SIGNIFICANT CHANGE UP (ref 0–0.2)
BASOPHILS NFR BLD AUTO: 0.8 % — SIGNIFICANT CHANGE UP (ref 0–2)
CEA SERPL-MCNC: 10 NG/ML
EOSINOPHIL # BLD AUTO: 0.2 K/UL — SIGNIFICANT CHANGE UP (ref 0–0.5)
EOSINOPHIL NFR BLD AUTO: 3.3 % — SIGNIFICANT CHANGE UP (ref 0–6)
HCT VFR BLD CALC: 37.2 % — SIGNIFICANT CHANGE UP (ref 34.5–45)
HGB BLD-MCNC: 12.1 G/DL — SIGNIFICANT CHANGE UP (ref 11.5–15.5)
IMM GRANULOCYTES NFR BLD AUTO: 0.7 % — SIGNIFICANT CHANGE UP (ref 0–1.5)
LYMPHOCYTES # BLD AUTO: 0.99 K/UL — LOW (ref 1–3.3)
LYMPHOCYTES # BLD AUTO: 16.2 % — SIGNIFICANT CHANGE UP (ref 13–44)
MCHC RBC-ENTMCNC: 28.6 PG — SIGNIFICANT CHANGE UP (ref 27–34)
MCHC RBC-ENTMCNC: 32.5 G/DL — SIGNIFICANT CHANGE UP (ref 32–36)
MCV RBC AUTO: 87.9 FL — SIGNIFICANT CHANGE UP (ref 80–100)
MONOCYTES # BLD AUTO: 0.77 K/UL — SIGNIFICANT CHANGE UP (ref 0–0.9)
MONOCYTES NFR BLD AUTO: 12.6 % — SIGNIFICANT CHANGE UP (ref 2–14)
NEUTROPHILS # BLD AUTO: 4.08 K/UL — SIGNIFICANT CHANGE UP (ref 1.8–7.4)
NEUTROPHILS NFR BLD AUTO: 66.4 % — SIGNIFICANT CHANGE UP (ref 43–77)
NRBC # BLD: 0 /100 WBCS — SIGNIFICANT CHANGE UP (ref 0–0)
PLATELET # BLD AUTO: 222 K/UL — SIGNIFICANT CHANGE UP (ref 150–400)
RBC # BLD: 4.23 M/UL — SIGNIFICANT CHANGE UP (ref 3.8–5.2)
RBC # FLD: 16 % — HIGH (ref 10.3–14.5)
WBC # BLD: 6.13 K/UL — SIGNIFICANT CHANGE UP (ref 3.8–10.5)
WBC # FLD AUTO: 6.13 K/UL — SIGNIFICANT CHANGE UP (ref 3.8–10.5)

## 2022-07-07 PROCEDURE — 99214 OFFICE O/P EST MOD 30 MIN: CPT

## 2022-07-07 RX ORDER — IBUPROFEN 600 MG/1
600 TABLET, FILM COATED ORAL
Qty: 60 | Refills: 0 | Status: DISCONTINUED | COMMUNITY
Start: 2021-05-05 | End: 2022-07-07

## 2022-07-07 RX ORDER — DOCUSATE SODIUM 100 MG/1
100 CAPSULE, LIQUID FILLED ORAL
Qty: 30 | Refills: 0 | Status: DISCONTINUED | COMMUNITY
Start: 2021-05-05 | End: 2022-07-07

## 2022-07-07 RX ORDER — AMLODIPINE BESYLATE 2.5 MG/1
2.5 TABLET ORAL
Refills: 0 | Status: DISCONTINUED | COMMUNITY
End: 2022-07-07

## 2022-07-07 NOTE — HISTORY OF PRESENT ILLNESS
[de-identified] : Pt noticed a left breast mass x 2 months for which she did not seek medical attention, denied any pain, ulceration, bleeding or nipple changes/discharge.  She also c/o lower back pain for 2-3 months, associated with pain radiating down the lower extremities relieved with Tylenol prn.  Also reports intermittent upper back pain.   No fevers, night sweats, weight loss.  Reports fatigue and poor appetite.  She went to an urgent care in [place] for back pain and breast mass and was given a referral for mammo/US.  \par \par Mammogram and breast US on 4/7/2021 showed  5.6x3.2x5.3cm hypoechoic irregularly-shaped mass with angular margins 9-11:00 5 to 9cm from nipple; left axilla - enlarged 4.0x2.1x3.4cm lymph node, US biopsy rec, [id number].\par \par On 4/20/2021, pt underwent an US guided left breast core biopsy which showed invasive moderately differentiated ductal carcinoma, [place] score 6/9 (3+2+10), invasive tumor at least 1.8cm, no DCIS, no LVI, microcalcifications present, ER 90%, [place] 2%, HER2 2+, CISH negative.  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  \par \par For the back pain, pt underwent an MRI lumbar spine on 4/22/2021 which showed metastatic disease in lower thoracic spine, lumbar spine and upper sacrum with severe pathologic fracture of L2 with retropulsion of the posterior endplate causing moderate canal stenosis. \par \par Admitted to [facility] 6/2021 - s/p L2 kyphoplasty and RT.  \par \par 6/2021-4/2022 - letrozole and Ibrance, s/p two dose reductions due to neutropenia.\par \par PET scan 4/11/22 - POD\par \par 5/2022 - Started on everolimus and fulvestrant - continue xgeva\par \par Patient also on Xgeva monthly since 10/5/21.\par \par Family history is significant for mother had cervical cancer age 64 and father with prostate cancer age 67.  No cancer of the breast, ovary, endometrium, and pancreatic cancer.  The patient is of [place] ethnic background.  No ETOH or tobacco.\par \par Caris Summary Report\par Source: Spine: 6/4/2021\par ER 60%; ERBB2 Negative; \par PTEN pathogenic\par PIK3CA Negative;\par ESR1 not detected\par No other actionable mutations noted; full report to be filed. \par \par Germline Testing: No clinically significant mutation on [employer] testing [de-identified] : ER 90%, [place] 2%, HER2 2+, CISH negative [de-identified] : 7/7/22: \par Patient presents today for follow up. \par Her everolimus was on hold since last visit due to complaints of L arm swelling. \par US Left upper extremity negative for clots in 6/2022.\par US axilla still pending.\par She notes the swelling has not improved since she has been off everolimus. \par She denies any weakness to the arm, drainage, decrease ROM, trauma to the area.  No other areas of swelling.\par No other complaints. \par Continues on faslodex/xgeva monthly.

## 2022-07-07 NOTE — ASSESSMENT
[FreeTextEntry1] : ROGELIO PEARSON  is here for metastatic breast cancer to spine, lymph nodes and lungs, ER 90%, [place] 2%, HER2 negative,  She is s/p L2 kyphoplasty and RT for severe pathologic compression fracture. s/p ibrance and letrozole, now with POD.\par \par -Reviewed PET scan 4/11/22 which showed increased in size of medial left breast mass, new FDG acitivity in upper central left breast and nodule in medial upper right breast, suspicious for new sites of disease. Lung and bone lesions stable and unchanged.  Left axillary node 1.3 cm with hypermetabolism\par -Started everolimus  5/5/2022, on  hold for 1 week for left arm swelling with no improvement\par -c/w q4week fulvestrant\par -Germlineline testing - negative\par -Caris testing on most recent biopsy- no actionable mutations\par -Grade 1 fatigue since starting everolimus\par -c/w dexamethasone prophylactic rinse \par -continue Xgeva monthly \par -continue f/up as needed w/ Dr. Rodríguez and Dr. Kim as recommended\par -I encouraged her to call me with any questions. \par -FU in 1 week\par \par Left Upper Extremity Lymphedema\par - Doppler Negative for DVT\par - Axillary sono ordered stat for full evaluation of axillary nodes\par - repeat PET scan to assess progression of disease \par - Keep arm elevated; above heart level whenever possible
[phone]

## 2022-07-07 NOTE — PHYSICAL EXAM
[Fully active, able to carry on all pre-disease performance without restriction] : Status 0 - Fully active, able to carry on all pre-disease performance without restriction [Normal] : affect appropriate [de-identified] : left 9:00-10:00 , s/p healed ulceration 3.5 cm induration (smaller) and vertical indentation unchanged; satellite lesion at 11:00 1 cm;  no adenopathy b/l ; no mass right breast,  [de-identified] : Left hand swollen diffusely and extending up forearm; edema noted;  no left axillary nodes palpated;

## 2022-07-07 NOTE — HISTORY OF PRESENT ILLNESS
[Disease: _____________________] : Disease: [unfilled] [M: ___] : M[unfilled] [AJCC Stage: ____] : AJCC Stage: [unfilled] [de-identified] : Pt noticed a left breast mass x 2 months for which she did not seek medical attention, denied any pain, ulceration, bleeding or nipple changes/discharge.  She also c/o lower back pain for 2-3 months, associated with pain radiating down the lower extremities relieved with Tylenol prn.  Also reports intermittent upper back pain.   No fevers, night sweats, weight loss.  Reports fatigue and poor appetite.  She went to an urgent care in [place] for back pain and breast mass and was given a referral for mammo/US.  \par \par Mammogram and breast US on 4/7/2021 showed  5.6x3.2x5.3cm hypoechoic irregularly-shaped mass with angular margins 9-11:00 5 to 9cm from nipple; left axilla - enlarged 4.0x2.1x3.4cm lymph node, US biopsy rec, [id number].\par \par On 4/20/2021, pt underwent an US guided left breast core biopsy which showed invasive moderately differentiated ductal carcinoma, [place] score 6/9 (3+2+10), invasive tumor at least 1.8cm, no DCIS, no LVI, microcalcifications present, ER 90%, [place] 2%, HER2 2+, CISH negative.  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  \par \par For the back pain, pt underwent an MRI lumbar spine on 4/22/2021 which showed metastatic disease in lower thoracic spine, lumbar spine and upper sacrum with severe pathologic fracture of L2 with retropulsion of the posterior endplate causing moderate canal stenosis. \par \par Admitted to [facility] 6/2021 - s/p L2 kyphoplasty and RT.  \par \par 6/2021-4/2022 - letrozole and Ibrance, s/p two dose reductions due to neutropenia.\par \par PET scan 4/11/22 - POD\par \par 5/2022 - Started on everolimus and fulvestrant - continue xgeva\par \par Patient also on Xgeva monthly since 10/5/21.\par \par Family history is significant for mother had cervical cancer age 64 and father with prostate cancer age 67.  No cancer of the breast, ovary, endometrium, and pancreatic cancer.  The patient is of [place] ethnic background.  No ETOH or tobacco.\par \par Caris Summary Report\par Source: Spine: 6/4/2021\par ER 60%; ERBB2 Negative; \par PTEN pathogenic\par PIK3CA Negative;\par ESR1 not detected\par No other actionable mutations noted; full report to be filed. \par \par Germline Testing: No clinically significant mutation on [employer] testing [de-identified] : ER 90%, [place] 2%, HER2 2+, CISH negative [de-identified] : 6/28/22\par Patient presents today for follow up. \par Started everolimus 5/5/2022 (held x 3 days but no improvement)\par intermittent headaches after starting everolimus\par Left arm swelling unchanged. Pt is concerned that it is not improving\par Xgeva/Faslodex due today. Tolerating well other than local mild reaction.\par US axilla still pending.\par

## 2022-07-07 NOTE — PHYSICAL EXAM
[de-identified] : left 9:00-10:00 , s/p healed ulceration 3x4cm induration (smaller) and vertical indentation unchanged; satellite lesion at 11:00 1 cm;  no adenopathy b/l ; no mass right breast,  [de-identified] : Left hand swollen diffusely and extending up forearm; edema noted;  no left axillary nodes palpated;

## 2022-07-07 NOTE — ASSESSMENT
[Palliative] : Goals of care discussed with patient: Palliative [FreeTextEntry1] : ROGELIO PEARSON  is here for metastatic breast cancer to spine, lymph nodes and lungs, ER 90%, [place] 2%, HER2 negative,  She is s/p L2 kyphoplasty and RT for severe pathologic compression fracture. s/p ibrance and letrozole, now with POD.\par \par -Reviewed PET scan 4/11/22 which showed increased in size of medial left breast mass, new FDG acitivity in upper central left breast and nodule in medial upper right breast, suspicious for new sites of disease. Lung and bone lesions stable and unchanged.  Left axillary node 1.3 cm with hypermetabolism\par -Started everolimus  5/5/2022; hold for 1 week and assess if improvement in left arm swelling\par -c/w q4week fulvestrant\par -continue Xgeva monthly \par -Germlineline testing - negative\par -Caris testing on most recent biopsy- no actionable mutations\par -Grade 1 fatigue and intermittent headaches since starting everolimus\par -c/w dexamethasone prophylactic rinse; no mouth sores\par -continue f/up as needed w/ Dr. raines and Dr. Kim as recommended\par -I encouraged her to call me with any questions. \par -FU in 1 week\par \par New Left Upper Extremity Lymphedema\par - Doppler Negative for DVT\par - Axillary sono ordered stat for full evaluation of axillary nodes; not done yet, will follow up\par - if sono negative; consider CT chest or repeat Pet/CT ??\par - keep arm elevated; above heart level whenever possible\par

## 2022-07-11 LAB
25(OH)D3 SERPL-MCNC: 25.3 NG/ML
ALBUMIN SERPL ELPH-MCNC: 4 G/DL
ALP BLD-CCNC: 115 U/L
ALT SERPL-CCNC: 24 U/L
ANION GAP SERPL CALC-SCNC: 15 MMOL/L
AST SERPL-CCNC: 42 U/L
BILIRUB SERPL-MCNC: 0.2 MG/DL
BUN SERPL-MCNC: 10 MG/DL
CALCIUM SERPL-MCNC: 9.1 MG/DL
CANCER AG27-29 SERPL-ACNC: 22.4 U/ML
CHLORIDE SERPL-SCNC: 103 MMOL/L
CO2 SERPL-SCNC: 22 MMOL/L
CREAT SERPL-MCNC: 0.68 MG/DL
EGFR: 102 ML/MIN/1.73M2
GLUCOSE SERPL-MCNC: 126 MG/DL
POTASSIUM SERPL-SCNC: 3.2 MMOL/L
PROT SERPL-MCNC: 6.9 G/DL
SODIUM SERPL-SCNC: 140 MMOL/L

## 2022-07-14 ENCOUNTER — OUTPATIENT (OUTPATIENT)
Dept: OUTPATIENT SERVICES | Facility: HOSPITAL | Age: 57
LOS: 1 days | Discharge: HOME | End: 2022-07-14

## 2022-07-14 DIAGNOSIS — C50.919 MALIGNANT NEOPLASM OF UNSPECIFIED SITE OF UNSPECIFIED FEMALE BREAST: ICD-10-CM

## 2022-07-14 DIAGNOSIS — Z98.890 OTHER SPECIFIED POSTPROCEDURAL STATES: Chronic | ICD-10-CM

## 2022-07-18 ENCOUNTER — NON-APPOINTMENT (OUTPATIENT)
Age: 57
End: 2022-07-18

## 2022-07-18 ENCOUNTER — RESULT REVIEW (OUTPATIENT)
Age: 57
End: 2022-07-18

## 2022-07-19 ENCOUNTER — APPOINTMENT (OUTPATIENT)
Dept: ULTRASOUND IMAGING | Facility: CLINIC | Age: 57
End: 2022-07-19

## 2022-07-19 ENCOUNTER — OUTPATIENT (OUTPATIENT)
Dept: OUTPATIENT SERVICES | Facility: HOSPITAL | Age: 57
LOS: 1 days | End: 2022-07-19
Payer: MEDICAID

## 2022-07-19 DIAGNOSIS — C79.9 SECONDARY MALIGNANT NEOPLASM OF UNSPECIFIED SITE: ICD-10-CM

## 2022-07-19 DIAGNOSIS — Z98.890 OTHER SPECIFIED POSTPROCEDURAL STATES: Chronic | ICD-10-CM

## 2022-07-19 PROCEDURE — 76882 US LMTD JT/FCL EVL NVASC XTR: CPT

## 2022-07-19 PROCEDURE — 76882 US LMTD JT/FCL EVL NVASC XTR: CPT | Mod: 26,LT

## 2022-07-21 NOTE — PROGRESS NOTE ADULT - PROBLEM SELECTOR PROBLEM 8
Phone visit as patient is Covid-19 positive. Spoke with ROHINI Cason facility staff. Full body assessment completed per Richard report - see body systems charing and vital signs. Patient has refused to eat very much since Covid onset stating 'It doesn't taste right'. Today patient is exhibiting signs of respiratory distress. Patient has received a Kenalog injection, has been started on Levaquin and tessalon pearles by Dr. Fuentes. Patient is scheduled for a chest x-ray later today. Patient was to be removed from isolation tomorrow but with development of new respiratory symptoms he will be kept on the Covid unit. Facility instructed to call for any needs, changes or concerns. Facility staff aware of Covid-19 prevention practices and have isolated the patient.
Need for prophylactic measure

## 2022-07-22 ENCOUNTER — APPOINTMENT (OUTPATIENT)
Dept: CT IMAGING | Facility: IMAGING CENTER | Age: 57
End: 2022-07-22

## 2022-07-22 ENCOUNTER — OUTPATIENT (OUTPATIENT)
Dept: OUTPATIENT SERVICES | Facility: HOSPITAL | Age: 57
LOS: 1 days | End: 2022-07-22
Payer: MEDICAID

## 2022-07-22 DIAGNOSIS — C50.919 MALIGNANT NEOPLASM OF UNSPECIFIED SITE OF UNSPECIFIED FEMALE BREAST: ICD-10-CM

## 2022-07-22 DIAGNOSIS — Z98.890 OTHER SPECIFIED POSTPROCEDURAL STATES: Chronic | ICD-10-CM

## 2022-07-22 PROCEDURE — 71260 CT THORAX DX C+: CPT

## 2022-07-22 PROCEDURE — 74177 CT ABD & PELVIS W/CONTRAST: CPT | Mod: 26

## 2022-07-22 PROCEDURE — 71260 CT THORAX DX C+: CPT | Mod: 26

## 2022-07-22 PROCEDURE — 74177 CT ABD & PELVIS W/CONTRAST: CPT

## 2022-07-25 ENCOUNTER — RESULT REVIEW (OUTPATIENT)
Age: 57
End: 2022-07-25

## 2022-07-25 ENCOUNTER — APPOINTMENT (OUTPATIENT)
Dept: NUCLEAR MEDICINE | Facility: IMAGING CENTER | Age: 57
End: 2022-07-25

## 2022-07-25 ENCOUNTER — OUTPATIENT (OUTPATIENT)
Dept: OUTPATIENT SERVICES | Facility: HOSPITAL | Age: 57
LOS: 1 days | End: 2022-07-25
Payer: MEDICAID

## 2022-07-25 DIAGNOSIS — C50.919 MALIGNANT NEOPLASM OF UNSPECIFIED SITE OF UNSPECIFIED FEMALE BREAST: ICD-10-CM

## 2022-07-25 DIAGNOSIS — Z98.890 OTHER SPECIFIED POSTPROCEDURAL STATES: Chronic | ICD-10-CM

## 2022-07-25 PROCEDURE — 78306 BONE IMAGING WHOLE BODY: CPT

## 2022-07-25 PROCEDURE — 78306 BONE IMAGING WHOLE BODY: CPT | Mod: 26

## 2022-07-25 PROCEDURE — 78830 RP LOCLZJ TUM SPECT W/CT 1: CPT

## 2022-07-25 PROCEDURE — 78830 RP LOCLZJ TUM SPECT W/CT 1: CPT | Mod: 26

## 2022-07-25 PROCEDURE — A9561: CPT

## 2022-07-26 ENCOUNTER — RESULT REVIEW (OUTPATIENT)
Age: 57
End: 2022-07-26

## 2022-07-26 ENCOUNTER — APPOINTMENT (OUTPATIENT)
Dept: INFUSION THERAPY | Facility: HOSPITAL | Age: 57
End: 2022-07-26

## 2022-07-26 ENCOUNTER — APPOINTMENT (OUTPATIENT)
Dept: HEMATOLOGY ONCOLOGY | Facility: CLINIC | Age: 57
End: 2022-07-26

## 2022-07-26 VITALS
HEIGHT: 61.97 IN | TEMPERATURE: 97.2 F | HEART RATE: 93 BPM | SYSTOLIC BLOOD PRESSURE: 129 MMHG | WEIGHT: 119.71 LBS | DIASTOLIC BLOOD PRESSURE: 88 MMHG | RESPIRATION RATE: 16 BRPM | BODY MASS INDEX: 22.03 KG/M2 | OXYGEN SATURATION: 100 %

## 2022-07-26 LAB
BASOPHILS # BLD AUTO: 0.03 K/UL — SIGNIFICANT CHANGE UP (ref 0–0.2)
BASOPHILS NFR BLD AUTO: 0.7 % — SIGNIFICANT CHANGE UP (ref 0–2)
EOSINOPHIL # BLD AUTO: 0.28 K/UL — SIGNIFICANT CHANGE UP (ref 0–0.5)
EOSINOPHIL NFR BLD AUTO: 6.4 % — HIGH (ref 0–6)
HCT VFR BLD CALC: 39.7 % — SIGNIFICANT CHANGE UP (ref 34.5–45)
HGB BLD-MCNC: 12.6 G/DL — SIGNIFICANT CHANGE UP (ref 11.5–15.5)
IMM GRANULOCYTES NFR BLD AUTO: 0.2 % — SIGNIFICANT CHANGE UP (ref 0–1.5)
LYMPHOCYTES # BLD AUTO: 1.16 K/UL — SIGNIFICANT CHANGE UP (ref 1–3.3)
LYMPHOCYTES # BLD AUTO: 26.4 % — SIGNIFICANT CHANGE UP (ref 13–44)
MCHC RBC-ENTMCNC: 27.5 PG — SIGNIFICANT CHANGE UP (ref 27–34)
MCHC RBC-ENTMCNC: 31.7 G/DL — LOW (ref 32–36)
MCV RBC AUTO: 86.7 FL — SIGNIFICANT CHANGE UP (ref 80–100)
MONOCYTES # BLD AUTO: 0.46 K/UL — SIGNIFICANT CHANGE UP (ref 0–0.9)
MONOCYTES NFR BLD AUTO: 10.5 % — SIGNIFICANT CHANGE UP (ref 2–14)
NEUTROPHILS # BLD AUTO: 2.45 K/UL — SIGNIFICANT CHANGE UP (ref 1.8–7.4)
NEUTROPHILS NFR BLD AUTO: 55.8 % — SIGNIFICANT CHANGE UP (ref 43–77)
NRBC # BLD: 0 /100 WBCS — SIGNIFICANT CHANGE UP (ref 0–0)
PLATELET # BLD AUTO: 187 K/UL — SIGNIFICANT CHANGE UP (ref 150–400)
RBC # BLD: 4.58 M/UL — SIGNIFICANT CHANGE UP (ref 3.8–5.2)
RBC # FLD: 16 % — HIGH (ref 10.3–14.5)
WBC # BLD: 4.39 K/UL — SIGNIFICANT CHANGE UP (ref 3.8–10.5)
WBC # FLD AUTO: 4.39 K/UL — SIGNIFICANT CHANGE UP (ref 3.8–10.5)

## 2022-07-26 PROCEDURE — 99214 OFFICE O/P EST MOD 30 MIN: CPT

## 2022-07-27 DIAGNOSIS — C79.51 SECONDARY MALIGNANT NEOPLASM OF BONE: ICD-10-CM

## 2022-07-27 LAB
ALBUMIN SERPL ELPH-MCNC: 4.2 G/DL
ALP BLD-CCNC: 135 U/L
ALT SERPL-CCNC: 16 U/L
ANION GAP SERPL CALC-SCNC: 21 MMOL/L
AST SERPL-CCNC: 32 U/L
BILIRUB SERPL-MCNC: 0.2 MG/DL
BUN SERPL-MCNC: 11 MG/DL
CALCIUM SERPL-MCNC: 9.2 MG/DL
CANCER AG27-29 SERPL-ACNC: 26.4 U/ML
CEA SERPL-MCNC: 11 NG/ML
CHLORIDE SERPL-SCNC: 106 MMOL/L
CO2 SERPL-SCNC: 16 MMOL/L
CREAT SERPL-MCNC: 0.75 MG/DL
EGFR: 93 ML/MIN/1.73M2
GLUCOSE SERPL-MCNC: 117 MG/DL
POTASSIUM SERPL-SCNC: 3.7 MMOL/L
PROT SERPL-MCNC: 7.3 G/DL
SODIUM SERPL-SCNC: 143 MMOL/L

## 2022-08-01 ENCOUNTER — APPOINTMENT (OUTPATIENT)
Dept: ULTRASOUND IMAGING | Facility: IMAGING CENTER | Age: 57
End: 2022-08-01

## 2022-08-03 NOTE — HISTORY OF PRESENT ILLNESS
[de-identified] : Pt noticed a left breast mass x 2 months for which she did not seek medical attention, denied any pain, ulceration, bleeding or nipple changes/discharge.  She also c/o lower back pain for 2-3 months, associated with pain radiating down the lower extremities relieved with Tylenol prn.  Also reports intermittent upper back pain.   No fevers, night sweats, weight loss.  Reports fatigue and poor appetite.  She went to an urgent care in [place] for back pain and breast mass and was given a referral for mammo/US.  \par \par Mammogram and breast US on 4/7/2021 showed  5.6x3.2x5.3cm hypoechoic irregularly-shaped mass with angular margins 9-11:00 5 to 9cm from nipple; left axilla - enlarged 4.0x2.1x3.4cm lymph node, US biopsy rec, [id number].\par \par On 4/20/2021, pt underwent an US guided left breast core biopsy which showed invasive moderately differentiated ductal carcinoma, Welsh score 6/9 (3+2+10), invasive tumor at least 1.8cm, no DCIS, no LVI, microcalcifications present, ER 90%, IA 2%, HER2 2+, CISH negative.  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  \par \par For the back pain, pt underwent an MRI lumbar spine on 4/22/2021 which showed metastatic disease in lower thoracic spine, lumbar spine and upper sacrum with severe pathologic fracture of L2 with retropulsion of the posterior endplate causing moderate canal stenosis. \par \par Risk factors:  No prior disease or biopsies. Menarche: age 12, Postmenopausal age 51; 2 pregnancies, 1 miscarriage, 1 living child daughter 19, 2 years of breastfeeding. No OCP or HRT.  Family history is significant for mother had cervical cancer age 64 and father with prostate cancer age 67.  No cancer of the breast, ovary, endometrium, and pancreatic cancer.  The patient is of [place] ethnic background.  No ETOH or tobacco.\par Screening: No prior colonoscopy, pap smear 2-3 years ago reportedly normal.\par Previous certified nursing assistant. [de-identified] : \par s/p kyphoplasty and RT at [facility]\par Coming from [facility], doing PT/OT and walking with physical therapist with walker, otherwise in a wheelchair.\par Currently unknown discharge date from [facility].\par Continues on letrozole daily, tolerating well.  Ibrance added on 6/22/2021, tolerating well. \par Has appt with Dr. Cynthia Kim today for fu post RT.\par Saw IR Dr. Julio Gordon 6/23/2021, doing well post L2 vertebral augmentation. \par Denies back pain, c/o mild dullness when lying down.  last oxycodone use 3 weeks ago.  Feels pain on exertion.  \par Breast mass stable, states it's hardening, otherwise no ulceration or discharge.\par Denies breathing difficulties, chest pain, cough, fevers, abd pain, n/v/d.  \par Pending dental eval at [facility] for clearance for bisphosphate, emphasized importance.  Pt to schedule. Admission

## 2022-08-07 NOTE — HISTORY OF PRESENT ILLNESS
[Disease: _____________________] : Disease: [unfilled] [M: ___] : M[unfilled] [AJCC Stage: ____] : AJCC Stage: [unfilled] [de-identified] : Pt noticed a left breast mass x 2 months for which she did not seek medical attention, denied any pain, ulceration, bleeding or nipple changes/discharge.  She also c/o lower back pain for 2-3 months, associated with pain radiating down the lower extremities relieved with Tylenol prn.  Also reports intermittent upper back pain.   No fevers, night sweats, weight loss.  Reports fatigue and poor appetite.  She went to an urgent care in [place] for back pain and breast mass and was given a referral for mammo/US.  \par \par Mammogram and breast US on 4/7/2021 showed  5.6x3.2x5.3cm hypoechoic irregularly-shaped mass with angular margins 9-11:00 5 to 9cm from nipple; left axilla - enlarged 4.0x2.1x3.4cm lymph node, US biopsy rec, [id number].\par \par On 4/20/2021, pt underwent an US guided left breast core biopsy which showed invasive moderately differentiated ductal carcinoma, [place] score 6/9 (3+2+10), invasive tumor at least 1.8cm, no DCIS, no LVI, microcalcifications present, ER 90%, [place] 2%, HER2 2+, CISH negative.  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  \par \par For the back pain, pt underwent an MRI lumbar spine on 4/22/2021 which showed metastatic disease in lower thoracic spine, lumbar spine and upper sacrum with severe pathologic fracture of L2 with retropulsion of the posterior endplate causing moderate canal stenosis. \par \par Admitted to [facility] 6/2021 - s/p L2 kyphoplasty and RT.  \par \par 6/2021-4/2022 - letrozole and Ibrance, s/p two dose reductions due to neutropenia.\par \par PET scan 4/11/22 - POD\par \par 5/2022 - Started on everolimus and fulvestrant - continue xgeva\par \par Patient also on Xgeva monthly since 10/5/21.\par \par Family history is significant for mother had cervical cancer age 64 and father with prostate cancer age 67.  No cancer of the breast, ovary, endometrium, and pancreatic cancer.  The patient is of [place] ethnic background.  No ETOH or tobacco.\par \par Caris Summary Report\par Source: Spine: 6/4/2021\par ER 60%; ERBB2 Negative; \par PTEN pathogenic\par PIK3CA Negative;\par ESR1 not detected\par No other actionable mutations noted; full report to be filed. \par \par Germline Testing: No clinically significant mutation on [employer] testing [de-identified] : ER 90%, [place] 2%, HER2 2+, CISH negative [FreeTextEntry1] : Metastatic Tx:\par 1st LIne Ibrance/letrozole 6/2021 - 4/2022 - [place]\par 2nd line Everolimus/fulvestrant - 5/2022 - Present [de-identified] : 7/26//22: \par Patient presents today for follow up of metastatic disease and assess treatment toxicity\par Started everolimus/faslodex 5/2022  + xgeva monthly\par c/o of ongoing left upper exstremity hand/wrist edema; no improvement holding everolimus\par US Left upper extremity negative for clots in 6/2022.\par US axilla stable adneopathy\par Patient denies fever, chills, nausea/vomiting, diarrhea/constipation, neuropathy, headache.\par no mouth sores using dex swish and spit prn\par no cough, no sob, no bone pain\par \par Imaging: July 2022 c/w PET/CT 4/2022 - insurance did not cover repeat pet/ct scans\par CT C/A/P:  7/2022 - stable disease compared w/ PET/CT 4/2022\par Bone Scan: 7/2022 multiple osseous lesions - unable to compare directly w/ pet/ct scan however overall appears stable

## 2022-08-07 NOTE — REVIEW OF SYSTEMS
[Negative] : Allergic/Immunologic [FreeTextEntry9] : see HPI [de-identified] : left upper extremity lymphedema

## 2022-08-07 NOTE — PHYSICAL EXAM
[Fully active, able to carry on all pre-disease performance without restriction] : Status 0 - Fully active, able to carry on all pre-disease performance without restriction [Normal] : affect appropriate [de-identified] : left 9:00-10:00 , s/p healed ulceration 3x4cm induration (smaller) and vertical indentation unchanged; satellite lesion at 11:00 1 cm;  no adenopathy b/l ; no mass right breast,  [de-identified] : Left hand swollen diffusely and extending up forearm; edema noted;  no left axillary nodes palpated; change

## 2022-08-18 ENCOUNTER — APPOINTMENT (OUTPATIENT)
Dept: SURGICAL ONCOLOGY | Facility: CLINIC | Age: 57
End: 2022-08-18

## 2022-08-18 VITALS
DIASTOLIC BLOOD PRESSURE: 87 MMHG | RESPIRATION RATE: 16 BRPM | SYSTOLIC BLOOD PRESSURE: 138 MMHG | TEMPERATURE: 97.8 F | OXYGEN SATURATION: 97 % | WEIGHT: 120 LBS | HEART RATE: 95 BPM | HEIGHT: 61 IN | BODY MASS INDEX: 22.66 KG/M2

## 2022-08-18 PROCEDURE — 99214 OFFICE O/P EST MOD 30 MIN: CPT

## 2022-08-18 NOTE — HISTORY OF PRESENT ILLNESS
[de-identified] : Ms. ROGELOI PEARSON is a 57 year old female who presents today for a 3 month follow up for metastatic left breast cancer.\par \par Bilateral mammo/sono 4/7/21: left breast mass at the site a palpable abnormality, enlarged left axillary lymph node, right breast unremarkable. BI-RADS 4. \par \par Ultrasound Guided core biopsy of left breast x 2 sites 4/20/21:\par 1. Breast, left, 9-11 o?clock, core biopsy: Invasive moderately differentiated ductal carcinoma; [place] score 6 / 9 (3 + 2 +1) in this limited material; Invasive tumor measures at least 1.8cm; Ductal carcinoma in situ not seen; Microcalcifications present; Lymphovascular permeation by tumor not seen. --ER+/FL+/HER-2 equivocal ([facility] study in progress)\par 2. Left axila, core biopsy: metastatic ductal carcinoma to lymph node.\par -ER+/FL+/HER-2 equivocal ([facility] study in progress)\par \par MRI Lumbar Spine 4/22/21: Metastatic disease to the lower thoracic spine, lumbar spine and upper sacrum with severe pathologic fracture of L2 with retropulsion of the posterior endplate causing moderate canal stenosis. No discogenic disease. Please correlate with any history of known malignancy. The patient is awaiting results of her left breast biopsy studies findings may represent metastatic breast cancer.\par \par She states she has noticed a palpable left breast mass for the past few months. She states she has had lower back pain  that radiates down her leg for approximately 3 months.  She has difficulty walking and bending over. \par She states she has not had a routine physical in few years and is unaware of any significant medical history. She denies a personal history of cancer, her family history of cancer includes her mother with cervical cancer. She is scheduled to have a physical on 5/5/21 and see a gynecologist on 5/2/21 at [facility]. \par \par PET/CT 5/10/21: Impression: \par 1. Large FDG-avid, centrally necrotic mass in medial left breast, involving overlying skin and adjacent chest wall musculature and sternum corresponds to known malignancy. Additional separate FDG-avid lesions in left breast are suspicious for additional sites of disease. Further evaluation may be performed with breast MRI\par 2. FDG-avid lymph nodes in b/l lower cervical, left supraclavicular, left axillary, and left retropectoral regions are compatible  with metastatic disease.\par 3. Multiple FDG-avid b/l pulmonary nodules are compatible with metastatic disease. \par 4. Small left pleural effusion with heterogeneous FDG activity suspicious for malignant effusion. \par 5. Multiple FDG-avid lytic metastases in axial skeleton with severe pathologic compression fracture of L2 vertebral body, as seen on MRI dated 4/22/21, where retropulsion of posterior endplate is noted, causing moderate canal stenosis \par \par PET/CT 8/31/2021: Mildly FDG-avid mass in medial left breast and adjacent difficult to delineate mildly FDG of left breast lesions are decreased in size and metabolism, compatible with a partial response to interval therapy.Resolution of FDG-avid bilateral cervical and left supraclavicular lymph nodes and interval decrease in size, number, and metabolism of mildly FDG-avid left axillary and retropectoral lymph nodes.Multiple mildly FDG-avid and non-FDG-avid bilateral pulmonary nodules are similar, or decreased in size and are decreased metabolism. Small to moderate left pleural effusion is mildly INCREASED. Previously seen FDG-avid lytic lesions in the axial skeleton are decreased in metabolism and demonstrate new sclerosis, compatible with a partial response to interval therapy. Status post interval vertebroplasty of L2 compression fracture.\par \par PET/CT 12/16/21- Increased hypermetabolism of the left breast mass which is slightly decreased in size.  Mildly FDG-avid left axillary and right hilar lymph nodes are mildly increased in metabolism.  Innumerable subcentimeter minimally FDG-avid and non-FDG-avid bilateral pulmonary nodules, not significantly changed in size, number, or metabolism.  Innumerable predominantly sclerotic osseous metastases in the axial skeleton are similar or mildly increased in density on CT, and predominantly demonstrate minimal or no FDG avidity, compatible with treated bone metastases. A few small foci of increased FDG activity suggests residual disease, for example, an FDG-avid lytic lesion in the anterior aspect of the left sixth rib (image 103).\par \par Tumor Markers 2/1/22- CA 27-29: 21.5 | CEA: 10.4\par \par 2/11/22- Patient continues Ibrance and Letrozole per Dr. Dowd.  She is scheduled for a PET/CT in March 2022.  Feeling well overall.\par \par PET CT 4/11/22- Increased in size of medial left breast mass, new FDG acitivity in upper central left breast and nodule in medial upper right breast, suspicious for new sites of disease. Lung and bone lesions stable and unchanged. \par \par Tumor Markers 5/3/22: CEA (14.7); CA15.3 (26.8); CA27.29 (24.6); ALT (9); ALK PHOS (176) \par \par 5/16/22- PET scan 4/11/22 showed increased in size of medial left breast mass, new FDG acitivity in upper central left breast and nodule in medial upper right breast, suspicious for new sites of disease. Lung and bone lesions stable and unchanged. Tumor marker uptrending. Caris testing on most recent biopsy- no actionable mutations. Ibrance and Letrozole stopped. Pt. was started on Faslodex and everolimus. Will continue Xgeva monthly.

## 2022-08-18 NOTE — ASSESSMENT
[FreeTextEntry1] : IMP:\par 56 y/o with newly diagnosed invasive moderately differentiated ductal carcinoma, ER+/MA+/HER-2 negative with metastases to the axilla and lumbar spine. \par \par PET scan 4/11/22 showed increased in size of medial left breast mass, new FDG activity in upper central left breast and nodule in medial upper right breast, suspicious for new sites of disease. Lung and bone lesions stable and unchanged. Tumor marker uptrending. Caris testing on most recent biopsy- no actionable mutations.  Ibrance and Letrozole stopped. Pt. was started on Faslodex and everolimus.  Will continue Xgeva monthly. \par \par \par PLAN: \par Continue care with Dr. Dowd (on Faslodex, everolimus, xgeva)\par RTO Q3 months\par

## 2022-08-18 NOTE — PHYSICAL EXAM
[Normal] : supple, no neck mass and thyroid not enlarged [Normal Supraclavicular Lymph Nodes] : normal supraclavicular lymph nodes [Normal] : oriented to person, place and time, with appropriate affect [de-identified] : 3 cm area medial aspect of left breast which is now softer and mobile; left 11:00 mass soft [de-identified] : left axillary adenopathy still present but improved

## 2022-08-23 ENCOUNTER — RESULT REVIEW (OUTPATIENT)
Age: 57
End: 2022-08-23

## 2022-08-23 ENCOUNTER — APPOINTMENT (OUTPATIENT)
Dept: INFUSION THERAPY | Facility: HOSPITAL | Age: 57
End: 2022-08-23

## 2022-08-23 ENCOUNTER — APPOINTMENT (OUTPATIENT)
Dept: HEMATOLOGY ONCOLOGY | Facility: CLINIC | Age: 57
End: 2022-08-23

## 2022-08-23 VITALS
OXYGEN SATURATION: 96 % | TEMPERATURE: 97.2 F | DIASTOLIC BLOOD PRESSURE: 91 MMHG | SYSTOLIC BLOOD PRESSURE: 145 MMHG | HEART RATE: 100 BPM | RESPIRATION RATE: 16 BRPM | HEIGHT: 60.98 IN | WEIGHT: 120.15 LBS | BODY MASS INDEX: 22.68 KG/M2

## 2022-08-23 LAB
BASOPHILS # BLD AUTO: 0.05 K/UL — SIGNIFICANT CHANGE UP (ref 0–0.2)
BASOPHILS NFR BLD AUTO: 1.3 % — SIGNIFICANT CHANGE UP (ref 0–2)
EOSINOPHIL # BLD AUTO: 0.14 K/UL — SIGNIFICANT CHANGE UP (ref 0–0.5)
EOSINOPHIL NFR BLD AUTO: 3.6 % — SIGNIFICANT CHANGE UP (ref 0–6)
HCT VFR BLD CALC: 37.3 % — SIGNIFICANT CHANGE UP (ref 34.5–45)
HGB BLD-MCNC: 11.7 G/DL — SIGNIFICANT CHANGE UP (ref 11.5–15.5)
IMM GRANULOCYTES NFR BLD AUTO: 0.5 % — SIGNIFICANT CHANGE UP (ref 0–1.5)
LYMPHOCYTES # BLD AUTO: 0.9 K/UL — LOW (ref 1–3.3)
LYMPHOCYTES # BLD AUTO: 23.4 % — SIGNIFICANT CHANGE UP (ref 13–44)
MCHC RBC-ENTMCNC: 25.4 PG — LOW (ref 27–34)
MCHC RBC-ENTMCNC: 31.4 G/DL — LOW (ref 32–36)
MCV RBC AUTO: 80.9 FL — SIGNIFICANT CHANGE UP (ref 80–100)
MONOCYTES # BLD AUTO: 0.71 K/UL — SIGNIFICANT CHANGE UP (ref 0–0.9)
MONOCYTES NFR BLD AUTO: 18.5 % — HIGH (ref 2–14)
NEUTROPHILS # BLD AUTO: 2.02 K/UL — SIGNIFICANT CHANGE UP (ref 1.8–7.4)
NEUTROPHILS NFR BLD AUTO: 52.7 % — SIGNIFICANT CHANGE UP (ref 43–77)
NRBC # BLD: 0 /100 WBCS — SIGNIFICANT CHANGE UP (ref 0–0)
PLATELET # BLD AUTO: 220 K/UL — SIGNIFICANT CHANGE UP (ref 150–400)
RBC # BLD: 4.61 M/UL — SIGNIFICANT CHANGE UP (ref 3.8–5.2)
RBC # FLD: 15.8 % — HIGH (ref 10.3–14.5)
WBC # BLD: 3.84 K/UL — SIGNIFICANT CHANGE UP (ref 3.8–10.5)
WBC # FLD AUTO: 3.84 K/UL — SIGNIFICANT CHANGE UP (ref 3.8–10.5)

## 2022-08-23 PROCEDURE — 99214 OFFICE O/P EST MOD 30 MIN: CPT

## 2022-08-23 NOTE — HISTORY OF PRESENT ILLNESS
[Disease: _____________________] : Disease: [unfilled] [M: ___] : M[unfilled] [AJCC Stage: ____] : AJCC Stage: [unfilled] [de-identified] : Pt noticed a left breast mass x 2 months for which she did not seek medical attention, denied any pain, ulceration, bleeding or nipple changes/discharge.  She also c/o lower back pain for 2-3 months, associated with pain radiating down the lower extremities relieved with Tylenol prn.  Also reports intermittent upper back pain.   No fevers, night sweats, weight loss.  Reports fatigue and poor appetite.  She went to an urgent care in [place] for back pain and breast mass and was given a referral for mammo/US.  \par \par Mammogram and breast US on 4/7/2021 showed  5.6x3.2x5.3cm hypoechoic irregularly-shaped mass with angular margins 9-11:00 5 to 9cm from nipple; left axilla - enlarged 4.0x2.1x3.4cm lymph node, US biopsy rec, BIRADS4.\par \par On 4/20/2021, pt underwent an US guided left breast core biopsy which showed invasive moderately differentiated ductal carcinoma, [place] score 6/9 (3+2+10), invasive tumor at least 1.8cm, no DCIS, no LVI, microcalcifications present, ER 90%, [place] 2%, HER2 2+, CISH negative.  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  \par \par For the back pain, pt underwent an MRI lumbar spine on 4/22/2021 which showed metastatic disease in lower thoracic spine, lumbar spine and upper sacrum with severe pathologic fracture of L2 with retropulsion of the posterior endplate causing moderate canal stenosis. \par \par Admitted to [facility] 6/2021 - s/p L2 kyphoplasty and RT.  \par \par 6/2021-4/2022 - letrozole and Ibrance, s/p two dose reductions due to neutropenia.\par \par PET scan 4/11/22 - POD\par \par 5/2022 - Started on everolimus and fulvestrant - continue xgeva\par \par Patient also on Xgeva monthly since 10/5/21.\par \par Family history is significant for mother had cervical cancer age 64 and father with prostate cancer age 67.  No cancer of the breast, ovary, endometrium, and pancreatic cancer.  The patient is of [place] ethnic background.  No ETOH or tobacco.\par \par Caris Summary Report\par Source: Spine: 6/4/2021\par ER 60%; ERBB2 Negative; \par PTEN pathogenic\par PIK3CA Negative;\par ESR1 not detected\par No other actionable mutations noted; full report to be filed. \par \par Germline Testing: No clinically significant mutation on [employer] testing [de-identified] : ER 90%, [place] 2%, HER2 2+, CISH negative [FreeTextEntry1] : Metastatic Tx:\par 1st LIne Ibrance/letrozole 6/2021 - 4/2022 - [place]\par 2nd line Everolimus/fulvestrant - 5/2022 - Present [de-identified] : 8/23/2022\par Patient presents today for follow up of metastatic disease and assess treatment toxicity\par Started everolimus/faslodex 5/2022  + xgeva monthly\par Imaging done 7/2022 - [place]\par c/o of ongoing left upper extremity hand/wrist edema; no improvement holding everolimus - lymphedema therapy scheduled for 8/30/2022;US Left upper extremity negative for clots in 6/2022.\par US axilla stable adneopathy\par c/o fatigue and feels like her heart is racing at times;  \par Patient denies fever, chills, nausea/vomiting, diarrhea/constipation, neuropathy, headache.\par no mouth sores using dex swish and spit prn\par no cough, no sob, no bone pain\par \par Has financial concerns:  on [employer] disability\par \par Imaging: July 2022 c/w PET/CT 4/2022 - insurance did not cover repeat pet/ct scans\par CT C/A/P:  7/2022 - stable disease compared w/ PET/CT 4/2022\par Bone Scan: 7/2022 multiple osseous lesions - unable to compare directly w/ pet/ct scan however overall appears stable\par \par Primary Care: due for f/up; h/o HTN

## 2022-08-23 NOTE — ASSESSMENT
[Palliative] : Goals of care discussed with patient: Palliative [FreeTextEntry1] : ROGELIO PEARSON  is here for metastatic breast cancer to spine, lymph nodes and lungs, ER 90%, IL 2%, HER2 negative,  She is s/p L2 kyphoplasty and RT for severe pathologic compression fracture. s/p ibrance and letrozole,  POD after 10 months of treatment; now on everolimus/fulvestrant and xgeva\par \par -Started everolimus  5/5/2022 and fulvestrant - Best response Stable disease  NEXT imaging 10/2022; see if we can get pet/ct approved for better evaluation of bony disease\par - bw today continue to monitor CEA (stable)\par -Germlineline testing - negative\par -Caris testing on most recent biopsy- no actionable mutations\par -Grade 1 fatigue since starting everolimus\par -c/w dexamethasone prophylactic rinse prn\par -continue Xgeva monthly \par -continue f/up as needed w/ Dr. Rodríguez and Dr. Kim as recommended\par -I encouraged her to call me with any questions. \par - Referral to Social Work re: financial constraints to see if she is a candidate for any 3rd party foundation assistance\par -FU in 1 month\par \par Hypertension /  Sinus Tachycardia\par - referred to Dr. Dao - cardio-oncologist for management and evaluation\par \par Left Upper Extremity Lymphedema - etiology unknown - \par - Doppler Negative for DVT\par - Axillary sono stable adneopathy\par - lymphedema evaluation for treatment

## 2022-08-23 NOTE — REVIEW OF SYSTEMS
[Negative] : Allergic/Immunologic [Mucosal Pain] : no mucosal pain [FreeTextEntry5] : "feels like heart is racing" [FreeTextEntry9] : see HPI [de-identified] : left upper extremity lymphedema

## 2022-08-23 NOTE — PHYSICAL EXAM
[Fully active, able to carry on all pre-disease performance without restriction] : Status 0 - Fully active, able to carry on all pre-disease performance without restriction [Normal] : clear to auscultation bilaterally, no dullness, no wheezing [de-identified] : sinus tachycardia; no murmers [de-identified] : left 9:00-10:00 , s/p healed ulceration 3x4cm induration (unchanged) and vertical indentation unchanged; satellite lesion at 11:00 1 cm;  no adenopathy b/l ; no mass right breast,  [de-identified] : Left hand swollen diffusely and extending up forearm; edema noted;  no left axillary nodes palpated; change

## 2022-08-24 ENCOUNTER — NON-APPOINTMENT (OUTPATIENT)
Age: 57
End: 2022-08-24

## 2022-08-24 DIAGNOSIS — Z51.11 ENCOUNTER FOR ANTINEOPLASTIC CHEMOTHERAPY: ICD-10-CM

## 2022-08-25 LAB
ALBUMIN SERPL ELPH-MCNC: 4.2 G/DL
ALP BLD-CCNC: 125 U/L
ALT SERPL-CCNC: 25 U/L
ANION GAP SERPL CALC-SCNC: 22 MMOL/L
AST SERPL-CCNC: 42 U/L
BILIRUB SERPL-MCNC: <0.2 MG/DL
BUN SERPL-MCNC: 9 MG/DL
CALCIUM SERPL-MCNC: 9.3 MG/DL
CANCER AG27-29 SERPL-ACNC: 23.8 U/ML
CEA SERPL-MCNC: 10.9 NG/ML
CHLORIDE SERPL-SCNC: 103 MMOL/L
CO2 SERPL-SCNC: 16 MMOL/L
CREAT SERPL-MCNC: 0.74 MG/DL
EGFR: 94 ML/MIN/1.73M2
GLUCOSE SERPL-MCNC: 128 MG/DL
POTASSIUM SERPL-SCNC: 3.8 MMOL/L
PROT SERPL-MCNC: 7.2 G/DL
SODIUM SERPL-SCNC: 141 MMOL/L

## 2022-08-31 ENCOUNTER — APPOINTMENT (OUTPATIENT)
Dept: INFUSION THERAPY | Facility: HOSPITAL | Age: 57
End: 2022-08-31

## 2022-09-02 RX ORDER — ELECTROLYTES/DEXTROSE
SOLUTION, ORAL ORAL DAILY
Qty: 30 | Refills: 0 | Status: DISCONTINUED | COMMUNITY
Start: 2021-05-02 | End: 2022-09-02

## 2022-09-09 ENCOUNTER — APPOINTMENT (OUTPATIENT)
Dept: CARDIOLOGY | Facility: CLINIC | Age: 57
End: 2022-09-09

## 2022-09-09 VITALS
SYSTOLIC BLOOD PRESSURE: 143 MMHG | HEART RATE: 92 BPM | TEMPERATURE: 97.3 F | RESPIRATION RATE: 16 BRPM | BODY MASS INDEX: 22.46 KG/M2 | DIASTOLIC BLOOD PRESSURE: 99 MMHG | WEIGHT: 118.83 LBS | OXYGEN SATURATION: 99 %

## 2022-09-09 PROCEDURE — 93010 ELECTROCARDIOGRAM REPORT: CPT

## 2022-09-09 PROCEDURE — 93000 ELECTROCARDIOGRAM COMPLETE: CPT

## 2022-09-09 PROCEDURE — 99204 OFFICE O/P NEW MOD 45 MIN: CPT | Mod: 25

## 2022-09-09 RX ORDER — AMLODIPINE BESYLATE 5 MG/1
5 TABLET ORAL DAILY
Qty: 90 | Refills: 1 | Status: ACTIVE | COMMUNITY
Start: 2022-06-28 | End: 1900-01-01

## 2022-09-11 NOTE — ASSESSMENT
[FreeTextEntry1] : This very pleasant 57 year old woman presents for elevated HR and palpitations and hypertension. Examination of the heart and the ECG are normal, but there is increased risk for arrhythmia in the setting of metastatic disease and endocrine therapy. We also discussed impairments in cardiorespiratory fitness which are observed in breast cancer patients and can manifest as increased resting heart rate. Heart rate is normal at rest on examination today.\par \par Will obtain echo to rule out any structural abnormality.\par A two week Biotel monitor is ordered to check the rhythm and correlate with symptoms.\par Will increase amlodipine to 5 mg daily now to improve BP control.\par \par Follow up after the above.\par \par Above recommendations discussed with the patient and all questions were answered to the best of my ability and to her apparent satisfaction.

## 2022-09-11 NOTE — PHYSICAL EXAM
[Normal] : well developed, well nourished, no acute distress [Normal Conjunctiva] : normal conjunctiva [No Xanthelasma] : no xanthelasma [Normal Venous Pressure] : normal venous pressure [No Carotid Bruit] : no carotid bruit [Normal S1, S2] : normal S1, S2 [No Murmur] : no murmur [No Rub] : no rub [No Gallop] : no gallop [Clear Lung Fields] : clear lung fields [Good Air Entry] : good air entry [No Respiratory Distress] : no respiratory distress  [Soft] : abdomen soft [Normal Gait] : normal gait [Gait - Sufficient for Exercise Testing] : gait - sufficient for exercise testing [No Edema] : no edema [No Cyanosis] : no cyanosis [No Clubbing] : no clubbing [No Varicosities] : no varicosities [No Rash] : no rash [Moves all extremities] : moves all extremities [No Focal Deficits] : no focal deficits [Normal Speech] : normal speech [Alert and Oriented] : alert and oriented [de-identified] : LUE swelling

## 2022-09-11 NOTE — REASON FOR VISIT
[FreeTextEntry3] : Yeni Cedeno [FreeTextEntry1] : ROGELIO PEARSON is a 57 year woman with a history of metastatic breast cancer who is referred for palpitations.\par \par Prior Cancer Treatments:\par ------------------------------------------------------------------------\par Chemo/targeted therapy:\par letrozole and Ibrance, dose reduced due to neutropenia: 6/2021-4/2022 \par everolimus and fulvestrant started 5/2022\par denosumab (Xgeva) monthly since 10/5/2021\par ------------------------------------------------------------------------\par Surgery:\par 6/2021 - s/p L2 kyphoplasty and RT\par ------------------------------------------------------------------------\par Radiation:\par 6/10/2021: 800 cGy to L2

## 2022-09-11 NOTE — HISTORY OF PRESENT ILLNESS
[FreeTextEntry1] : ROGELIO PEARSON is a 57 year woman with a history of metastatic ER+ breast cancer who is referred for symptoms of racing heart beat. She notes intermittently increased HR over the past 6-8 weeks, mostly at rest. This is not associated with shortness of breath or any pain. She denies any exertional symptoms. No clear precipitating factors. No dizziness/syncope/near syncope.\par \par She is not aware of any history of cardiovascular disease. Notes LUE lymphedema. No history of diabetes, HLD, or cigarette smoking.\par \par Amlodipine 2.5 mg daily recently prescribed for elevated BP.\par \par \par Cardiovascular Summary:\par ----------------------------------------------\par ECG:\par 9/9/2022: NSR 82 bpm, normal ECG\par \par ----------------------------------------------\par CT:\par 7/22/2022: no pericardial effusion, numerous pulmonary nodules compatible with metastasis (stable from 4/2022), chest wall mass and left axillary lymph nodes (also stable from prior scan).

## 2022-09-15 ENCOUNTER — OUTPATIENT (OUTPATIENT)
Dept: OUTPATIENT SERVICES | Facility: HOSPITAL | Age: 57
LOS: 1 days | Discharge: HOME | End: 2022-09-15

## 2022-09-15 DIAGNOSIS — C50.919 MALIGNANT NEOPLASM OF UNSPECIFIED SITE OF UNSPECIFIED FEMALE BREAST: ICD-10-CM

## 2022-09-15 DIAGNOSIS — Z98.890 OTHER SPECIFIED POSTPROCEDURAL STATES: Chronic | ICD-10-CM

## 2022-09-20 ENCOUNTER — APPOINTMENT (OUTPATIENT)
Dept: INFUSION THERAPY | Facility: HOSPITAL | Age: 57
End: 2022-09-20

## 2022-09-20 ENCOUNTER — RESULT REVIEW (OUTPATIENT)
Age: 57
End: 2022-09-20

## 2022-09-20 ENCOUNTER — APPOINTMENT (OUTPATIENT)
Dept: HEMATOLOGY ONCOLOGY | Facility: CLINIC | Age: 57
End: 2022-09-20

## 2022-09-20 VITALS
SYSTOLIC BLOOD PRESSURE: 134 MMHG | DIASTOLIC BLOOD PRESSURE: 87 MMHG | TEMPERATURE: 97.1 F | WEIGHT: 119.05 LBS | HEART RATE: 92 BPM | HEIGHT: 60.98 IN | RESPIRATION RATE: 16 BRPM | BODY MASS INDEX: 22.48 KG/M2 | OXYGEN SATURATION: 96 %

## 2022-09-20 DIAGNOSIS — R09.89 OTHER SPECIFIED SYMPTOMS AND SIGNS INVOLVING THE CIRCULATORY AND RESPIRATORY SYSTEMS: ICD-10-CM

## 2022-09-20 LAB
ALBUMIN SERPL ELPH-MCNC: 4.7 G/DL
ALP BLD-CCNC: 145 U/L
ALT SERPL-CCNC: 30 U/L
ANION GAP SERPL CALC-SCNC: 14 MMOL/L
AST SERPL-CCNC: 48 U/L
BASOPHILS # BLD AUTO: 0.05 K/UL — SIGNIFICANT CHANGE UP (ref 0–0.2)
BASOPHILS NFR BLD AUTO: 1.3 % — SIGNIFICANT CHANGE UP (ref 0–2)
BILIRUB SERPL-MCNC: 0.2 MG/DL
BUN SERPL-MCNC: 13 MG/DL
CALCIUM SERPL-MCNC: 9.7 MG/DL
CHLORIDE SERPL-SCNC: 102 MMOL/L
CO2 SERPL-SCNC: 24 MMOL/L
CREAT SERPL-MCNC: 0.67 MG/DL
EGFR: 102 ML/MIN/1.73M2
EOSINOPHIL # BLD AUTO: 0.09 K/UL — SIGNIFICANT CHANGE UP (ref 0–0.5)
EOSINOPHIL NFR BLD AUTO: 2.3 % — SIGNIFICANT CHANGE UP (ref 0–6)
GLUCOSE SERPL-MCNC: 82 MG/DL
HCT VFR BLD CALC: 43.8 % — SIGNIFICANT CHANGE UP (ref 34.5–45)
HGB BLD-MCNC: 13.7 G/DL — SIGNIFICANT CHANGE UP (ref 11.5–15.5)
IMM GRANULOCYTES NFR BLD AUTO: 0.5 % — SIGNIFICANT CHANGE UP (ref 0–0.9)
LYMPHOCYTES # BLD AUTO: 1.02 K/UL — SIGNIFICANT CHANGE UP (ref 1–3.3)
LYMPHOCYTES # BLD AUTO: 25.9 % — SIGNIFICANT CHANGE UP (ref 13–44)
MCHC RBC-ENTMCNC: 24.6 PG — LOW (ref 27–34)
MCHC RBC-ENTMCNC: 31.3 G/DL — LOW (ref 32–36)
MCV RBC AUTO: 78.5 FL — LOW (ref 80–100)
MONOCYTES # BLD AUTO: 0.48 K/UL — SIGNIFICANT CHANGE UP (ref 0–0.9)
MONOCYTES NFR BLD AUTO: 12.2 % — SIGNIFICANT CHANGE UP (ref 2–14)
NEUTROPHILS # BLD AUTO: 2.28 K/UL — SIGNIFICANT CHANGE UP (ref 1.8–7.4)
NEUTROPHILS NFR BLD AUTO: 57.8 % — SIGNIFICANT CHANGE UP (ref 43–77)
NRBC # BLD: 0 /100 WBCS — SIGNIFICANT CHANGE UP (ref 0–0)
PLATELET # BLD AUTO: SIGNIFICANT CHANGE UP K/UL (ref 150–400)
POTASSIUM SERPL-SCNC: 3.7 MMOL/L
PROT SERPL-MCNC: 7.9 G/DL
RBC # BLD: 5.58 M/UL — HIGH (ref 3.8–5.2)
RBC # FLD: 15.3 % — HIGH (ref 10.3–14.5)
SODIUM SERPL-SCNC: 140 MMOL/L
WBC # BLD: 3.94 K/UL — SIGNIFICANT CHANGE UP (ref 3.8–10.5)
WBC # FLD AUTO: 3.94 K/UL — SIGNIFICANT CHANGE UP (ref 3.8–10.5)

## 2022-09-20 PROCEDURE — 99214 OFFICE O/P EST MOD 30 MIN: CPT

## 2022-09-20 NOTE — ASSESSMENT
[Palliative] : Goals of care discussed with patient: Palliative [FreeTextEntry1] : ROGELIO PEARSON  is here for metastatic breast cancer to spine, lymph nodes and lungs, ER 90%, [place] 2%, HER2 negative,  She is s/p L2 kyphoplasty and RT for severe pathologic compression fracture. s/p ibrance and letrozole,  POD after 10 months of treatment; now on everolimus/fulvestrant and xgeva\par \par -Started everolimus  5/5/2022 and fulvestrant - Best response Stable disease  NEXT imaging 10/2022; see if we can get pet/ct approved for better evaluation of bony disease\par - bw today continue to monitor CEA (stable)\par - New Rales on exam RLL - STAT chest xray ordered**; echocardiogram scheduled tomorrow \par -Germlineline testing - negative\par -Caris testing on most recent biopsy- no actionable mutations\par -Grade 1 fatigue since starting everolimus\par -c/w dexamethasone prophylactic rinse prn\par -continue Xgeva monthly \par -continue f/up as needed w/ Dr. Rodríguez and Dr. Kim as recommended\par -I encouraged her to call me with any questions. \par - Referral to Social Work re: financial constraints to see if she is a candidate for any 3rd party foundation assistance - provided patient w/ phone number\par -FU in 1 month\par \par Hypertension /  Sinus Tachycardia\par - continue f/up w/ Dr. Dao - cardio-oncologist for management and evaluation;  holter monitor results pending; amlodipine dosage increased;\par \par Left Upper Extremity Lymphedema - etiology unknown - \par - Doppler Negative for DVT\par - Axillary sono stable adneopathy\par - lymphedema  treatment ongoing

## 2022-09-20 NOTE — HISTORY OF PRESENT ILLNESS
[Disease: _____________________] : Disease: [unfilled] [M: ___] : M[unfilled] [AJCC Stage: ____] : AJCC Stage: [unfilled] [de-identified] : Pt noticed a left breast mass x 2 months for which she did not seek medical attention, denied any pain, ulceration, bleeding or nipple changes/discharge.  She also c/o lower back pain for 2-3 months, associated with pain radiating down the lower extremities relieved with Tylenol prn.  Also reports intermittent upper back pain.   No fevers, night sweats, weight loss.  Reports fatigue and poor appetite.  She went to an urgent care in [place] for back pain and breast mass and was given a referral for mammo/US.  \par \par Mammogram and breast US on 4/7/2021 showed  5.6x3.2x5.3cm hypoechoic irregularly-shaped mass with angular margins 9-11:00 5 to 9cm from nipple; left axilla - enlarged 4.0x2.1x3.4cm lymph node, US biopsy rec, [id number].\par \par On 4/20/2021, pt underwent an US guided left breast core biopsy which showed invasive moderately differentiated ductal carcinoma, [place] score 6/9 (3+2+10), invasive tumor at least 1.8cm, no DCIS, no LVI, microcalcifications present, ER 90%, [place] 2%, HER2 2+, CISH negative.  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  \par \par For the back pain, pt underwent an MRI lumbar spine on 4/22/2021 which showed metastatic disease in lower thoracic spine, lumbar spine and upper sacrum with severe pathologic fracture of L2 with retropulsion of the posterior endplate causing moderate canal stenosis. \par \par Admitted to [facility] 6/2021 - s/p L2 kyphoplasty and RT.  \par \par 6/2021-4/2022 - letrozole and Ibrance, s/p two dose reductions due to neutropenia.\par \par PET scan 4/11/22 - POD\par \par 5/2022 - Started on everolimus and fulvestrant - continue xgeva\par \par Patient also on Xgeva monthly since 10/5/21.\par \par Family history is significant for mother had cervical cancer age 64 and father with prostate cancer age 67.  No cancer of the breast, ovary, endometrium, and pancreatic cancer.  The patient is of [place] ethnic background.  No ETOH or tobacco.\par \par Caris Summary Report\par Source: Spine: 6/4/2021\par ER 60%; ERBB2 Negative; \par PTEN pathogenic\par PIK3CA Negative;\par ESR1 not detected\par No other actionable mutations noted; full report to be filed. \par \par Germline Testing: No clinically significant mutation on [employer] testing [de-identified] : ER 90%, [place] 2%, HER2 2+, CISH negative [FreeTextEntry1] : Metastatic Tx:\par 1st LIne Ibrance/letrozole 6/2021 - 4/2022 - ID\par 2nd line Everolimus/fulvestrant - 5/2022 - Present [de-identified] : 8/23/2022\par Patient presents today for follow up of metastatic disease and assess treatment toxicity\par Started everolimus/faslodex 5/2022  + xgeva monthly\par Imaging done 7/2022 - SD\par c/o of ongoing left upper extremity hand/wrist edema; no improvement holding everolimus - lymphedema therapy started on  8/30/2022;US Left upper extremity negative for clots in 6/2022. US axilla stable adneopathy\par c/o fatigue \par Seen by cardiooncology for palpitations - today is wearing a heart monitor for evaluation; tomorrow scheduled for echocardiogram\par Patient denies fever, chills, nausea/vomiting, diarrhea/constipation, neuropathy, headache.\par no mouth sores using dex swish and spit prn\par no cough, no sob, no bone pain\par \par Has financial concerns:  on SS disability\par \par Imaging: July 2022 c/w PET/CT 4/2022 - insurance did not cover repeat pet/ct scans\par CT C/A/P:  7/2022 - stable disease compared w/ PET/CT 4/2022\par Bone Scan: 7/2022 multiple osseous lesions - unable to compare directly w/ pet/ct scan however overall appears stable\par \par Primary Care: due for f/up; h/o HTN

## 2022-09-20 NOTE — REVIEW OF SYSTEMS
[Negative] : Allergic/Immunologic [Fatigue] : fatigue [Mucosal Pain] : no mucosal pain [Palpitations] : palpitations [FreeTextEntry5] : "feels like heart is racing" [FreeTextEntry9] : see HPI [de-identified] : left upper extremity lymphedema

## 2022-09-20 NOTE — PHYSICAL EXAM
[Fully active, able to carry on all pre-disease performance without restriction] : Status 0 - Fully active, able to carry on all pre-disease performance without restriction [Normal] : RRR, normal S1S2, no murmurs, rubs, gallops [de-identified] : right lower lobe rales on auscultaiton; no wheezes, good air entry  [de-identified] : left 9:00-10:00 , s/p healed ulceration 3x4cm induration (unchanged) and vertical indentation unchanged; satellite lesion at 11:00 1.5 (slight increase?) cm;  no adenopathy b/l ; no mass right breast,  [de-identified] : Left hand swollen diffusely and extending up forearm; edema noted;  no left axillary nodes palpated; change

## 2022-09-21 DIAGNOSIS — C79.51 SECONDARY MALIGNANT NEOPLASM OF BONE: ICD-10-CM

## 2022-09-21 LAB
CANCER AG27-29 SERPL-ACNC: 23.4 U/ML
CEA SERPL-MCNC: 11.5 NG/ML

## 2022-09-22 ENCOUNTER — APPOINTMENT (OUTPATIENT)
Dept: CV DIAGNOSITCS | Facility: HOSPITAL | Age: 57
End: 2022-09-22

## 2022-09-22 ENCOUNTER — OUTPATIENT (OUTPATIENT)
Dept: OUTPATIENT SERVICES | Facility: HOSPITAL | Age: 57
LOS: 1 days | End: 2022-09-22

## 2022-09-22 DIAGNOSIS — R00.2 PALPITATIONS: ICD-10-CM

## 2022-09-22 DIAGNOSIS — Z98.890 OTHER SPECIFIED POSTPROCEDURAL STATES: Chronic | ICD-10-CM

## 2022-09-22 PROCEDURE — 93306 TTE W/DOPPLER COMPLETE: CPT | Mod: 26

## 2022-09-24 ENCOUNTER — APPOINTMENT (OUTPATIENT)
Dept: RADIOLOGY | Facility: IMAGING CENTER | Age: 57
End: 2022-09-24

## 2022-09-24 ENCOUNTER — OUTPATIENT (OUTPATIENT)
Dept: OUTPATIENT SERVICES | Facility: HOSPITAL | Age: 57
LOS: 1 days | End: 2022-09-24
Payer: MEDICAID

## 2022-09-24 DIAGNOSIS — Z00.8 ENCOUNTER FOR OTHER GENERAL EXAMINATION: ICD-10-CM

## 2022-09-24 DIAGNOSIS — R09.89 OTHER SPECIFIED SYMPTOMS AND SIGNS INVOLVING THE CIRCULATORY AND RESPIRATORY SYSTEMS: ICD-10-CM

## 2022-09-24 DIAGNOSIS — Z98.890 OTHER SPECIFIED POSTPROCEDURAL STATES: Chronic | ICD-10-CM

## 2022-09-24 PROCEDURE — 71046 X-RAY EXAM CHEST 2 VIEWS: CPT | Mod: 26

## 2022-09-24 PROCEDURE — 71046 X-RAY EXAM CHEST 2 VIEWS: CPT

## 2022-10-03 DIAGNOSIS — Z00.00 ENCOUNTER FOR GENERAL ADULT MEDICAL EXAMINATION W/OUT ABNORMAL FINDINGS: ICD-10-CM

## 2022-10-18 ENCOUNTER — APPOINTMENT (OUTPATIENT)
Dept: HEMATOLOGY ONCOLOGY | Facility: CLINIC | Age: 57
End: 2022-10-18

## 2022-10-18 ENCOUNTER — RESULT REVIEW (OUTPATIENT)
Age: 57
End: 2022-10-18

## 2022-10-18 ENCOUNTER — APPOINTMENT (OUTPATIENT)
Dept: INFUSION THERAPY | Facility: HOSPITAL | Age: 57
End: 2022-10-18

## 2022-10-18 VITALS
SYSTOLIC BLOOD PRESSURE: 132 MMHG | DIASTOLIC BLOOD PRESSURE: 89 MMHG | WEIGHT: 116.82 LBS | RESPIRATION RATE: 16 BRPM | OXYGEN SATURATION: 95 % | TEMPERATURE: 97.1 F | HEART RATE: 120 BPM | BODY MASS INDEX: 22.09 KG/M2

## 2022-10-18 LAB
ALBUMIN SERPL ELPH-MCNC: 4.3 G/DL
ALP BLD-CCNC: 152 U/L
ALT SERPL-CCNC: 28 U/L
ANION GAP SERPL CALC-SCNC: 16 MMOL/L
AST SERPL-CCNC: 40 U/L
BASOPHILS # BLD AUTO: 0.04 K/UL — SIGNIFICANT CHANGE UP (ref 0–0.2)
BASOPHILS NFR BLD AUTO: 0.8 % — SIGNIFICANT CHANGE UP (ref 0–2)
BILIRUB SERPL-MCNC: 0.2 MG/DL
BUN SERPL-MCNC: 17 MG/DL
CALCIUM SERPL-MCNC: 9 MG/DL
CANCER AG27-29 SERPL-ACNC: 20.4 U/ML
CEA SERPL-MCNC: 10.4 NG/ML
CHLORIDE SERPL-SCNC: 103 MMOL/L
CO2 SERPL-SCNC: 19 MMOL/L
CREAT SERPL-MCNC: 0.89 MG/DL
EGFR: 76 ML/MIN/1.73M2
EOSINOPHIL # BLD AUTO: 0.14 K/UL — SIGNIFICANT CHANGE UP (ref 0–0.5)
EOSINOPHIL NFR BLD AUTO: 2.7 % — SIGNIFICANT CHANGE UP (ref 0–6)
GLUCOSE SERPL-MCNC: 125 MG/DL
HCT VFR BLD CALC: 39.3 % — SIGNIFICANT CHANGE UP (ref 34.5–45)
HGB BLD-MCNC: 12.7 G/DL — SIGNIFICANT CHANGE UP (ref 11.5–15.5)
IMM GRANULOCYTES NFR BLD AUTO: 0.4 % — SIGNIFICANT CHANGE UP (ref 0–0.9)
LYMPHOCYTES # BLD AUTO: 0.89 K/UL — LOW (ref 1–3.3)
LYMPHOCYTES # BLD AUTO: 16.9 % — SIGNIFICANT CHANGE UP (ref 13–44)
MCHC RBC-ENTMCNC: 24.2 PG — LOW (ref 27–34)
MCHC RBC-ENTMCNC: 32.3 G/DL — SIGNIFICANT CHANGE UP (ref 32–36)
MCV RBC AUTO: 75 FL — LOW (ref 80–100)
MONOCYTES # BLD AUTO: 0.66 K/UL — SIGNIFICANT CHANGE UP (ref 0–0.9)
MONOCYTES NFR BLD AUTO: 12.5 % — SIGNIFICANT CHANGE UP (ref 2–14)
NEUTROPHILS # BLD AUTO: 3.52 K/UL — SIGNIFICANT CHANGE UP (ref 1.8–7.4)
NEUTROPHILS NFR BLD AUTO: 66.7 % — SIGNIFICANT CHANGE UP (ref 43–77)
NRBC # BLD: 0 /100 WBCS — SIGNIFICANT CHANGE UP (ref 0–0)
PLATELET # BLD AUTO: 221 K/UL — SIGNIFICANT CHANGE UP (ref 150–400)
POTASSIUM SERPL-SCNC: 3.3 MMOL/L
PROT SERPL-MCNC: 7.4 G/DL
RBC # BLD: 5.24 M/UL — HIGH (ref 3.8–5.2)
RBC # FLD: 15.2 % — HIGH (ref 10.3–14.5)
SODIUM SERPL-SCNC: 139 MMOL/L
WBC # BLD: 5.27 K/UL — SIGNIFICANT CHANGE UP (ref 3.8–10.5)
WBC # FLD AUTO: 5.27 K/UL — SIGNIFICANT CHANGE UP (ref 3.8–10.5)

## 2022-10-18 PROCEDURE — 99215 OFFICE O/P EST HI 40 MIN: CPT

## 2022-10-18 NOTE — REVIEW OF SYSTEMS
[Fatigue] : fatigue [Mucosal Pain] : no mucosal pain [Cough] : cough [Negative] : Cardiovascular [FreeTextEntry6] : productive of clear sputum [FreeTextEntry9] : see HPI [de-identified] : left upper extremity lymphedema

## 2022-10-18 NOTE — ASSESSMENT
[Palliative] : Goals of care discussed with patient: Palliative [FreeTextEntry1] : patient was appraised of incurability of her disease and continued therapy to control disease and maintain quality of life

## 2022-10-18 NOTE — HISTORY OF PRESENT ILLNESS
[Disease: _____________________] : Disease: [unfilled] [M: ___] : M[unfilled] [AJCC Stage: ____] : AJCC Stage: [unfilled] [de-identified] : Pt initially evaluated by Medical Oncology in 7/2021 when she noticed a left breast mass x 2 months for which she did not seek medical attention, denied any pain, ulceration, bleeding or nipple changes/discharge.  She also c/o lower back pain for 2-3 months, associated with pain radiating down the lower extremities relieved with Tylenol prn.  Also reports intermittent upper back pain.   No fevers, night sweats, weight loss.  Reports fatigue and poor appetite.  She went to an urgent care in [place] for back pain and breast mass and was given a referral for mammo/US.  \par \par Mammogram and breast US on 4/7/2021 showed  5.6x3.2x5.3cm hypoechoic irregularly-shaped mass with angular margins 9-11:00 5 to 9cm from nipple; left axilla - enlarged 4.0x2.1x3.4cm lymph node, US biopsy rec, BIRADS4.\par \par On 4/20/2021, pt underwent an US guided left breast core biopsy which showed invasive moderately differentiated ductal carcinoma, [place]anny score 6/9 (3+2+10), invasive tumor at least 1.8cm, no DCIS, no LVI, microcalcifications present, ER 90%, [place] 2%, HER2 2+, CISH negative.  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  \par \par For the back pain, pt underwent an MRI lumbar spine on 4/22/2021 which showed metastatic disease in lower thoracic spine, lumbar spine and upper sacrum with severe pathologic fracture of L2 with retropulsion of the posterior endplate causing moderate canal stenosis. \par \par Admitted to [facility] 6/2021 - s/p L2 kyphoplasty and RT.  \par \par 6/2021-4/2022 - letrozole and Ibrance, s/p two dose reductions due to neutropenia.\par \par PET scan 4/11/22 - POD\par \par 5/2022 - Started on everolimus and fulvestrant - continue xgeva\par \par Patient also on Xgeva monthly since 10/5/21.\par \par Family history is significant for mother had cervical cancer age 64 and father with prostate cancer age 67.  No cancer of the breast, ovary, endometrium, and pancreatic cancer.  The patient is of [place] ethnic background.  No ETOH or tobacco.\par \par Caris Summary Report\par Source: Spine: 6/4/2021\par ER 60%; ERBB2 Negative; \par PTEN pathogenic\par PIK3CA Negative;\par ESR1 not detected\par No other actionable mutations noted; full report to be filed. \par \par Germline Testing: No clinically significant mutation on [employer]ae testing [de-identified] : ER 90%, [place] 2%, HER2 2+, CISH negative [FreeTextEntry1] : Metastatic Tx:\par 1st LIne Ibrance/letrozole 6/2021 - 4/2022 - [place]\par 2nd line Everolimus/fulvestrant - 5/2022 - Present [de-identified] : 10/18/22\par Patient presents today for follow up of metastatic disease and assess treatment toxicity\par Transfer of care to me today\par All of the patient's prior records including radiology, pathology and prior notes reviewed; Past Medical History, Past Surgical History, Family History and Social history reviewed and updated in the patient's chart.\par \par Started everolimus/faslodex 5/2022  + xgeva monthly\par Imaging done 7/2022 - Stable disease\par c/o of ongoing left upper extremity hand/wrist edema; no improvement holding everolimus - lymphedema therapy started on  8/30/2022;US Left upper extremity negative for clots in 6/2022. US axilla stable adneopathy\par c/o fatigue and cough productive of clear sputum\par Seen by cardio-oncology for palpitations - echo done showed normal valves; could not calculate EF; had holter which showed tachycardia up to 120 bpm; no AFib\par Patient denies fever, chills, nausea/vomiting, diarrhea/constipation, neuropathy, headache.\par no mouth sores using dex swish and spit prn\par no sob, no bone pain\par ****Cough persists with productive clear sputum\par \par Has financial concerns:  on SS disability\par \par Imaging: July 2022 c/w PET/CT 4/2022 - insurance did not cover repeat pet/ct scans\par CT C/A/P:  7/2022 - stable disease compared w/ PET/CT 4/2022\par Bone Scan: 7/2022 multiple osseous lesions - unable to compare directly w/ pet/ct scan however overall appears stable\par \par Primary Care: due for f/up; h/o HTN

## 2022-10-19 DIAGNOSIS — Z51.11 ENCOUNTER FOR ANTINEOPLASTIC CHEMOTHERAPY: ICD-10-CM

## 2022-10-25 ENCOUNTER — NON-APPOINTMENT (OUTPATIENT)
Age: 57
End: 2022-10-25

## 2022-11-05 ENCOUNTER — OUTPATIENT (OUTPATIENT)
Dept: OUTPATIENT SERVICES | Facility: HOSPITAL | Age: 57
LOS: 1 days | End: 2022-11-05
Payer: MEDICAID

## 2022-11-05 ENCOUNTER — APPOINTMENT (OUTPATIENT)
Dept: NUCLEAR MEDICINE | Facility: IMAGING CENTER | Age: 57
End: 2022-11-05

## 2022-11-05 DIAGNOSIS — Z98.890 OTHER SPECIFIED POSTPROCEDURAL STATES: Chronic | ICD-10-CM

## 2022-11-05 DIAGNOSIS — C79.9 SECONDARY MALIGNANT NEOPLASM OF UNSPECIFIED SITE: ICD-10-CM

## 2022-11-05 PROCEDURE — A9552: CPT

## 2022-11-05 PROCEDURE — 78815 PET IMAGE W/CT SKULL-THIGH: CPT

## 2022-11-05 PROCEDURE — 78815 PET IMAGE W/CT SKULL-THIGH: CPT | Mod: 26,PS

## 2022-11-07 ENCOUNTER — NON-APPOINTMENT (OUTPATIENT)
Age: 57
End: 2022-11-07

## 2022-11-07 ENCOUNTER — OUTPATIENT (OUTPATIENT)
Dept: OUTPATIENT SERVICES | Facility: HOSPITAL | Age: 57
LOS: 1 days | Discharge: HOME | End: 2022-11-07

## 2022-11-07 ENCOUNTER — APPOINTMENT (OUTPATIENT)
Dept: HEMATOLOGY ONCOLOGY | Facility: CLINIC | Age: 57
End: 2022-11-07

## 2022-11-07 ENCOUNTER — APPOINTMENT (OUTPATIENT)
Dept: CARDIOLOGY | Facility: CLINIC | Age: 57
End: 2022-11-07

## 2022-11-07 VITALS
HEIGHT: 60 IN | BODY MASS INDEX: 22.64 KG/M2 | HEART RATE: 111 BPM | DIASTOLIC BLOOD PRESSURE: 94 MMHG | RESPIRATION RATE: 16 BRPM | OXYGEN SATURATION: 93 % | SYSTOLIC BLOOD PRESSURE: 134 MMHG | WEIGHT: 115.3 LBS

## 2022-11-07 DIAGNOSIS — C50.919 MALIGNANT NEOPLASM OF UNSPECIFIED SITE OF UNSPECIFIED FEMALE BREAST: ICD-10-CM

## 2022-11-07 DIAGNOSIS — Z98.890 OTHER SPECIFIED POSTPROCEDURAL STATES: Chronic | ICD-10-CM

## 2022-11-07 PROCEDURE — 99214 OFFICE O/P EST MOD 30 MIN: CPT

## 2022-11-07 PROCEDURE — 99214 OFFICE O/P EST MOD 30 MIN: CPT | Mod: 25

## 2022-11-07 PROCEDURE — 93010 ELECTROCARDIOGRAM REPORT: CPT

## 2022-11-07 PROCEDURE — 93000 ELECTROCARDIOGRAM COMPLETE: CPT

## 2022-11-07 NOTE — END OF VISIT
[FreeTextEntry3] : Patient being seen as per physician's primary plan of care.\par D/W dr. rodriguez  [Time Spent: ___ minutes] : I have spent [unfilled] minutes of time on the encounter.

## 2022-11-07 NOTE — PHYSICAL EXAM
[Fully active, able to carry on all pre-disease performance without restriction] : Status 0 - Fully active, able to carry on all pre-disease performance without restriction [Normal] : affect appropriate [de-identified] : fine crackles at bilateral bases no wheezes, good air entry  [de-identified] : tachycardia but regular [de-identified] : left 9:00-10:00 , s/p healed ulceration 4x4cm induration (increased in size?) and vertical indentation unchanged; satellite lesion at 11:00 1.5 (stable) cm;  no adenopathy b/l ; no mass right breast,  [de-identified] : Left hand swollen diffusely and extending up forearm; edema noted;  no left axillary nodes palpated; change

## 2022-11-07 NOTE — REVIEW OF SYSTEMS
[Fatigue] : fatigue [Cough] : cough [Negative] : Allergic/Immunologic [Mucosal Pain] : no mucosal pain [SOB on Exertion] : shortness of breath during exertion [FreeTextEntry6] : productive of clear sputum [FreeTextEntry9] : see HPI [de-identified] : left upper extremity lymphedema

## 2022-11-07 NOTE — REASON FOR VISIT
[FreeTextEntry3] : Yeni Cedeno [FreeTextEntry1] : ROGELIO PEARSON is a 57 year woman with a history of ER+/HER+ metastatic breast cancer here for follow up of palpitations.\par \par Prior Cancer Treatments:\par ------------------------------------------------------------------------\par Chemo/targeted therapy:\par denosumab (Xgeva) monthly since 10/5/2021\par letrozole and Ibrance, dose reduced due to neutropenia: 6/2021-4/2022 \par everolimus and fulvestrant started 5/2022\par ------------------------------------------------------------------------\par Surgery:\par 6/2021 - s/p L2 kyphoplasty\par ------------------------------------------------------------------------\par Radiation:\par 6/10/2021: 800 cGy to L2

## 2022-11-07 NOTE — HISTORY OF PRESENT ILLNESS
[FreeTextEntry1] : Interval History:\par Completed Biotel Holter monitor which showed sinus rhythm, with occasional PACs, VPCs which correlated with symptoms. Echo was technically limited, but revealed no gross abnormalities.\par PET/CT recently completed demonstrated pneumonitis, possibly from everolimus.\par Notes ongoing symptoms of racing heart beat, and cough. No chest pain. \par \par \par History:\par ROGELIO PEARSON is a 57 year woman with a history of metastatic ER+ breast cancer referred for symptoms of racing heart beat. She notes intermittently increased HR over the past 6-8 weeks, mostly at rest. This is not associated with shortness of breath or any pain. She denies any exertional symptoms. No clear precipitating factors. No dizziness/syncope/near syncope.\par \par She is not aware of any history of cardiovascular disease. Notes LUE lymphedema. No history of diabetes, HLD, or cigarette smoking.\par \par Amlodipine 2.5 mg daily recently prescribed for elevated BP.\par \par \par Cardiovascular Summary:\par ----------------------------------------------\par ECG:\par 11/7/2022: sinus tachycardia 102 bpm, otherwise normal ECG\par 9/9/2022: NSR 82 bpm, normal ECG\par ----------------------------------------------\par CT:\par 7/22/2022: no pericardial effusion, numerous pulmonary nodules compatible with metastasis (stable from 4/2022), chest wall mass and left axillary lymph nodes (also stable from prior scan).\par -----------------------------------------------\par Echo:\par 9/22/2022: technically inadequate study, unable to evaluate LV function\par -----------------------------------------------\par Remote/ambulatory rhythm monitoring:\par 9/19/22-10/17/22: sinus with PACs, PVCs - no symptoms during monitoring.\par -----------------------------------------------

## 2022-11-07 NOTE — HISTORY OF PRESENT ILLNESS
[Disease: _____________________] : Disease: [unfilled] [M: ___] : M[unfilled] [AJCC Stage: ____] : AJCC Stage: [unfilled] [de-identified] : Pt initially evaluated by Medical Oncology in 7/2021 when she noticed a left breast mass x 2 months for which she did not seek medical attention, denied any pain, ulceration, bleeding or nipple changes/discharge.  She also c/o lower back pain for 2-3 months, associated with pain radiating down the lower extremities relieved with Tylenol prn.  Also reports intermittent upper back pain.   No fevers, night sweats, weight loss.  Reports fatigue and poor appetite.  She went to an urgent care in [place] for back pain and breast mass and was given a referral for mammo/US.  \par \par Mammogram and breast US on 4/7/2021 showed  5.6x3.2x5.3cm hypoechoic irregularly-shaped mass with angular margins 9-11:00 5 to 9cm from nipple; left axilla - enlarged 4.0x2.1x3.4cm lymph node, US biopsy rec, BIRADS4.\par \par On 4/20/2021, pt underwent an US guided left breast core biopsy which showed invasive moderately differentiated ductal carcinoma, Geovanny score 6/9 (3+2+10), invasive tumor at least 1.8cm, no DCIS, no LVI, microcalcifications present, ER 90%, [place] 2%, HER2 2+, CISH negative.  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  \par \par For the back pain, pt underwent an MRI lumbar spine on 4/22/2021 which showed metastatic disease in lower thoracic spine, lumbar spine and upper sacrum with severe pathologic fracture of L2 with retropulsion of the posterior endplate causing moderate canal stenosis. \par \par Admitted to [facility] 6/2021 - s/p L2 kyphoplasty and RT.  \par \par 6/2021-4/2022 - letrozole and Ibrance, s/p two dose reductions due to neutropenia.\par \par PET scan 4/11/22 - POD\par \par 5/2022 - Started on everolimus and fulvestrant - continue xgeva\par \par Patient also on Xgeva monthly since 10/5/21.\par \par Family history is significant for mother had cervical cancer age 64 and father with prostate cancer age 67.  No cancer of the breast, ovary, endometrium, and pancreatic cancer.  The patient is of [place] ethnic background.  No ETOH or tobacco.\par \par Caris Summary Report\par Source: Spine: 6/4/2021\par ER 60%; ERBB2 Negative; \par PTEN pathogenic\par PIK3CA Negative;\par ESR1 not detected\par No other actionable mutations noted; full report to be filed. \par \par Germline Testing: No clinically significant mutation on [employer]ae testing [de-identified] : ER 90%, [place] 2%, HER2 2+, CISH negative [FreeTextEntry1] : Metastatic Tx:\par 1st LIne Ibrance/letrozole 6/2021 - 4/2022 - [place]\par 2nd line Everolimus/fulvestrant - 5/2022 - Present [de-identified] : 2022\par Patient presents today for follow up of metastatic disease and assess treatment toxicity - everolimus\par PET/CT scan on 2022: demonstrates bilateral patchy opacities (hypermetabolic) worrisome for pneumonitis in context of patient taking everolimus.\par patient has noted dyspnea on exertion and dry intermittent cough; Pulse ox today 93% (baseline 96-99%)\par no fever, no chills, no myalgias\par \par Started everolimus/faslodex 2022  + xgeva monthly\par \par c/o of ongoing left upper extremity hand/wrist edema; no improvement holding everolimus - lymphedema therapy started on  2022;US Left upper extremity negative for clots in 2022. US axilla stable adneopathy\par \par Seen today by cardio-oncology for followup- echo done showed normal valves; could not calculate EF; had holter which showed tachycardia up to 120 bpm; no AFib\par \par Has financial concerns:  on SS disability\par \par Imagin2022 PET/CT \par  IMPRESSION: Since prior FDG-PET/CT scan 2022: (baseline for afinitor/faslodex)\par 1. Findings in the lung fields includes NEW hypermetabolic BILATERAL consolidative opacities in addition to previously seen lung nodules. Correlate for history of recent infection; in the absence of an additional lung process, additional malignant involvement is not excluded. Distinct from this, there are lung nodules which demonstrate stable uptake, while others show markedly increasing metabolic activity compared to the prior study.\par 2. Mixed changes in breast and chest wall lesions, with most showing stable morphologic appearance, with some showing quantitatively increased versus stable, versus decreased uptake. The largest chest wall lesion demonstrates grossly stable appearance, wall a smaller nodule previously seen in the LEFT breast shows increasing metabolism. A small lesion in the RIGHT breast shows stable size with decreased metabolism.\par 3. Stable LEFT axillary node. Reduced metabolism in size at RIGHT axillary node. A previously seen RIGHT perihilar lesion has resolved.\par 4. Osseous foci are either stable or demonstrate reduced uptake.\par 5. Mild uptake at stable RIGHT thyroid nodule. Consider thyroid function testing to exclude a functioning nodule; if patient is euthyroid consider sonography of biopsy.\par \par Primary Care: due for f/up; h/o HTN

## 2022-11-07 NOTE — PHYSICAL EXAM
[Normal] : well developed, well nourished, no acute distress [Normal Conjunctiva] : normal conjunctiva [No Xanthelasma] : no xanthelasma [Normal Venous Pressure] : normal venous pressure [No Carotid Bruit] : no carotid bruit [Normal S1, S2] : normal S1, S2 [No Murmur] : no murmur [No Rub] : no rub [No Gallop] : no gallop [Good Air Entry] : good air entry [No Respiratory Distress] : no respiratory distress  [Soft] : abdomen soft [Normal Gait] : normal gait [Gait - Sufficient for Exercise Testing] : gait - sufficient for exercise testing [No Edema] : no edema [No Cyanosis] : no cyanosis [No Clubbing] : no clubbing [No Varicosities] : no varicosities [No Rash] : no rash [Moves all extremities] : moves all extremities [No Focal Deficits] : no focal deficits [Normal Speech] : normal speech [Alert and Oriented] : alert and oriented [de-identified] : dry creps bilaterally [de-identified] : LUE swelling

## 2022-11-09 ENCOUNTER — APPOINTMENT (OUTPATIENT)
Dept: PULMONOLOGY | Facility: CLINIC | Age: 57
End: 2022-11-09

## 2022-11-09 VITALS
HEART RATE: 92 BPM | TEMPERATURE: 97.7 F | HEIGHT: 60 IN | WEIGHT: 115 LBS | OXYGEN SATURATION: 98 % | RESPIRATION RATE: 15 BRPM | BODY MASS INDEX: 22.58 KG/M2 | DIASTOLIC BLOOD PRESSURE: 107 MMHG | SYSTOLIC BLOOD PRESSURE: 162 MMHG

## 2022-11-09 PROCEDURE — 99203 OFFICE O/P NEW LOW 30 MIN: CPT | Mod: GC

## 2022-11-09 RX ORDER — DEXAMETHASONE 0.5 MG/5ML
0.5 SOLUTION ORAL 3 TIMES DAILY
Qty: 1 | Refills: 1 | Status: DISCONTINUED | COMMUNITY
Start: 2022-05-03 | End: 2022-11-09

## 2022-11-09 NOTE — ASSESSMENT
[FreeTextEntry1] : 58 yo with metastatic breast cancer to spine, lymph nodes and lungs s/p L2 kyphoplasty and RT for severe pathologic compression fracture. s/p ibrance and letrozole, POD after 10 months of treatment; in 5/2022 started on everolimus/fulvestrant with monthly xgeva. referred to pulmonology after recent PET suggestive of pneumonitis.\par \par #pneumonitis\par all everolimus/ fulvestrant and xgeva can have some level of pneumonitis although highest incidence in everolimus use.\par patient's clinical presentation and after review of recent imaging not suggestive of severe lung injury and likely more mild\par adjusted steroid taper, sent in bactrim ppx, and should obtain repeat CT chest non con in 4-6 weeks.\par at this time low suspicion for atypical infections given imaging and recent blood wok and clinical symptoms along with rapid improvement with steroids although need to continue low threshold to send sputum culture, pcp, fungitell, galactomannan and possibly bronch if patient conditions worsen and requires hospital admission\par RTC after repeat CT chest obtained\par \par Mu Orozco PGY4 PCCM Fellow

## 2022-11-09 NOTE — HISTORY OF PRESENT ILLNESS
[TextBox_4] : 56 yo with metastatic breast cancer to spine, lymph nodes and lungs s/p L2 kyphoplasty and RT for severe pathologic compression fracture. s/p ibrance and letrozole, POD after 10 months of treatment; in 5/2022 started on everolimus/fulvestrant with monthly xgeva. referred to pulmonology after recent PET suggestive of pneumonitis.\par \par Patient notes that she was having increased fatigue, productive thick white sputum cough without blood for over 2 weeks, with associated DONOHUE. Patient denies fevers, chills, weight loss, loss of appetite, recent travel, sick contact, other hospitalizations. Patient has guinea pig, no dogs/cats/birds/no asthma/copd/smoking/family history or personal history of blood clots. Denies, leg edema, orthopnea, or PND. patient originally from [facility], previously worked as a nurse aide. since being placed on steroids with large improvement with cough in quality, quantity and severity

## 2022-11-15 ENCOUNTER — RESULT REVIEW (OUTPATIENT)
Age: 57
End: 2022-11-15

## 2022-11-15 ENCOUNTER — APPOINTMENT (OUTPATIENT)
Dept: HEMATOLOGY ONCOLOGY | Facility: CLINIC | Age: 57
End: 2022-11-15

## 2022-11-15 ENCOUNTER — APPOINTMENT (OUTPATIENT)
Dept: INFUSION THERAPY | Facility: HOSPITAL | Age: 57
End: 2022-11-15

## 2022-11-15 VITALS
HEART RATE: 97 BPM | TEMPERATURE: 96.7 F | SYSTOLIC BLOOD PRESSURE: 135 MMHG | BODY MASS INDEX: 23.04 KG/M2 | WEIGHT: 117.95 LBS | RESPIRATION RATE: 16 BRPM | OXYGEN SATURATION: 98 % | DIASTOLIC BLOOD PRESSURE: 84 MMHG

## 2022-11-15 PROCEDURE — 99214 OFFICE O/P EST MOD 30 MIN: CPT

## 2022-11-15 RX ORDER — EVEROLIMUS 10 MG/1
10 TABLET ORAL
Qty: 30 | Refills: 3 | Status: DISCONTINUED | COMMUNITY
Start: 2022-05-03 | End: 2022-11-15

## 2022-11-15 NOTE — PHYSICAL EXAM
[Fully active, able to carry on all pre-disease performance without restriction] : Status 0 - Fully active, able to carry on all pre-disease performance without restriction [Normal] : clear to auscultation bilaterally, no dullness, no wheezing [de-identified] : left 9:00-10:00 , s/p healed ulceration 3x4cm induration (unchanged) and vertical indentation unchanged; satellite lesion at 11:00 1.5 (slight increase?) cm;  no adenopathy b/l ; no mass right breast,  [de-identified] : Left hand swollen diffusely and extending up forearm; edema noted;  no left axillary nodes palpated; change

## 2022-11-15 NOTE — REVIEW OF SYSTEMS
[Fatigue] : fatigue [Palpitations] : palpitations [Negative] : Allergic/Immunologic [Mucosal Pain] : no mucosal pain [FreeTextEntry5] : "feels like heart is racing" [FreeTextEntry9] : see HPI [de-identified] : left upper extremity lymphedema

## 2022-11-15 NOTE — HISTORY OF PRESENT ILLNESS
[Disease: _____________________] : Disease: [unfilled] [M: ___] : M[unfilled] [AJCC Stage: ____] : AJCC Stage: [unfilled] [de-identified] : Pt noticed a left breast mass x 2 months for which she did not seek medical attention, denied any pain, ulceration, bleeding or nipple changes/discharge.  She also c/o lower back pain for 2-3 months, associated with pain radiating down the lower extremities relieved with Tylenol prn.  Also reports intermittent upper back pain.   No fevers, night sweats, weight loss.  Reports fatigue and poor appetite.  She went to an urgent care in [place] for back pain and breast mass and was given a referral for mammo/US.  \par \par Mammogram and breast US on 4/7/2021 showed  5.6x3.2x5.3cm hypoechoic irregularly-shaped mass with angular margins 9-11:00 5 to 9cm from nipple; left axilla - enlarged 4.0x2.1x3.4cm lymph node, US biopsy rec, BIRADS4.\par \par On 4/20/2021, pt underwent an US guided left breast core biopsy which showed invasive moderately differentiated ductal carcinoma, Gem score 6/9 (3+2+10), invasive tumor at least 1.8cm, no DCIS, no LVI, microcalcifications present, ER 90%, [place] 2%, HER2 2+, CISH negative.  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  \par \par For the back pain, pt underwent an MRI lumbar spine on 4/22/2021 which showed metastatic disease in lower thoracic spine, lumbar spine and upper sacrum with severe pathologic fracture of L2 with retropulsion of the posterior endplate causing moderate canal stenosis. \par \par Admitted to [facility] 6/2021 - s/p L2 kyphoplasty and RT.  \par \par 6/2021-4/2022 - letrozole and Ibrance, s/p two dose reductions due to neutropenia.\par \par PET scan 4/11/22 - POD\par \par 5/2022 - Started on everolimus and fulvestrant - continue xgeva\par \par Patient also on Xgeva monthly since 10/5/21.\par \par Family history is significant for mother had cervical cancer age 64 and father with prostate cancer age 67.  No cancer of the breast, ovary, endometrium, and pancreatic cancer.  The patient is of [place] ethnic background.  No ETOH or tobacco.\par \par Caris Summary Report\par Source: Spine: 6/4/2021\par ER 60%; ERBB2 Negative; \par PTEN pathogenic\par PIK3CA Negative;\par ESR1 not detected\par No other actionable mutations noted; full report to be filed. \par \par Germline Testing: No clinically significant mutation on [employer] testing [de-identified] : ER 90%, [place] 2%, HER2 2+, CISH negative [FreeTextEntry1] : Metastatic Tx:\par 1st LIne Ibrance/letrozole 6/2021 - 4/2022 - CT\par 2nd line Everolimus/fulvestrant - 5/2022 - Present [de-identified] : 11/15/2022\par Patient presents today for follow up of metastatic disease and assess treatment toxicity - everolimus\par PET/CT scan on 2022: demonstrates bilateral patchy opacities (hypermetabolic) worrisome for pneumonitis in context of patient taking everolimus.\par patient has noted dyspnea on exertion and dry intermittent cough - both have completely resolved on prednisone; seen by pulmonary who agreed with assessment and causality of pneumonitis. \par no fever, no chills, no myalgias\par \par Started everolimus/faslodex 2022  + xgeva monthly: Everolimus on hold since 2022\par \par c/o of ongoing left upper extremity hand/wrist edema; no improvement holding everolimus - lymphedema therapy started on  2022;US Left upper extremity negative for clots in 2022. US axilla stable adneopathy\par \par Seen today by cardio-oncology for followup- echo done showed normal valves; could not calculate EF; had holter which showed tachycardia up to 120 bpm; no AFib\par \par Has financial concerns:  on [employer] disability\par \par Imagin2022 PET/CT \par  IMPRESSION: Since prior FDG-PET/CT scan 2022: (baseline for afinitor/faslodex)\par 1. Findings in the lung fields includes NEW hypermetabolic BILATERAL consolidative opacities in addition to previously seen lung nodules. Correlate for history of recent infection; in the absence of an additional lung process, additional malignant involvement is not excluded. Distinct from this, there are lung nodules which demonstrate stable uptake, while others show markedly increasing metabolic activity compared to the prior study.\par 2. Mixed changes in breast and chest wall lesions, with most showing stable morphologic appearance, with some showing quantitatively increased versus stable, versus decreased uptake. The largest chest wall lesion demonstrates grossly stable appearance, wall a smaller nodule previously seen in the LEFT breast shows increasing metabolism. A small lesion in the RIGHT breast shows stable size with decreased metabolism.\par 3. Stable LEFT axillary node. Reduced metabolism in size at RIGHT axillary node. A previously seen RIGHT perihilar lesion has resolved.\par 4. Osseous foci are either stable or demonstrate reduced uptake.\par 5. Mild uptake at stable RIGHT thyroid nodule. Consider thyroid function testing to exclude a functioning nodule; if patient is euthyroid consider sonography of biopsy.\par \par Primary Care: due for f/up; h/o HTN

## 2022-11-16 DIAGNOSIS — Z51.11 ENCOUNTER FOR ANTINEOPLASTIC CHEMOTHERAPY: ICD-10-CM

## 2022-11-16 DIAGNOSIS — C79.51 SECONDARY MALIGNANT NEOPLASM OF BONE: ICD-10-CM

## 2022-11-16 LAB
ALBUMIN SERPL ELPH-MCNC: 3.7 G/DL
ALP BLD-CCNC: 195 U/L
ALT SERPL-CCNC: 15 U/L
ANION GAP SERPL CALC-SCNC: 14 MMOL/L
AST SERPL-CCNC: 23 U/L
BILIRUB SERPL-MCNC: 0.2 MG/DL
BUN SERPL-MCNC: 19 MG/DL
CALCIUM SERPL-MCNC: 10.4 MG/DL
CANCER AG27-29 SERPL-ACNC: 33.8 U/ML
CEA SERPL-MCNC: 12.8 NG/ML
CHLORIDE SERPL-SCNC: 99 MMOL/L
CO2 SERPL-SCNC: 25 MMOL/L
CREAT SERPL-MCNC: 0.82 MG/DL
EGFR: 83 ML/MIN/1.73M2
GLUCOSE SERPL-MCNC: 135 MG/DL
POTASSIUM SERPL-SCNC: 4.2 MMOL/L
PROT SERPL-MCNC: 6.3 G/DL
SODIUM SERPL-SCNC: 138 MMOL/L

## 2022-11-17 ENCOUNTER — APPOINTMENT (OUTPATIENT)
Dept: SURGICAL ONCOLOGY | Facility: CLINIC | Age: 57
End: 2022-11-17

## 2022-11-17 VITALS
OXYGEN SATURATION: 96 % | HEART RATE: 105 BPM | TEMPERATURE: 97.8 F | BODY MASS INDEX: 23.75 KG/M2 | DIASTOLIC BLOOD PRESSURE: 92 MMHG | WEIGHT: 121 LBS | HEIGHT: 60 IN | SYSTOLIC BLOOD PRESSURE: 133 MMHG | RESPIRATION RATE: 17 BRPM

## 2022-11-17 PROCEDURE — 99214 OFFICE O/P EST MOD 30 MIN: CPT

## 2022-11-17 NOTE — ASSESSMENT
[FreeTextEntry1] : IMP:\par 58 y/o with invasive moderately differentiated ductal carcinoma, ER+/NM+/HER2- with metastases to the axilla and lumbar spine. \par \par \par Started Faslodex, everolimus & xgeva w/ Dr. Dowd 5/2022 (now under Dr. Sofia)\par \par CT C/A/P 7/2022- stable\par PET/CT 11/2022 - stable over all but pneumonitis \par Labs 11/2022 CEA 12.8 (trending up)\par \par reports LUE lymphedema that started in September/October, did therapy & wears a JOBST sleeve \par \par PLAN: \par RTO Q3 months \par

## 2022-11-17 NOTE — PHYSICAL EXAM
[Normal] : normal breast inspection and palpation of axillas [Normal Supraclavicular Lymph Nodes] : normal supraclavicular lymph nodes [Normal] : oriented to person, place and time, with appropriate affect [de-identified] : palpable masses in left breast stable [de-identified] : palpable mobile left axillary adenopathy

## 2022-11-17 NOTE — HISTORY OF PRESENT ILLNESS
[de-identified] : Ms. ROGELIO PEARSON is a 57 year old female who presents today for a follow-up visit \par \par Bilateral mammo/sono 4/7/21: left breast mass at the site a palpable abnormality, enlarged left axillary lymph node, right breast unremarkable. BI-RADS 4. \par \par Ultrasound Guided core biopsy of left breast x 2 sites 4/20/21:\par 1. Breast, left, 9-11 o'clock, core biopsy: Invasive moderately differentiated ductal carcinoma; Geovanny score 6 / 9 (3 + 2 +1) in this limited material; Invasive tumor measures at least 1.8cm; Ductal carcinoma in situ not seen; Microcalcifications present; Lymphovascular permeation by tumor not seen. --ER+/OR+/HER-2 equivocal (CISH negative)\par 2. Left axilla, core biopsy: metastatic ductal carcinoma to lymph node.\par -ER+/OR+/HER-2 equivocal (CISH study in progress)\par \par MRI Lumbar Spine 4/22/21: Metastatic disease to the lower thoracic spine, lumbar spine and upper sacrum with severe pathologic fracture of L2 with retropulsion of the posterior endplate causing moderate canal stenosis. No discogenic disease\par \par \par PET/CT 5/10/21: Impression: \par 1. Large FDG-avid, centrally necrotic mass in medial left breast, involving overlying skin and adjacent chest wall musculature and sternum corresponds to known malignancy. Additional separate FDG-avid lesions in left breast are suspicious for additional sites of disease. Further evaluation may be performed with breast MRI\par 2. FDG-avid lymph nodes in b/l lower cervical, left supraclavicular, left axillary, and left retropectoral regions are compatible  with metastatic disease.\par 3. Multiple FDG-avid b/l pulmonary nodules are compatible with metastatic disease. \par 4. Small left pleural effusion with heterogeneous FDG activity suspicious for malignant effusion. \par 5. Multiple FDG-avid lytic metastases in axial skeleton with severe pathologic compression fracture of L2 vertebral body, as seen on MRI dated 4/22/21, where retropulsion of posterior endplate is noted, causing moderate canal stenosis \par \par PET/CT 8/31/2021: Mildly FDG-avid mass in medial left breast and adjacent difficult to delineate mildly FDG of left breast lesions are decreased in size and metabolism, compatible with a partial response to interval therapy.Resolution of FDG-avid bilateral cervical and left supraclavicular lymph nodes and interval decrease in size, number, and metabolism of mildly FDG-avid left axillary and retropectoral lymph nodes.Multiple mildly FDG-avid and non-FDG-avid bilateral pulmonary nodules are similar, or decreased in size and are decreased metabolism. Small to moderate left pleural effusion is mildly INCREASED. Previously seen FDG-avid lytic lesions in the axial skeleton are decreased in metabolism and demonstrate new sclerosis, compatible with a partial response to interval therapy. Status post interval vertebroplasty of L2 compression fracture.\par \par PET/CT 12/16/21- Increased hypermetabolism of the left breast mass which is slightly decreased in size.  Mildly FDG-avid left axillary and right hilar lymph nodes are mildly increased in metabolism.  Innumerable subcentimeter minimally FDG-avid and non-FDG-avid bilateral pulmonary nodules, not significantly changed in size, number, or metabolism.  Innumerable predominantly sclerotic osseous metastases in the axial skeleton are similar or mildly increased in density on CT, and predominantly demonstrate minimal or no FDG avidity, compatible with treated bone metastases. A few small foci of increased FDG activity suggests residual disease, for example, an FDG-avid lytic lesion in the anterior aspect of the left sixth rib (image 103).\par \par Tumor Markers 2/1/22- CA 27-29: 21.5 | CEA: 10.4\par 2/11/22- Patient continues Ibrance and Letrozole per Dr. Dowd.  She is scheduled for a PET/CT in March 2022.  Feeling well overall.\par \par PET CT 4/11/22- Increased in size of medial left breast mass, new FDG acitivity in upper central left breast and nodule in medial upper right breast, suspicious for new sites of disease. Lung and bone lesions stable and unchanged. \par \par Tumor Markers 5/3/22: CEA (14.7); CA15.3 (26.8); CA27.29 (24.6); ALT (9); ALK PHOS (176) \par \par 5/16/22- PET scan 4/11/22 showed increased in size of medial left breast mass, new FDG acitivity in upper central left breast and nodule in medial upper right breast, suspicious for new sites of disease. Lung and bone lesions stable and unchanged. Tumor marker uptrending. Caris testing on most recent biopsy- no actionable mutations. Ibrance and Letrozole stopped. Pt. was started on Faslodex and everolimus. Will continue Xgeva monthly. \par \par CT C/A/P 7/2022- stable\par PET/CT 11/2022 - stable over all but pneumonitis \par \par reports LUE lymphedema that started in September/October, did therapy & wears a JOBST sleeve

## 2022-12-13 ENCOUNTER — RESULT REVIEW (OUTPATIENT)
Age: 57
End: 2022-12-13

## 2022-12-13 ENCOUNTER — APPOINTMENT (OUTPATIENT)
Dept: HEMATOLOGY ONCOLOGY | Facility: CLINIC | Age: 57
End: 2022-12-13

## 2022-12-13 ENCOUNTER — APPOINTMENT (OUTPATIENT)
Dept: INFUSION THERAPY | Facility: HOSPITAL | Age: 57
End: 2022-12-13

## 2022-12-13 VITALS
WEIGHT: 116.84 LBS | OXYGEN SATURATION: 98 % | RESPIRATION RATE: 18 BRPM | SYSTOLIC BLOOD PRESSURE: 156 MMHG | HEART RATE: 90 BPM | BODY MASS INDEX: 22.82 KG/M2 | DIASTOLIC BLOOD PRESSURE: 92 MMHG | TEMPERATURE: 97.3 F

## 2022-12-13 DIAGNOSIS — J18.9 PNEUMONIA, UNSPECIFIED ORGANISM: ICD-10-CM

## 2022-12-13 LAB
BASOPHILS # BLD AUTO: 0.04 K/UL — SIGNIFICANT CHANGE UP (ref 0–0.2)
BASOPHILS NFR BLD AUTO: 0.7 % — SIGNIFICANT CHANGE UP (ref 0–2)
EOSINOPHIL # BLD AUTO: 0.08 K/UL — SIGNIFICANT CHANGE UP (ref 0–0.5)
EOSINOPHIL NFR BLD AUTO: 1.3 % — SIGNIFICANT CHANGE UP (ref 0–6)
HCT VFR BLD CALC: 42.4 % — SIGNIFICANT CHANGE UP (ref 34.5–45)
HGB BLD-MCNC: 13 G/DL — SIGNIFICANT CHANGE UP (ref 11.5–15.5)
IMM GRANULOCYTES NFR BLD AUTO: 0.3 % — SIGNIFICANT CHANGE UP (ref 0–0.9)
LYMPHOCYTES # BLD AUTO: 1.31 K/UL — SIGNIFICANT CHANGE UP (ref 1–3.3)
LYMPHOCYTES # BLD AUTO: 21.5 % — SIGNIFICANT CHANGE UP (ref 13–44)
MCHC RBC-ENTMCNC: 25.5 PG — LOW (ref 27–34)
MCHC RBC-ENTMCNC: 30.7 G/DL — LOW (ref 32–36)
MCV RBC AUTO: 83.1 FL — SIGNIFICANT CHANGE UP (ref 80–100)
MONOCYTES # BLD AUTO: 0.62 K/UL — SIGNIFICANT CHANGE UP (ref 0–0.9)
MONOCYTES NFR BLD AUTO: 10.2 % — SIGNIFICANT CHANGE UP (ref 2–14)
NEUTROPHILS # BLD AUTO: 4.02 K/UL — SIGNIFICANT CHANGE UP (ref 1.8–7.4)
NEUTROPHILS NFR BLD AUTO: 66 % — SIGNIFICANT CHANGE UP (ref 43–77)
NRBC # BLD: 0 /100 WBCS — SIGNIFICANT CHANGE UP (ref 0–0)
PLATELET # BLD AUTO: 307 K/UL — SIGNIFICANT CHANGE UP (ref 150–400)
RBC # BLD: 5.1 M/UL — SIGNIFICANT CHANGE UP (ref 3.8–5.2)
RBC # FLD: 19.4 % — HIGH (ref 10.3–14.5)
WBC # BLD: 6.09 K/UL — SIGNIFICANT CHANGE UP (ref 3.8–10.5)
WBC # FLD AUTO: 6.09 K/UL — SIGNIFICANT CHANGE UP (ref 3.8–10.5)

## 2022-12-13 PROCEDURE — 99214 OFFICE O/P EST MOD 30 MIN: CPT

## 2022-12-14 LAB — CANCER AG27-29 SERPL-ACNC: 30.4 U/ML

## 2022-12-15 LAB
ALBUMIN SERPL ELPH-MCNC: 4.1 G/DL
ALP BLD-CCNC: 197 U/L
ALT SERPL-CCNC: 14 U/L
ANION GAP SERPL CALC-SCNC: 16 MMOL/L
AST SERPL-CCNC: 24 U/L
BILIRUB SERPL-MCNC: <0.2 MG/DL
BUN SERPL-MCNC: 14 MG/DL
CALCIUM SERPL-MCNC: 9.9 MG/DL
CEA SERPL-MCNC: 12.7 NG/ML
CHLORIDE SERPL-SCNC: 104 MMOL/L
CO2 SERPL-SCNC: 20 MMOL/L
CREAT SERPL-MCNC: 0.76 MG/DL
EGFR: 91 ML/MIN/1.73M2
GLUCOSE SERPL-MCNC: 82 MG/DL
POTASSIUM SERPL-SCNC: 4 MMOL/L
PROT SERPL-MCNC: 6.9 G/DL
SODIUM SERPL-SCNC: 141 MMOL/L

## 2022-12-16 ENCOUNTER — NON-APPOINTMENT (OUTPATIENT)
Age: 57
End: 2022-12-16

## 2022-12-17 PROBLEM — J18.9 PNEUMONITIS: Status: ACTIVE | Noted: 2022-11-07

## 2022-12-17 NOTE — PHYSICAL EXAM
[Fully active, able to carry on all pre-disease performance without restriction] : Status 0 - Fully active, able to carry on all pre-disease performance without restriction [Normal] : affect appropriate [de-identified] : fine crackles at bilateral bases no wheezes, good air entry  [de-identified] : tachycardia but regular [de-identified] : left 9:00-10:00 , s/p healed ulceration 4x4cm induration (increased in size?) and vertical indentation unchanged; satellite lesion at 11:00 1.5 (stable) cm;  no adenopathy b/l ; no mass right breast,  [de-identified] : Left hand swollen diffusely and extending up forearm; edema noted;  no left axillary nodes palpated; change

## 2022-12-17 NOTE — HISTORY OF PRESENT ILLNESS
[Disease: _____________________] : Disease: [unfilled] [M: ___] : M[unfilled] [AJCC Stage: ____] : AJCC Stage: [unfilled] [de-identified] : Pt initially evaluated by Medical Oncology in 7/2021 when she noticed a left breast mass x 2 months for which she did not seek medical attention, denied any pain, ulceration, bleeding or nipple changes/discharge.  She also c/o lower back pain for 2-3 months, associated with pain radiating down the lower extremities relieved with Tylenol prn.  Also reports intermittent upper back pain.   No fevers, night sweats, weight loss.  Reports fatigue and poor appetite.  She went to an urgent care in [place] for back pain and breast mass and was given a referral for mammo/US.  \par \par Mammogram and breast US on 4/7/2021 showed  5.6x3.2x5.3cm hypoechoic irregularly-shaped mass with angular margins 9-11:00 5 to 9cm from nipple; left axilla - enlarged 4.0x2.1x3.4cm lymph node, US biopsy rec, BIRADS4.\par \par On 4/20/2021, pt underwent an US guided left breast core biopsy which showed invasive moderately differentiated ductal carcinoma, Geovanny score 6/9 (3+2+10), invasive tumor at least 1.8cm, no DCIS, no LVI, microcalcifications present, ER 90%, [place] 2%, HER2 2+, CISH negative.  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  \par \par For the back pain, pt underwent an MRI lumbar spine on 4/22/2021 which showed metastatic disease in lower thoracic spine, lumbar spine and upper sacrum with severe pathologic fracture of L2 with retropulsion of the posterior endplate causing moderate canal stenosis. \par \par Admitted to [facility] 6/2021 - s/p L2 kyphoplasty and RT.  \par \par 6/2021-4/2022 - letrozole and Ibrance, s/p two dose reductions due to neutropenia.\par \par PET scan 4/11/22 - POD\par \par 5/2022 - Started on everolimus and fulvestrant - continue xgeva\par \par Patient also on Xgeva monthly since 10/5/21.\par \par Family history is significant for mother had cervical cancer age 64 and father with prostate cancer age 67.  No cancer of the breast, ovary, endometrium, and pancreatic cancer.  The patient is of [place] ethnic background.  No ETOH or tobacco.\par \par Caris Summary Report\par Source: Spine: 6/4/2021\par ER 60%; ERBB2 Negative; \par PTEN pathogenic\par PIK3CA Negative;\par ESR1 not detected\par No other actionable mutations noted; full report to be filed. \par \par Germline Testing: No clinically significant mutation on [employer] testing [de-identified] : ER 90%, [place] 2%, HER2 2+, CISH negative [FreeTextEntry1] : Metastatic Tx:\par 1st LIne Ibrance/letrozole 6/2021 - 4/2022 - [place]\par 2nd line Everolimus/fulvestrant - 5/2022 - Present [de-identified] : 22\par Patient presents today for follow up of metastatic disease and assess treatment toxicity\par - PET/CT scan on 2022: demonstrated bilateral patchy opacities (hypermetabolic) worrisome for pneumonitis in context of patient taking everolimus.\par - has remained off everolimus since 2022 and was treated with prednisone with dramatic improvement in her cough and dyspnea; following with pulmonary\par \par Started everolimus/faslodex 2022  + xgeva monthly but everolimus had to be held in 2022 due to pneumonitis\par \par c/o of ongoing left upper extremity hand/wrist edema; no improvement holding everolimus - lymphedema therapy started on  2022;US Left upper extremity negative for clots in 2022. US axilla stable adneopathy\par \par Seen today by cardio-oncology for followup- echo done showed normal valves; could not calculate EF; had holter which showed tachycardia up to 120 bpm; no AFib\par \par Has financial concerns:  on [employer] disability\par \par Imagin2022 PET/CT \par  IMPRESSION: Since prior FDG-PET/CT scan 2022: (baseline for afinitor/faslodex)\par 1. Findings in the lung fields includes NEW hypermetabolic BILATERAL consolidative opacities in addition to previously seen lung nodules. Correlate for history of recent infection; in the absence of an additional lung process, additional malignant involvement is not excluded. Distinct from this, there are lung nodules which demonstrate stable uptake, while others show markedly increasing metabolic activity compared to the prior study.\par 2. Mixed changes in breast and chest wall lesions, with most showing stable morphologic appearance, with some showing quantitatively increased versus stable, versus decreased uptake. The largest chest wall lesion demonstrates grossly stable appearance, wall a smaller nodule previously seen in the LEFT breast shows increasing metabolism. A small lesion in the RIGHT breast shows stable size with decreased metabolism.\par 3. Stable LEFT axillary node. Reduced metabolism in size at RIGHT axillary node. A previously seen RIGHT perihilar lesion has resolved.\par 4. Osseous foci are either stable or demonstrate reduced uptake.\par 5. Mild uptake at stable RIGHT thyroid nodule. Consider thyroid function testing to exclude a functioning nodule; if patient is euthyroid consider sonography of biopsy.\par \par Primary Care: due for f/up; h/o HTN  [100: Normal, no complaints, no evidence of disease.] : 100: Normal, no complaints, no evidence of disease.

## 2022-12-17 NOTE — REVIEW OF SYSTEMS
[Fatigue] : fatigue [Cough] : cough [SOB on Exertion] : shortness of breath during exertion [Negative] : Allergic/Immunologic [Mucosal Pain] : no mucosal pain [FreeTextEntry9] : see HPI [FreeTextEntry6] : productive of clear sputum [de-identified] : left upper extremity lymphedema

## 2022-12-27 ENCOUNTER — OUTPATIENT (OUTPATIENT)
Dept: OUTPATIENT SERVICES | Facility: HOSPITAL | Age: 57
LOS: 1 days | End: 2022-12-27
Payer: MEDICAID

## 2022-12-27 ENCOUNTER — APPOINTMENT (OUTPATIENT)
Dept: CT IMAGING | Facility: IMAGING CENTER | Age: 57
End: 2022-12-27
Payer: MEDICAID

## 2022-12-27 DIAGNOSIS — J18.9 PNEUMONIA, UNSPECIFIED ORGANISM: ICD-10-CM

## 2022-12-27 PROCEDURE — 71250 CT THORAX DX C-: CPT | Mod: 26

## 2022-12-27 PROCEDURE — 71250 CT THORAX DX C-: CPT

## 2023-01-12 ENCOUNTER — APPOINTMENT (OUTPATIENT)
Dept: HEMATOLOGY ONCOLOGY | Facility: CLINIC | Age: 58
End: 2023-01-12

## 2023-01-13 ENCOUNTER — OUTPATIENT (OUTPATIENT)
Dept: OUTPATIENT SERVICES | Facility: HOSPITAL | Age: 58
LOS: 1 days | Discharge: HOME | End: 2023-01-13

## 2023-01-13 DIAGNOSIS — C50.919 MALIGNANT NEOPLASM OF UNSPECIFIED SITE OF UNSPECIFIED FEMALE BREAST: ICD-10-CM

## 2023-01-13 DIAGNOSIS — Z98.890 OTHER SPECIFIED POSTPROCEDURAL STATES: Chronic | ICD-10-CM

## 2023-01-19 ENCOUNTER — RESULT REVIEW (OUTPATIENT)
Age: 58
End: 2023-01-19

## 2023-01-19 ENCOUNTER — APPOINTMENT (OUTPATIENT)
Dept: INFUSION THERAPY | Facility: HOSPITAL | Age: 58
End: 2023-01-19

## 2023-01-19 ENCOUNTER — APPOINTMENT (OUTPATIENT)
Dept: HEMATOLOGY ONCOLOGY | Facility: CLINIC | Age: 58
End: 2023-01-19
Payer: MEDICAID

## 2023-01-19 VITALS
TEMPERATURE: 97.3 F | SYSTOLIC BLOOD PRESSURE: 118 MMHG | HEART RATE: 85 BPM | OXYGEN SATURATION: 99 % | BODY MASS INDEX: 22.39 KG/M2 | RESPIRATION RATE: 16 BRPM | DIASTOLIC BLOOD PRESSURE: 60 MMHG | WEIGHT: 114.64 LBS

## 2023-01-19 LAB
ALBUMIN SERPL ELPH-MCNC: 4.1 G/DL
ALP BLD-CCNC: 153 U/L
ALT SERPL-CCNC: 16 U/L
ANION GAP SERPL CALC-SCNC: 13 MMOL/L
AST SERPL-CCNC: 28 U/L
BASOPHILS # BLD AUTO: 0.05 K/UL — SIGNIFICANT CHANGE UP (ref 0–0.2)
BASOPHILS NFR BLD AUTO: 0.7 % — SIGNIFICANT CHANGE UP (ref 0–2)
BILIRUB SERPL-MCNC: 0.2 MG/DL
BUN SERPL-MCNC: 12 MG/DL
CALCIUM SERPL-MCNC: 9.9 MG/DL
CEA SERPL-MCNC: 15.1 NG/ML
CHLORIDE SERPL-SCNC: 103 MMOL/L
CO2 SERPL-SCNC: 24 MMOL/L
CREAT SERPL-MCNC: 0.79 MG/DL
EGFR: 87 ML/MIN/1.73M2
EOSINOPHIL # BLD AUTO: 0.18 K/UL — SIGNIFICANT CHANGE UP (ref 0–0.5)
EOSINOPHIL NFR BLD AUTO: 2.5 % — SIGNIFICANT CHANGE UP (ref 0–6)
GLUCOSE SERPL-MCNC: 75 MG/DL
HCT VFR BLD CALC: 40.6 % — SIGNIFICANT CHANGE UP (ref 34.5–45)
HGB BLD-MCNC: 12.7 G/DL — SIGNIFICANT CHANGE UP (ref 11.5–15.5)
IMM GRANULOCYTES NFR BLD AUTO: 0.1 % — SIGNIFICANT CHANGE UP (ref 0–0.9)
LYMPHOCYTES # BLD AUTO: 1.52 K/UL — SIGNIFICANT CHANGE UP (ref 1–3.3)
LYMPHOCYTES # BLD AUTO: 21.3 % — SIGNIFICANT CHANGE UP (ref 13–44)
MCHC RBC-ENTMCNC: 25.7 PG — LOW (ref 27–34)
MCHC RBC-ENTMCNC: 31.3 G/DL — LOW (ref 32–36)
MCV RBC AUTO: 82.2 FL — SIGNIFICANT CHANGE UP (ref 80–100)
MONOCYTES # BLD AUTO: 0.86 K/UL — SIGNIFICANT CHANGE UP (ref 0–0.9)
MONOCYTES NFR BLD AUTO: 12 % — SIGNIFICANT CHANGE UP (ref 2–14)
NEUTROPHILS # BLD AUTO: 4.52 K/UL — SIGNIFICANT CHANGE UP (ref 1.8–7.4)
NEUTROPHILS NFR BLD AUTO: 63.4 % — SIGNIFICANT CHANGE UP (ref 43–77)
NRBC # BLD: 0 /100 WBCS — SIGNIFICANT CHANGE UP (ref 0–0)
PLATELET # BLD AUTO: 325 K/UL — SIGNIFICANT CHANGE UP (ref 150–400)
POTASSIUM SERPL-SCNC: 4.5 MMOL/L
PROT SERPL-MCNC: 6.8 G/DL
RBC # BLD: 4.94 M/UL — SIGNIFICANT CHANGE UP (ref 3.8–5.2)
RBC # FLD: 17.6 % — HIGH (ref 10.3–14.5)
SODIUM SERPL-SCNC: 140 MMOL/L
WBC # BLD: 7.14 K/UL — SIGNIFICANT CHANGE UP (ref 3.8–10.5)
WBC # FLD AUTO: 7.14 K/UL — SIGNIFICANT CHANGE UP (ref 3.8–10.5)

## 2023-01-19 PROCEDURE — 99215 OFFICE O/P EST HI 40 MIN: CPT

## 2023-01-19 NOTE — PHYSICAL EXAM
[Fully active, able to carry on all pre-disease performance without restriction] : Status 0 - Fully active, able to carry on all pre-disease performance without restriction [Normal] : affect appropriate [de-identified] : fine crackles at bilateral bases no wheezes, good air entry  [de-identified] : tachycardia but regular [de-identified] : left breast 9:00-10:00 1cm nodular mass palpated , s/p healed ulceration 4x4cm induration and vertical indentation  increased in size; satellite lesion at 11:00 not prominent on today's exam;  no palpable adenopathy b/l ; no mass right breast,  [de-identified] : Left hand swollen diffusely and extending up forearm; edema noted; lymphedema sleeve in place;  no left axillary nodes palpated;

## 2023-01-19 NOTE — REVIEW OF SYSTEMS
[Fatigue] : fatigue [Mucosal Pain] : no mucosal pain [Cough] : no cough [SOB on Exertion] : no shortness of breath during exertion [Negative] : Respiratory [FreeTextEntry9] : see HPI [de-identified] : nodularity of chest wall lesion; increased "puffiness" per patient [de-identified] : left upper extremity lymphedema

## 2023-01-19 NOTE — HISTORY OF PRESENT ILLNESS
[Disease: _____________________] : Disease: [unfilled] [M: ___] : M[unfilled] [AJCC Stage: ____] : AJCC Stage: [unfilled] [100: Normal, no complaints, no evidence of disease.] : 100: Normal, no complaints, no evidence of disease. [de-identified] : Pt initially evaluated by Medical Oncology in 7/2021 when she noticed a left breast mass x 2 months for which she did not seek medical attention, denied any pain, ulceration, bleeding or nipple changes/discharge.  She also c/o lower back pain for 2-3 months, associated with pain radiating down the lower extremities relieved with Tylenol prn.  Also reports intermittent upper back pain.   No fevers, night sweats, weight loss.  Reports fatigue and poor appetite.  She went to an urgent care in [place] for back pain and breast mass and was given a referral for mammo/US.  \par \par Mammogram and breast US on 4/7/2021 showed  5.6x3.2x5.3cm hypoechoic irregularly-shaped mass with angular margins 9-11:00 5 to 9cm from nipple; left axilla - enlarged 4.0x2.1x3.4cm lymph node, US biopsy rec, BIRADS4.\par \par On 4/20/2021, pt underwent an US guided left breast core biopsy which showed invasive moderately differentiated ductal carcinoma, Geovanny score 6/9 (3+2+10), invasive tumor at least 1.8cm, no DCIS, no LVI, microcalcifications present, ER 90%, [place] 2%, HER2 2+, CISH negative.  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  \par \par For the back pain, pt underwent an MRI lumbar spine on 4/22/2021 which showed metastatic disease in lower thoracic spine, lumbar spine and upper sacrum with severe pathologic fracture of L2 with retropulsion of the posterior endplate causing moderate canal stenosis. \par \par Admitted to [facility] 6/2021 - s/p L2 kyphoplasty and RT.  \par \par 6/2021-4/2022 - letrozole and Ibrance, s/p two dose reductions due to neutropenia.\par \par PET scan 4/11/22 - POD\par \par 5/2022 - Started on everolimus and fulvestrant - stopped in 11/2022 due to pneumonitis\par \par Patient also on Xgeva monthly since 10/5/21.\par \par Family history is significant for mother had cervical cancer age 64 and father with prostate cancer age 67.  No cancer of the breast, ovary, endometrium, and pancreatic cancer.  The patient is of [place] ethnic background.  No ETOH or tobacco.\par \par Caris Summary Report\par Source: Spine: 6/4/2021\par ER 60%; ERBB2 Negative; \par PTEN pathogenic\par PIK3CA Negative;\par ESR1 not detected\par No other actionable mutations noted; full report to be filed. \par \par Germline Testing: No clinically significant mutation on [employer] testing [de-identified] : ER 90%, [place] 2%, HER2 2+, CISH negative [FreeTextEntry1] : Metastatic Tx:\par 1st LIne Ibrance/letrozole 6/2021 - 4/2022 - [place]\par 2nd line Everolimus/fulvestrant - 5/2022 - Present [de-identified] : 1/19/23\par Patient presents today for follow up of metastatic disease and assess treatment toxicity\par \par - Started everolimus/faslodex 5/2022  + xgeva monthly but everolimus had to be held in 11/2022 due to suspected pneumonitis which improved with steroids\par \par - PET/CT scan on 11/4/2022: demonstrated bilateral patchy opacities (hypermetabolic) worrisome for pneumonitis in context of patient taking everolimus.\par - has remained off everolimus since 11/5/2022 and was treated with prednisone with dramatic improvement in her cough and dyspnea; following with pulmonary; repeat CT scan 12/27/22 showed disease progression with progressive lung nodules, LN and increased mid-chest wall lesion and left breast nodule\par - apprehensive about resuming lower dose everolimus\par - on 1/6/23 prescribed capecitabine 1500mg BID 7 days on, 7 days off schedule; has not started for unclear reason\par \par Seen by  cardio-oncology for followup- echo done showed normal valves; could not calculate EF; had holter which showed tachycardia up to 120 bpm; no AFib\par \par Has financial concerns:  on [employer] disability\par \par Primary Care: due for f/up; h/o HTN

## 2023-01-20 DIAGNOSIS — C79.51 SECONDARY MALIGNANT NEOPLASM OF BONE: ICD-10-CM

## 2023-01-22 LAB — CANCER AG27-29 SERPL-ACNC: 30 U/ML

## 2023-02-01 ENCOUNTER — NON-APPOINTMENT (OUTPATIENT)
Age: 58
End: 2023-02-01

## 2023-02-02 ENCOUNTER — APPOINTMENT (OUTPATIENT)
Dept: HEMATOLOGY ONCOLOGY | Facility: CLINIC | Age: 58
End: 2023-02-02
Payer: MEDICAID

## 2023-02-02 ENCOUNTER — RESULT REVIEW (OUTPATIENT)
Age: 58
End: 2023-02-02

## 2023-02-02 VITALS
BODY MASS INDEX: 22.82 KG/M2 | RESPIRATION RATE: 16 BRPM | SYSTOLIC BLOOD PRESSURE: 133 MMHG | TEMPERATURE: 97.1 F | HEART RATE: 88 BPM | OXYGEN SATURATION: 95 % | DIASTOLIC BLOOD PRESSURE: 80 MMHG | WEIGHT: 116.84 LBS

## 2023-02-02 LAB
ALBUMIN SERPL ELPH-MCNC: 4.2 G/DL
ALP BLD-CCNC: 262 U/L
ALT SERPL-CCNC: 10 U/L
ANION GAP SERPL CALC-SCNC: 11 MMOL/L
AST SERPL-CCNC: 20 U/L
BASOPHILS # BLD AUTO: 0.03 K/UL — SIGNIFICANT CHANGE UP (ref 0–0.2)
BASOPHILS NFR BLD AUTO: 0.6 % — SIGNIFICANT CHANGE UP (ref 0–2)
BILIRUB SERPL-MCNC: <0.2 MG/DL
BUN SERPL-MCNC: 19 MG/DL
CALCIUM SERPL-MCNC: 9.5 MG/DL
CEA SERPL-MCNC: 12.7 NG/ML
CHLORIDE SERPL-SCNC: 103 MMOL/L
CO2 SERPL-SCNC: 25 MMOL/L
CREAT SERPL-MCNC: 0.87 MG/DL
EGFR: 78 ML/MIN/1.73M2
EOSINOPHIL # BLD AUTO: 0.12 K/UL — SIGNIFICANT CHANGE UP (ref 0–0.5)
EOSINOPHIL NFR BLD AUTO: 2.3 % — SIGNIFICANT CHANGE UP (ref 0–6)
GLUCOSE SERPL-MCNC: 98 MG/DL
HCT VFR BLD CALC: 38 % — SIGNIFICANT CHANGE UP (ref 34.5–45)
HGB BLD-MCNC: 12.4 G/DL — SIGNIFICANT CHANGE UP (ref 11.5–15.5)
IMM GRANULOCYTES NFR BLD AUTO: 0.2 % — SIGNIFICANT CHANGE UP (ref 0–0.9)
LYMPHOCYTES # BLD AUTO: 1.64 K/UL — SIGNIFICANT CHANGE UP (ref 1–3.3)
LYMPHOCYTES # BLD AUTO: 32 % — SIGNIFICANT CHANGE UP (ref 13–44)
MCHC RBC-ENTMCNC: 26.4 PG — LOW (ref 27–34)
MCHC RBC-ENTMCNC: 32.6 G/DL — SIGNIFICANT CHANGE UP (ref 32–36)
MCV RBC AUTO: 81 FL — SIGNIFICANT CHANGE UP (ref 80–100)
MONOCYTES # BLD AUTO: 0.82 K/UL — SIGNIFICANT CHANGE UP (ref 0–0.9)
MONOCYTES NFR BLD AUTO: 16 % — HIGH (ref 2–14)
NEUTROPHILS # BLD AUTO: 2.5 K/UL — SIGNIFICANT CHANGE UP (ref 1.8–7.4)
NEUTROPHILS NFR BLD AUTO: 48.9 % — SIGNIFICANT CHANGE UP (ref 43–77)
NRBC # BLD: 0 /100 WBCS — SIGNIFICANT CHANGE UP (ref 0–0)
PLATELET # BLD AUTO: 264 K/UL — SIGNIFICANT CHANGE UP (ref 150–400)
POTASSIUM SERPL-SCNC: 3.9 MMOL/L
PROT SERPL-MCNC: 7.1 G/DL
RBC # BLD: 4.69 M/UL — SIGNIFICANT CHANGE UP (ref 3.8–5.2)
RBC # FLD: 17.6 % — HIGH (ref 10.3–14.5)
SODIUM SERPL-SCNC: 139 MMOL/L
WBC # BLD: 5.12 K/UL — SIGNIFICANT CHANGE UP (ref 3.8–10.5)
WBC # FLD AUTO: 5.12 K/UL — SIGNIFICANT CHANGE UP (ref 3.8–10.5)

## 2023-02-02 PROCEDURE — 99214 OFFICE O/P EST MOD 30 MIN: CPT

## 2023-02-03 LAB — CANCER AG27-29 SERPL-ACNC: 26.5 U/ML

## 2023-02-03 NOTE — REVIEW OF SYSTEMS
[Fatigue] : fatigue [Negative] : Musculoskeletal [Mucosal Pain] : no mucosal pain [Cough] : no cough [SOB on Exertion] : no shortness of breath during exertion [FreeTextEntry7] : more frequent BM 2-3 x day [de-identified] : nodularity of chest wall lesion; increased "puffiness" per patient [de-identified] : left upper extremity lymphedema - unchanged

## 2023-02-03 NOTE — HISTORY OF PRESENT ILLNESS
[Disease: _____________________] : Disease: [unfilled] [M: ___] : M[unfilled] [AJCC Stage: ____] : AJCC Stage: [unfilled] [100: Normal, no complaints, no evidence of disease.] : 100: Normal, no complaints, no evidence of disease. [de-identified] : Pt initially evaluated by Medical Oncology in 7/2021 when she noticed a left breast mass x 2 months for which she did not seek medical attention, denied any pain, ulceration, bleeding or nipple changes/discharge.  She also c/o lower back pain for 2-3 months, associated with pain radiating down the lower extremities relieved with Tylenol prn.  Also reports intermittent upper back pain.   No fevers, night sweats, weight loss.  Reports fatigue and poor appetite.  She went to an urgent care in [place] for back pain and breast mass and was given a referral for mammo/US.  \par \par Mammogram and breast US on 4/7/2021 showed  5.6x3.2x5.3cm hypoechoic irregularly-shaped mass with angular margins 9-11:00 5 to 9cm from nipple; left axilla - enlarged 4.0x2.1x3.4cm lymph node, US biopsy rec, BIRADS4.\par \par On 4/20/2021, pt underwent an US guided left breast core biopsy which showed invasive moderately differentiated ductal carcinoma, Geovanny score 6/9 (3+2+10), invasive tumor at least 1.8cm, no DCIS, no LVI, microcalcifications present, ER 90%, [place] 2%, HER2 2+, CISH negative.  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  \par \par For the back pain, pt underwent an MRI lumbar spine on 4/22/2021 which showed metastatic disease in lower thoracic spine, lumbar spine and upper sacrum with severe pathologic fracture of L2 with retropulsion of the posterior endplate causing moderate canal stenosis. \par \par Admitted to [facility] 6/2021 - s/p L2 kyphoplasty and RT.  \par \par 6/2021-4/2022 - letrozole and Ibrance, s/p two dose reductions due to neutropenia.\par \par PET scan 4/11/22 - POD\par \par 5/2022 - Started on everolimus and fulvestrant - stopped in 11/2022 due to pneumonitis\par \par Patient also on Xgeva monthly since 10/5/21.\par \par Family history is significant for mother had cervical cancer age 64 and father with prostate cancer age 67.  No cancer of the breast, ovary, endometrium, and pancreatic cancer.  The patient is of [place] ethnic background.  No ETOH or tobacco.\par \par Caris Summary Report\par Source: Spine: 6/4/2021\par ER 60%; ERBB2 Negative; \par PTEN pathogenic\par PIK3CA Negative;\par ESR1 not detected\par No other actionable mutations noted; full report to be filed. \par \par Germline Testing: No clinically significant mutation on [employer]ae testing [de-identified] : ER 90%, [place] 2%, HER2 2+, CISH negative [FreeTextEntry1] : Metastatic Tx:\par 1st LIne Ibrance/letrozole 6/2021 - 4/2022 - [place]\par 2nd line Everolimus/fulvestrant - 5/2022 - 12/2022 - [place] - developed pneumonitis on afinitor\par 3rd line: capecitabine 1/19/2023 [de-identified] : 2/2/2023\par Patient presents today for follow up of metastatic disease and assess treatment toxicity\par \par - Started capecitabine (3rd line tx MBC) on 1/20/2023: She is just starting her second week of therapy  1500 mg po bid 7 days on / 7 days off \par She is tolerating therapy well - may note more frequent BM (not watery or soft) 2-3 x/ day; no HFS; notes hyperpigmentation of tongue and hands\par Patient denies any SOB, CP, abdominal pain, bone pain, headache, or unexplained weight loss\par Patient denies fever, chills, nausea/vomiting, diarrhea/constipation, neuropathy, headache.\par \par - PET/CT scan on  12/27/22 showed disease progression with progressive lung nodules, LN and increased mid-chest wall lesion and left breast nodule\par - apprehensive about resuming lower dose everolimus\par \par Has financial concerns:  on SS disability; considering returning to work 1-2 days per week once she is stablized\par \par

## 2023-02-03 NOTE — PHYSICAL EXAM
[Fully active, able to carry on all pre-disease performance without restriction] : Status 0 - Fully active, able to carry on all pre-disease performance without restriction [Normal] : clear to auscultation bilaterally, no dullness, no wheezing [de-identified] : left breast 9:00-10:00 1cm nodular mass palpated , s/p healed ulceration 5x3cm induration and vertical indentation  increased in size; satellite lesion at 11:00 1.5 cm;  no palpable adenopathy b/l ; no mass right breast, (photo taken ) [de-identified] : Left hand swollen diffusely and extending up forearm; edema noted; lymphedema sleeve in place;  no left axillary nodes palpated;  [de-identified] : hyperpigmentation of tongue and palms of hands; no dryness, no erythema

## 2023-02-06 ENCOUNTER — APPOINTMENT (OUTPATIENT)
Dept: CARDIOLOGY | Facility: CLINIC | Age: 58
End: 2023-02-06
Payer: MEDICAID

## 2023-02-06 VITALS
HEART RATE: 93 BPM | SYSTOLIC BLOOD PRESSURE: 154 MMHG | RESPIRATION RATE: 16 BRPM | OXYGEN SATURATION: 99 % | HEIGHT: 60 IN | WEIGHT: 119.05 LBS | BODY MASS INDEX: 23.37 KG/M2 | DIASTOLIC BLOOD PRESSURE: 95 MMHG

## 2023-02-06 DIAGNOSIS — I10 ESSENTIAL (PRIMARY) HYPERTENSION: ICD-10-CM

## 2023-02-06 PROCEDURE — 93010 ELECTROCARDIOGRAM REPORT: CPT

## 2023-02-06 PROCEDURE — 93000 ELECTROCARDIOGRAM COMPLETE: CPT

## 2023-02-06 PROCEDURE — 99214 OFFICE O/P EST MOD 30 MIN: CPT | Mod: 25

## 2023-02-06 RX ORDER — PREDNISONE 10 MG/1
10 TABLET ORAL
Qty: 60 | Refills: 0 | Status: COMPLETED | COMMUNITY
Start: 2022-11-07 | End: 2023-02-06

## 2023-02-06 NOTE — PHYSICAL EXAM
[Normal] : well developed, well nourished, no acute distress [Normal Conjunctiva] : normal conjunctiva [No Xanthelasma] : no xanthelasma [Normal Venous Pressure] : normal venous pressure [Normal S1, S2] : normal S1, S2 [No Murmur] : no murmur [No Rub] : no rub [No Gallop] : no gallop [Good Air Entry] : good air entry [No Respiratory Distress] : no respiratory distress  [Soft] : abdomen soft [Normal Gait] : normal gait [Gait - Sufficient for Exercise Testing] : gait - sufficient for exercise testing [No Edema] : no edema [No Cyanosis] : no cyanosis [No Clubbing] : no clubbing [No Varicosities] : no varicosities [No Rash] : no rash [Moves all extremities] : moves all extremities [No Focal Deficits] : no focal deficits [Normal Speech] : normal speech [Alert and Oriented] : alert and oriented [de-identified] : dry creps at right base, improved from prior [de-identified] : LUE swelling

## 2023-02-06 NOTE — REVIEW OF SYSTEMS
[Change in Appetite] : change in appetite [Change In The Stool] : change in stool [Negative] : Cardiovascular

## 2023-02-06 NOTE — REASON FOR VISIT
[FreeTextEntry3] : Yeni Cedeno [FreeTextEntry1] : ROGELIO PEARSON is a 57 year woman with a history of ER+/HER+ metastatic breast cancer here for follow up of palpitations.\par \par Prior Cancer Treatments:\par ------------------------------------------------------------------------\par Chemo/targeted therapy:\par denosumab (Xgeva) monthly since 10/5/2021\par letrozole and Ibrance, dose reduced due to neutropenia: 6/2021-4/2022 \par everolimus and fulvestrant 5/2022-12/2022\par 1/20/2023: capecitabine 4th line\par ------------------------------------------------------------------------\par Surgery:\par 6/2021 - s/p L2 kyphoplasty\par ------------------------------------------------------------------------\par Radiation:\par 6/10/2021: 800 cGy to L2  Home

## 2023-02-06 NOTE — HISTORY OF PRESENT ILLNESS
[FreeTextEntry1] : Interval History:\par There was POD on on 12/2022 CT so treatment changed to capecitabine from fulvestrant.\par Tachycardia resolved after treatment for pneumonitis.\par Feels well, no cardiac complaint. She has not been taking amlodipine and BP is elevated.\par \par \par \par History:\par ROGELIO PEARSON is a 57 year woman with a history of metastatic ER+ breast cancer referred for symptoms of racing heart beat. She notes intermittently increased HR over the past 6-8 weeks, mostly at rest. This is not associated with shortness of breath or any pain. She denies any exertional symptoms. No clear precipitating factors. No dizziness/syncope/near syncope.\par \par She is not aware of any history of cardiovascular disease. Notes LUE lymphedema. No history of diabetes, HLD, or cigarette smoking.\par \par Amlodipine 2.5 mg daily recently prescribed for elevated BP.\par \par 11/7/2022:\par Completed Biotel Holter monitor which showed sinus rhythm, with occasional PACs, VPCs which correlated with symptoms. Echo was technically limited, but revealed no gross abnormalities.\par PET/CT recently completed demonstrated pneumonitis, possibly from everolimus.\par Notes ongoing symptoms of racing heart beat, and cough. No chest pain. \par \par \par Cardiovascular Summary:\par ----------------------------------------------\par ECG:\par 2/6/2023: Sinus 95 bpm, otherwise normal\par 11/7/2022: sinus tachycardia 102 bpm, otherwise normal ECG\par 9/9/2022: NSR 82 bpm, normal ECG\par ----------------------------------------------\par CT:\par 7/22/2022: no pericardial effusion, numerous pulmonary nodules compatible with metastasis (stable from 4/2022), chest wall mass and left axillary lymph nodes (also stable from prior scan).\par -----------------------------------------------\par Echo:\par 9/22/2022: technically inadequate study, unable to evaluate LV function\par -----------------------------------------------\par Remote/ambulatory rhythm monitoring:\par 9/19/22-10/17/22: sinus with PACs, PVCs - no symptoms during monitoring.\par -----------------------------------------------

## 2023-02-06 NOTE — ASSESSMENT
[FreeTextEntry1] : 1. Sinus tachycardia, likely reactive in the setting of pneumonitis. Improved with treatment. PACs, PVCs on Holter, asymptomatic.\par - Discussed PACs, PVCs - if symptoms worsen or bother her significantly, a beta blocker can be considered. \par - Would check TSH with next labs as there was also notation of a possible thyroid nodule on the PET/CT\par \par 2. Essential Hypertension: BP uncontrolled\par - advised to resume amlodipine 5 mg daily now\par \par 3. Risk for cardiomyopathy: unable to assess LV function by echo due to poor windows. She had pain during exam related to post-operative changes and scar.\par - discussed Cardiac MRI\par - since she is asymptomatic at present and exam and ECG normal, will follow up in 6 months\par - arrange MRI in 6 months or if any change in symptoms\par \par Follow up in 6 months with me.\par \par Above recommendations discussed with the patient and all questions were answered to the best of my ability and to her apparent satisfaction.

## 2023-02-17 NOTE — PATIENT PROFILE ADULT - BILL PAYMENT
no
[facility]                                                                                                                                 Gastritis en adultos    Gastritis, Adult    Outline of an adult's lower body with a close-up of the stomach, showing inflammation and an ulcer inside the stomach.    La gastritis es la inflamación del estómago. Hay dos tipos de gastritis:  •Gastritis aguda. Emily tipo aparece de manera repentina.      •Gastritis crónica. Emily tipo es mucho más frecuente. Se desarrolla lentamente y dura un amaris tiempo.      La gastritis se manifiesta cuando el revestimiento del estómago se debilita o se lesiona. Sin tratamiento, la gastritis puede causar sangrado y úlceras estomacales.      ¿Cuáles son las causas?    Esta afección puede ser causada por lo siguiente:  •Eve infección.      •Beber alcohol en exceso.      •Ciertos medicamentos. Estos incluyen corticoesteroides, antibióticos y algunos medicamentos de venta sin receta, nella aspirina o ibuprofeno.      •Tener demasiado ácido en el estómago.      •Tener eve enfermedad del estómago.      Algunas otras causas son las siguientes:  •Eve reacción alérgica.      •Algunos tratamientos para el cáncer (radiación).      •Fumar cigarrillos o usar productos que contengan nicotina o tabaco.      En algunos casos, se desconoce la causa de esta afección.      ¿Qué incrementa el riesgo?    •Tener eve enfermedad de los intestinos.      •Tener eve enfermedad por la cual el propio sistema inmunitario ataca el organismo (enfermedad autoinmunitaria), nella la enfermedad de Crohn.      •Usar aspirina o ibuprofeno y otros antiinflamatorios no esteroideos (THOMAS) para tratar otras afecciones, nella enfermedades cardíacas o dolor crónico.      •Estrés.        ¿Cuáles son los signos o síntomas?    Los síntomas de esta afección incluyen los siguientes:  •Dolor o sensación de ardor en la parte superior del abdomen.      •Náuseas.      •Vómitos.      •Sensación molesta de saciedad después de comer.      •Pérdida de peso.      •Mal aliento.      •Brianne en el vómito o en la materia fecal (heces).      En algunos casos, no hay síntomas.      ¿Cómo se diagnostica?    Esta afección se puede diagnosticar en función de ruthie antecedentes médicos, un examen físico y pruebas. Las pruebas pueden incluir las siguientes:  •Ruthie antecedentes médicos y eve descripción de ruthie síntomas.      •Un examen físico.    •Pruebas. Estas pueden incluir las siguientes:  •Pruebas de brianne.      •Pruebas de heces.      •Eve prueba en la que se introduce un instrumento samaniego y flexible con eve arvin y eve pequeña cámara a través del esófago hasta el estómago (endoscopía alireza).      •Eve prueba en la que se marilyn eve muestra de tejido para analizarla con un microscopio (biopsia).          ¿Cómo se trata?    Esta afección se puede tratar con medicamentos. Los medicamentos que se utilizan varían según la causa de la gastritis.  •Si la afección se debe a eve infección bacteriana, pueden recetarle medicamentos antibióticos.      •Si la afección se debe al exceso de ácido estomacal, se pueden administrar medicamentos denominados bloqueadores H2, inhibidores de la bomba de protones o antiácidos.      El tratamiento también puede incluir interrumpir el uso de ciertos medicamentos nella aspirina o ibuprofeno y otros antiinflamatorios no esteroideos (THOMAS).      Siga estas instrucciones en nunez casa:    Medicamentos     •Use los medicamentos de venta deb y los recetados solamente nella se lo haya indicado el médico.      •Si le recetaron un antibiótico, tómelo nella se lo haya indicado el médico. No deje de ash el antibiótico aunque comience a sentirse mejor.      Consumo de alcohol   • No carmen alcohol si:  •Nunez médico le indica no hacerlo.      •Está embarazada, puede estar embarazada o está tratando de quedar embarazada.      •Si federico alcohol:•Limite nunez uso a las siguientes medidas:  •De 0 a 1 medida por día para las mujeres.      •De 0 a 2 medidas por día para los hombres.        •Sepa cuánta cantidad de alcohol hay en las bebidas que marilyn. En los [place], eve medida equivale a eve botella de cerveza de 12 oz (355 ml), un vaso de vino de 5 oz (148 ml) o un vaso de eve bebida alcohólica de alireza graduación de 1½ oz (44 ml).          Instrucciones generales   A comparison of three sample cups showing dark yellow, yellow, and pale yellow urine.   •Lubna comidas pequeñas y frecuentes shubham el día en lugar de comidas abundantes.      •Evite los alimentos y las bebidas que intensifican los síntomas.      •Hable con el médico sobre las formas de controlar el estrés, nella realizar ejercicio con regularidad o practicar respiración profunda, meditación o yoga.      • No consuma ningún producto que contenga nicotina o tabaco. Estos productos incluyen cigarrillos, tabaco para mascar y aparatos de vapeo, nella los cigarrillos electrónicos. Si necesita ayuda para dejar de fumar, consulte al médico.      •Carmen suficiente líquido nella para mantener la orina de color amarillo pálido.      •Concurra a todas las visitas de seguimiento. [place] es importante.        Comuníquese con un médico si:    •Ruthie síntomas empeoran.      •El dolor abdominal empeora.      •Los síntomas regresan después del tratamiento.      •Tiene fiebre.        Solicite ayuda de inmediato si:    •Vomita brianne o eve sustancia parecida a los posos del café.      •Las heces son negras o de color mack oscuro.      •No puede retener los líquidos.      Estos síntomas pueden representar un problema grave que constituye eve emergencia. No espere a esaton si los síntomas desaparecen. Solicite atención médica de inmediato. Comuníquese con el servicio de emergencias de nunez localidad (911 en los [place]). No conduzca por ruthie propios medios hasta el hospital.       Resumen    •La gastritis es la inflamación del revestimiento del estómago que puede ocurrir de manera repentina (aguda) o desarrollarse lentamente con el tiempo (crónica).      •Esta afección se diagnostica mediante los antecedentes médicos, un examen físico o pruebas.      •Esta afección puede tratarse con medicamentos para tratar la infección o medicamentos para reducir la cantidad de ácido en el estómago.      •Siga las instrucciones del médico acerca de ash medicamentos, hacer cambios en la dieta y saber cuándo pedir ayuda.      Esta información no tiene nella fin reemplazar el consejo del médico. Asegúrese de hacerle al médico cualquier pregunta que tenga.      Document Revised: 05/19/2022 Document Reviewed: 05/19/2022    Elsevier Patient Education © 2022 [employer] Inc[employer]

## 2023-02-23 ENCOUNTER — RESULT REVIEW (OUTPATIENT)
Age: 58
End: 2023-02-23

## 2023-02-23 ENCOUNTER — APPOINTMENT (OUTPATIENT)
Dept: HEMATOLOGY ONCOLOGY | Facility: CLINIC | Age: 58
End: 2023-02-23
Payer: MEDICAID

## 2023-02-23 VITALS
TEMPERATURE: 97 F | DIASTOLIC BLOOD PRESSURE: 87 MMHG | OXYGEN SATURATION: 97 % | HEART RATE: 93 BPM | RESPIRATION RATE: 16 BRPM | WEIGHT: 116.84 LBS | BODY MASS INDEX: 22.82 KG/M2 | SYSTOLIC BLOOD PRESSURE: 129 MMHG

## 2023-02-23 DIAGNOSIS — R00.2 PALPITATIONS: ICD-10-CM

## 2023-02-23 LAB
ALBUMIN SERPL ELPH-MCNC: 4.3 G/DL
ALP BLD-CCNC: 218 U/L
ALT SERPL-CCNC: 15 U/L
ANION GAP SERPL CALC-SCNC: 14 MMOL/L
AST SERPL-CCNC: 29 U/L
BASOPHILS # BLD AUTO: 0.03 K/UL — SIGNIFICANT CHANGE UP (ref 0–0.2)
BASOPHILS NFR BLD AUTO: 0.8 % — SIGNIFICANT CHANGE UP (ref 0–2)
BILIRUB SERPL-MCNC: 0.4 MG/DL
BUN SERPL-MCNC: 16 MG/DL
CALCIUM SERPL-MCNC: 9.7 MG/DL
CEA SERPL-MCNC: 14.4 NG/ML
CHLORIDE SERPL-SCNC: 102 MMOL/L
CO2 SERPL-SCNC: 22 MMOL/L
CREAT SERPL-MCNC: 0.85 MG/DL
EGFR: 80 ML/MIN/1.73M2
EOSINOPHIL # BLD AUTO: 0.04 K/UL — SIGNIFICANT CHANGE UP (ref 0–0.5)
EOSINOPHIL NFR BLD AUTO: 1.1 % — SIGNIFICANT CHANGE UP (ref 0–6)
GLUCOSE SERPL-MCNC: 113 MG/DL
HCT VFR BLD CALC: 42.6 % — SIGNIFICANT CHANGE UP (ref 34.5–45)
HGB BLD-MCNC: 13.5 G/DL — SIGNIFICANT CHANGE UP (ref 11.5–15.5)
IMM GRANULOCYTES NFR BLD AUTO: 0.5 % — SIGNIFICANT CHANGE UP (ref 0–0.9)
LYMPHOCYTES # BLD AUTO: 1.17 K/UL — SIGNIFICANT CHANGE UP (ref 1–3.3)
LYMPHOCYTES # BLD AUTO: 31.6 % — SIGNIFICANT CHANGE UP (ref 13–44)
MCHC RBC-ENTMCNC: 26.4 PG — LOW (ref 27–34)
MCHC RBC-ENTMCNC: 31.7 G/DL — LOW (ref 32–36)
MCV RBC AUTO: 83.2 FL — SIGNIFICANT CHANGE UP (ref 80–100)
MONOCYTES # BLD AUTO: 0.2 K/UL — SIGNIFICANT CHANGE UP (ref 0–0.9)
MONOCYTES NFR BLD AUTO: 5.4 % — SIGNIFICANT CHANGE UP (ref 2–14)
NEUTROPHILS # BLD AUTO: 2.24 K/UL — SIGNIFICANT CHANGE UP (ref 1.8–7.4)
NEUTROPHILS NFR BLD AUTO: 60.6 % — SIGNIFICANT CHANGE UP (ref 43–77)
NRBC # BLD: 0 /100 WBCS — SIGNIFICANT CHANGE UP (ref 0–0)
PLATELET # BLD AUTO: 195 K/UL — SIGNIFICANT CHANGE UP (ref 150–400)
POTASSIUM SERPL-SCNC: 4.1 MMOL/L
PROT SERPL-MCNC: 7.3 G/DL
RBC # BLD: 5.12 M/UL — SIGNIFICANT CHANGE UP (ref 3.8–5.2)
RBC # FLD: 19 % — HIGH (ref 10.3–14.5)
SODIUM SERPL-SCNC: 139 MMOL/L
WBC # BLD: 3.7 K/UL — LOW (ref 3.8–10.5)
WBC # FLD AUTO: 3.7 K/UL — LOW (ref 3.8–10.5)

## 2023-02-23 PROCEDURE — 99214 OFFICE O/P EST MOD 30 MIN: CPT

## 2023-02-23 NOTE — PHYSICAL EXAM
[Fully active, able to carry on all pre-disease performance without restriction] : Status 0 - Fully active, able to carry on all pre-disease performance without restriction [Normal] : affect appropriate [de-identified] : left breast 9:00-10:00 1cm nodular mass palpated , s/p healed ulceration 5x3cm induration and vertical indentation - slightly softer; no change;   no palpable adenopathy b/l ; no mass right breast, (photo taken ) [de-identified] : Left hand swollen diffusely and extending up forearm; edema noted; lymphedema sleeve in place;  no left axillary nodes palpated;  [de-identified] : hyperpigmentation of tongue and palms of hands; bilateral palms and soles - very dry and cracking at joints; with skin peeling/fissures of thumbs

## 2023-02-23 NOTE — HISTORY OF PRESENT ILLNESS
[Disease: _____________________] : Disease: [unfilled] [M: ___] : M[unfilled] [AJCC Stage: ____] : AJCC Stage: [unfilled] [de-identified] : Pt initially evaluated by Medical Oncology in 7/2021 when she noticed a left breast mass x 2 months for which she did not seek medical attention, denied any pain, ulceration, bleeding or nipple changes/discharge.  She also c/o lower back pain for 2-3 months, associated with pain radiating down the lower extremities relieved with Tylenol prn.  Also reports intermittent upper back pain.   No fevers, night sweats, weight loss.  Reports fatigue and poor appetite.  She went to an urgent care in [place] for back pain and breast mass and was given a referral for mammo/US.  \par \par Mammogram and breast US on 4/7/2021 showed  5.6x3.2x5.3cm hypoechoic irregularly-shaped mass with angular margins 9-11:00 5 to 9cm from nipple; left axilla - enlarged 4.0x2.1x3.4cm lymph node, US biopsy rec, [id number].\par \par On 4/20/2021, pt underwent an US guided left breast core biopsy which showed invasive moderately differentiated ductal carcinoma, Geovanny score 6/9 (3+2+10), invasive tumor at least 1.8cm, no DCIS, no LVI, microcalcifications present, ER 90%, [place] 2%, HER2 2+, CISH negative.  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  \par \par For the back pain, pt underwent an MRI lumbar spine on 4/22/2021 which showed metastatic disease in lower thoracic spine, lumbar spine and upper sacrum with severe pathologic fracture of L2 with retropulsion of the posterior endplate causing moderate canal stenosis. \par \par Admitted to [facility] 6/2021 - s/p L2 kyphoplasty and RT.  \par \par 6/2021-4/2022 - letrozole and Ibrance, s/p two dose reductions due to neutropenia.\par \par PET scan 4/11/22 - POD\par \par 5/2022 - Started on everolimus and fulvestrant - stopped in 11/2022 due to pneumonitis\par \par Patient also on Xgeva monthly since 10/5/21.\par \par Family history is significant for mother had cervical cancer age 64 and father with prostate cancer age 67.  No cancer of the breast, ovary, endometrium, and pancreatic cancer.  The patient is of [place] ethnic background.  No ETOH or tobacco.\par \par Caris Summary Report\par Source: Spine: 6/4/2021\par ER 60%; ERBB2 Negative; \par PTEN pathogenic\par PIK3CA Negative;\par ESR1 not detected\par No other actionable mutations noted; full report to be filed. \par \par Germline Testing: No clinically significant mutation on [employer] testing [de-identified] : ER 90%, [place] 2%, HER2 2+, CISH negative [FreeTextEntry1] : Metastatic Tx:\par 1st LIne Ibrance/letrozole 6/2021 - 4/2022 - [place]\par 2nd line Everolimus/fulvestrant - 5/2022 - 12/2022 - [place] - developed pneumonitis on afinitor\par 3rd line: capecitabine 1/19/2023 [de-identified] : 2/23/2023\par Patient presents today for follow up of metastatic disease and assess treatment toxicity\par \par - Started capecitabine (3rd line tx MBC) on 1/20/2023:  1500 mg po bid 7 days on / 7 days off \par She is tolerating therapy well - may note more frequent BM (not watery or soft) 2-3 x/ day; no HFS; notes hyperpigmentation of tongue and hands\par c/o very dry hands and feet with peeling of skin; no pain; using Udderly smooth daily; notes "hungry" all the time. \par Patient denies any SOB, CP, abdominal pain, bone pain, headache, or unexplained weight loss\par Patient denies fever, chills, nausea/vomiting, diarrhea/constipation, neuropathy, headache.\par \par - PET/CT scan on  12/27/22 showed disease progression with progressive lung nodules, LN and increased mid-chest wall lesion and left breast nodule\par - apprehensive about resuming lower dose everolimus\par \par Has financial concerns:  on SS disability; considering returning to work 1-2 days per week once she is stablized\par \par  [100: Normal, no complaints, no evidence of disease.] : 100: Normal, no complaints, no evidence of disease.

## 2023-02-23 NOTE — REVIEW OF SYSTEMS
[Fatigue] : fatigue [Mucosal Pain] : no mucosal pain [Cough] : no cough [SOB on Exertion] : no shortness of breath during exertion [Negative] : Allergic/Immunologic [FreeTextEntry7] : more frequent BM 2-3 x day [de-identified] : nodularity of chest wall lesion - softer per patient; very dry hands and peeling skin   [de-identified] : left upper extremity lymphedema - unchanged

## 2023-02-24 LAB
CANCER AG27-29 SERPL-ACNC: 29.3 U/ML
TSH SERPL-ACNC: 0.7 UIU/ML

## 2023-03-16 ENCOUNTER — OUTPATIENT (OUTPATIENT)
Dept: OUTPATIENT SERVICES | Facility: HOSPITAL | Age: 58
LOS: 1 days | Discharge: HOME | End: 2023-03-16
Payer: MEDICAID

## 2023-03-16 DIAGNOSIS — Z98.890 OTHER SPECIFIED POSTPROCEDURAL STATES: Chronic | ICD-10-CM

## 2023-03-16 DIAGNOSIS — C50.919 MALIGNANT NEOPLASM OF UNSPECIFIED SITE OF UNSPECIFIED FEMALE BREAST: ICD-10-CM

## 2023-03-23 ENCOUNTER — APPOINTMENT (OUTPATIENT)
Dept: HEMATOLOGY ONCOLOGY | Facility: CLINIC | Age: 58
End: 2023-03-23
Payer: MEDICAID

## 2023-03-23 ENCOUNTER — APPOINTMENT (OUTPATIENT)
Dept: INFUSION THERAPY | Facility: HOSPITAL | Age: 58
End: 2023-03-23

## 2023-03-23 ENCOUNTER — RESULT REVIEW (OUTPATIENT)
Age: 58
End: 2023-03-23

## 2023-03-23 ENCOUNTER — APPOINTMENT (OUTPATIENT)
Dept: HEMATOLOGY ONCOLOGY | Facility: CLINIC | Age: 58
End: 2023-03-23

## 2023-03-23 VITALS
TEMPERATURE: 97.2 F | HEART RATE: 110 BPM | RESPIRATION RATE: 16 BRPM | SYSTOLIC BLOOD PRESSURE: 135 MMHG | OXYGEN SATURATION: 99 % | WEIGHT: 115.74 LBS | BODY MASS INDEX: 22.6 KG/M2 | DIASTOLIC BLOOD PRESSURE: 94 MMHG

## 2023-03-23 DIAGNOSIS — T22.011D: ICD-10-CM

## 2023-03-23 LAB
BASOPHILS # BLD AUTO: 0.05 K/UL — SIGNIFICANT CHANGE UP (ref 0–0.2)
BASOPHILS NFR BLD AUTO: 1 % — SIGNIFICANT CHANGE UP (ref 0–2)
EOSINOPHIL # BLD AUTO: 0.1 K/UL — SIGNIFICANT CHANGE UP (ref 0–0.5)
EOSINOPHIL NFR BLD AUTO: 2 % — SIGNIFICANT CHANGE UP (ref 0–6)
HCT VFR BLD CALC: 38.2 % — SIGNIFICANT CHANGE UP (ref 34.5–45)
HGB BLD-MCNC: 12.5 G/DL — SIGNIFICANT CHANGE UP (ref 11.5–15.5)
IMM GRANULOCYTES NFR BLD AUTO: 0.2 % — SIGNIFICANT CHANGE UP (ref 0–0.9)
LYMPHOCYTES # BLD AUTO: 1.25 K/UL — SIGNIFICANT CHANGE UP (ref 1–3.3)
LYMPHOCYTES # BLD AUTO: 25.2 % — SIGNIFICANT CHANGE UP (ref 13–44)
MCHC RBC-ENTMCNC: 27.5 PG — SIGNIFICANT CHANGE UP (ref 27–34)
MCHC RBC-ENTMCNC: 32.7 G/DL — SIGNIFICANT CHANGE UP (ref 32–36)
MCV RBC AUTO: 84 FL — SIGNIFICANT CHANGE UP (ref 80–100)
MONOCYTES # BLD AUTO: 0.42 K/UL — SIGNIFICANT CHANGE UP (ref 0–0.9)
MONOCYTES NFR BLD AUTO: 8.5 % — SIGNIFICANT CHANGE UP (ref 2–14)
NEUTROPHILS # BLD AUTO: 3.13 K/UL — SIGNIFICANT CHANGE UP (ref 1.8–7.4)
NEUTROPHILS NFR BLD AUTO: 63.1 % — SIGNIFICANT CHANGE UP (ref 43–77)
NRBC # BLD: 0 /100 WBCS — SIGNIFICANT CHANGE UP (ref 0–0)
PLATELET # BLD AUTO: 183 K/UL — SIGNIFICANT CHANGE UP (ref 150–400)
RBC # BLD: 4.55 M/UL — SIGNIFICANT CHANGE UP (ref 3.8–5.2)
RBC # FLD: 21.2 % — HIGH (ref 10.3–14.5)
WBC # BLD: 4.96 K/UL — SIGNIFICANT CHANGE UP (ref 3.8–10.5)
WBC # FLD AUTO: 4.96 K/UL — SIGNIFICANT CHANGE UP (ref 3.8–10.5)

## 2023-03-23 PROCEDURE — 99214 OFFICE O/P EST MOD 30 MIN: CPT

## 2023-03-24 DIAGNOSIS — C79.51 SECONDARY MALIGNANT NEOPLASM OF BONE: ICD-10-CM

## 2023-03-24 PROBLEM — T22.011D: Status: ACTIVE | Noted: 2023-03-24

## 2023-03-24 LAB
ALBUMIN SERPL ELPH-MCNC: 4.1 G/DL
ALP BLD-CCNC: 226 U/L
ALT SERPL-CCNC: 26 U/L
ANION GAP SERPL CALC-SCNC: 13 MMOL/L
AST SERPL-CCNC: 33 U/L
BILIRUB SERPL-MCNC: 0.5 MG/DL
BUN SERPL-MCNC: 12 MG/DL
CALCIUM SERPL-MCNC: 9.7 MG/DL
CANCER AG27-29 SERPL-ACNC: 36.6 U/ML
CEA SERPL-MCNC: 12.5 NG/ML
CHLORIDE SERPL-SCNC: 102 MMOL/L
CO2 SERPL-SCNC: 21 MMOL/L
CREAT SERPL-MCNC: 0.86 MG/DL
EGFR: 79 ML/MIN/1.73M2
GLUCOSE SERPL-MCNC: 128 MG/DL
POTASSIUM SERPL-SCNC: 4.3 MMOL/L
PROT SERPL-MCNC: 6.7 G/DL
SODIUM SERPL-SCNC: 136 MMOL/L

## 2023-03-24 NOTE — HISTORY OF PRESENT ILLNESS
[Disease: _____________________] : Disease: [unfilled] [M: ___] : M[unfilled] [AJCC Stage: ____] : AJCC Stage: [unfilled] [100: Normal, no complaints, no evidence of disease.] : 100: Normal, no complaints, no evidence of disease. [de-identified] : Pt initially evaluated by Medical Oncology in 7/2021 when she noticed a left breast mass x 2 months for which she did not seek medical attention, denied any pain, ulceration, bleeding or nipple changes/discharge.  She also c/o lower back pain for 2-3 months, associated with pain radiating down the lower extremities relieved with Tylenol prn.  Also reports intermittent upper back pain.   No fevers, night sweats, weight loss.  Reports fatigue and poor appetite.  She went to an urgent care in [place] for back pain and breast mass and was given a referral for mammo/US.  \par \par Mammogram and breast US on 4/7/2021 showed  5.6x3.2x5.3cm hypoechoic irregularly-shaped mass with angular margins 9-11:00 5 to 9cm from nipple; left axilla - enlarged 4.0x2.1x3.4cm lymph node, US biopsy rec, BIRADS4.\par \par On 4/20/2021, pt underwent an US guided left breast core biopsy which showed invasive moderately differentiated ductal carcinoma, Geovanny score 6/9 (3+2+10), invasive tumor at least 1.8cm, no DCIS, no LVI, microcalcifications present, ER 90%, [place] 2%, HER2 2+, CISH negative.  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  \par \par For the back pain, pt underwent an MRI lumbar spine on 4/22/2021 which showed metastatic disease in lower thoracic spine, lumbar spine and upper sacrum with severe pathologic fracture of L2 with retropulsion of the posterior endplate causing moderate canal stenosis. \par \par Admitted to [facility] 6/2021 - s/p L2 kyphoplasty and RT.  \par \par 6/2021-4/2022 - letrozole and Ibrance, s/p two dose reductions due to neutropenia.\par \par PET scan 4/11/22 - POD\par \par 5/2022 - Started on everolimus and fulvestrant - stopped in 11/2022 due to pneumonitis\par \par Patient also on Xgeva monthly since 10/5/21.\par \par Family history is significant for mother had cervical cancer age 64 and father with prostate cancer age 67.  No cancer of the breast, ovary, endometrium, and pancreatic cancer.  The patient is of [place] ethnic background.  No ETOH or tobacco.\par \par Caris Summary Report\par Source: Spine: 6/4/2021\par ER 60%; ERBB2 Negative; \par PTEN pathogenic\par PIK3CA Negative;\par ESR1 not detected\par No other actionable mutations noted; full report to be filed. \par \par Germline Testing: No clinically significant mutation on [employer] testing [de-identified] : ER 90%, [place] 2%, HER2 2+, CISH negative [FreeTextEntry1] : Metastatic Tx:\par 1st LIne Ibrance/letrozole 6/2021 - 4/2022 - [place]\par 2nd line Everolimus/fulvestrant - 5/2022 - 12/2022 - [place] - developed pneumonitis on afinitor\par 3rd line: capecitabine 1/19/2023 [de-identified] : 3/23/2023\par Patient presents today for follow up of metastatic disease and assess treatment toxicity\par \par - Started capecitabine (3rd line tx MBC) on 1/20/2023:  1500 mg po bid 7 days on / 7 days off; dose reduced 2/2023 for Grade 2 HFS to 1000 mg po AM/1500 mg po PH.\par - c/o very dry hands and feet with sloughing of skin and sensitivity of hands and feet; now interfering with ADLs; using Udderly smooth daily; notes "hungry" all the time. \par - notes she burned her left arm on a hot pan - she has been keeping it clean and applying antibacterial ointment; denies any fever\par Patient denies any SOB, CP, abdominal pain, bone pain, headache, or unexplained weight loss\par Patient denies fever, chills, nausea/vomiting, diarrhea/constipation, neuropathy, headache.\par \par - PET/CT scan on  12/27/22 showed disease progression with progressive lung nodules, LN and increased mid-chest wall lesion and left breast nodule\par - apprehensive about resuming lower dose everolimus\par \par Has financial concerns:  on [employer] disability; considering returning to work 1-2 days per week once she is stablized\par \par

## 2023-03-24 NOTE — REVIEW OF SYSTEMS
[Fatigue] : fatigue [Negative] : Allergic/Immunologic [Mucosal Pain] : no mucosal pain [Cough] : no cough [SOB on Exertion] : no shortness of breath during exertion [FreeTextEntry7] : more frequent BM 2-3 x day [de-identified] : nodularity of chest wall lesion - softer per patient; very dry hands and peeling skin   [de-identified] : left upper extremity lymphedema - unchanged

## 2023-03-24 NOTE — PHYSICAL EXAM
[Fully active, able to carry on all pre-disease performance without restriction] : Status 0 - Fully active, able to carry on all pre-disease performance without restriction [Normal] : affect appropriate [de-identified] : left breast 9:00-10:00 1cm nodular mass palpated; change in shape less nodular, flatter , s/p healed ulceration 5x3cm induration and vertical indentation - ; no change;   no palpable adenopathy b/l ; no mass right breast, (photo taken ) [de-identified] : Left hand swollen diffusely and extending up forearm; edema noted (no change) 5 cm linear partial thickness burn without evidence of infection, healing well;  no left axillary nodes palpated;  [de-identified] : hyperpigmentation of tongue and palms of hands; bilateral palms and soles - skin sloughing with exposed

## 2023-03-30 ENCOUNTER — NON-APPOINTMENT (OUTPATIENT)
Age: 58
End: 2023-03-30

## 2023-03-30 ENCOUNTER — APPOINTMENT (OUTPATIENT)
Dept: SURGICAL ONCOLOGY | Facility: CLINIC | Age: 58
End: 2023-03-30
Payer: MEDICAID

## 2023-03-30 VITALS
SYSTOLIC BLOOD PRESSURE: 131 MMHG | HEART RATE: 96 BPM | WEIGHT: 293 LBS | RESPIRATION RATE: 17 BRPM | BODY MASS INDEX: 53.92 KG/M2 | HEIGHT: 62 IN | OXYGEN SATURATION: 99 % | DIASTOLIC BLOOD PRESSURE: 91 MMHG

## 2023-03-30 PROCEDURE — 99214 OFFICE O/P EST MOD 30 MIN: CPT

## 2023-03-30 NOTE — HISTORY OF PRESENT ILLNESS
[de-identified] : Ms. ROGELIO PEARSON is a 57 year old female who presents today for a follow-up visit \par \par Bilateral mammo/sono 4/7/21: left breast mass at the site a palpable abnormality, enlarged left axillary lymph node, right breast unremarkable. BI-RADS 4. \par \par Ultrasound Guided core biopsy of left breast x 2 sites 4/20/21:\par 1. Breast, left, 9-11 o'clock, core biopsy: Invasive moderately differentiated ductal carcinoma; Geovanny score 6 / 9 (3 + 2 +1) in this limited material; Invasive tumor measures at least 1.8cm; Ductal carcinoma in situ not seen; Microcalcifications present; Lymphovascular permeation by tumor not seen. --ER+/[place]+/HER-2 equivocal (CISH negative)\par 2. Left axilla, core biopsy: metastatic ductal carcinoma to lymph node.\par -ER+/[place]+/HER-2 equivocal (CISH study in progress)\par \par MRI Lumbar Spine 4/22/21: Metastatic disease to the lower thoracic spine, lumbar spine and upper sacrum with severe pathologic fracture of L2 with retropulsion of the posterior endplate causing moderate canal stenosis. No discogenic disease\par \par \par PET/CT 5/10/21: Impression: \par 1. Large FDG-avid, centrally necrotic mass in medial left breast, involving overlying skin and adjacent chest wall musculature and sternum corresponds to known malignancy. Additional separate FDG-avid lesions in left breast are suspicious for additional sites of disease. Further evaluation may be performed with breast MRI\par 2. FDG-avid lymph nodes in b/l lower cervical, left supraclavicular, left axillary, and left retropectoral regions are compatible  with metastatic disease.\par 3. Multiple FDG-avid b/l pulmonary nodules are compatible with metastatic disease. \par 4. Small left pleural effusion with heterogeneous FDG activity suspicious for malignant effusion. \par 5. Multiple FDG-avid lytic metastases in axial skeleton with severe pathologic compression fracture of L2 vertebral body, as seen on MRI dated 4/22/21, where retropulsion of posterior endplate is noted, causing moderate canal stenosis \par \par PET/CT 8/31/2021: Mildly FDG-avid mass in medial left breast and adjacent difficult to delineate mildly FDG of left breast lesions are decreased in size and metabolism, compatible with a partial response to interval therapy.Resolution of FDG-avid bilateral cervical and left supraclavicular lymph nodes and interval decrease in size, number, and metabolism of mildly FDG-avid left axillary and retropectoral lymph nodes.Multiple mildly FDG-avid and non-FDG-avid bilateral pulmonary nodules are similar, or decreased in size and are decreased metabolism. Small to moderate left pleural effusion is mildly INCREASED. Previously seen FDG-avid lytic lesions in the axial skeleton are decreased in metabolism and demonstrate new sclerosis, compatible with a partial response to interval therapy. Status post interval vertebroplasty of L2 compression fracture.\par \par PET/CT 12/16/21- Increased hypermetabolism of the left breast mass which is slightly decreased in size.  Mildly FDG-avid left axillary and right hilar lymph nodes are mildly increased in metabolism.  Innumerable subcentimeter minimally FDG-avid and non-FDG-avid bilateral pulmonary nodules, not significantly changed in size, number, or metabolism.  Innumerable predominantly sclerotic osseous metastases in the axial skeleton are similar or mildly increased in density on CT, and predominantly demonstrate minimal or no FDG avidity, compatible with treated bone metastases. A few small foci of increased FDG activity suggests residual disease, for example, an FDG-avid lytic lesion in the anterior aspect of the left sixth rib (image 103).\par \par Tumor Markers 2/1/22- CA 27-29: 21.5 | CEA: 10.4\par 2/11/22- Patient continues Ibrance and Letrozole per Dr. Dowd.  She is scheduled for a PET/CT in March 2022.  Feeling well overall.\par \par PET CT 4/11/22- Increased in size of medial left breast mass, new FDG acitivity in upper central left breast and nodule in medial upper right breast, suspicious for new sites of disease. Lung and bone lesions stable and unchanged. \par \par Tumor Markers 5/3/22: CEA (14.7); CA15.3 (26.8); CA27.29 (24.6); ALT (9); ALK PHOS (176) \par \par 5/16/22- PET scan 4/11/22 showed increased in size of medial left breast mass, new FDG acitivity in upper central left breast and nodule in medial upper right breast, suspicious for new sites of disease. Lung and bone lesions stable and unchanged. Tumor marker uptrending. Caris testing on most recent biopsy- no actionable mutations. Ibrance and Letrozole stopped. Pt. was started on Faslodex and everolimus. Will continue Xgeva monthly. \par \par CT C/A/P 7/2022- stable\par PET/CT 11/2022 - stable over all but pneumonitis \par \par reports LUE lymphedema that started in September/October, did therapy & wears a JOBST sleeve

## 2023-03-30 NOTE — PHYSICAL EXAM
Patient agreeable.  Please send to [facility] in [place] in [address] [Normal] : supple, no neck mass and thyroid not enlarged [Normal Supraclavicular Lymph Nodes] : normal supraclavicular lymph nodes [Normal Axillary Lymph Nodes] : normal axillary lymph nodes [Normal] : oriented to person, place and time, with appropriate affect [de-identified] : medial left breast mass still present but softer than previously

## 2023-03-30 NOTE — ASSESSMENT
[FreeTextEntry1] : IMP:\par 58 y/o with invasive moderately differentiated ductal carcinoma, ER+/OR+/HER2- with metastases to the axilla and lumbar spine. \par \par \par Started Faslodex, everolimus & xgeva w/ Dr. Dowd 5/2022 (now under Dr. Sofia)\par \par CT C/A/P 7/2022- stable\par PET/CT 11/2022 - stable over all but pneumonitis \par Labs 11/2022 CEA 12.8 (trending up)\par \par reports LUE lymphedema that started in September/October, did therapy & wears a JOBST sleeve \par \par PLAN: \par continue palliative systemic therapy\par RTO Q3 months \par

## 2023-04-04 ENCOUNTER — NON-APPOINTMENT (OUTPATIENT)
Age: 58
End: 2023-04-04

## 2023-04-18 ENCOUNTER — RESULT REVIEW (OUTPATIENT)
Age: 58
End: 2023-04-18

## 2023-04-18 ENCOUNTER — APPOINTMENT (OUTPATIENT)
Dept: INFUSION THERAPY | Facility: HOSPITAL | Age: 58
End: 2023-04-18

## 2023-04-18 ENCOUNTER — APPOINTMENT (OUTPATIENT)
Dept: HEMATOLOGY ONCOLOGY | Facility: CLINIC | Age: 58
End: 2023-04-18
Payer: MEDICAID

## 2023-04-18 VITALS
SYSTOLIC BLOOD PRESSURE: 145 MMHG | RESPIRATION RATE: 16 BRPM | OXYGEN SATURATION: 99 % | WEIGHT: 119.05 LBS | DIASTOLIC BLOOD PRESSURE: 84 MMHG | HEART RATE: 90 BPM | BODY MASS INDEX: 21.77 KG/M2 | TEMPERATURE: 97.9 F

## 2023-04-18 DIAGNOSIS — T22.212A BURN OF SECOND DEGREE OF LEFT FOREARM, INITIAL ENCOUNTER: ICD-10-CM

## 2023-04-18 DIAGNOSIS — T22.212D BURN OF SECOND DEGREE OF LEFT FOREARM, SUBSEQUENT ENCOUNTER: ICD-10-CM

## 2023-04-18 LAB
BASOPHILS # BLD AUTO: 0.04 K/UL — SIGNIFICANT CHANGE UP (ref 0–0.2)
BASOPHILS NFR BLD AUTO: 0.8 % — SIGNIFICANT CHANGE UP (ref 0–2)
EOSINOPHIL # BLD AUTO: 0.07 K/UL — SIGNIFICANT CHANGE UP (ref 0–0.5)
EOSINOPHIL NFR BLD AUTO: 1.3 % — SIGNIFICANT CHANGE UP (ref 0–6)
HCT VFR BLD CALC: 38.4 % — SIGNIFICANT CHANGE UP (ref 34.5–45)
HGB BLD-MCNC: 12.3 G/DL — SIGNIFICANT CHANGE UP (ref 11.5–15.5)
IMM GRANULOCYTES NFR BLD AUTO: 1.1 % — HIGH (ref 0–0.9)
LYMPHOCYTES # BLD AUTO: 1.14 K/UL — SIGNIFICANT CHANGE UP (ref 1–3.3)
LYMPHOCYTES # BLD AUTO: 21.6 % — SIGNIFICANT CHANGE UP (ref 13–44)
MCHC RBC-ENTMCNC: 29.4 PG — SIGNIFICANT CHANGE UP (ref 27–34)
MCHC RBC-ENTMCNC: 32 G/DL — SIGNIFICANT CHANGE UP (ref 32–36)
MCV RBC AUTO: 91.9 FL — SIGNIFICANT CHANGE UP (ref 80–100)
MONOCYTES # BLD AUTO: 0.42 K/UL — SIGNIFICANT CHANGE UP (ref 0–0.9)
MONOCYTES NFR BLD AUTO: 8 % — SIGNIFICANT CHANGE UP (ref 2–14)
NEUTROPHILS # BLD AUTO: 3.54 K/UL — SIGNIFICANT CHANGE UP (ref 1.8–7.4)
NEUTROPHILS NFR BLD AUTO: 67.2 % — SIGNIFICANT CHANGE UP (ref 43–77)
NRBC # BLD: 0 /100 WBCS — SIGNIFICANT CHANGE UP (ref 0–0)
PLATELET # BLD AUTO: 195 K/UL — SIGNIFICANT CHANGE UP (ref 150–400)
RBC # BLD: 4.18 M/UL — SIGNIFICANT CHANGE UP (ref 3.8–5.2)
RBC # FLD: 21.2 % — HIGH (ref 10.3–14.5)
WBC # BLD: 5.27 K/UL — SIGNIFICANT CHANGE UP (ref 3.8–10.5)
WBC # FLD AUTO: 5.27 K/UL — SIGNIFICANT CHANGE UP (ref 3.8–10.5)

## 2023-04-18 PROCEDURE — 99214 OFFICE O/P EST MOD 30 MIN: CPT

## 2023-04-19 LAB
ALBUMIN SERPL ELPH-MCNC: 4.1 G/DL
ALP BLD-CCNC: 316 U/L
ALT SERPL-CCNC: 32 U/L
ANION GAP SERPL CALC-SCNC: 12 MMOL/L
AST SERPL-CCNC: 37 U/L
BILIRUB SERPL-MCNC: <0.2 MG/DL
BUN SERPL-MCNC: 12 MG/DL
CALCIUM SERPL-MCNC: 9.2 MG/DL
CANCER AG27-29 SERPL-ACNC: 30.2 U/ML
CEA SERPL-MCNC: 10.2 NG/ML
CHLORIDE SERPL-SCNC: 105 MMOL/L
CO2 SERPL-SCNC: 21 MMOL/L
CREAT SERPL-MCNC: 0.69 MG/DL
EGFR: 101 ML/MIN/1.73M2
GLUCOSE SERPL-MCNC: 101 MG/DL
POTASSIUM SERPL-SCNC: 3.9 MMOL/L
PROT SERPL-MCNC: 6.7 G/DL
SODIUM SERPL-SCNC: 139 MMOL/L

## 2023-04-21 NOTE — PHYSICAL EXAM
[Fully active, able to carry on all pre-disease performance without restriction] : Status 0 - Fully active, able to carry on all pre-disease performance without restriction [Normal] : affect appropriate [de-identified] : left breast 9:00-10:00 1cm nodular mass palpated; change in shape less nodular, flatter , s/p healed ulceration 5x3cm induration and vertical indentation - ; no change;   no palpable adenopathy b/l ; no mass right breast, (photo taken ) [de-identified] : Left hand swollen diffusely and extending up forearm; edema noted (no change) 5 cm linear partial thickness burn without evidence of infection, healing well;  no left axillary nodes palpated;  [de-identified] : hyperpigmentation of tongue and palms of hands; bilateral palms and soles - skin sloughing with exposed

## 2023-04-21 NOTE — PHYSICAL EXAM
[Fully active, able to carry on all pre-disease performance without restriction] : Status 0 - Fully active, able to carry on all pre-disease performance without restriction [Normal] : affect appropriate [de-identified] : left breast 9:00-10:00 1cm nodular mass palpated; change in shape less nodular, flatter , s/p healed ulceration 5x3cm induration and vertical indentation - ; no change;   no palpable adenopathy b/l ; no mass right breast, (photo taken ) [de-identified] : Left hand swollen diffusely and extending up forearm; edema noted (no change) 5 cm linear partial thickness burn without evidence of infection, healing well;  no left axillary nodes palpated;  [de-identified] : hyperpigmentation of tongue and palms of hands; bilateral palms and soles - skin sloughing with exposed

## 2023-04-21 NOTE — HISTORY OF PRESENT ILLNESS
[Disease: _____________________] : Disease: [unfilled] [M: ___] : M[unfilled] [AJCC Stage: ____] : AJCC Stage: [unfilled] [100: Normal, no complaints, no evidence of disease.] : 100: Normal, no complaints, no evidence of disease. [de-identified] : Pt initially evaluated by Medical Oncology in 7/2021 when she noticed a left breast mass x 2 months for which she did not seek medical attention, denied any pain, ulceration, bleeding or nipple changes/discharge.  She also c/o lower back pain for 2-3 months, associated with pain radiating down the lower extremities relieved with Tylenol prn.  Also reports intermittent upper back pain.   No fevers, night sweats, weight loss.  Reports fatigue and poor appetite.  She went to an urgent care in [place] for back pain and breast mass and was given a referral for mammo/US.  \par \par Mammogram and breast US on 4/7/2021 showed  5.6x3.2x5.3cm hypoechoic irregularly-shaped mass with angular margins 9-11:00 5 to 9cm from nipple; left axilla - enlarged 4.0x2.1x3.4cm lymph node, US biopsy rec, BIRADS4.\par \par On 4/20/2021, pt underwent an US guided left breast core biopsy which showed invasive moderately differentiated ductal carcinoma, Geovanny score 6/9 (3+2+10), invasive tumor at least 1.8cm, no DCIS, no LVI, microcalcifications present, ER 90%, PA 2%, HER2 2+, CISH negative.  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  \par \par For the back pain, pt underwent an MRI lumbar spine on 4/22/2021 which showed metastatic disease in lower thoracic spine, lumbar spine and upper sacrum with severe pathologic fracture of L2 with retropulsion of the posterior endplate causing moderate canal stenosis. \par \par Admitted to [facility] 6/2021 - s/p L2 kyphoplasty and RT.  \par \par 6/2021-4/2022 - letrozole and Ibrance, s/p two dose reductions due to neutropenia.\par \par PET scan 4/11/22 - POD\par \par 5/2022 - Started on everolimus and fulvestrant - stopped in 11/2022 due to pneumonitis\par \par Patient also on Xgeva monthly since 10/5/21.\par \par Family history is significant for mother had cervical cancer age 64 and father with prostate cancer age 67.  No cancer of the breast, ovary, endometrium, and pancreatic cancer.  The patient is of [place] ethnic background.  No ETOH or tobacco.\par \par Caris Summary Report\par Source: Spine: 6/4/2021\par ER 60%; ERBB2 Negative; \par PTEN pathogenic\par PIK3CA Negative;\par ESR1 not detected\par No other actionable mutations noted; full report to be filed. \par \par Germline Testing: No clinically significant mutation on [employer] testing [de-identified] : ER 90%, [place] 2%, HER2 2+, CISH negative [FreeTextEntry1] : Metastatic Tx:\par 1st LIne Ibrance/letrozole 6/2021 - 4/2022 - [place]\par 2nd line Everolimus/fulvestrant - 5/2022 - 12/2022 - [place] - developed pneumonitis on afinitor\par 3rd line: capecitabine 1/19/2023 [de-identified] : 3/23/2023\par Patient presents today for follow up of metastatic disease and assess treatment toxicity\par \par - Started capecitabine (3rd line tx MBC) on 1/20/2023:  1500 mg po bid 7 days on / 7 days off; dose reduced 2/2023 for Grade 2 HFS to 1000 mg po AM/1500 mg po PH.\par - c/o very dry hands and feet with sloughing of skin and sensitivity of hands and feet; now interfering with ADLs; using Udderly smooth daily; notes "hungry" all the time. \par - notes she burned her left arm on a hot pan - she has been keeping it clean and applying antibacterial ointment; denies any fever\par Patient denies any SOB, CP, abdominal pain, bone pain, headache, or unexplained weight loss\par Patient denies fever, chills, nausea/vomiting, diarrhea/constipation, neuropathy, headache.\par \par - PET/CT scan on  12/27/22 showed disease progression with progressive lung nodules, LN and increased mid-chest wall lesion and left breast nodule\par - apprehensive about resuming lower dose everolimus\par \par Has financial concerns:  on [employer] disability; considering returning to work 1-2 days per week once she is stablized\par \par

## 2023-04-21 NOTE — REVIEW OF SYSTEMS
[Fatigue] : fatigue [Negative] : Allergic/Immunologic [Mucosal Pain] : no mucosal pain [Cough] : no cough [SOB on Exertion] : no shortness of breath during exertion [FreeTextEntry7] : more frequent BM 2-3 x day [de-identified] : nodularity of chest wall lesion - softer per patient; very dry hands and peeling skin   [de-identified] : left upper extremity lymphedema - unchanged

## 2023-04-21 NOTE — REVIEW OF SYSTEMS
[Fatigue] : fatigue [Negative] : Allergic/Immunologic [Mucosal Pain] : no mucosal pain [Cough] : no cough [SOB on Exertion] : no shortness of breath during exertion [FreeTextEntry7] : more frequent BM 2-3 x day [de-identified] : nodularity of chest wall lesion - softer per patient; very dry hands and peeling skin   [de-identified] : left upper extremity lymphedema - unchanged

## 2023-04-21 NOTE — HISTORY OF PRESENT ILLNESS
[Disease: _____________________] : Disease: [unfilled] [M: ___] : M[unfilled] [AJCC Stage: ____] : AJCC Stage: [unfilled] [100: Normal, no complaints, no evidence of disease.] : 100: Normal, no complaints, no evidence of disease. [de-identified] : Pt initially evaluated by Medical Oncology in 7/2021 when she noticed a left breast mass x 2 months for which she did not seek medical attention, denied any pain, ulceration, bleeding or nipple changes/discharge.  She also c/o lower back pain for 2-3 months, associated with pain radiating down the lower extremities relieved with Tylenol prn.  Also reports intermittent upper back pain.   No fevers, night sweats, weight loss.  Reports fatigue and poor appetite.  She went to an urgent care in [place] for back pain and breast mass and was given a referral for mammo/US.  \par \par Mammogram and breast US on 4/7/2021 showed  5.6x3.2x5.3cm hypoechoic irregularly-shaped mass with angular margins 9-11:00 5 to 9cm from nipple; left axilla - enlarged 4.0x2.1x3.4cm lymph node, US biopsy rec, BIRADS4.\par \par On 4/20/2021, pt underwent an US guided left breast core biopsy which showed invasive moderately differentiated ductal carcinoma, Geovanny score 6/9 (3+2+10), invasive tumor at least 1.8cm, no DCIS, no LVI, microcalcifications present, ER 90%, OR 2%, HER2 2+, CISH negative.  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  \par \par For the back pain, pt underwent an MRI lumbar spine on 4/22/2021 which showed metastatic disease in lower thoracic spine, lumbar spine and upper sacrum with severe pathologic fracture of L2 with retropulsion of the posterior endplate causing moderate canal stenosis. \par \par Admitted to [facility] 6/2021 - s/p L2 kyphoplasty and RT.  \par \par 6/2021-4/2022 - letrozole and Ibrance, s/p two dose reductions due to neutropenia.\par \par PET scan 4/11/22 - POD\par \par 5/2022 - Started on everolimus and fulvestrant - stopped in 11/2022 due to pneumonitis\par \par Patient also on Xgeva monthly since 10/5/21.\par \par Family history is significant for mother had cervical cancer age 64 and father with prostate cancer age 67.  No cancer of the breast, ovary, endometrium, and pancreatic cancer.  The patient is of [place] ethnic background.  No ETOH or tobacco.\par \par Caris Summary Report\par Source: Spine: 6/4/2021\par ER 60%; ERBB2 Negative; \par PTEN pathogenic\par PIK3CA Negative;\par ESR1 not detected\par No other actionable mutations noted; full report to be filed. \par \par Germline Testing: No clinically significant mutation on [employer] testing [de-identified] : ER 90%, [place] 2%, HER2 2+, CISH negative [FreeTextEntry1] : Metastatic Tx:\par 1st LIne Ibrance/letrozole 6/2021 - 4/2022 - [place]\par 2nd line Everolimus/fulvestrant - 5/2022 - 12/2022 - [place] - developed pneumonitis on afinitor\par 3rd line: capecitabine 1/19/2023 [de-identified] : 3/23/2023\par Patient presents today for follow up of metastatic disease and assess treatment toxicity\par \par - Started capecitabine (3rd line tx MBC) on 1/20/2023:  1500 mg po bid 7 days on / 7 days off; dose reduced 2/2023 for Grade 2 HFS to 1000 mg po AM/1500 mg po PH.\par - c/o very dry hands and feet with sloughing of skin and sensitivity of hands and feet; now interfering with ADLs; using Udderly smooth daily; notes "hungry" all the time. \par - notes she burned her left arm on a hot pan - she has been keeping it clean and applying antibacterial ointment; denies any fever\par Patient denies any SOB, CP, abdominal pain, bone pain, headache, or unexplained weight loss\par Patient denies fever, chills, nausea/vomiting, diarrhea/constipation, neuropathy, headache.\par \par - PET/CT scan on  12/27/22 showed disease progression with progressive lung nodules, LN and increased mid-chest wall lesion and left breast nodule\par - apprehensive about resuming lower dose everolimus\par \par Has financial concerns:  on [employer] disability; considering returning to work 1-2 days per week once she is stablized\par \par

## 2023-05-03 ENCOUNTER — OUTPATIENT (OUTPATIENT)
Dept: OUTPATIENT SERVICES | Facility: HOSPITAL | Age: 58
LOS: 1 days | Discharge: HOME | End: 2023-05-03

## 2023-05-03 DIAGNOSIS — Z98.890 OTHER SPECIFIED POSTPROCEDURAL STATES: Chronic | ICD-10-CM

## 2023-05-03 DIAGNOSIS — C50.919 MALIGNANT NEOPLASM OF UNSPECIFIED SITE OF UNSPECIFIED FEMALE BREAST: ICD-10-CM

## 2023-05-16 ENCOUNTER — RESULT REVIEW (OUTPATIENT)
Age: 58
End: 2023-05-16

## 2023-05-16 ENCOUNTER — APPOINTMENT (OUTPATIENT)
Dept: HEMATOLOGY ONCOLOGY | Facility: CLINIC | Age: 58
End: 2023-05-16
Payer: MEDICAID

## 2023-05-16 ENCOUNTER — APPOINTMENT (OUTPATIENT)
Dept: INFUSION THERAPY | Facility: HOSPITAL | Age: 58
End: 2023-05-16

## 2023-05-16 VITALS
RESPIRATION RATE: 16 BRPM | SYSTOLIC BLOOD PRESSURE: 133 MMHG | WEIGHT: 117.95 LBS | TEMPERATURE: 96.8 F | OXYGEN SATURATION: 98 % | DIASTOLIC BLOOD PRESSURE: 84 MMHG | HEART RATE: 87 BPM | BODY MASS INDEX: 21.57 KG/M2

## 2023-05-16 DIAGNOSIS — C79.51 SECONDARY MALIGNANT NEOPLASM OF BONE: ICD-10-CM

## 2023-05-16 LAB
ALBUMIN SERPL ELPH-MCNC: 4.1 G/DL
ALP BLD-CCNC: 291 U/L
ALT SERPL-CCNC: 20 U/L
ANION GAP SERPL CALC-SCNC: 12 MMOL/L
AST SERPL-CCNC: 36 U/L
BASOPHILS # BLD AUTO: 0.03 K/UL — SIGNIFICANT CHANGE UP (ref 0–0.2)
BASOPHILS NFR BLD AUTO: 0.7 % — SIGNIFICANT CHANGE UP (ref 0–2)
BILIRUB SERPL-MCNC: 0.4 MG/DL
BUN SERPL-MCNC: 14 MG/DL
CALCIUM SERPL-MCNC: 9.2 MG/DL
CEA SERPL-MCNC: 10 NG/ML
CHLORIDE SERPL-SCNC: 108 MMOL/L
CO2 SERPL-SCNC: 21 MMOL/L
CREAT SERPL-MCNC: 0.67 MG/DL
EGFR: 101 ML/MIN/1.73M2
EOSINOPHIL # BLD AUTO: 0.04 K/UL — SIGNIFICANT CHANGE UP (ref 0–0.5)
EOSINOPHIL NFR BLD AUTO: 0.9 % — SIGNIFICANT CHANGE UP (ref 0–6)
GLUCOSE SERPL-MCNC: 132 MG/DL
HCT VFR BLD CALC: 38.6 % — SIGNIFICANT CHANGE UP (ref 34.5–45)
HGB BLD-MCNC: 12.7 G/DL — SIGNIFICANT CHANGE UP (ref 11.5–15.5)
IMM GRANULOCYTES NFR BLD AUTO: 0.2 % — SIGNIFICANT CHANGE UP (ref 0–0.9)
LYMPHOCYTES # BLD AUTO: 1 K/UL — SIGNIFICANT CHANGE UP (ref 1–3.3)
LYMPHOCYTES # BLD AUTO: 22.5 % — SIGNIFICANT CHANGE UP (ref 13–44)
MCHC RBC-ENTMCNC: 31.7 PG — SIGNIFICANT CHANGE UP (ref 27–34)
MCHC RBC-ENTMCNC: 32.9 G/DL — SIGNIFICANT CHANGE UP (ref 32–36)
MCV RBC AUTO: 96.3 FL — SIGNIFICANT CHANGE UP (ref 80–100)
MONOCYTES # BLD AUTO: 0.24 K/UL — SIGNIFICANT CHANGE UP (ref 0–0.9)
MONOCYTES NFR BLD AUTO: 5.4 % — SIGNIFICANT CHANGE UP (ref 2–14)
NEUTROPHILS # BLD AUTO: 3.13 K/UL — SIGNIFICANT CHANGE UP (ref 1.8–7.4)
NEUTROPHILS NFR BLD AUTO: 70.3 % — SIGNIFICANT CHANGE UP (ref 43–77)
NRBC # BLD: 0 /100 WBCS — SIGNIFICANT CHANGE UP (ref 0–0)
PLATELET # BLD AUTO: 208 K/UL — SIGNIFICANT CHANGE UP (ref 150–400)
POTASSIUM SERPL-SCNC: 4 MMOL/L
PROT SERPL-MCNC: 6.7 G/DL
RBC # BLD: 4.01 M/UL — SIGNIFICANT CHANGE UP (ref 3.8–5.2)
RBC # FLD: 18.1 % — HIGH (ref 10.3–14.5)
SODIUM SERPL-SCNC: 141 MMOL/L
WBC # BLD: 4.45 K/UL — SIGNIFICANT CHANGE UP (ref 3.8–10.5)
WBC # FLD AUTO: 4.45 K/UL — SIGNIFICANT CHANGE UP (ref 3.8–10.5)

## 2023-05-16 PROCEDURE — 99214 OFFICE O/P EST MOD 30 MIN: CPT

## 2023-05-16 NOTE — PHYSICAL EXAM
[Fully active, able to carry on all pre-disease performance without restriction] : Status 0 - Fully active, able to carry on all pre-disease performance without restriction [Normal] : affect appropriate [de-identified] : left breast 9:00-10:00 2 cm nodular mass palpated unchanged;   s/p healed ulceration 5x3cm induration and persistent lobular changes ;   no palpable adenopathy b/l ; no mass right breast, (photo taken today 4/18/2023) [de-identified] : Left hand swollen diffusely and extending up forearm; edema noted (no change) ;  no left axillary nodes palpated;  [de-identified] : hyperpigmentation of tongue and palms of hands; bilateral palms and soles - skin sloughing with exposed

## 2023-05-16 NOTE — HISTORY OF PRESENT ILLNESS
[Disease: _____________________] : Disease: [unfilled] [M: ___] : M[unfilled] [AJCC Stage: ____] : AJCC Stage: [unfilled] [100: Normal, no complaints, no evidence of disease.] : 100: Normal, no complaints, no evidence of disease. [de-identified] : Pt initially evaluated by Medical Oncology in 7/2021 when she noticed a left breast mass x 2 months for which she did not seek medical attention, denied any pain, ulceration, bleeding or nipple changes/discharge.  She also c/o lower back pain for 2-3 months, associated with pain radiating down the lower extremities relieved with Tylenol prn.  Also reports intermittent upper back pain.   No fevers, night sweats, weight loss.  Reports fatigue and poor appetite.  She went to an urgent care in [place] for back pain and breast mass and was given a referral for mammo/US.  \par \par Mammogram and breast US on 4/7/2021 showed  5.6x3.2x5.3cm hypoechoic irregularly-shaped mass with angular margins 9-11:00 5 to 9cm from nipple; left axilla - enlarged 4.0x2.1x3.4cm lymph node, US biopsy rec, [id number].\par \par On 4/20/2021, pt underwent an US guided left breast core biopsy which showed invasive moderately differentiated ductal carcinoma, Geovanny score 6/9 (3+2+10), invasive tumor at least 1.8cm, no DCIS, no LVI, microcalcifications present, ER 90%, AK 2%, HER2 2+, CISH negative.  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  \par \par For the back pain, pt underwent an MRI lumbar spine on 4/22/2021 which showed metastatic disease in lower thoracic spine, lumbar spine and upper sacrum with severe pathologic fracture of L2 with retropulsion of the posterior endplate causing moderate canal stenosis. \par \par Admitted to [facility] 6/2021 - s/p L2 kyphoplasty and RT.  \par \par 6/2021-4/2022 - letrozole and Ibrance, s/p two dose reductions due to neutropenia.\par \par PET scan 4/11/22 - POD\par \par 5/2022 - Started on everolimus and fulvestrant - stopped in 11/2022 due to pneumonitis\par \par Patient also on Xgeva monthly since 10/5/21.\par \par Family history is significant for mother had cervical cancer age 64 and father with prostate cancer age 67.  No cancer of the breast, ovary, endometrium, and pancreatic cancer.  The patient is of [place] ethnic background.  No ETOH or tobacco.\par \par Caris Summary Report\par Source: Spine: 6/4/2021\par ER 60%; ERBB2 Negative; \par PTEN pathogenic\par PIK3CA Negative;\par ESR1 not detected\par No other actionable mutations noted; full report to be filed. \par \par Germline Testing: No clinically significant mutation on [employer] testing [de-identified] : ER 90%, DE 2%, HER2 2+, CISH negative [FreeTextEntry1] : Metastatic Tx:\par 1st LIne Ibrance/letrozole 6/2021 - 4/2022 - [place]\par 2nd line Everolimus/fulvestrant - 5/2022 - 12/2022 - [place] - developed pneumonitis on afinitor\par 3rd line: capecitabine 1/19/2023 [de-identified] : 5/16/23\par Patient presents today for follow up of metastatic disease and assess treatment toxicity\par \par - was apprehensive about resuming lower dose of everolimus.\par - Started capecitabine (3rd line tx MBC) on 1/20/2023:  1500 mg po bid 7 days on / 7 days off; dose reduced 2/2023 for Grade 2 HFS to 1000 mg po AM/1500 mg po PH and more recently decreased to 1000 mg po bid 7 days on / 7 days off due to persistent Grade 2 HFT .\par - notes much improved  dry hands and feet with decreased sloughing of skin and sensitivity of hands and feet although still with darkening of the skin; creams are helping\par \par Patient denies any SOB, CP, abdominal pain, bone pain, headache, or unexplained weight loss\par Patient denies fever, chills, nausea/vomiting, diarrhea/constipation, neuropathy, headache.\par \par - PET/CT scan on  12/27/22 showed disease progression with progressive lung nodules, LN and increased mid-chest wall lesion and left breast nodule\par - ordered for repeat PET/CT in 4/2023 to determine response to therapy; scheduled for 5/31/23\par \par Has financial concerns:  on [employer] disability; considering returning to work 1-2 days per week once she is stabilized\par \par

## 2023-05-16 NOTE — REVIEW OF SYSTEMS
[Fatigue] : fatigue [Negative] : Allergic/Immunologic [Mucosal Pain] : no mucosal pain [Cough] : no cough [SOB on Exertion] : no shortness of breath during exertion [de-identified] : nodularity of chest wall lesion - softer per patient; very dry hands and peeling skin better with lower dose [FreeTextEntry7] : more frequent BM 2-3 x day [de-identified] : left upper extremity lymphedema - unchanged

## 2023-05-17 LAB — CANCER AG27-29 SERPL-ACNC: 30.5 U/ML

## 2023-05-31 ENCOUNTER — APPOINTMENT (OUTPATIENT)
Dept: NUCLEAR MEDICINE | Facility: IMAGING CENTER | Age: 58
End: 2023-05-31
Payer: MEDICAID

## 2023-05-31 ENCOUNTER — OUTPATIENT (OUTPATIENT)
Dept: OUTPATIENT SERVICES | Facility: HOSPITAL | Age: 58
LOS: 1 days | End: 2023-05-31
Payer: MEDICAID

## 2023-05-31 DIAGNOSIS — C50.919 MALIGNANT NEOPLASM OF UNSPECIFIED SITE OF UNSPECIFIED FEMALE BREAST: ICD-10-CM

## 2023-05-31 DIAGNOSIS — Z98.890 OTHER SPECIFIED POSTPROCEDURAL STATES: Chronic | ICD-10-CM

## 2023-05-31 PROCEDURE — 78815 PET IMAGE W/CT SKULL-THIGH: CPT | Mod: 26,PS

## 2023-05-31 PROCEDURE — 78815 PET IMAGE W/CT SKULL-THIGH: CPT

## 2023-05-31 PROCEDURE — A9552: CPT

## 2023-06-13 ENCOUNTER — RESULT REVIEW (OUTPATIENT)
Age: 58
End: 2023-06-13

## 2023-06-13 ENCOUNTER — APPOINTMENT (OUTPATIENT)
Dept: HEMATOLOGY ONCOLOGY | Facility: CLINIC | Age: 58
End: 2023-06-13
Payer: MEDICAID

## 2023-06-13 ENCOUNTER — APPOINTMENT (OUTPATIENT)
Dept: INFUSION THERAPY | Facility: HOSPITAL | Age: 58
End: 2023-06-13

## 2023-06-13 VITALS
HEIGHT: 61.97 IN | RESPIRATION RATE: 17 BRPM | HEART RATE: 80 BPM | OXYGEN SATURATION: 99 % | WEIGHT: 120.15 LBS | DIASTOLIC BLOOD PRESSURE: 89 MMHG | SYSTOLIC BLOOD PRESSURE: 147 MMHG | BODY MASS INDEX: 22.11 KG/M2

## 2023-06-13 LAB
ALBUMIN SERPL ELPH-MCNC: 4 G/DL
ALP BLD-CCNC: 292 U/L
ALT SERPL-CCNC: 25 U/L
ANION GAP SERPL CALC-SCNC: 13 MMOL/L
AST SERPL-CCNC: 37 U/L
BASOPHILS # BLD AUTO: 0.03 K/UL — SIGNIFICANT CHANGE UP (ref 0–0.2)
BASOPHILS NFR BLD AUTO: 0.8 % — SIGNIFICANT CHANGE UP (ref 0–2)
BILIRUB SERPL-MCNC: 0.4 MG/DL
BUN SERPL-MCNC: 17 MG/DL
CALCIUM SERPL-MCNC: 9.1 MG/DL
CEA SERPL-MCNC: 11.9 NG/ML
CHLORIDE SERPL-SCNC: 105 MMOL/L
CO2 SERPL-SCNC: 22 MMOL/L
CREAT SERPL-MCNC: 0.7 MG/DL
EGFR: 100 ML/MIN/1.73M2
EOSINOPHIL # BLD AUTO: 0.02 K/UL — SIGNIFICANT CHANGE UP (ref 0–0.5)
EOSINOPHIL NFR BLD AUTO: 0.5 % — SIGNIFICANT CHANGE UP (ref 0–6)
GLUCOSE SERPL-MCNC: 134 MG/DL
HCT VFR BLD CALC: 37.1 % — SIGNIFICANT CHANGE UP (ref 34.5–45)
HGB BLD-MCNC: 12.7 G/DL — SIGNIFICANT CHANGE UP (ref 11.5–15.5)
IMM GRANULOCYTES NFR BLD AUTO: 0.5 % — SIGNIFICANT CHANGE UP (ref 0–0.9)
LYMPHOCYTES # BLD AUTO: 1.14 K/UL — SIGNIFICANT CHANGE UP (ref 1–3.3)
LYMPHOCYTES # BLD AUTO: 29.8 % — SIGNIFICANT CHANGE UP (ref 13–44)
MCHC RBC-ENTMCNC: 33.1 PG — SIGNIFICANT CHANGE UP (ref 27–34)
MCHC RBC-ENTMCNC: 34.2 G/DL — SIGNIFICANT CHANGE UP (ref 32–36)
MCV RBC AUTO: 96.6 FL — SIGNIFICANT CHANGE UP (ref 80–100)
MONOCYTES # BLD AUTO: 0.33 K/UL — SIGNIFICANT CHANGE UP (ref 0–0.9)
MONOCYTES NFR BLD AUTO: 8.6 % — SIGNIFICANT CHANGE UP (ref 2–14)
NEUTROPHILS # BLD AUTO: 2.29 K/UL — SIGNIFICANT CHANGE UP (ref 1.8–7.4)
NEUTROPHILS NFR BLD AUTO: 59.8 % — SIGNIFICANT CHANGE UP (ref 43–77)
NRBC # BLD: 0 /100 WBCS — SIGNIFICANT CHANGE UP (ref 0–0)
PLATELET # BLD AUTO: 189 K/UL — SIGNIFICANT CHANGE UP (ref 150–400)
POTASSIUM SERPL-SCNC: 4.3 MMOL/L
PROT SERPL-MCNC: 6.6 G/DL
RBC # BLD: 3.84 M/UL — SIGNIFICANT CHANGE UP (ref 3.8–5.2)
RBC # FLD: 15.9 % — HIGH (ref 10.3–14.5)
SODIUM SERPL-SCNC: 140 MMOL/L
WBC # BLD: 3.83 K/UL — SIGNIFICANT CHANGE UP (ref 3.8–10.5)
WBC # FLD AUTO: 3.83 K/UL — SIGNIFICANT CHANGE UP (ref 3.8–10.5)

## 2023-06-13 PROCEDURE — 99214 OFFICE O/P EST MOD 30 MIN: CPT

## 2023-06-14 LAB — CANCER AG27-29 SERPL-ACNC: 31.8 U/ML

## 2023-06-19 NOTE — REVIEW OF SYSTEMS
[Fatigue] : fatigue [Negative] : Allergic/Immunologic [Mucosal Pain] : no mucosal pain [Cough] : no cough [SOB on Exertion] : no shortness of breath during exertion [FreeTextEntry7] : more frequent BM 2-3 x day [de-identified] : nodularity of chest wall lesion - noted some discharge x 1 day;  very dry hands and peeling skin better with lower dose [de-identified] : left upper extremity lymphedema - unchanged

## 2023-06-19 NOTE — HISTORY OF PRESENT ILLNESS
[Disease: _____________________] : Disease: [unfilled] [M: ___] : M[unfilled] [AJCC Stage: ____] : AJCC Stage: [unfilled] [100: Normal, no complaints, no evidence of disease.] : 100: Normal, no complaints, no evidence of disease. [de-identified] : Pt initially evaluated by Medical Oncology in 7/2021 when she noticed a left breast mass x 2 months for which she did not seek medical attention, denied any pain, ulceration, bleeding or nipple changes/discharge.  She also c/o lower back pain for 2-3 months, associated with pain radiating down the lower extremities relieved with Tylenol prn.  Also reports intermittent upper back pain.   No fevers, night sweats, weight loss.  Reports fatigue and poor appetite.  She went to an urgent care in [place] for back pain and breast mass and was given a referral for mammo/US.  \par \par Mammogram and breast US on 4/7/2021 showed  5.6x3.2x5.3cm hypoechoic irregularly-shaped mass with angular margins 9-11:00 5 to 9cm from nipple; left axilla - enlarged 4.0x2.1x3.4cm lymph node, US biopsy rec, [id number].\par \par On 4/20/2021, pt underwent an US guided left breast core biopsy which showed invasive moderately differentiated ductal carcinoma, Geovanny score 6/9 (3+2+10), invasive tumor at least 1.8cm, no DCIS, no LVI, microcalcifications present, ER 90%, SD 2%, HER2 2+, CISH negative.  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  \par \par For the back pain, pt underwent an MRI lumbar spine on 4/22/2021 which showed metastatic disease in lower thoracic spine, lumbar spine and upper sacrum with severe pathologic fracture of L2 with retropulsion of the posterior endplate causing moderate canal stenosis. \par \par Admitted to [facility] 6/2021 - s/p L2 kyphoplasty and RT.  \par \par 6/2021-4/2022 - letrozole and Ibrance, s/p two dose reductions due to neutropenia.\par \par PET scan 4/11/22 - POD\par \par 5/2022 - Started on everolimus and fulvestrant - stopped in 11/2022 due to pneumonitis\par \par Patient also on Xgeva monthly since 10/5/21.\par \par Family history is significant for mother had cervical cancer age 64 and father with prostate cancer age 67.  No cancer of the breast, ovary, endometrium, and pancreatic cancer.  The patient is of [place] ethnic background.  No ETOH or tobacco.\par \par Caris Summary Report\par Source: Spine: 6/4/2021\par ER 60%; ERBB2 Negative; \par PTEN pathogenic\par PIK3CA Negative;\par ESR1 not detected\par No other actionable mutations noted; full report to be filed. \par \par Germline Testing: No clinically significant mutation on [employer] testing [de-identified] : ER 90%, CO 2%, HER2 2+, CISH negative [FreeTextEntry1] : Metastatic Tx:\par 1st LIne Ibrance/letrozole 6/2021 - 4/2022 - [place]\par 2nd line Everolimus/fulvestrant - 5/2022 - 12/2022 - [place] - developed pneumonitis on afinitor\par 3rd line: capecitabine 1/19/2023 [de-identified] : 6/13/2023\par Patient presents today for follow up of metastatic disease and assess treatment toxicity\par \par - was apprehensive about resuming lower dose of everolimus.\par - Started capecitabine (3rd line tx MBC) on 1/20/2023:  1500 mg po bid 7 days on / 7 days off; dose reduced 2/2023 for Grade 2 HFS to 1000 mg po AM/1500 mg po PH and more recently decreased to 1000 mg po bid 7 days on / 7 days off due to persistent Grade 2 HFT .\par - notes much improved  dry hands and feet with decreased sloughing of skin and sensitivity of hands and feet although still with darkening of the skin; creams are helping\par \par Patient denies any SOB, CP, abdominal pain, bone pain, headache, or unexplained weight loss\par Patient denies fever, chills, nausea/vomiting, diarrhea/constipation, neuropathy, headache.\par \par - PET/CT scan on  5/31/2023: Demonstrated a mixed response to therapy: Sclerotic bone lesions, lung lesions are improved or minimally avid.  Bilateral Breast lesions and axillary nodes are slightly more avid and slight increased in size\par \par - CEA has been stable. \par Has financial concerns:  on [employer] disability; considering returning to work 1-2 days per week once she is stabilized\par \par

## 2023-06-19 NOTE — PHYSICAL EXAM
[Fully active, able to carry on all pre-disease performance without restriction] : Status 0 - Fully active, able to carry on all pre-disease performance without restriction [Normal] : affect appropriate [de-identified] : left breast 9:00-10:00 2 cm nodular mass palpated unchanged;   s/p healed ulceration 5x3cm induration and persistent lobular changes - see photograph; pinpoint area of ulceration noted new from previous exam; no discharge at this time.  ;   no palpable adenopathy b/l ; no mass right breast, (photo taken today 6/13/2023) [de-identified] : Left hand swollen diffusely and extending up forearm; edema noted (no change) ;  no left axillary nodes palpated;  [de-identified] : hyperpigmentation of tongue and palms of hands; bilateral palms and soles - skin sloughing with exposed

## 2023-06-28 ENCOUNTER — OUTPATIENT (OUTPATIENT)
Dept: OUTPATIENT SERVICES | Facility: HOSPITAL | Age: 58
LOS: 1 days | Discharge: HOME | End: 2023-06-28
Payer: MEDICARE

## 2023-06-28 DIAGNOSIS — C50.919 MALIGNANT NEOPLASM OF UNSPECIFIED SITE OF UNSPECIFIED FEMALE BREAST: ICD-10-CM

## 2023-06-28 DIAGNOSIS — Z98.890 OTHER SPECIFIED POSTPROCEDURAL STATES: Chronic | ICD-10-CM

## 2023-07-11 ENCOUNTER — RESULT REVIEW (OUTPATIENT)
Age: 58
End: 2023-07-11

## 2023-07-11 ENCOUNTER — APPOINTMENT (OUTPATIENT)
Dept: HEMATOLOGY ONCOLOGY | Facility: CLINIC | Age: 58
End: 2023-07-11
Payer: MEDICAID

## 2023-07-11 ENCOUNTER — APPOINTMENT (OUTPATIENT)
Dept: INFUSION THERAPY | Facility: HOSPITAL | Age: 58
End: 2023-07-11

## 2023-07-11 VITALS
WEIGHT: 121.01 LBS | DIASTOLIC BLOOD PRESSURE: 92 MMHG | OXYGEN SATURATION: 99 % | TEMPERATURE: 97.2 F | BODY MASS INDEX: 22.16 KG/M2 | SYSTOLIC BLOOD PRESSURE: 138 MMHG | HEART RATE: 90 BPM

## 2023-07-11 LAB
ALBUMIN SERPL ELPH-MCNC: 4.1 G/DL
ALP BLD-CCNC: 256 U/L
ALT SERPL-CCNC: 40 U/L
ANION GAP SERPL CALC-SCNC: 15 MMOL/L
AST SERPL-CCNC: 51 U/L
BASOPHILS # BLD AUTO: 0.03 K/UL — SIGNIFICANT CHANGE UP (ref 0–0.2)
BASOPHILS NFR BLD AUTO: 0.9 % — SIGNIFICANT CHANGE UP (ref 0–2)
BILIRUB SERPL-MCNC: 0.7 MG/DL
BUN SERPL-MCNC: 16 MG/DL
CALCIUM SERPL-MCNC: 9.2 MG/DL
CEA SERPL-MCNC: 15.3 NG/ML
CHLORIDE SERPL-SCNC: 105 MMOL/L
CO2 SERPL-SCNC: 20 MMOL/L
CREAT SERPL-MCNC: 0.76 MG/DL
EGFR: 91 ML/MIN/1.73M2
EOSINOPHIL # BLD AUTO: 0.07 K/UL — SIGNIFICANT CHANGE UP (ref 0–0.5)
EOSINOPHIL NFR BLD AUTO: 2.1 % — SIGNIFICANT CHANGE UP (ref 0–6)
GLUCOSE SERPL-MCNC: 109 MG/DL
HCT VFR BLD CALC: 38.2 % — SIGNIFICANT CHANGE UP (ref 34.5–45)
HGB BLD-MCNC: 12.9 G/DL — SIGNIFICANT CHANGE UP (ref 11.5–15.5)
IMM GRANULOCYTES NFR BLD AUTO: 0.6 % — SIGNIFICANT CHANGE UP (ref 0–0.9)
LYMPHOCYTES # BLD AUTO: 1.03 K/UL — SIGNIFICANT CHANGE UP (ref 1–3.3)
LYMPHOCYTES # BLD AUTO: 30.5 % — SIGNIFICANT CHANGE UP (ref 13–44)
MCHC RBC-ENTMCNC: 32.7 PG — SIGNIFICANT CHANGE UP (ref 27–34)
MCHC RBC-ENTMCNC: 33.8 G/DL — SIGNIFICANT CHANGE UP (ref 32–36)
MCV RBC AUTO: 97 FL — SIGNIFICANT CHANGE UP (ref 80–100)
MONOCYTES # BLD AUTO: 0.3 K/UL — SIGNIFICANT CHANGE UP (ref 0–0.9)
MONOCYTES NFR BLD AUTO: 8.9 % — SIGNIFICANT CHANGE UP (ref 2–14)
NEUTROPHILS # BLD AUTO: 1.93 K/UL — SIGNIFICANT CHANGE UP (ref 1.8–7.4)
NEUTROPHILS NFR BLD AUTO: 57 % — SIGNIFICANT CHANGE UP (ref 43–77)
NRBC # BLD: 0 /100 WBCS — SIGNIFICANT CHANGE UP (ref 0–0)
PLATELET # BLD AUTO: 182 K/UL — SIGNIFICANT CHANGE UP (ref 150–400)
POTASSIUM SERPL-SCNC: 3.8 MMOL/L
PROT SERPL-MCNC: 6.7 G/DL
RBC # BLD: 3.94 M/UL — SIGNIFICANT CHANGE UP (ref 3.8–5.2)
RBC # FLD: 16 % — HIGH (ref 10.3–14.5)
SODIUM SERPL-SCNC: 139 MMOL/L
WBC # BLD: 3.38 K/UL — LOW (ref 3.8–10.5)
WBC # FLD AUTO: 3.38 K/UL — LOW (ref 3.8–10.5)

## 2023-07-11 PROCEDURE — 99214 OFFICE O/P EST MOD 30 MIN: CPT

## 2023-07-11 NOTE — REVIEW OF SYSTEMS
[Fatigue] : fatigue [Mucosal Pain] : no mucosal pain [Cough] : no cough [SOB on Exertion] : no shortness of breath during exertion [Negative] : Allergic/Immunologic [FreeTextEntry7] : more frequent BM 2-3 x day [de-identified] : nodularity of chest wall lesion - noted some discharge x 1 day;  very dry hands and peeling skin better with lower dose [de-identified] : left upper extremity lymphedema - unchanged

## 2023-07-11 NOTE — HISTORY OF PRESENT ILLNESS
[Disease: _____________________] : Disease: [unfilled] [M: ___] : M[unfilled] [AJCC Stage: ____] : AJCC Stage: [unfilled] [de-identified] : Pt initially evaluated by Medical Oncology in 7/2021 when she noticed a left breast mass x 2 months for which she did not seek medical attention, denied any pain, ulceration, bleeding or nipple changes/discharge.  She also c/o lower back pain for 2-3 months, associated with pain radiating down the lower extremities relieved with Tylenol prn.  Also reports intermittent upper back pain.   No fevers, night sweats, weight loss.  Reports fatigue and poor appetite.  She went to an urgent care in [place] for back pain and breast mass and was given a referral for mammo/US.  \par \par Mammogram and breast US on 4/7/2021 showed  5.6x3.2x5.3cm hypoechoic irregularly-shaped mass with angular margins 9-11:00 5 to 9cm from nipple; left axilla - enlarged 4.0x2.1x3.4cm lymph node, US biopsy rec, BIRADS4.\par \par On 4/20/2021, pt underwent an US guided left breast core biopsy which showed invasive moderately differentiated ductal carcinoma, Geovanny score 6/9 (3+2+10), invasive tumor at least 1.8cm, no DCIS, no LVI, microcalcifications present, ER 90%, GA 2%, HER2 2+, CISH negative.  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  \par \par For the back pain, pt underwent an MRI lumbar spine on 4/22/2021 which showed metastatic disease in lower thoracic spine, lumbar spine and upper sacrum with severe pathologic fracture of L2 with retropulsion of the posterior endplate causing moderate canal stenosis. \par \par Admitted to [facility] 6/2021 - s/p L2 kyphoplasty and RT.  \par \par 6/2021-4/2022 - letrozole and Ibrance, s/p two dose reductions due to neutropenia.\par \par PET scan 4/11/22 - POD\par \par 5/2022 - Started on everolimus and fulvestrant - stopped in 11/2022 due to pneumonitis\par \par Patient also on Xgeva monthly since 10/5/21.\par \par Family history is significant for mother had cervical cancer age 64 and father with prostate cancer age 67.  No cancer of the breast, ovary, endometrium, and pancreatic cancer.  The patient is of [place] ethnic background.  No ETOH or tobacco.\par \par Caris Summary Report\par Source: Spine: 6/4/2021\par ER 60%; ERBB2 Negative; \par PTEN pathogenic\par PIK3CA Negative;\par ESR1 not detected\par No other actionable mutations noted; full report to be filed. \par \par Germline Testing: No clinically significant mutation on [employer] testing [de-identified] : ER 90%, NJ 2%, HER2 2+, CISH negative [FreeTextEntry1] : Metastatic Tx:\par 1st LIne Ibrance/letrozole 6/2021 - 4/2022 - [place]\par 2nd line Everolimus/fulvestrant - 5/2022 - 12/2022 - [place] - developed pneumonitis on afinitor\par 3rd line: capecitabine 1/19/2023 [de-identified] : 7/11/2023\par Patient presents today for follow up of metastatic disease and assess treatment toxicity\par \par - Started capecitabine (3rd line tx MBC) on 1/20/2023:  1500 mg po bid 7 days on / 7 days off; dose reduced 2/2023 for Grade 2 HFS to 1000 mg po AM/1500 mg po PH and more recently decreased to 1000 mg po bid 7 days on / 7 days off due to persistent Grade 2 HFT .\par - notes much improved  dry hands and feet with decreased sloughing of skin and sensitivity of hands and feet although still with darkening of the skin; creams are helping\par \par Patient denies any SOB, CP, abdominal pain, bone pain, headache, or unexplained weight loss\par Patient denies fever, chills, nausea/vomiting, diarrhea/constipation, neuropathy, headache.\par \par - PET/CT scan on  5/31/2023: Demonstrated a mixed response to therapy: Sclerotic bone lesions, lung lesions are improved or minimally avid.  Bilateral Breast lesions and axillary nodes are slightly more avid and slight increased in size\par \par - CEA has been stable. \par Has financial concerns:  on [employer] disability; considering returning to work 1-2 days per week once she is stabilized\par \par  [100: Normal, no complaints, no evidence of disease.] : 100: Normal, no complaints, no evidence of disease.

## 2023-07-11 NOTE — PHYSICAL EXAM
[Fully active, able to carry on all pre-disease performance without restriction] : Status 0 - Fully active, able to carry on all pre-disease performance without restriction [Normal] : affect appropriate [de-identified] : left breast 9:00-10:00 2 cm nodular mass palpated unchanged;   s/p healed ulceration 5x3cm induration and persistent lobular changes - see photograph; previously noted small ulceration healed now ; no discharge at this time.  ;   no palpable adenopathy b/l ; no mass right breast, (photo taken today 7/11/2023) [de-identified] : Left hand swollen diffusely and extending up forearm; edema noted (no change) ;  no left axillary nodes palpated;  [de-identified] : hyperpigmentation of tongue and palms of hands; bilateral palms and soles - skin sloughing with exposed

## 2023-07-12 DIAGNOSIS — C79.51 SECONDARY MALIGNANT NEOPLASM OF BONE: ICD-10-CM

## 2023-07-12 LAB — CANCER AG27-29 SERPL-ACNC: 41.7 U/ML

## 2023-07-13 ENCOUNTER — APPOINTMENT (OUTPATIENT)
Dept: SURGICAL ONCOLOGY | Facility: CLINIC | Age: 58
End: 2023-07-13
Payer: MEDICARE

## 2023-07-24 ENCOUNTER — APPOINTMENT (OUTPATIENT)
Dept: SURGICAL ONCOLOGY | Facility: CLINIC | Age: 58
End: 2023-07-24
Payer: MEDICARE

## 2023-07-24 VITALS
RESPIRATION RATE: 16 BRPM | BODY MASS INDEX: 21.71 KG/M2 | OXYGEN SATURATION: 99 % | HEIGHT: 61.97 IN | HEART RATE: 96 BPM | DIASTOLIC BLOOD PRESSURE: 85 MMHG | WEIGHT: 118 LBS | SYSTOLIC BLOOD PRESSURE: 126 MMHG

## 2023-07-24 PROCEDURE — 99214 OFFICE O/P EST MOD 30 MIN: CPT

## 2023-07-24 NOTE — HISTORY OF PRESENT ILLNESS
[de-identified] : Ms. ROGELIO PEARSON is a 58 year old female who presents today for a follow-up visit \par \par B/L mammo/sono 4/7/2021: left breast mass at the site a palpable abnormality, enlarged left axillary lymph node, right breast unremarkable. BI-RADS 4. \par \par Ultrasound Guided core biopsy of left breast x 2 sites 4/20/21:\par 1. Breast, left, 9-11 o'clock, core biopsy: Invasive moderately differentiated ductal carcinoma; Campbell score 6 / 9 (3 + 2 +1) in this limited material; Invasive tumor measures at least 1.8cm; Ductal carcinoma in situ not seen; Microcalcifications present; Lymphovascular permeation by tumor not seen. --ER+/MN+/HER-2 equivocal (CISH negative)\par 2. Left axilla, core biopsy: metastatic ductal carcinoma to lymph node.\par -ER+/MN+/HER2- by CISH \par \par MRI Lumbar Spine 4/22/21: Metastatic disease to the lower thoracic spine, lumbar spine and upper sacrum with severe pathologic fracture of L2 with retropulsion of the posterior endplate causing moderate canal stenosis. No discogenic disease\par \par \par PET/CT 5/10/21: Impression: \par 1. Large FDG-avid, centrally necrotic mass in medial left breast, involving overlying skin and adjacent chest wall musculature and sternum corresponds to known malignancy. Additional separate FDG-avid lesions in left breast are suspicious for additional sites of disease. Further evaluation may be performed with breast MRI\par 2. FDG-avid lymph nodes in b/l lower cervical, left supraclavicular, left axillary, and left retropectoral regions are compatible  with metastatic disease.\par 3. Multiple FDG-avid b/l pulmonary nodules are compatible with metastatic disease. \par 4. Small left pleural effusion with heterogeneous FDG activity suspicious for malignant effusion. \par 5. Multiple FDG-avid lytic metastases in axial skeleton with severe pathologic compression fracture of L2 vertebral body, as seen on MRI dated 4/22/21, where retropulsion of posterior endplate is noted, causing moderate canal stenosis \par \par PET/CT 8/31/2021: Mildly FDG-avid mass in medial left breast and adjacent difficult to delineate mildly FDG of left breast lesions are decreased in size and metabolism, compatible with a partial response to interval therapy.Resolution of FDG-avid bilateral cervical and left supraclavicular lymph nodes and interval decrease in size, number, and metabolism of mildly FDG-avid left axillary and retropectoral lymph nodes.Multiple mildly FDG-avid and non-FDG-avid bilateral pulmonary nodules are similar, or decreased in size and are decreased metabolism. Small to moderate left pleural effusion is mildly INCREASED. Previously seen FDG-avid lytic lesions in the axial skeleton are decreased in metabolism and demonstrate new sclerosis, compatible with a partial response to interval therapy. Status post interval vertebroplasty of L2 compression fracture.\par \par PET/CT 12/16/21- Increased hypermetabolism of the left breast mass which is slightly decreased in size.  Mildly FDG-avid left axillary and right hilar lymph nodes are mildly increased in metabolism.  Innumerable subcentimeter minimally FDG-avid and non-FDG-avid bilateral pulmonary nodules, not significantly changed in size, number, or metabolism.  Innumerable predominantly sclerotic osseous metastases in the axial skeleton are similar or mildly increased in density on CT, and predominantly demonstrate minimal or no FDG avidity, compatible with treated bone metastases. A few small foci of increased FDG activity suggests residual disease, for example, an FDG-avid lytic lesion in the anterior aspect of the left sixth rib (image 103).\par \par Tumor Markers 2/1/22- CA 27-29: 21.5 | CEA: 10.4\par 2/11/22- Patient continues Ibrance and Letrozole per Dr. Dowd.  She is scheduled for a PET/CT in March 2022.  Feeling well overall.\par \par PET CT 4/11/22- Increased in size of medial left breast mass, new FDG activity in upper central left breast and nodule in medial upper right breast, suspicious for new sites of disease. Lung and bone lesions stable and unchanged. \par \par Tumor Markers 5/3/22: CEA (14.7); CA15.3 (26.8); CA27.29 (24.6); ALT (9); ALK PHOS (176) \par \par 5/16/22- PET scan 4/11/22 showed increased in size of medial left breast mass, new FDG acitivity in upper central left breast and nodule in medial upper right breast, suspicious for new sites of disease. Lung and bone lesions stable and unchanged. Tumor marker uptrending. Caris testing on most recent biopsy- no actionable mutations. Ibrance and Letrozole stopped. Pt. was started on Faslodex and everolimus. Will continue Xgeva monthly. \par \par CT C/A/P 7/2022- stable\par PET/CT 11/2022 - stable over all but pneumonitis \par \par reports LUE lymphedema that started in September/October, did therapy & wears a JOBST sleeve \par \par PET/CT 5/2023 - mixed response \par \par labs 7/2023 CEA 15.3, CA 27.29: 41.7

## 2023-07-24 NOTE — PHYSICAL EXAM
[Normal] : supple, no neck mass and thyroid not enlarged [Normal Supraclavicular Lymph Nodes] : normal supraclavicular lymph nodes [Normal] : oriented to person, place and time, with appropriate affect [de-identified] : left medial breast mass is stable; left axillary adenopathy persists [de-identified] : mobile left axillary adenopathy; right axilla negative

## 2023-07-24 NOTE — ASSESSMENT
[FreeTextEntry1] : IMP:\par 57 y/o with invasive moderately differentiated ductal carcinoma, ER+/[place]+/HER2- with metastases to the axilla and lumbar spine. \par \par Started Faslodex, everolimus & xgeva w/ Dr. Dowd 5/2022 (now under Dr. Sofia)\par \par CT C/A/P 7/2022- stable\par PET/CT 11/2022 - stable over all but pneumonitis \par Labs 11/2022 CEA 12.8 (trending up)\par \par reports LUE lymphedema that started in September/October, 2022 did therapy & wears a JOBST sleeve \par \par PET/CT 5/2023 - mixed response \par \par labs 7/2023 CEA 15.3, CA 27.29: 41.7 \par \par currently on Capecitabine & monthly Xgeva \par \par PLAN: \par continue palliative systemic therapy\par continue lymphedema therapy \par RTO Q3 months \par  Post-Care Instructions: I reviewed with the patient in detail post-care instructions. Patient is to keep the biopsy site dry overnight, and then apply bacitracin twice daily until healed. Patient may apply hydrogen peroxide soaks to remove any crusting.

## 2023-07-31 ENCOUNTER — APPOINTMENT (OUTPATIENT)
Dept: OPHTHALMOLOGY | Facility: CLINIC | Age: 58
End: 2023-07-31
Payer: MEDICARE

## 2023-07-31 ENCOUNTER — NON-APPOINTMENT (OUTPATIENT)
Age: 58
End: 2023-07-31

## 2023-07-31 PROCEDURE — 92004 COMPRE OPH EXAM NEW PT 1/>: CPT | Mod: 25

## 2023-07-31 PROCEDURE — 92015 DETERMINE REFRACTIVE STATE: CPT | Mod: NC

## 2023-08-07 ENCOUNTER — APPOINTMENT (OUTPATIENT)
Dept: CARDIOLOGY | Facility: CLINIC | Age: 58
End: 2023-08-07

## 2023-08-08 ENCOUNTER — APPOINTMENT (OUTPATIENT)
Dept: HEMATOLOGY ONCOLOGY | Facility: CLINIC | Age: 58
End: 2023-08-08
Payer: MEDICARE

## 2023-08-08 ENCOUNTER — APPOINTMENT (OUTPATIENT)
Dept: INFUSION THERAPY | Facility: HOSPITAL | Age: 58
End: 2023-08-08

## 2023-08-08 ENCOUNTER — RESULT REVIEW (OUTPATIENT)
Age: 58
End: 2023-08-08

## 2023-08-08 VITALS
HEART RATE: 99 BPM | TEMPERATURE: 97.8 F | SYSTOLIC BLOOD PRESSURE: 122 MMHG | BODY MASS INDEX: 21.6 KG/M2 | RESPIRATION RATE: 16 BRPM | DIASTOLIC BLOOD PRESSURE: 85 MMHG | OXYGEN SATURATION: 99 % | WEIGHT: 117.95 LBS

## 2023-08-08 LAB
BASOPHILS # BLD AUTO: 0.04 K/UL — SIGNIFICANT CHANGE UP (ref 0–0.2)
BASOPHILS NFR BLD AUTO: 0.8 % — SIGNIFICANT CHANGE UP (ref 0–2)
EOSINOPHIL # BLD AUTO: 0.05 K/UL — SIGNIFICANT CHANGE UP (ref 0–0.5)
EOSINOPHIL NFR BLD AUTO: 1 % — SIGNIFICANT CHANGE UP (ref 0–6)
HCT VFR BLD CALC: 39.8 % — SIGNIFICANT CHANGE UP (ref 34.5–45)
HGB BLD-MCNC: 13.4 G/DL — SIGNIFICANT CHANGE UP (ref 11.5–15.5)
IMM GRANULOCYTES NFR BLD AUTO: 0.2 % — SIGNIFICANT CHANGE UP (ref 0–0.9)
LYMPHOCYTES # BLD AUTO: 1.09 K/UL — SIGNIFICANT CHANGE UP (ref 1–3.3)
LYMPHOCYTES # BLD AUTO: 22 % — SIGNIFICANT CHANGE UP (ref 13–44)
MCHC RBC-ENTMCNC: 33.2 PG — SIGNIFICANT CHANGE UP (ref 27–34)
MCHC RBC-ENTMCNC: 33.7 G/DL — SIGNIFICANT CHANGE UP (ref 32–36)
MCV RBC AUTO: 98.5 FL — SIGNIFICANT CHANGE UP (ref 80–100)
MONOCYTES # BLD AUTO: 0.49 K/UL — SIGNIFICANT CHANGE UP (ref 0–0.9)
MONOCYTES NFR BLD AUTO: 9.9 % — SIGNIFICANT CHANGE UP (ref 2–14)
NEUTROPHILS # BLD AUTO: 3.27 K/UL — SIGNIFICANT CHANGE UP (ref 1.8–7.4)
NEUTROPHILS NFR BLD AUTO: 66.1 % — SIGNIFICANT CHANGE UP (ref 43–77)
NRBC # BLD: 0 /100 WBCS — SIGNIFICANT CHANGE UP (ref 0–0)
PLATELET # BLD AUTO: 192 K/UL — SIGNIFICANT CHANGE UP (ref 150–400)
RBC # BLD: 4.04 M/UL — SIGNIFICANT CHANGE UP (ref 3.8–5.2)
RBC # FLD: 16.2 % — HIGH (ref 10.3–14.5)
WBC # BLD: 4.95 K/UL — SIGNIFICANT CHANGE UP (ref 3.8–10.5)
WBC # FLD AUTO: 4.95 K/UL — SIGNIFICANT CHANGE UP (ref 3.8–10.5)

## 2023-08-08 PROCEDURE — 99214 OFFICE O/P EST MOD 30 MIN: CPT

## 2023-08-08 PROCEDURE — 93010 ELECTROCARDIOGRAM REPORT: CPT

## 2023-08-09 LAB
ALBUMIN SERPL ELPH-MCNC: 4.2 G/DL
ALP BLD-CCNC: 275 U/L
ALT SERPL-CCNC: 32 U/L
ANION GAP SERPL CALC-SCNC: 14 MMOL/L
AST SERPL-CCNC: 32 U/L
BILIRUB SERPL-MCNC: 0.8 MG/DL
BUN SERPL-MCNC: 19 MG/DL
CALCIUM SERPL-MCNC: 9.6 MG/DL
CANCER AG27-29 SERPL-ACNC: 48.9 U/ML
CEA SERPL-MCNC: 17.9 NG/ML
CHLORIDE SERPL-SCNC: 101 MMOL/L
CO2 SERPL-SCNC: 22 MMOL/L
CREAT SERPL-MCNC: 0.8 MG/DL
EGFR: 85 ML/MIN/1.73M2
GLUCOSE SERPL-MCNC: 99 MG/DL
POTASSIUM SERPL-SCNC: 4.1 MMOL/L
PROT SERPL-MCNC: 6.9 G/DL
SODIUM SERPL-SCNC: 137 MMOL/L

## 2023-08-10 RX ORDER — SULFAMETHOXAZOLE AND TRIMETHOPRIM 400; 80 MG/1; MG/1
400-80 TABLET ORAL DAILY
Qty: 15 | Refills: 0 | Status: COMPLETED | COMMUNITY
Start: 2022-11-09 | End: 2023-08-10

## 2023-08-10 RX ORDER — AVOBENZONE, HOMOSALATE, OCTISALATE, OCTOCRYLENE, AND OXYBENZONE 29.4; 147; 49; 25.4; 58.8 MG/ML; MG/ML; MG/ML; MG/ML; MG/ML
51.7 LOTION TOPICAL
Qty: 1 | Refills: 0 | Status: COMPLETED | COMMUNITY
Start: 2021-05-18 | End: 2023-08-10

## 2023-08-10 NOTE — END OF VISIT
[Time Spent: ___ minutes] : I have spent [unfilled] minutes of time on the encounter. [FreeTextEntry3] : Patient being seen as per physician's primary plan of care. d/w Dr. gordon

## 2023-08-10 NOTE — PHYSICAL EXAM
[Fully active, able to carry on all pre-disease performance without restriction] : Status 0 - Fully active, able to carry on all pre-disease performance without restriction [Normal] : affect appropriate [de-identified] : left breast 9:00-10:00 2 cm nodular mass palpated unchanged;   s/p healed ulceration 5x3cm induration and persistent lobular changes and now with retraction of skin; small ulceration healed now ; no discharge at this time.  ;   Left axillary adenopathy** [de-identified] : 2 cm Left axillary node (NEW);  Left hand swollen diffusely and extending up forearm; edema noted (no change) ;   [de-identified] : hyperpigmentation of tongue and palms of hands; bilateral palms and soles - skin sloughing with exposed

## 2023-08-10 NOTE — REVIEW OF SYSTEMS
[Fatigue] : fatigue [Negative] : Allergic/Immunologic [Mucosal Pain] : no mucosal pain [Cough] : no cough [SOB on Exertion] : no shortness of breath during exertion [FreeTextEntry7] : more frequent BM 2-3 x day [de-identified] : nodularity of chest wall lesion -;  very dry hands and peeling skin better with lower dose [de-identified] : left upper extremity lymphedema - unchanged

## 2023-08-10 NOTE — HISTORY OF PRESENT ILLNESS
[Disease: _____________________] : Disease: [unfilled] [M: ___] : M[unfilled] [AJCC Stage: ____] : AJCC Stage: [unfilled] [100: Normal, no complaints, no evidence of disease.] : 100: Normal, no complaints, no evidence of disease. [de-identified] : Pt initially evaluated by Medical Oncology in 7/2021 when she noticed a left breast mass x 2 months for which she did not seek medical attention, denied any pain, ulceration, bleeding or nipple changes/discharge.  She also c/o lower back pain for 2-3 months, associated with pain radiating down the lower extremities relieved with Tylenol prn.  Also reports intermittent upper back pain.   No fevers, night sweats, weight loss.  Reports fatigue and poor appetite.  She went to an urgent care in [place] for back pain and breast mass and was given a referral for mammo/US.    Mammogram and breast US on 4/7/2021 showed  5.6x3.2x5.3cm hypoechoic irregularly-shaped mass with angular margins 9-11:00 5 to 9cm from nipple; left axilla - enlarged 4.0x2.1x3.4cm lymph node, US biopsy rec, BIRADS4.  On 4/20/2021, pt underwent an US guided left breast core biopsy which showed invasive moderately differentiated ductal carcinoma, [place] score 6/9 (3+2+10), invasive tumor at least 1.8cm, no DCIS, no LVI, microcalcifications present, ER 90%, [place] 2%, HER2 2+, CISH negative.  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    For the back pain, pt underwent an MRI lumbar spine on 4/22/2021 which showed metastatic disease in lower thoracic spine, lumbar spine and upper sacrum with severe pathologic fracture of L2 with retropulsion of the posterior endplate causing moderate canal stenosis.   Admitted to [facility] 6/2021 - s/p L2 kyphoplasty and RT.    6/2021-4/2022 - letrozole and Ibrance, s/p two dose reductions due to neutropenia.  PET scan 4/11/22 - POD  5/2022 - Started on everolimus and fulvestrant - stopped in 11/2022 due to pneumonitis  Patient also on Xgeva monthly since 10/5/21.  Family history is significant for mother had cervical cancer age 64 and father with prostate cancer age 67.  No cancer of the breast, ovary, endometrium, and pancreatic cancer.  The patient is of [place] ethnic background.  No ETOH or tobacco.  Caris Summary Report Source: Spine: 6/4/2021 ER 60%; ERBB2 Negative;  PTEN pathogenic PIK3CA Negative; ESR1 not detected No other actionable mutations noted; full report to be filed.   Germline Testing: No clinically significant mutation on [employer] testing [de-identified] : ER 90%, [place] 2%, HER2 2+, CISH negative [FreeTextEntry1] : Metastatic Tx:\par  1st LIne Ibrance/letrozole 6/2021 - 4/2022 - [place]\par  2nd line Everolimus/fulvestrant - 5/2022 - 12/2022 - [place] - developed pneumonitis on afinitor\par  3rd line: capecitabine 1/19/2023 [de-identified] : 8/8/2023 Patient presents today for follow up of metastatic disease and assess treatment toxicity  - Started capecitabine (3rd line tx MBC) on 1/20/2023:  1500 mg po bid 7 days on / 7 days off; dose reduced 2/2023 for Grade 2 HFS to 1000 mg po AM/1500 mg po PH and more recently decreased to 1000 mg po bid 7 days on / 7 days off due to persistent Grade 2 HFs. - notes much improved dry hands and feet with decreased sloughing of skin and sensitivity of hands and feet although still with darkening of the skin; creams are helping  Patient denies any SOB, CP, abdominal pain, bone pain, headache, or unexplained weight loss Patient denies fever, chills, nausea/vomiting, diarrhea/constipation, neuropathy, headache.  - PET/CT scan on  5/31/2023: Demonstrated a mixed response to therapy: Sclerotic bone lesions, lung lesions are improved or minimally avid.  Bilateral Breast lesions and axillary nodes are slightly more avid and slight increased in size  - CEA and CA 27.29 has become to rise*   Has financial concerns:  on SS disability; considering returning to work 1-2 days per week once she is stabilized

## 2023-08-19 ENCOUNTER — APPOINTMENT (OUTPATIENT)
Dept: NUCLEAR MEDICINE | Facility: IMAGING CENTER | Age: 58
End: 2023-08-19
Payer: MEDICARE

## 2023-08-19 ENCOUNTER — OUTPATIENT (OUTPATIENT)
Dept: OUTPATIENT SERVICES | Facility: HOSPITAL | Age: 58
LOS: 1 days | End: 2023-08-19
Payer: COMMERCIAL

## 2023-08-19 DIAGNOSIS — C50.919 MALIGNANT NEOPLASM OF UNSPECIFIED SITE OF UNSPECIFIED FEMALE BREAST: ICD-10-CM

## 2023-08-19 DIAGNOSIS — Z98.890 OTHER SPECIFIED POSTPROCEDURAL STATES: Chronic | ICD-10-CM

## 2023-08-19 PROCEDURE — 78815 PET IMAGE W/CT SKULL-THIGH: CPT | Mod: 26,PS

## 2023-08-19 PROCEDURE — 78815 PET IMAGE W/CT SKULL-THIGH: CPT

## 2023-08-19 PROCEDURE — A9552: CPT

## 2023-08-27 ENCOUNTER — OUTPATIENT (OUTPATIENT)
Dept: OUTPATIENT SERVICES | Facility: HOSPITAL | Age: 58
LOS: 1 days | Discharge: HOME | End: 2023-08-27

## 2023-08-27 DIAGNOSIS — C79.51 SECONDARY MALIGNANT NEOPLASM OF BONE: ICD-10-CM

## 2023-08-27 DIAGNOSIS — Z98.890 OTHER SPECIFIED POSTPROCEDURAL STATES: Chronic | ICD-10-CM

## 2023-08-27 DIAGNOSIS — C50.919 MALIGNANT NEOPLASM OF UNSPECIFIED SITE OF UNSPECIFIED FEMALE BREAST: ICD-10-CM

## 2023-08-28 ENCOUNTER — RESULT REVIEW (OUTPATIENT)
Age: 58
End: 2023-08-28

## 2023-08-28 ENCOUNTER — NON-APPOINTMENT (OUTPATIENT)
Age: 58
End: 2023-08-28

## 2023-08-28 ENCOUNTER — APPOINTMENT (OUTPATIENT)
Dept: HEMATOLOGY ONCOLOGY | Facility: CLINIC | Age: 58
End: 2023-08-28
Payer: MEDICARE

## 2023-08-28 ENCOUNTER — APPOINTMENT (OUTPATIENT)
Dept: HEMATOLOGY ONCOLOGY | Facility: CLINIC | Age: 58
End: 2023-08-28

## 2023-08-28 VITALS
HEART RATE: 90 BPM | HEIGHT: 63 IN | SYSTOLIC BLOOD PRESSURE: 131 MMHG | TEMPERATURE: 97.2 F | BODY MASS INDEX: 21.48 KG/M2 | DIASTOLIC BLOOD PRESSURE: 88 MMHG | RESPIRATION RATE: 17 BRPM | WEIGHT: 121.23 LBS | OXYGEN SATURATION: 99 %

## 2023-08-28 LAB
ALBUMIN SERPL ELPH-MCNC: 4 G/DL
ALP BLD-CCNC: 273 U/L
ALT SERPL-CCNC: 20 U/L
ANION GAP SERPL CALC-SCNC: 11 MMOL/L
APTT BLD: 23.6 SEC
AST SERPL-CCNC: 32 U/L
BASOPHILS # BLD AUTO: 0.01 K/UL — SIGNIFICANT CHANGE UP (ref 0–0.2)
BASOPHILS NFR BLD AUTO: 0.2 % — SIGNIFICANT CHANGE UP (ref 0–2)
BILIRUB SERPL-MCNC: 0.6 MG/DL
BUN SERPL-MCNC: 11 MG/DL
CALCIUM SERPL-MCNC: 8.4 MG/DL
CANCER AG27-29 SERPL-ACNC: 46.2 U/ML
CEA SERPL-MCNC: 16.6 NG/ML
CHLORIDE SERPL-SCNC: 106 MMOL/L
CO2 SERPL-SCNC: 22 MMOL/L
CREAT SERPL-MCNC: 0.56 MG/DL
EGFR: 106 ML/MIN/1.73M2
EOSINOPHIL # BLD AUTO: 0.1 K/UL — SIGNIFICANT CHANGE UP (ref 0–0.5)
EOSINOPHIL NFR BLD AUTO: 2.3 % — SIGNIFICANT CHANGE UP (ref 0–6)
GLUCOSE SERPL-MCNC: 102 MG/DL
HBV CORE IGG+IGM SER QL: REACTIVE
HBV SURFACE AB SER QL: REACTIVE
HBV SURFACE AG SER QL: NONREACTIVE
HCT VFR BLD CALC: 34.2 % — LOW (ref 34.5–45)
HGB BLD-MCNC: 11.7 G/DL — SIGNIFICANT CHANGE UP (ref 11.5–15.5)
IMM GRANULOCYTES NFR BLD AUTO: 0.5 % — SIGNIFICANT CHANGE UP (ref 0–0.9)
INR PPP: 1.17 RATIO
LYMPHOCYTES # BLD AUTO: 1.09 K/UL — SIGNIFICANT CHANGE UP (ref 1–3.3)
LYMPHOCYTES # BLD AUTO: 24.7 % — SIGNIFICANT CHANGE UP (ref 13–44)
MCHC RBC-ENTMCNC: 33.4 PG — SIGNIFICANT CHANGE UP (ref 27–34)
MCHC RBC-ENTMCNC: 34.2 G/DL — SIGNIFICANT CHANGE UP (ref 32–36)
MCV RBC AUTO: 97.7 FL — SIGNIFICANT CHANGE UP (ref 80–100)
MONOCYTES # BLD AUTO: 0.35 K/UL — SIGNIFICANT CHANGE UP (ref 0–0.9)
MONOCYTES NFR BLD AUTO: 7.9 % — SIGNIFICANT CHANGE UP (ref 2–14)
NEUTROPHILS # BLD AUTO: 2.85 K/UL — SIGNIFICANT CHANGE UP (ref 1.8–7.4)
NEUTROPHILS NFR BLD AUTO: 64.4 % — SIGNIFICANT CHANGE UP (ref 43–77)
NRBC # BLD: 0 /100 WBCS — SIGNIFICANT CHANGE UP (ref 0–0)
PLATELET # BLD AUTO: 196 K/UL — SIGNIFICANT CHANGE UP (ref 150–400)
POTASSIUM SERPL-SCNC: 3.6 MMOL/L
PROT SERPL-MCNC: 6.5 G/DL
PT BLD: 13.2 SEC
RBC # BLD: 3.5 M/UL — LOW (ref 3.8–5.2)
RBC # FLD: 15.7 % — HIGH (ref 10.3–14.5)
SODIUM SERPL-SCNC: 139 MMOL/L
WBC # BLD: 4.42 K/UL — SIGNIFICANT CHANGE UP (ref 3.8–10.5)
WBC # FLD AUTO: 4.42 K/UL — SIGNIFICANT CHANGE UP (ref 3.8–10.5)

## 2023-08-28 PROCEDURE — 99215 OFFICE O/P EST HI 40 MIN: CPT

## 2023-08-28 RX ORDER — FULVESTRANT 50 MG/ML
250 INJECTION INTRAMUSCULAR
Refills: 0 | Status: COMPLETED | COMMUNITY
Start: 2022-09-09 | End: 2023-08-28

## 2023-08-28 RX ORDER — CAPECITABINE 500 MG/1
500 TABLET ORAL
Qty: 84 | Refills: 3 | Status: COMPLETED | COMMUNITY
Start: 2023-01-06 | End: 2023-08-28

## 2023-08-28 NOTE — END OF VISIT
[Time Spent: ___ minutes] : I have spent [unfilled] minutes of time on the encounter. [FreeTextEntry3] : Dr. Sofia was present for this visit and advised on primary plan of care for this patient.

## 2023-08-28 NOTE — HISTORY OF PRESENT ILLNESS
[Disease: _____________________] : Disease: [unfilled] [M: ___] : M[unfilled] [AJCC Stage: ____] : AJCC Stage: [unfilled] [100: Normal, no complaints, no evidence of disease.] : 100: Normal, no complaints, no evidence of disease. [de-identified] : Pt initially evaluated by Medical Oncology in 7/2021 when she noticed a left breast mass x 2 months for which she did not seek medical attention, denied any pain, ulceration, bleeding or nipple changes/discharge.  She also c/o lower back pain for 2-3 months, associated with pain radiating down the lower extremities relieved with Tylenol prn.  Also reports intermittent upper back pain.   No fevers, night sweats, weight loss.  Reports fatigue and poor appetite.  She went to an urgent care in [place] for back pain and breast mass and was given a referral for mammo/US.    Mammogram and breast US on 4/7/2021 showed  5.6x3.2x5.3cm hypoechoic irregularly-shaped mass with angular margins 9-11:00 5 to 9cm from nipple; left axilla - enlarged 4.0x2.1x3.4cm lymph node, US biopsy rec, BIRADS4.  On 4/20/2021, pt underwent an US guided left breast core biopsy which showed invasive moderately differentiated ductal carcinoma, [place] score 6/9 (3+2+10), invasive tumor at least 1.8cm, no DCIS, no LVI, microcalcifications present, ER 90%, [place] 2%, HER2 2+, CISH negative.  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    For the back pain, pt underwent an MRI lumbar spine on 4/22/2021 which showed metastatic disease in lower thoracic spine, lumbar spine and upper sacrum with severe pathologic fracture of L2 with retropulsion of the posterior endplate causing moderate canal stenosis.   Admitted to [facility] 6/2021 - s/p L2 kyphoplasty and RT.    6/2021-4/2022 - letrozole and Ibrance, s/p two dose reductions due to neutropenia.  PET scan 4/11/22 - POD  5/2022 - Started on everolimus and fulvestrant - stopped in 11/2022 due to pneumonitis  Patient also on Xgeva monthly since 10/5/21.  Family history is significant for mother had cervical cancer age 64 and father with prostate cancer age 67.  No cancer of the breast, ovary, endometrium, and pancreatic cancer.  The patient is of [place] ethnic background.  No ETOH or tobacco.  Caris Summary Report Source: Spine: 6/4/2021 ER 60%; ERBB2 Negative;  PTEN pathogenic PIK3CA Negative; ESR1 not detected No other actionable mutations noted; full report to be filed.   Germline Testing: No clinically significant mutation on [employer] testing [de-identified] : ER 90%, [place] 2%, HER2 2+, CISH negative [FreeTextEntry1] : Metastatic Tx: 1st LIne Ibrance/letrozole 6/2021 - 4/2022 - [place] 2nd line Everolimus/fulvestrant - 5/2022 - 12/2022 - [place] - developed pneumonitis on afinitor 3rd line: capecitabine 1/19/2023 - 8/2023 [place] 4th line: Doxil 9/2023  [de-identified] : 8/28/2023 Patient presents today to discuss recent progression of disease on PEt/CT scan.  - Started capecitabine (3rd line tx MBC) on 1/20/2023 now with clinical and radiographic progression of disease  Patient denies any SOB, CP, abdominal pain, bone pain, headache, or unexplained weight loss Patient denies fever, chills, nausea/vomiting, diarrhea/constipation, neuropathy, headache. c/o tightness and discomfort in hands and feet   - PET/CT scan 8/19/2023 1. Persistent hypermetabolic tissue in the mid anterior chest wall and bilateral hypermetabolic subcutaneous nodules in the breasts increased in size and intensity of activity. Bilateral hypermetabolic axillary nodes also increased in size. Findings suggestive of disease progression. 2. Multiple lung nodules with few that shows increased size and one with increased activity, suggestive of disease progression. 3. Innumerable osteoblastic lesions with scattered patches of activity where some sites may reflect lingering disease  - CEA and CA 27.29 rising   Has financial concerns:  on [employer] disability; considering returning to work 1-2 days per week once she is stabilized

## 2023-08-28 NOTE — PHYSICAL EXAM
[Fully active, able to carry on all pre-disease performance without restriction] : Status 0 - Fully active, able to carry on all pre-disease performance without restriction [Normal] : affect appropriate [de-identified] : (deferred 8/28/2023) left breast 9:00-10:00 2 cm nodular mass palpated unchanged;   s/p healed ulceration 5x3cm induration and persistent lobular changes and now with retraction of skin; small ulceration healed now ; no discharge at this time.  ;   Left axillary adenopathy** [de-identified] : 2 cm Left axillary node (NEW);  Left hand swollen diffusely and extending up forearm; edema noted (no change) ;   [de-identified] : hyperpigmentation of tongue and palms of hands; bilateral palms and soles - skin sloughing with exposed

## 2023-08-28 NOTE — REVIEW OF SYSTEMS
[Fatigue] : fatigue [Negative] : Allergic/Immunologic [Mucosal Pain] : no mucosal pain [Cough] : no cough [SOB on Exertion] : no shortness of breath during exertion [FreeTextEntry7] : more frequent BM 2-3 x day [de-identified] : nodularity of chest wall lesion -;  very dry hands and peeling skin better with lower dose [de-identified] : left upper extremity lymphedema - unchanged

## 2023-08-30 RX ORDER — SENNOSIDES/DOCUSATE SODIUM 8.6MG-50MG
1 TABLET ORAL
Qty: 0 | Refills: 0 | DISCHARGE

## 2023-08-30 RX ORDER — OXYCODONE HYDROCHLORIDE 5 MG/1
1 TABLET ORAL
Qty: 0 | Refills: 0 | DISCHARGE

## 2023-08-31 RX ORDER — LIDOCAINE AND PRILOCAINE 25; 25 MG/G; MG/G
2.5-2.5 CREAM TOPICAL
Qty: 20 | Refills: 2 | Status: ACTIVE | COMMUNITY
Start: 2023-08-31 | End: 1900-01-01

## 2023-09-05 ENCOUNTER — APPOINTMENT (OUTPATIENT)
Dept: CARDIOLOGY | Facility: CLINIC | Age: 58
End: 2023-09-05
Payer: MEDICARE

## 2023-09-05 PROCEDURE — 93306 TTE W/DOPPLER COMPLETE: CPT

## 2023-09-11 ENCOUNTER — OUTPATIENT (OUTPATIENT)
Dept: OUTPATIENT SERVICES | Facility: HOSPITAL | Age: 58
LOS: 1 days | End: 2023-09-11
Payer: COMMERCIAL

## 2023-09-11 ENCOUNTER — TRANSCRIPTION ENCOUNTER (OUTPATIENT)
Age: 58
End: 2023-09-11

## 2023-09-11 ENCOUNTER — RESULT REVIEW (OUTPATIENT)
Age: 58
End: 2023-09-11

## 2023-09-11 VITALS
RESPIRATION RATE: 16 BRPM | SYSTOLIC BLOOD PRESSURE: 117 MMHG | DIASTOLIC BLOOD PRESSURE: 93 MMHG | HEART RATE: 95 BPM | TEMPERATURE: 98 F | OXYGEN SATURATION: 94 % | HEIGHT: 63 IN | WEIGHT: 121.03 LBS

## 2023-09-11 VITALS
OXYGEN SATURATION: 100 % | RESPIRATION RATE: 18 BRPM | DIASTOLIC BLOOD PRESSURE: 85 MMHG | HEART RATE: 94 BPM | SYSTOLIC BLOOD PRESSURE: 126 MMHG

## 2023-09-11 DIAGNOSIS — C50.919 MALIGNANT NEOPLASM OF UNSPECIFIED SITE OF UNSPECIFIED FEMALE BREAST: ICD-10-CM

## 2023-09-11 DIAGNOSIS — Z98.890 OTHER SPECIFIED POSTPROCEDURAL STATES: Chronic | ICD-10-CM

## 2023-09-11 PROCEDURE — 77001 FLUOROGUIDE FOR VEIN DEVICE: CPT | Mod: 26

## 2023-09-11 PROCEDURE — 76937 US GUIDE VASCULAR ACCESS: CPT

## 2023-09-11 PROCEDURE — C1788: CPT

## 2023-09-11 PROCEDURE — 76937 US GUIDE VASCULAR ACCESS: CPT | Mod: 26

## 2023-09-11 PROCEDURE — C1769: CPT

## 2023-09-11 PROCEDURE — 77001 FLUOROGUIDE FOR VEIN DEVICE: CPT

## 2023-09-11 PROCEDURE — 36561 INSERT TUNNELED CV CATH: CPT

## 2023-09-11 PROCEDURE — C1894: CPT

## 2023-09-11 RX ORDER — LETROZOLE 2.5 MG/1
1 TABLET, FILM COATED ORAL
Qty: 0 | Refills: 0 | DISCHARGE

## 2023-09-11 NOTE — PRE PROCEDURE NOTE - PRE PROCEDURE EVALUATION
Interventional Radiology    HPI: 58y Female with metastatic left breast ca presents for chest wall port placement.    Allergies: No Known Allergies    Medications (Abx/Cardiac/Anticoagulation/Blood Products)      Data:  160  54.9  T(C): 36.4  HR: 95  BP: 117/93  RR: 16  SpO2: 94%    Exam  General: No acute distress  Chest: Non labored breathing  Abdomen: Non-distended  Extremities: No swelling, warm          Imaging:     Plan:   chest port placement   -- Relevant imaging and labs were reviewed.   -- No additional antibiotics are indicated for this procedure.   -- Risks, benefits, and alternatives were explained to the patient and informed consent was obtained.

## 2023-09-11 NOTE — PROCEDURE NOTE - PROCEDURE FINDINGS AND DETAILS
Successful right chest wall port placement, with tip terminating in the superior atriocaval junction.

## 2023-09-11 NOTE — H&P ADULT - HISTORY OF PRESENT ILLNESS
Interventional Radiology    HPI: 58y Female with left breast cancer presents for chest wall port placement.     Review of Systems:   Constitutional: No fever, weight loss, or fatigue  Neurological: No headaches, memory loss, loss of strength, numbness, or tremors  Respiratory: No cough, wheezing, chills or hemoptysis; No shortness of breath  Cardiovascular: No chest pain, palpitations, dizziness, or leg swelling  Gastrointestinal: No abdominal or epigastric pain. No nausea, vomiting, or hematemesis; No diarrhea or constipation. No melena or hematochezia.  Skin: No itching, burning, rashes, or lesions   Musculoskeletal: No joint pain or swelling; No muscle, back, or extremity pain    Allergies: No Known Allergies    Medications (Abx/Cardiac/Anticoagulation/Blood Products)      Data:  160  54.9  T(C): 36.4  HR: 95  BP: 117/93  RR: 16  SpO2: 94%            Physical Exam  General: No acute distress, nontoxic, A&Ox3  Chest: Non labored breathing  Abdomen: Non-distended, non-tender, no preitoneal signs  Extremities: No swelling, warm, No pedal edema or calf tenderness notes.  Skin: No rashes or lesions    RADIOLOGY & ADDITIONAL TESTS:    Imaging Reviewed      Plan: 58y Female presents for chest wall port placement   -Risks/Benefits/alternatives explained with the patient and/or healthcare proxy and witnessed informed consent obtained.

## 2023-09-11 NOTE — ASU DISCHARGE PLAN (ADULT/PEDIATRIC) - NS MD DC FALL RISK RISK
For information on Fall & Injury Prevention, visit: [email or web address] OR  [email or web address] OR  https://www.cdc.gov/steadi/patient.html

## 2023-09-11 NOTE — PROCEDURE NOTE - ACCESS SITE (IF APPLICABLE)
Problem: Falls - Risk of:  Goal: Will remain free from falls  Description: Will remain free from falls. Fall precautions in place. Bed is in lowest position, wheels locked, alarm on, non-skid socks on. Call light and bedside table within reach. Patient calls out appropriately. Patient is up x1 SBA with/without a walker. Will continue to assess and monitor.    7/7/2021 0745 by Marjorie Das RN  Outcome: Ongoing Right/Internal Jugular/Vein

## 2023-09-11 NOTE — ASU DISCHARGE PLAN (ADULT/PEDIATRIC) - ASU DC SPECIAL INSTRUCTIONSFT
Chest Port Placement    Discharge Instructions  - You have had a chest port implanted in your chest.   - The port is ready for use.  - You may shower in 48 hours. No soaking or swimming for 2 weeks or until the site is completely healed.  - Keep the area covered and dry for the next 7 days. It may be removed by a chemotherapy nurse as needed for treatment.  - Do not perform any heavy lifting or put tension on the area for the next week or until the site is healed.  - The skin glue (Dermabond) on your skin will act as a scab, allow it to fall off on its own over the next 1-3 weeks.  - You may resume your normal diet.  - You may resume your normal medications however you should wait 48 hours before restarting aspirin, plavix, or blood thinners.  - It is normal to experience some pain over the site for the next few days. You may take apply ice to the area (20 minutes on, 20 minutes off) and take Tylenol for that pain. Do not take more frequently than every 6 hours and do not exceed more than 3000mg of Tylenol in a 24 hour period.    - You were given conscious sedation which may make you drowsy, therefore you need someone to stay with you until the morning following the procedure.  - Do not drive, engage in heavy lifting or strenuous activity, or drink any alcoholic beverages for the next 24 hours.   - You may resume normal activity in 24 hours.    Notify your primary physician and/or Interventional Radiology IMMEDIATELY if you experience any of the following       - Fever of 101F or 38C       - Chills or Rigors/ Shakes       - Swelling and/or Redness in the area around the port       - Worsening Pain       - Blood soaked bandages or worsening bleeding       - Lightheadedness and/or dizziness upon standing       - Chest Pain/ Tightness       - Shortness of Breath       - Difficulty walking    If you have a problem that you believe requires IMMEDIATE attention, please go to your NEAREST Emergency Room. If you believe your problem can safely wait until you speak to a physician, please call Interventional Radiology for any concerns.    During Normal Weekday Business Hours- You can contact the Interventional Radiology department during normal business hours via telephone.  During Evenings and Weekends- If you need to contact Interventional Radiology during off hours, do so by calling the hospital and requesting to be connected to the Interventional Radiologist on call. Chest Port Placement    Discharge Instructions  - You have had a chest port implanted in your chest.   - The port is ready for use.  - You may shower in 48 hours. No soaking or swimming for 2 weeks or until the site is completely healed.  - Keep the area covered and dry for the next 7 days. It may be removed by a chemotherapy nurse as needed for treatment.  - Do not perform any heavy lifting or put tension on the area for the next week or until the site is healed.  - The skin glue (Dermabond) on your skin will act as a scab, allow it to fall off on its own over the next 1-3 weeks.  - You may resume your normal diet.  - You may resume your normal medications however you should wait 48 hours before restarting aspirin, plavix, or blood thinners.  - It is normal to experience some pain over the site for the next few days. You may take apply ice to the area (20 minutes on, 20 minutes off) and take Tylenol for that pain. Do not take more frequently than every 6 hours and do not exceed more than 3000mg of Tylenol in a 24 hour period.    - You were given conscious sedation which may make you drowsy, therefore you need someone to stay with you until the morning following the procedure.  - Do not drive, engage in heavy lifting or strenuous activity, or drink any alcoholic beverages for the next 24 hours.   - You may resume normal activity in 24 hours.    Notify your primary physician and/or Interventional Radiology IMMEDIATELY if you experience any of the following       - Fever of 101F or 38C       - Chills or Rigors/ Shakes       - Swelling and/or Redness in the area around the port       - Worsening Pain       - Blood soaked bandages or worsening bleeding       - Lightheadedness and/or dizziness upon standing       - Chest Pain/ Tightness       - Shortness of Breath       - Difficulty walking    If you have a problem that you believe requires IMMEDIATE attention, please go to your NEAREST Emergency Room. If you believe your problem can safely wait until you speak to a physician, please call Interventional Radiology for any concerns.    During Normal Weekday Business Hours- You can contact the Interventional Radiology department during normal business hours via telephone.  During Evenings and Weekends- If you need to contact Interventional Radiology during off hours, do so by calling the hospital and requesting to be connected to the Interventional Radiologist on call.  Please feel free to contact us at [phone] if any problems arise. After 6PM, Monday through Friday, on weekends and on holidays, please call [phone] and ask for the radiology resident on call to be paged.

## 2023-09-12 ENCOUNTER — NON-APPOINTMENT (OUTPATIENT)
Age: 58
End: 2023-09-12

## 2023-09-15 ENCOUNTER — RESULT REVIEW (OUTPATIENT)
Age: 58
End: 2023-09-15

## 2023-09-15 ENCOUNTER — NON-APPOINTMENT (OUTPATIENT)
Age: 58
End: 2023-09-15

## 2023-09-15 ENCOUNTER — APPOINTMENT (OUTPATIENT)
Dept: INFUSION THERAPY | Facility: HOSPITAL | Age: 58
End: 2023-09-15

## 2023-09-15 ENCOUNTER — APPOINTMENT (OUTPATIENT)
Dept: HEMATOLOGY ONCOLOGY | Facility: CLINIC | Age: 58
End: 2023-09-15

## 2023-09-15 LAB
ALBUMIN SERPL ELPH-MCNC: 3.6 G/DL — SIGNIFICANT CHANGE UP (ref 3.3–5)
ALP SERPL-CCNC: 230 U/L — HIGH (ref 40–120)
ALT FLD-CCNC: 30 U/L — SIGNIFICANT CHANGE UP (ref 10–45)
ANION GAP SERPL CALC-SCNC: 10 MMOL/L — SIGNIFICANT CHANGE UP (ref 5–17)
AST SERPL-CCNC: 34 U/L — SIGNIFICANT CHANGE UP (ref 10–40)
BASOPHILS # BLD AUTO: 0 K/UL — SIGNIFICANT CHANGE UP (ref 0–0.2)
BASOPHILS NFR BLD AUTO: 0 % — SIGNIFICANT CHANGE UP (ref 0–2)
BILIRUB SERPL-MCNC: 0.4 MG/DL — SIGNIFICANT CHANGE UP (ref 0.2–1.2)
BUN SERPL-MCNC: 14 MG/DL — SIGNIFICANT CHANGE UP (ref 7–23)
CALCIUM SERPL-MCNC: 9 MG/DL — SIGNIFICANT CHANGE UP (ref 8.4–10.5)
CHLORIDE SERPL-SCNC: 104 MMOL/L — SIGNIFICANT CHANGE UP (ref 96–108)
CO2 SERPL-SCNC: 27 MMOL/L — SIGNIFICANT CHANGE UP (ref 22–31)
CREAT SERPL-MCNC: 0.57 MG/DL — SIGNIFICANT CHANGE UP (ref 0.5–1.3)
EGFR: 105 ML/MIN/1.73M2 — SIGNIFICANT CHANGE UP
EOSINOPHIL # BLD AUTO: 0.15 K/UL — SIGNIFICANT CHANGE UP (ref 0–0.5)
EOSINOPHIL NFR BLD AUTO: 2 % — SIGNIFICANT CHANGE UP (ref 0–6)
GLUCOSE SERPL-MCNC: 127 MG/DL — HIGH (ref 70–99)
HCT VFR BLD CALC: 38.8 % — SIGNIFICANT CHANGE UP (ref 34.5–45)
HGB BLD-MCNC: 13.1 G/DL — SIGNIFICANT CHANGE UP (ref 11.5–15.5)
LYMPHOCYTES # BLD AUTO: 1.32 K/UL — SIGNIFICANT CHANGE UP (ref 1–3.3)
LYMPHOCYTES # BLD AUTO: 18 % — SIGNIFICANT CHANGE UP (ref 13–44)
MCHC RBC-ENTMCNC: 33.3 PG — SIGNIFICANT CHANGE UP (ref 27–34)
MCHC RBC-ENTMCNC: 33.8 G/DL — SIGNIFICANT CHANGE UP (ref 32–36)
MCV RBC AUTO: 98.7 FL — SIGNIFICANT CHANGE UP (ref 80–100)
MONOCYTES # BLD AUTO: 1.32 K/UL — HIGH (ref 0–0.9)
MONOCYTES NFR BLD AUTO: 18 % — HIGH (ref 2–14)
NEUTROPHILS # BLD AUTO: 4.56 K/UL — SIGNIFICANT CHANGE UP (ref 1.8–7.4)
NEUTROPHILS NFR BLD AUTO: 62 % — SIGNIFICANT CHANGE UP (ref 43–77)
NRBC # BLD: 0 /100 — SIGNIFICANT CHANGE UP (ref 0–0)
NRBC # BLD: SIGNIFICANT CHANGE UP /100 WBCS (ref 0–0)
PLAT MORPH BLD: NORMAL — SIGNIFICANT CHANGE UP
PLATELET # BLD AUTO: 170 K/UL — SIGNIFICANT CHANGE UP (ref 150–400)
POTASSIUM SERPL-MCNC: 3.6 MMOL/L — SIGNIFICANT CHANGE UP (ref 3.5–5.3)
POTASSIUM SERPL-SCNC: 3.6 MMOL/L — SIGNIFICANT CHANGE UP (ref 3.5–5.3)
PROT SERPL-MCNC: 6.5 G/DL — SIGNIFICANT CHANGE UP (ref 6–8.3)
RBC # BLD: 3.93 M/UL — SIGNIFICANT CHANGE UP (ref 3.8–5.2)
RBC # FLD: 15 % — HIGH (ref 10.3–14.5)
RBC BLD AUTO: SIGNIFICANT CHANGE UP
SODIUM SERPL-SCNC: 140 MMOL/L — SIGNIFICANT CHANGE UP (ref 135–145)
WBC # BLD: 7.35 K/UL — SIGNIFICANT CHANGE UP (ref 3.8–10.5)
WBC # FLD AUTO: 7.35 K/UL — SIGNIFICANT CHANGE UP (ref 3.8–10.5)

## 2023-09-18 ENCOUNTER — APPOINTMENT (OUTPATIENT)
Dept: ENDOVASCULAR SURGERY | Facility: CLINIC | Age: 58
End: 2023-09-18

## 2023-09-18 DIAGNOSIS — Z51.11 ENCOUNTER FOR ANTINEOPLASTIC CHEMOTHERAPY: ICD-10-CM

## 2023-09-18 DIAGNOSIS — Z45.2 ENCOUNTER FOR ADJUSTMENT AND MANAGEMENT OF VASCULAR ACCESS DEVICE: ICD-10-CM

## 2023-09-18 DIAGNOSIS — R11.2 NAUSEA WITH VOMITING, UNSPECIFIED: ICD-10-CM

## 2023-09-22 ENCOUNTER — APPOINTMENT (OUTPATIENT)
Dept: CARDIOLOGY | Facility: CLINIC | Age: 58
End: 2023-09-22

## 2023-09-26 ENCOUNTER — RESULT REVIEW (OUTPATIENT)
Age: 58
End: 2023-09-26

## 2023-09-26 ENCOUNTER — APPOINTMENT (OUTPATIENT)
Dept: HEMATOLOGY ONCOLOGY | Facility: CLINIC | Age: 58
End: 2023-09-26
Payer: MEDICARE

## 2023-09-26 VITALS
HEART RATE: 104 BPM | OXYGEN SATURATION: 98 % | RESPIRATION RATE: 16 BRPM | SYSTOLIC BLOOD PRESSURE: 120 MMHG | DIASTOLIC BLOOD PRESSURE: 80 MMHG | WEIGHT: 117.95 LBS | BODY MASS INDEX: 20.89 KG/M2 | TEMPERATURE: 97.4 F

## 2023-09-26 DIAGNOSIS — Z91.89 OTHER SPECIFIED PERSONAL RISK FACTORS, NOT ELSEWHERE CLASSIFIED: ICD-10-CM

## 2023-09-26 DIAGNOSIS — L27.1 LOCALIZED SKIN ERUPTION DUE TO DRUGS AND MEDICAMENTS TAKEN INTERNALLY: ICD-10-CM

## 2023-09-26 LAB
BASOPHILS # BLD AUTO: 0.06 K/UL — SIGNIFICANT CHANGE UP (ref 0–0.2)
BASOPHILS NFR BLD AUTO: 1.1 % — SIGNIFICANT CHANGE UP (ref 0–2)
EOSINOPHIL # BLD AUTO: 0.06 K/UL — SIGNIFICANT CHANGE UP (ref 0–0.5)
EOSINOPHIL NFR BLD AUTO: 1.1 % — SIGNIFICANT CHANGE UP (ref 0–6)
HCT VFR BLD CALC: 40.6 % — SIGNIFICANT CHANGE UP (ref 34.5–45)
HGB BLD-MCNC: 13.2 G/DL — SIGNIFICANT CHANGE UP (ref 11.5–15.5)
IMM GRANULOCYTES NFR BLD AUTO: 1.1 % — HIGH (ref 0–0.9)
LYMPHOCYTES # BLD AUTO: 0.84 K/UL — LOW (ref 1–3.3)
LYMPHOCYTES # BLD AUTO: 15.4 % — SIGNIFICANT CHANGE UP (ref 13–44)
MCHC RBC-ENTMCNC: 32 PG — SIGNIFICANT CHANGE UP (ref 27–34)
MCHC RBC-ENTMCNC: 32.5 G/DL — SIGNIFICANT CHANGE UP (ref 32–36)
MCV RBC AUTO: 98.3 FL — SIGNIFICANT CHANGE UP (ref 80–100)
MONOCYTES # BLD AUTO: 0.25 K/UL — SIGNIFICANT CHANGE UP (ref 0–0.9)
MONOCYTES NFR BLD AUTO: 4.6 % — SIGNIFICANT CHANGE UP (ref 2–14)
NEUTROPHILS # BLD AUTO: 4.2 K/UL — SIGNIFICANT CHANGE UP (ref 1.8–7.4)
NEUTROPHILS NFR BLD AUTO: 76.7 % — SIGNIFICANT CHANGE UP (ref 43–77)
NRBC # BLD: 0 /100 WBCS — SIGNIFICANT CHANGE UP (ref 0–0)
PLATELET # BLD AUTO: 176 K/UL — SIGNIFICANT CHANGE UP (ref 150–400)
RBC # BLD: 4.13 M/UL — SIGNIFICANT CHANGE UP (ref 3.8–5.2)
RBC # FLD: 14.2 % — SIGNIFICANT CHANGE UP (ref 10.3–14.5)
WBC # BLD: 5.47 K/UL — SIGNIFICANT CHANGE UP (ref 3.8–10.5)
WBC # FLD AUTO: 5.47 K/UL — SIGNIFICANT CHANGE UP (ref 3.8–10.5)

## 2023-09-26 PROCEDURE — 99214 OFFICE O/P EST MOD 30 MIN: CPT

## 2023-10-10 ENCOUNTER — NON-APPOINTMENT (OUTPATIENT)
Age: 58
End: 2023-10-10

## 2023-10-12 ENCOUNTER — NON-APPOINTMENT (OUTPATIENT)
Age: 58
End: 2023-10-12

## 2023-10-13 ENCOUNTER — RESULT REVIEW (OUTPATIENT)
Age: 58
End: 2023-10-13

## 2023-10-13 ENCOUNTER — NON-APPOINTMENT (OUTPATIENT)
Age: 58
End: 2023-10-13

## 2023-10-13 ENCOUNTER — APPOINTMENT (OUTPATIENT)
Dept: HEMATOLOGY ONCOLOGY | Facility: CLINIC | Age: 58
End: 2023-10-13
Payer: MEDICARE

## 2023-10-13 ENCOUNTER — APPOINTMENT (OUTPATIENT)
Dept: INFUSION THERAPY | Facility: HOSPITAL | Age: 58
End: 2023-10-13

## 2023-10-13 LAB
ALBUMIN SERPL ELPH-MCNC: 4.1 G/DL — SIGNIFICANT CHANGE UP (ref 3.3–5)
ALP SERPL-CCNC: 145 U/L — HIGH (ref 40–120)
ALT FLD-CCNC: 12 U/L — SIGNIFICANT CHANGE UP (ref 10–45)
ANION GAP SERPL CALC-SCNC: 8 MMOL/L — SIGNIFICANT CHANGE UP (ref 5–17)
AST SERPL-CCNC: 33 U/L — SIGNIFICANT CHANGE UP (ref 10–40)
BASOPHILS # BLD AUTO: 0.03 K/UL — SIGNIFICANT CHANGE UP (ref 0–0.2)
BASOPHILS NFR BLD AUTO: 0.5 % — SIGNIFICANT CHANGE UP (ref 0–2)
BILIRUB SERPL-MCNC: <0.2 MG/DL — SIGNIFICANT CHANGE UP (ref 0.2–1.2)
BUN SERPL-MCNC: 13 MG/DL — SIGNIFICANT CHANGE UP (ref 7–23)
CALCIUM SERPL-MCNC: 8.9 MG/DL — SIGNIFICANT CHANGE UP (ref 8.4–10.5)
CHLORIDE SERPL-SCNC: 107 MMOL/L — SIGNIFICANT CHANGE UP (ref 96–108)
CO2 SERPL-SCNC: 25 MMOL/L — SIGNIFICANT CHANGE UP (ref 22–31)
CREAT SERPL-MCNC: 0.6 MG/DL — SIGNIFICANT CHANGE UP (ref 0.5–1.3)
EGFR: 104 ML/MIN/1.73M2 — SIGNIFICANT CHANGE UP
EOSINOPHIL # BLD AUTO: 0.01 K/UL — SIGNIFICANT CHANGE UP (ref 0–0.5)
EOSINOPHIL NFR BLD AUTO: 0.2 % — SIGNIFICANT CHANGE UP (ref 0–6)
GLUCOSE SERPL-MCNC: 112 MG/DL — HIGH (ref 70–99)
HCT VFR BLD CALC: 37.4 % — SIGNIFICANT CHANGE UP (ref 34.5–45)
HGB BLD-MCNC: 12.8 G/DL — SIGNIFICANT CHANGE UP (ref 11.5–15.5)
IMM GRANULOCYTES NFR BLD AUTO: 0.2 % — SIGNIFICANT CHANGE UP (ref 0–0.9)
LYMPHOCYTES # BLD AUTO: 1.08 K/UL — SIGNIFICANT CHANGE UP (ref 1–3.3)
LYMPHOCYTES # BLD AUTO: 18.8 % — SIGNIFICANT CHANGE UP (ref 13–44)
MCHC RBC-ENTMCNC: 32.7 PG — SIGNIFICANT CHANGE UP (ref 27–34)
MCHC RBC-ENTMCNC: 34.2 G/DL — SIGNIFICANT CHANGE UP (ref 32–36)
MCV RBC AUTO: 95.7 FL — SIGNIFICANT CHANGE UP (ref 80–100)
MONOCYTES # BLD AUTO: 1.18 K/UL — HIGH (ref 0–0.9)
MONOCYTES NFR BLD AUTO: 20.6 % — HIGH (ref 2–14)
NEUTROPHILS # BLD AUTO: 3.42 K/UL — SIGNIFICANT CHANGE UP (ref 1.8–7.4)
NEUTROPHILS NFR BLD AUTO: 59.7 % — SIGNIFICANT CHANGE UP (ref 43–77)
NRBC # BLD: 0 /100 WBCS — SIGNIFICANT CHANGE UP (ref 0–0)
PLATELET # BLD AUTO: 224 K/UL — SIGNIFICANT CHANGE UP (ref 150–400)
POTASSIUM SERPL-MCNC: 3.9 MMOL/L — SIGNIFICANT CHANGE UP (ref 3.5–5.3)
POTASSIUM SERPL-SCNC: 3.9 MMOL/L — SIGNIFICANT CHANGE UP (ref 3.5–5.3)
PROT SERPL-MCNC: 6.9 G/DL — SIGNIFICANT CHANGE UP (ref 6–8.3)
RBC # BLD: 3.91 M/UL — SIGNIFICANT CHANGE UP (ref 3.8–5.2)
RBC # FLD: 13.6 % — SIGNIFICANT CHANGE UP (ref 10.3–14.5)
SODIUM SERPL-SCNC: 140 MMOL/L — SIGNIFICANT CHANGE UP (ref 135–145)
WBC # BLD: 5.73 K/UL — SIGNIFICANT CHANGE UP (ref 3.8–10.5)
WBC # FLD AUTO: 5.73 K/UL — SIGNIFICANT CHANGE UP (ref 3.8–10.5)

## 2023-10-13 PROCEDURE — 99214 OFFICE O/P EST MOD 30 MIN: CPT

## 2023-10-26 ENCOUNTER — APPOINTMENT (OUTPATIENT)
Dept: SURGICAL ONCOLOGY | Facility: CLINIC | Age: 58
End: 2023-10-26
Payer: MEDICARE

## 2023-10-26 VITALS
HEART RATE: 95 BPM | DIASTOLIC BLOOD PRESSURE: 80 MMHG | OXYGEN SATURATION: 99 % | WEIGHT: 117 LBS | HEIGHT: 63 IN | BODY MASS INDEX: 20.73 KG/M2 | SYSTOLIC BLOOD PRESSURE: 126 MMHG

## 2023-10-26 PROCEDURE — 99214 OFFICE O/P EST MOD 30 MIN: CPT

## 2023-11-01 ENCOUNTER — OUTPATIENT (OUTPATIENT)
Dept: OUTPATIENT SERVICES | Facility: HOSPITAL | Age: 58
LOS: 1 days | Discharge: HOME | End: 2023-11-01

## 2023-11-01 DIAGNOSIS — Z98.890 OTHER SPECIFIED POSTPROCEDURAL STATES: Chronic | ICD-10-CM

## 2023-11-01 DIAGNOSIS — C50.919 MALIGNANT NEOPLASM OF UNSPECIFIED SITE OF UNSPECIFIED FEMALE BREAST: ICD-10-CM

## 2023-11-01 DIAGNOSIS — C79.51 SECONDARY MALIGNANT NEOPLASM OF BONE: ICD-10-CM

## 2023-11-06 ENCOUNTER — NON-APPOINTMENT (OUTPATIENT)
Age: 58
End: 2023-11-06

## 2023-11-10 ENCOUNTER — RESULT REVIEW (OUTPATIENT)
Age: 58
End: 2023-11-10

## 2023-11-10 ENCOUNTER — APPOINTMENT (OUTPATIENT)
Dept: INFUSION THERAPY | Facility: HOSPITAL | Age: 58
End: 2023-11-10

## 2023-11-10 ENCOUNTER — APPOINTMENT (OUTPATIENT)
Dept: HEMATOLOGY ONCOLOGY | Facility: CLINIC | Age: 58
End: 2023-11-10

## 2023-11-10 LAB
ALBUMIN SERPL ELPH-MCNC: 3.9 G/DL — SIGNIFICANT CHANGE UP (ref 3.3–5)
ALBUMIN SERPL ELPH-MCNC: 3.9 G/DL — SIGNIFICANT CHANGE UP (ref 3.3–5)
ALP SERPL-CCNC: 193 U/L — HIGH (ref 40–120)
ALP SERPL-CCNC: 193 U/L — HIGH (ref 40–120)
ALT FLD-CCNC: <5 U/L — LOW (ref 10–45)
ALT FLD-CCNC: <5 U/L — LOW (ref 10–45)
ANION GAP SERPL CALC-SCNC: 11 MMOL/L — SIGNIFICANT CHANGE UP (ref 5–17)
ANION GAP SERPL CALC-SCNC: 11 MMOL/L — SIGNIFICANT CHANGE UP (ref 5–17)
AST SERPL-CCNC: 33 U/L — SIGNIFICANT CHANGE UP (ref 10–40)
AST SERPL-CCNC: 33 U/L — SIGNIFICANT CHANGE UP (ref 10–40)
BASOPHILS # BLD AUTO: 0.05 K/UL — SIGNIFICANT CHANGE UP (ref 0–0.2)
BASOPHILS # BLD AUTO: 0.05 K/UL — SIGNIFICANT CHANGE UP (ref 0–0.2)
BASOPHILS NFR BLD AUTO: 1 % — SIGNIFICANT CHANGE UP (ref 0–2)
BASOPHILS NFR BLD AUTO: 1 % — SIGNIFICANT CHANGE UP (ref 0–2)
BILIRUB SERPL-MCNC: <0.2 MG/DL — SIGNIFICANT CHANGE UP (ref 0.2–1.2)
BILIRUB SERPL-MCNC: <0.2 MG/DL — SIGNIFICANT CHANGE UP (ref 0.2–1.2)
BUN SERPL-MCNC: 19 MG/DL — SIGNIFICANT CHANGE UP (ref 7–23)
BUN SERPL-MCNC: 19 MG/DL — SIGNIFICANT CHANGE UP (ref 7–23)
CALCIUM SERPL-MCNC: 9.9 MG/DL — SIGNIFICANT CHANGE UP (ref 8.4–10.5)
CALCIUM SERPL-MCNC: 9.9 MG/DL — SIGNIFICANT CHANGE UP (ref 8.4–10.5)
CEA SERPL-MCNC: 14.8 NG/ML — HIGH (ref 0–3.8)
CEA SERPL-MCNC: 14.8 NG/ML — HIGH (ref 0–3.8)
CHLORIDE SERPL-SCNC: 100 MMOL/L — SIGNIFICANT CHANGE UP (ref 96–108)
CHLORIDE SERPL-SCNC: 100 MMOL/L — SIGNIFICANT CHANGE UP (ref 96–108)
CO2 SERPL-SCNC: 25 MMOL/L — SIGNIFICANT CHANGE UP (ref 22–31)
CO2 SERPL-SCNC: 25 MMOL/L — SIGNIFICANT CHANGE UP (ref 22–31)
CREAT SERPL-MCNC: 0.72 MG/DL — SIGNIFICANT CHANGE UP (ref 0.5–1.3)
CREAT SERPL-MCNC: 0.72 MG/DL — SIGNIFICANT CHANGE UP (ref 0.5–1.3)
EGFR: 97 ML/MIN/1.73M2 — SIGNIFICANT CHANGE UP
EGFR: 97 ML/MIN/1.73M2 — SIGNIFICANT CHANGE UP
EOSINOPHIL # BLD AUTO: 0.06 K/UL — SIGNIFICANT CHANGE UP (ref 0–0.5)
EOSINOPHIL # BLD AUTO: 0.06 K/UL — SIGNIFICANT CHANGE UP (ref 0–0.5)
EOSINOPHIL NFR BLD AUTO: 1.2 % — SIGNIFICANT CHANGE UP (ref 0–6)
EOSINOPHIL NFR BLD AUTO: 1.2 % — SIGNIFICANT CHANGE UP (ref 0–6)
GLUCOSE SERPL-MCNC: 75 MG/DL — SIGNIFICANT CHANGE UP (ref 70–99)
GLUCOSE SERPL-MCNC: 75 MG/DL — SIGNIFICANT CHANGE UP (ref 70–99)
HCT VFR BLD CALC: 40.2 % — SIGNIFICANT CHANGE UP (ref 34.5–45)
HCT VFR BLD CALC: 40.2 % — SIGNIFICANT CHANGE UP (ref 34.5–45)
HGB BLD-MCNC: 13.5 G/DL — SIGNIFICANT CHANGE UP (ref 11.5–15.5)
HGB BLD-MCNC: 13.5 G/DL — SIGNIFICANT CHANGE UP (ref 11.5–15.5)
IMM GRANULOCYTES NFR BLD AUTO: 0.2 % — SIGNIFICANT CHANGE UP (ref 0–0.9)
IMM GRANULOCYTES NFR BLD AUTO: 0.2 % — SIGNIFICANT CHANGE UP (ref 0–0.9)
LYMPHOCYTES # BLD AUTO: 1.19 K/UL — SIGNIFICANT CHANGE UP (ref 1–3.3)
LYMPHOCYTES # BLD AUTO: 1.19 K/UL — SIGNIFICANT CHANGE UP (ref 1–3.3)
LYMPHOCYTES # BLD AUTO: 24.6 % — SIGNIFICANT CHANGE UP (ref 13–44)
LYMPHOCYTES # BLD AUTO: 24.6 % — SIGNIFICANT CHANGE UP (ref 13–44)
MCHC RBC-ENTMCNC: 31.1 PG — SIGNIFICANT CHANGE UP (ref 27–34)
MCHC RBC-ENTMCNC: 31.1 PG — SIGNIFICANT CHANGE UP (ref 27–34)
MCHC RBC-ENTMCNC: 33.6 G/DL — SIGNIFICANT CHANGE UP (ref 32–36)
MCHC RBC-ENTMCNC: 33.6 G/DL — SIGNIFICANT CHANGE UP (ref 32–36)
MCV RBC AUTO: 92.6 FL — SIGNIFICANT CHANGE UP (ref 80–100)
MCV RBC AUTO: 92.6 FL — SIGNIFICANT CHANGE UP (ref 80–100)
MONOCYTES # BLD AUTO: 1.01 K/UL — HIGH (ref 0–0.9)
MONOCYTES # BLD AUTO: 1.01 K/UL — HIGH (ref 0–0.9)
MONOCYTES NFR BLD AUTO: 20.9 % — HIGH (ref 2–14)
MONOCYTES NFR BLD AUTO: 20.9 % — HIGH (ref 2–14)
NEUTROPHILS # BLD AUTO: 2.51 K/UL — SIGNIFICANT CHANGE UP (ref 1.8–7.4)
NEUTROPHILS # BLD AUTO: 2.51 K/UL — SIGNIFICANT CHANGE UP (ref 1.8–7.4)
NEUTROPHILS NFR BLD AUTO: 52.1 % — SIGNIFICANT CHANGE UP (ref 43–77)
NEUTROPHILS NFR BLD AUTO: 52.1 % — SIGNIFICANT CHANGE UP (ref 43–77)
NRBC # BLD: 0 /100 WBCS — SIGNIFICANT CHANGE UP (ref 0–0)
NRBC # BLD: 0 /100 WBCS — SIGNIFICANT CHANGE UP (ref 0–0)
PLATELET # BLD AUTO: 230 K/UL — SIGNIFICANT CHANGE UP (ref 150–400)
PLATELET # BLD AUTO: 230 K/UL — SIGNIFICANT CHANGE UP (ref 150–400)
POTASSIUM SERPL-MCNC: 4.4 MMOL/L — SIGNIFICANT CHANGE UP (ref 3.5–5.3)
POTASSIUM SERPL-MCNC: 4.4 MMOL/L — SIGNIFICANT CHANGE UP (ref 3.5–5.3)
POTASSIUM SERPL-SCNC: 4.4 MMOL/L — SIGNIFICANT CHANGE UP (ref 3.5–5.3)
POTASSIUM SERPL-SCNC: 4.4 MMOL/L — SIGNIFICANT CHANGE UP (ref 3.5–5.3)
PROT SERPL-MCNC: 7.2 G/DL — SIGNIFICANT CHANGE UP (ref 6–8.3)
PROT SERPL-MCNC: 7.2 G/DL — SIGNIFICANT CHANGE UP (ref 6–8.3)
RBC # BLD: 4.34 M/UL — SIGNIFICANT CHANGE UP (ref 3.8–5.2)
RBC # BLD: 4.34 M/UL — SIGNIFICANT CHANGE UP (ref 3.8–5.2)
RBC # FLD: 13.2 % — SIGNIFICANT CHANGE UP (ref 10.3–14.5)
RBC # FLD: 13.2 % — SIGNIFICANT CHANGE UP (ref 10.3–14.5)
SODIUM SERPL-SCNC: 136 MMOL/L — SIGNIFICANT CHANGE UP (ref 135–145)
SODIUM SERPL-SCNC: 136 MMOL/L — SIGNIFICANT CHANGE UP (ref 135–145)
WBC # BLD: 4.83 K/UL — SIGNIFICANT CHANGE UP (ref 3.8–10.5)
WBC # BLD: 4.83 K/UL — SIGNIFICANT CHANGE UP (ref 3.8–10.5)
WBC # FLD AUTO: 4.83 K/UL — SIGNIFICANT CHANGE UP (ref 3.8–10.5)
WBC # FLD AUTO: 4.83 K/UL — SIGNIFICANT CHANGE UP (ref 3.8–10.5)

## 2023-11-11 LAB
CANCER AG27-29 SERPL-ACNC: 34.6 U/ML — SIGNIFICANT CHANGE UP (ref 0–38.6)
CANCER AG27-29 SERPL-ACNC: 34.6 U/ML — SIGNIFICANT CHANGE UP (ref 0–38.6)

## 2023-11-13 DIAGNOSIS — R11.2 NAUSEA WITH VOMITING, UNSPECIFIED: ICD-10-CM

## 2023-11-13 DIAGNOSIS — C41.9 MALIGNANT NEOPLASM OF BONE AND ARTICULAR CARTILAGE, UNSPECIFIED: ICD-10-CM

## 2023-11-13 DIAGNOSIS — Z51.11 ENCOUNTER FOR ANTINEOPLASTIC CHEMOTHERAPY: ICD-10-CM

## 2023-11-16 NOTE — ASSESSMENT
[FreeTextEntry1] : Sinus tachycardia, likely reactive in the setting of pneumonitis.\par BP is adequately controlled on amlodipine 5 mg daily\par To initiate steroids for pneumonitis.\par Discussed PACs, PVCs - if symptoms worsen or bother her significantly, a beta blocker can be considered. Otherwise, would manage sinus tachycardia by treating underlying .\par Would check TSH with next labs as there was also notation of a possible thyroid nodule on the PET/CT\par Given technical challenges with echo, would evaluate LV function by MRI in future.\par \par Follow up in 4 months with me to monitor her symptoms after treatment for pneumonitis.\par \par Above recommendations discussed with the patient and all questions were answered to the best of my ability and to her apparent satisfaction. Spontaneous, unlabored and symmetrical

## 2023-11-21 ENCOUNTER — APPOINTMENT (OUTPATIENT)
Dept: HEMATOLOGY ONCOLOGY | Facility: CLINIC | Age: 58
End: 2023-11-21
Payer: MEDICARE

## 2023-11-21 ENCOUNTER — RESULT REVIEW (OUTPATIENT)
Age: 58
End: 2023-11-21

## 2023-11-21 VITALS
TEMPERATURE: 98.1 F | OXYGEN SATURATION: 98 % | WEIGHT: 121.23 LBS | DIASTOLIC BLOOD PRESSURE: 87 MMHG | RESPIRATION RATE: 16 BRPM | BODY MASS INDEX: 21.48 KG/M2 | HEART RATE: 93 BPM | SYSTOLIC BLOOD PRESSURE: 126 MMHG

## 2023-11-21 LAB
BASOPHILS # BLD AUTO: 0.04 K/UL — SIGNIFICANT CHANGE UP (ref 0–0.2)
BASOPHILS # BLD AUTO: 0.04 K/UL — SIGNIFICANT CHANGE UP (ref 0–0.2)
BASOPHILS NFR BLD AUTO: 0.6 % — SIGNIFICANT CHANGE UP (ref 0–2)
BASOPHILS NFR BLD AUTO: 0.6 % — SIGNIFICANT CHANGE UP (ref 0–2)
EOSINOPHIL # BLD AUTO: 0.05 K/UL — SIGNIFICANT CHANGE UP (ref 0–0.5)
EOSINOPHIL # BLD AUTO: 0.05 K/UL — SIGNIFICANT CHANGE UP (ref 0–0.5)
EOSINOPHIL NFR BLD AUTO: 0.7 % — SIGNIFICANT CHANGE UP (ref 0–6)
EOSINOPHIL NFR BLD AUTO: 0.7 % — SIGNIFICANT CHANGE UP (ref 0–6)
HCT VFR BLD CALC: 39.9 % — SIGNIFICANT CHANGE UP (ref 34.5–45)
HCT VFR BLD CALC: 39.9 % — SIGNIFICANT CHANGE UP (ref 34.5–45)
HGB BLD-MCNC: 13.3 G/DL — SIGNIFICANT CHANGE UP (ref 11.5–15.5)
HGB BLD-MCNC: 13.3 G/DL — SIGNIFICANT CHANGE UP (ref 11.5–15.5)
IMM GRANULOCYTES NFR BLD AUTO: 0.4 % — SIGNIFICANT CHANGE UP (ref 0–0.9)
IMM GRANULOCYTES NFR BLD AUTO: 0.4 % — SIGNIFICANT CHANGE UP (ref 0–0.9)
LYMPHOCYTES # BLD AUTO: 0.98 K/UL — LOW (ref 1–3.3)
LYMPHOCYTES # BLD AUTO: 0.98 K/UL — LOW (ref 1–3.3)
LYMPHOCYTES # BLD AUTO: 14.4 % — SIGNIFICANT CHANGE UP (ref 13–44)
LYMPHOCYTES # BLD AUTO: 14.4 % — SIGNIFICANT CHANGE UP (ref 13–44)
MCHC RBC-ENTMCNC: 30.9 PG — SIGNIFICANT CHANGE UP (ref 27–34)
MCHC RBC-ENTMCNC: 30.9 PG — SIGNIFICANT CHANGE UP (ref 27–34)
MCHC RBC-ENTMCNC: 33.3 G/DL — SIGNIFICANT CHANGE UP (ref 32–36)
MCHC RBC-ENTMCNC: 33.3 G/DL — SIGNIFICANT CHANGE UP (ref 32–36)
MCV RBC AUTO: 92.6 FL — SIGNIFICANT CHANGE UP (ref 80–100)
MCV RBC AUTO: 92.6 FL — SIGNIFICANT CHANGE UP (ref 80–100)
MONOCYTES # BLD AUTO: 0.49 K/UL — SIGNIFICANT CHANGE UP (ref 0–0.9)
MONOCYTES # BLD AUTO: 0.49 K/UL — SIGNIFICANT CHANGE UP (ref 0–0.9)
MONOCYTES NFR BLD AUTO: 7.2 % — SIGNIFICANT CHANGE UP (ref 2–14)
MONOCYTES NFR BLD AUTO: 7.2 % — SIGNIFICANT CHANGE UP (ref 2–14)
NEUTROPHILS # BLD AUTO: 5.2 K/UL — SIGNIFICANT CHANGE UP (ref 1.8–7.4)
NEUTROPHILS # BLD AUTO: 5.2 K/UL — SIGNIFICANT CHANGE UP (ref 1.8–7.4)
NEUTROPHILS NFR BLD AUTO: 76.7 % — SIGNIFICANT CHANGE UP (ref 43–77)
NEUTROPHILS NFR BLD AUTO: 76.7 % — SIGNIFICANT CHANGE UP (ref 43–77)
NRBC # BLD: 0 /100 WBCS — SIGNIFICANT CHANGE UP (ref 0–0)
NRBC # BLD: 0 /100 WBCS — SIGNIFICANT CHANGE UP (ref 0–0)
PLATELET # BLD AUTO: 218 K/UL — SIGNIFICANT CHANGE UP (ref 150–400)
PLATELET # BLD AUTO: 218 K/UL — SIGNIFICANT CHANGE UP (ref 150–400)
RBC # BLD: 4.31 M/UL — SIGNIFICANT CHANGE UP (ref 3.8–5.2)
RBC # BLD: 4.31 M/UL — SIGNIFICANT CHANGE UP (ref 3.8–5.2)
RBC # FLD: 13.2 % — SIGNIFICANT CHANGE UP (ref 10.3–14.5)
RBC # FLD: 13.2 % — SIGNIFICANT CHANGE UP (ref 10.3–14.5)
WBC # BLD: 6.79 K/UL — SIGNIFICANT CHANGE UP (ref 3.8–10.5)
WBC # BLD: 6.79 K/UL — SIGNIFICANT CHANGE UP (ref 3.8–10.5)
WBC # FLD AUTO: 6.79 K/UL — SIGNIFICANT CHANGE UP (ref 3.8–10.5)
WBC # FLD AUTO: 6.79 K/UL — SIGNIFICANT CHANGE UP (ref 3.8–10.5)

## 2023-11-21 PROCEDURE — 99214 OFFICE O/P EST MOD 30 MIN: CPT

## 2023-12-01 ENCOUNTER — APPOINTMENT (OUTPATIENT)
Dept: INFUSION THERAPY | Facility: HOSPITAL | Age: 58
End: 2023-12-01

## 2023-12-01 ENCOUNTER — NON-APPOINTMENT (OUTPATIENT)
Age: 58
End: 2023-12-01

## 2023-12-01 ENCOUNTER — APPOINTMENT (OUTPATIENT)
Dept: HEMATOLOGY ONCOLOGY | Facility: CLINIC | Age: 58
End: 2023-12-01

## 2023-12-08 ENCOUNTER — RESULT REVIEW (OUTPATIENT)
Age: 58
End: 2023-12-08

## 2023-12-08 ENCOUNTER — APPOINTMENT (OUTPATIENT)
Dept: HEMATOLOGY ONCOLOGY | Facility: CLINIC | Age: 58
End: 2023-12-08

## 2023-12-08 ENCOUNTER — APPOINTMENT (OUTPATIENT)
Dept: INFUSION THERAPY | Facility: HOSPITAL | Age: 58
End: 2023-12-08

## 2023-12-08 LAB
ALBUMIN SERPL ELPH-MCNC: 3.9 G/DL — SIGNIFICANT CHANGE UP (ref 3.3–5)
ALBUMIN SERPL ELPH-MCNC: 3.9 G/DL — SIGNIFICANT CHANGE UP (ref 3.3–5)
ALP SERPL-CCNC: 154 U/L — HIGH (ref 40–120)
ALP SERPL-CCNC: 154 U/L — HIGH (ref 40–120)
ALT FLD-CCNC: 12 U/L — SIGNIFICANT CHANGE UP (ref 10–45)
ALT FLD-CCNC: 12 U/L — SIGNIFICANT CHANGE UP (ref 10–45)
ANION GAP SERPL CALC-SCNC: 8 MMOL/L — SIGNIFICANT CHANGE UP (ref 5–17)
ANION GAP SERPL CALC-SCNC: 8 MMOL/L — SIGNIFICANT CHANGE UP (ref 5–17)
AST SERPL-CCNC: 28 U/L — SIGNIFICANT CHANGE UP (ref 10–40)
AST SERPL-CCNC: 28 U/L — SIGNIFICANT CHANGE UP (ref 10–40)
BASOPHILS # BLD AUTO: 0.04 K/UL — SIGNIFICANT CHANGE UP (ref 0–0.2)
BASOPHILS # BLD AUTO: 0.04 K/UL — SIGNIFICANT CHANGE UP (ref 0–0.2)
BASOPHILS NFR BLD AUTO: 0.7 % — SIGNIFICANT CHANGE UP (ref 0–2)
BASOPHILS NFR BLD AUTO: 0.7 % — SIGNIFICANT CHANGE UP (ref 0–2)
BILIRUB SERPL-MCNC: 0.2 MG/DL — SIGNIFICANT CHANGE UP (ref 0.2–1.2)
BILIRUB SERPL-MCNC: 0.2 MG/DL — SIGNIFICANT CHANGE UP (ref 0.2–1.2)
BUN SERPL-MCNC: 13 MG/DL — SIGNIFICANT CHANGE UP (ref 7–23)
BUN SERPL-MCNC: 13 MG/DL — SIGNIFICANT CHANGE UP (ref 7–23)
CALCIUM SERPL-MCNC: 9.7 MG/DL — SIGNIFICANT CHANGE UP (ref 8.4–10.5)
CALCIUM SERPL-MCNC: 9.7 MG/DL — SIGNIFICANT CHANGE UP (ref 8.4–10.5)
CHLORIDE SERPL-SCNC: 104 MMOL/L — SIGNIFICANT CHANGE UP (ref 96–108)
CHLORIDE SERPL-SCNC: 104 MMOL/L — SIGNIFICANT CHANGE UP (ref 96–108)
CO2 SERPL-SCNC: 26 MMOL/L — SIGNIFICANT CHANGE UP (ref 22–31)
CO2 SERPL-SCNC: 26 MMOL/L — SIGNIFICANT CHANGE UP (ref 22–31)
CREAT SERPL-MCNC: 0.65 MG/DL — SIGNIFICANT CHANGE UP (ref 0.5–1.3)
CREAT SERPL-MCNC: 0.65 MG/DL — SIGNIFICANT CHANGE UP (ref 0.5–1.3)
EGFR: 102 ML/MIN/1.73M2 — SIGNIFICANT CHANGE UP
EGFR: 102 ML/MIN/1.73M2 — SIGNIFICANT CHANGE UP
EOSINOPHIL # BLD AUTO: 0.06 K/UL — SIGNIFICANT CHANGE UP (ref 0–0.5)
EOSINOPHIL # BLD AUTO: 0.06 K/UL — SIGNIFICANT CHANGE UP (ref 0–0.5)
EOSINOPHIL NFR BLD AUTO: 1.1 % — SIGNIFICANT CHANGE UP (ref 0–6)
EOSINOPHIL NFR BLD AUTO: 1.1 % — SIGNIFICANT CHANGE UP (ref 0–6)
GLUCOSE SERPL-MCNC: 80 MG/DL — SIGNIFICANT CHANGE UP (ref 70–99)
GLUCOSE SERPL-MCNC: 80 MG/DL — SIGNIFICANT CHANGE UP (ref 70–99)
HCT VFR BLD CALC: 39.2 % — SIGNIFICANT CHANGE UP (ref 34.5–45)
HCT VFR BLD CALC: 39.2 % — SIGNIFICANT CHANGE UP (ref 34.5–45)
HGB BLD-MCNC: 13 G/DL — SIGNIFICANT CHANGE UP (ref 11.5–15.5)
HGB BLD-MCNC: 13 G/DL — SIGNIFICANT CHANGE UP (ref 11.5–15.5)
IMM GRANULOCYTES NFR BLD AUTO: 0.5 % — SIGNIFICANT CHANGE UP (ref 0–0.9)
IMM GRANULOCYTES NFR BLD AUTO: 0.5 % — SIGNIFICANT CHANGE UP (ref 0–0.9)
LYMPHOCYTES # BLD AUTO: 1.17 K/UL — SIGNIFICANT CHANGE UP (ref 1–3.3)
LYMPHOCYTES # BLD AUTO: 1.17 K/UL — SIGNIFICANT CHANGE UP (ref 1–3.3)
LYMPHOCYTES # BLD AUTO: 21.3 % — SIGNIFICANT CHANGE UP (ref 13–44)
LYMPHOCYTES # BLD AUTO: 21.3 % — SIGNIFICANT CHANGE UP (ref 13–44)
MCHC RBC-ENTMCNC: 30 PG — SIGNIFICANT CHANGE UP (ref 27–34)
MCHC RBC-ENTMCNC: 30 PG — SIGNIFICANT CHANGE UP (ref 27–34)
MCHC RBC-ENTMCNC: 33.2 G/DL — SIGNIFICANT CHANGE UP (ref 32–36)
MCHC RBC-ENTMCNC: 33.2 G/DL — SIGNIFICANT CHANGE UP (ref 32–36)
MCV RBC AUTO: 90.3 FL — SIGNIFICANT CHANGE UP (ref 80–100)
MCV RBC AUTO: 90.3 FL — SIGNIFICANT CHANGE UP (ref 80–100)
MONOCYTES # BLD AUTO: 0.93 K/UL — HIGH (ref 0–0.9)
MONOCYTES # BLD AUTO: 0.93 K/UL — HIGH (ref 0–0.9)
MONOCYTES NFR BLD AUTO: 16.9 % — HIGH (ref 2–14)
MONOCYTES NFR BLD AUTO: 16.9 % — HIGH (ref 2–14)
NEUTROPHILS # BLD AUTO: 3.27 K/UL — SIGNIFICANT CHANGE UP (ref 1.8–7.4)
NEUTROPHILS # BLD AUTO: 3.27 K/UL — SIGNIFICANT CHANGE UP (ref 1.8–7.4)
NEUTROPHILS NFR BLD AUTO: 59.5 % — SIGNIFICANT CHANGE UP (ref 43–77)
NEUTROPHILS NFR BLD AUTO: 59.5 % — SIGNIFICANT CHANGE UP (ref 43–77)
NRBC # BLD: 0 /100 WBCS — SIGNIFICANT CHANGE UP (ref 0–0)
NRBC # BLD: 0 /100 WBCS — SIGNIFICANT CHANGE UP (ref 0–0)
PLATELET # BLD AUTO: 246 K/UL — SIGNIFICANT CHANGE UP (ref 150–400)
PLATELET # BLD AUTO: 246 K/UL — SIGNIFICANT CHANGE UP (ref 150–400)
POTASSIUM SERPL-MCNC: 3.9 MMOL/L — SIGNIFICANT CHANGE UP (ref 3.5–5.3)
POTASSIUM SERPL-MCNC: 3.9 MMOL/L — SIGNIFICANT CHANGE UP (ref 3.5–5.3)
POTASSIUM SERPL-SCNC: 3.9 MMOL/L — SIGNIFICANT CHANGE UP (ref 3.5–5.3)
POTASSIUM SERPL-SCNC: 3.9 MMOL/L — SIGNIFICANT CHANGE UP (ref 3.5–5.3)
PROT SERPL-MCNC: 7.1 G/DL — SIGNIFICANT CHANGE UP (ref 6–8.3)
PROT SERPL-MCNC: 7.1 G/DL — SIGNIFICANT CHANGE UP (ref 6–8.3)
RBC # BLD: 4.34 M/UL — SIGNIFICANT CHANGE UP (ref 3.8–5.2)
RBC # BLD: 4.34 M/UL — SIGNIFICANT CHANGE UP (ref 3.8–5.2)
RBC # FLD: 13.5 % — SIGNIFICANT CHANGE UP (ref 10.3–14.5)
RBC # FLD: 13.5 % — SIGNIFICANT CHANGE UP (ref 10.3–14.5)
SODIUM SERPL-SCNC: 138 MMOL/L — SIGNIFICANT CHANGE UP (ref 135–145)
SODIUM SERPL-SCNC: 138 MMOL/L — SIGNIFICANT CHANGE UP (ref 135–145)
WBC # BLD: 5.5 K/UL — SIGNIFICANT CHANGE UP (ref 3.8–10.5)
WBC # BLD: 5.5 K/UL — SIGNIFICANT CHANGE UP (ref 3.8–10.5)
WBC # FLD AUTO: 5.5 K/UL — SIGNIFICANT CHANGE UP (ref 3.8–10.5)
WBC # FLD AUTO: 5.5 K/UL — SIGNIFICANT CHANGE UP (ref 3.8–10.5)

## 2023-12-09 LAB
CANCER AG27-29 SERPL-ACNC: 26.5 U/ML — SIGNIFICANT CHANGE UP (ref 0–38.6)
CANCER AG27-29 SERPL-ACNC: 26.5 U/ML — SIGNIFICANT CHANGE UP (ref 0–38.6)
CEA SERPL-MCNC: 14.7 NG/ML — HIGH (ref 0–3.8)
CEA SERPL-MCNC: 14.7 NG/ML — HIGH (ref 0–3.8)

## 2023-12-15 ENCOUNTER — NON-APPOINTMENT (OUTPATIENT)
Age: 58
End: 2023-12-15

## 2023-12-19 ENCOUNTER — APPOINTMENT (OUTPATIENT)
Dept: HEMATOLOGY ONCOLOGY | Facility: CLINIC | Age: 58
End: 2023-12-19
Payer: MEDICARE

## 2023-12-19 ENCOUNTER — RESULT REVIEW (OUTPATIENT)
Age: 58
End: 2023-12-19

## 2023-12-19 VITALS
HEIGHT: 62.99 IN | DIASTOLIC BLOOD PRESSURE: 85 MMHG | HEART RATE: 100 BPM | RESPIRATION RATE: 16 BRPM | BODY MASS INDEX: 21.8 KG/M2 | WEIGHT: 123.02 LBS | SYSTOLIC BLOOD PRESSURE: 139 MMHG | TEMPERATURE: 97.4 F | OXYGEN SATURATION: 100 %

## 2023-12-19 LAB
ALBUMIN SERPL ELPH-MCNC: 3.9 G/DL
ALP BLD-CCNC: 184 U/L
ALT SERPL-CCNC: 10 U/L
ANION GAP SERPL CALC-SCNC: 12 MMOL/L
AST SERPL-CCNC: 22 U/L
BASOPHILS # BLD AUTO: 0.02 K/UL — SIGNIFICANT CHANGE UP (ref 0–0.2)
BASOPHILS # BLD AUTO: 0.02 K/UL — SIGNIFICANT CHANGE UP (ref 0–0.2)
BASOPHILS NFR BLD AUTO: 0.4 % — SIGNIFICANT CHANGE UP (ref 0–2)
BASOPHILS NFR BLD AUTO: 0.4 % — SIGNIFICANT CHANGE UP (ref 0–2)
BILIRUB SERPL-MCNC: 0.2 MG/DL
BUN SERPL-MCNC: 11 MG/DL
CALCIUM SERPL-MCNC: 9 MG/DL
CHLORIDE SERPL-SCNC: 107 MMOL/L
CO2 SERPL-SCNC: 24 MMOL/L
CREAT SERPL-MCNC: 0.7 MG/DL
EGFR: 100 ML/MIN/1.73M2
EOSINOPHIL # BLD AUTO: 0.02 K/UL — SIGNIFICANT CHANGE UP (ref 0–0.5)
EOSINOPHIL # BLD AUTO: 0.02 K/UL — SIGNIFICANT CHANGE UP (ref 0–0.5)
EOSINOPHIL NFR BLD AUTO: 0.4 % — SIGNIFICANT CHANGE UP (ref 0–6)
EOSINOPHIL NFR BLD AUTO: 0.4 % — SIGNIFICANT CHANGE UP (ref 0–6)
GLUCOSE SERPL-MCNC: 94 MG/DL
HCT VFR BLD CALC: 39 % — SIGNIFICANT CHANGE UP (ref 34.5–45)
HCT VFR BLD CALC: 39 % — SIGNIFICANT CHANGE UP (ref 34.5–45)
HGB BLD-MCNC: 12.6 G/DL — SIGNIFICANT CHANGE UP (ref 11.5–15.5)
HGB BLD-MCNC: 12.6 G/DL — SIGNIFICANT CHANGE UP (ref 11.5–15.5)
IMM GRANULOCYTES NFR BLD AUTO: 0.4 % — SIGNIFICANT CHANGE UP (ref 0–0.9)
IMM GRANULOCYTES NFR BLD AUTO: 0.4 % — SIGNIFICANT CHANGE UP (ref 0–0.9)
LYMPHOCYTES # BLD AUTO: 0.48 K/UL — LOW (ref 1–3.3)
LYMPHOCYTES # BLD AUTO: 0.48 K/UL — LOW (ref 1–3.3)
LYMPHOCYTES # BLD AUTO: 10.5 % — LOW (ref 13–44)
LYMPHOCYTES # BLD AUTO: 10.5 % — LOW (ref 13–44)
MCHC RBC-ENTMCNC: 29.7 PG — SIGNIFICANT CHANGE UP (ref 27–34)
MCHC RBC-ENTMCNC: 29.7 PG — SIGNIFICANT CHANGE UP (ref 27–34)
MCHC RBC-ENTMCNC: 32.3 G/DL — SIGNIFICANT CHANGE UP (ref 32–36)
MCHC RBC-ENTMCNC: 32.3 G/DL — SIGNIFICANT CHANGE UP (ref 32–36)
MCV RBC AUTO: 92 FL — SIGNIFICANT CHANGE UP (ref 80–100)
MCV RBC AUTO: 92 FL — SIGNIFICANT CHANGE UP (ref 80–100)
MONOCYTES # BLD AUTO: 0.31 K/UL — SIGNIFICANT CHANGE UP (ref 0–0.9)
MONOCYTES # BLD AUTO: 0.31 K/UL — SIGNIFICANT CHANGE UP (ref 0–0.9)
MONOCYTES NFR BLD AUTO: 6.8 % — SIGNIFICANT CHANGE UP (ref 2–14)
MONOCYTES NFR BLD AUTO: 6.8 % — SIGNIFICANT CHANGE UP (ref 2–14)
NEUTROPHILS # BLD AUTO: 3.73 K/UL — SIGNIFICANT CHANGE UP (ref 1.8–7.4)
NEUTROPHILS # BLD AUTO: 3.73 K/UL — SIGNIFICANT CHANGE UP (ref 1.8–7.4)
NEUTROPHILS NFR BLD AUTO: 81.5 % — HIGH (ref 43–77)
NEUTROPHILS NFR BLD AUTO: 81.5 % — HIGH (ref 43–77)
NRBC # BLD: 0 /100 WBCS — SIGNIFICANT CHANGE UP (ref 0–0)
NRBC # BLD: 0 /100 WBCS — SIGNIFICANT CHANGE UP (ref 0–0)
PLATELET # BLD AUTO: 197 K/UL — SIGNIFICANT CHANGE UP (ref 150–400)
PLATELET # BLD AUTO: 197 K/UL — SIGNIFICANT CHANGE UP (ref 150–400)
POTASSIUM SERPL-SCNC: 3.8 MMOL/L
PROT SERPL-MCNC: 6.8 G/DL
RBC # BLD: 4.24 M/UL — SIGNIFICANT CHANGE UP (ref 3.8–5.2)
RBC # BLD: 4.24 M/UL — SIGNIFICANT CHANGE UP (ref 3.8–5.2)
RBC # FLD: 13.3 % — SIGNIFICANT CHANGE UP (ref 10.3–14.5)
RBC # FLD: 13.3 % — SIGNIFICANT CHANGE UP (ref 10.3–14.5)
SODIUM SERPL-SCNC: 143 MMOL/L
WBC # BLD: 4.58 K/UL — SIGNIFICANT CHANGE UP (ref 3.8–10.5)
WBC # BLD: 4.58 K/UL — SIGNIFICANT CHANGE UP (ref 3.8–10.5)
WBC # FLD AUTO: 4.58 K/UL — SIGNIFICANT CHANGE UP (ref 3.8–10.5)
WBC # FLD AUTO: 4.58 K/UL — SIGNIFICANT CHANGE UP (ref 3.8–10.5)

## 2023-12-19 PROCEDURE — 99214 OFFICE O/P EST MOD 30 MIN: CPT

## 2023-12-19 NOTE — HISTORY OF PRESENT ILLNESS
[Disease: _____________________] : Disease: [unfilled] [M: ___] : M[unfilled] [AJCC Stage: ____] : AJCC Stage: [unfilled] [100: Normal, no complaints, no evidence of disease.] : 100: Normal, no complaints, no evidence of disease. [de-identified] : Pt initially evaluated by Medical Oncology in 7/2021 when she noticed a left breast mass x 2 months for which she did not seek medical attention, denied any pain, ulceration, bleeding or nipple changes/discharge.  She also c/o lower back pain for 2-3 months, associated with pain radiating down the lower extremities relieved with Tylenol prn.  Also reports intermittent upper back pain.   No fevers, night sweats, weight loss.  Reports fatigue and poor appetite.  She went to an urgent care in [place] for back pain and breast mass and was given a referral for mammo/US.    Mammogram and breast US on 4/7/2021 showed  5.6x3.2x5.3cm hypoechoic irregularly-shaped mass with angular margins 9-11:00 5 to 9cm from nipple; left axilla - enlarged 4.0x2.1x3.4cm lymph node, US biopsy rec, BIRADS4.  On 4/20/2021, pt underwent an US guided left breast core biopsy which showed invasive moderately differentiated ductal carcinoma, [place] score 6/9 (3+2+10), invasive tumor at least 1.8cm, no DCIS, no LVI, microcalcifications present, ER 90%, [place] 2%, HER2 2+, CISH negative.  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    For the back pain, pt underwent an MRI lumbar spine on 4/22/2021 which showed metastatic disease in lower thoracic spine, lumbar spine and upper sacrum with severe pathologic fracture of L2 with retropulsion of the posterior endplate causing moderate canal stenosis.   Admitted to [facility] 6/2021 - s/p L2 kyphoplasty and RT.   6/2021-4/2022 - letrozole and palbociclib s/p two dose reductions due to neutropenia. PET scan 4/11/22 - POD 5/2022 - Started on everolimus and fulvestrant - stopped in 11/2022 due to pneumonitis Patient also on Xgeva monthly since 10/5/21.  Family history is significant for mother had cervical cancer age 64 and father with prostate cancer age 67.  No cancer of the breast, ovary, endometrium, and pancreatic cancer.  The patient is of [place] ethnic background.  No ETOH or tobacco.  Caris Summary Report Source: Spine: 6/4/2021 ER 60%; ERBB2 Negative;  PTEN pathogenic PIK3CA Negative; ESR1 not detected No other actionable mutations noted; full report to be filed.   Germline Testing: No clinically significant mutation on [employer] testing [de-identified] : ER 90%, [place] 2%, HER2 2+, CISH negative [FreeTextEntry1] : Metastatic Tx: 1st LIne Ibrance/letrozole 6/2021 - 4/2022 - [place] 2nd line Everolimus/fulvestrant - 5/2022 - 12/2022 - [place] - developed pneumonitis on afinitor 3rd line: capecitabine 1/19/2023 - 8/2023 [place] 4th line: Doxil 9/2023 - [de-identified] : 12/19/23 presents today to assess for evidence of breast cancer recurrence and assess treatment toxicity. S/P Cycle 43 Doxil; tolerated reasonably well; fatigue improved; scheduled for repeat PET/CT on 12/21/23  Patient denies any SOB, CP, abdominal pain, bone pain, headache, or unexplained weight loss Patient denies fever, chills, nausea/vomiting, diarrhea/constipation, neuropathy, headache. HFS improving - decreased dryness decreased left upper extremity swelling; although she is not wearing compression sleeve as often   - PET/CT scan 8/19/2023 1. Persistent hypermetabolic tissue in the mid anterior chest wall and bilateral hypermetabolic subcutaneous nodules in the breasts increased in size and intensity of activity. Bilateral hypermetabolic axillary nodes also increased in size. Findings suggestive of disease progression. 2. Multiple lung nodules with few that shows increased size and one with increased activity, suggestive of disease progression. 3. Innumerable osteoblastic lesions with scattered patches of activity where some sites may reflect lingering disease  - CEA and CA 27.29 stabilized Has financial concerns:  on [employer] disability; considering returning to work 1-2 days per week once she is stabilized

## 2023-12-19 NOTE — PHYSICAL EXAM
[Fully active, able to carry on all pre-disease performance without restriction] : Status 0 - Fully active, able to carry on all pre-disease performance without restriction [Normal] : affect appropriate [de-identified] :  left breast 9:00-10:00 3 cm nodular mass palpated.   s/p healed ulceration 5x3cm induration; lobular changes and now with retraction of skin; small ulceration healed with dried/crusted over skin ;   Left axillary adenopathy [de-identified] : 2 cm Left axillary node (unchanged);  Left hand swollen diffusely and extending up forearm; edema noted stable [de-identified] : geographic tongue and hyperpigmentation of tongue and palms of hands; bilateral palms and soles - dryness;

## 2023-12-19 NOTE — REVIEW OF SYSTEMS
[Fatigue] : fatigue [Negative] : Allergic/Immunologic [Cough] : no cough [SOB on Exertion] : no shortness of breath during exertion [FreeTextEntry2] : manageable symptoms [de-identified] : nodularity of chest wall lesion - drying at this point; dry skin on palms of hands (HFS) [de-identified] : left upper extremity lymphedema

## 2023-12-21 ENCOUNTER — APPOINTMENT (OUTPATIENT)
Dept: NUCLEAR MEDICINE | Facility: IMAGING CENTER | Age: 58
End: 2023-12-21

## 2023-12-22 ENCOUNTER — APPOINTMENT (OUTPATIENT)
Dept: HEMATOLOGY ONCOLOGY | Facility: CLINIC | Age: 58
End: 2023-12-22

## 2023-12-22 ENCOUNTER — APPOINTMENT (OUTPATIENT)
Dept: INFUSION THERAPY | Facility: HOSPITAL | Age: 58
End: 2023-12-22

## 2023-12-26 ENCOUNTER — OUTPATIENT (OUTPATIENT)
Dept: OUTPATIENT SERVICES | Facility: HOSPITAL | Age: 58
LOS: 1 days | Discharge: HOME | End: 2023-12-26

## 2023-12-26 DIAGNOSIS — C50.919 MALIGNANT NEOPLASM OF UNSPECIFIED SITE OF UNSPECIFIED FEMALE BREAST: ICD-10-CM

## 2023-12-26 DIAGNOSIS — C79.51 SECONDARY MALIGNANT NEOPLASM OF BONE: ICD-10-CM

## 2023-12-26 DIAGNOSIS — Z98.890 OTHER SPECIFIED POSTPROCEDURAL STATES: Chronic | ICD-10-CM

## 2023-12-27 ENCOUNTER — NON-APPOINTMENT (OUTPATIENT)
Age: 58
End: 2023-12-27

## 2024-01-05 ENCOUNTER — RESULT REVIEW (OUTPATIENT)
Age: 59
End: 2024-01-05

## 2024-01-05 ENCOUNTER — APPOINTMENT (OUTPATIENT)
Dept: INFUSION THERAPY | Facility: HOSPITAL | Age: 59
End: 2024-01-05

## 2024-01-05 ENCOUNTER — APPOINTMENT (OUTPATIENT)
Dept: HEMATOLOGY ONCOLOGY | Facility: CLINIC | Age: 59
End: 2024-01-05

## 2024-01-05 LAB
ALBUMIN SERPL ELPH-MCNC: 3.8 G/DL — SIGNIFICANT CHANGE UP (ref 3.3–5)
ALBUMIN SERPL ELPH-MCNC: 3.8 G/DL — SIGNIFICANT CHANGE UP (ref 3.3–5)
ALP SERPL-CCNC: 120 U/L — SIGNIFICANT CHANGE UP (ref 40–120)
ALP SERPL-CCNC: 120 U/L — SIGNIFICANT CHANGE UP (ref 40–120)
ALT FLD-CCNC: 6 U/L — LOW (ref 10–45)
ALT FLD-CCNC: 6 U/L — LOW (ref 10–45)
ANION GAP SERPL CALC-SCNC: 10 MMOL/L — SIGNIFICANT CHANGE UP (ref 5–17)
ANION GAP SERPL CALC-SCNC: 10 MMOL/L — SIGNIFICANT CHANGE UP (ref 5–17)
AST SERPL-CCNC: 27 U/L — SIGNIFICANT CHANGE UP (ref 10–40)
AST SERPL-CCNC: 27 U/L — SIGNIFICANT CHANGE UP (ref 10–40)
BASOPHILS # BLD AUTO: 0.04 K/UL — SIGNIFICANT CHANGE UP (ref 0–0.2)
BASOPHILS # BLD AUTO: 0.04 K/UL — SIGNIFICANT CHANGE UP (ref 0–0.2)
BASOPHILS NFR BLD AUTO: 0.7 % — SIGNIFICANT CHANGE UP (ref 0–2)
BASOPHILS NFR BLD AUTO: 0.7 % — SIGNIFICANT CHANGE UP (ref 0–2)
BILIRUB SERPL-MCNC: 0.2 MG/DL — SIGNIFICANT CHANGE UP (ref 0.2–1.2)
BILIRUB SERPL-MCNC: 0.2 MG/DL — SIGNIFICANT CHANGE UP (ref 0.2–1.2)
BUN SERPL-MCNC: 12 MG/DL — SIGNIFICANT CHANGE UP (ref 7–23)
BUN SERPL-MCNC: 12 MG/DL — SIGNIFICANT CHANGE UP (ref 7–23)
CALCIUM SERPL-MCNC: 9.2 MG/DL — SIGNIFICANT CHANGE UP (ref 8.4–10.5)
CALCIUM SERPL-MCNC: 9.2 MG/DL — SIGNIFICANT CHANGE UP (ref 8.4–10.5)
CEA SERPL-MCNC: 12.8 NG/ML — HIGH (ref 0–3.8)
CEA SERPL-MCNC: 12.8 NG/ML — HIGH (ref 0–3.8)
CHLORIDE SERPL-SCNC: 107 MMOL/L — SIGNIFICANT CHANGE UP (ref 96–108)
CHLORIDE SERPL-SCNC: 107 MMOL/L — SIGNIFICANT CHANGE UP (ref 96–108)
CO2 SERPL-SCNC: 25 MMOL/L — SIGNIFICANT CHANGE UP (ref 22–31)
CO2 SERPL-SCNC: 25 MMOL/L — SIGNIFICANT CHANGE UP (ref 22–31)
CREAT SERPL-MCNC: 0.7 MG/DL — SIGNIFICANT CHANGE UP (ref 0.5–1.3)
CREAT SERPL-MCNC: 0.7 MG/DL — SIGNIFICANT CHANGE UP (ref 0.5–1.3)
EGFR: 100 ML/MIN/1.73M2 — SIGNIFICANT CHANGE UP
EGFR: 100 ML/MIN/1.73M2 — SIGNIFICANT CHANGE UP
EOSINOPHIL # BLD AUTO: 0.04 K/UL — SIGNIFICANT CHANGE UP (ref 0–0.5)
EOSINOPHIL # BLD AUTO: 0.04 K/UL — SIGNIFICANT CHANGE UP (ref 0–0.5)
EOSINOPHIL NFR BLD AUTO: 0.7 % — SIGNIFICANT CHANGE UP (ref 0–6)
EOSINOPHIL NFR BLD AUTO: 0.7 % — SIGNIFICANT CHANGE UP (ref 0–6)
GLUCOSE SERPL-MCNC: 107 MG/DL — HIGH (ref 70–99)
GLUCOSE SERPL-MCNC: 107 MG/DL — HIGH (ref 70–99)
HCT VFR BLD CALC: 35.8 % — SIGNIFICANT CHANGE UP (ref 34.5–45)
HCT VFR BLD CALC: 35.8 % — SIGNIFICANT CHANGE UP (ref 34.5–45)
HGB BLD-MCNC: 11.7 G/DL — SIGNIFICANT CHANGE UP (ref 11.5–15.5)
HGB BLD-MCNC: 11.7 G/DL — SIGNIFICANT CHANGE UP (ref 11.5–15.5)
IMM GRANULOCYTES NFR BLD AUTO: 0.3 % — SIGNIFICANT CHANGE UP (ref 0–0.9)
IMM GRANULOCYTES NFR BLD AUTO: 0.3 % — SIGNIFICANT CHANGE UP (ref 0–0.9)
LYMPHOCYTES # BLD AUTO: 1.04 K/UL — SIGNIFICANT CHANGE UP (ref 1–3.3)
LYMPHOCYTES # BLD AUTO: 1.04 K/UL — SIGNIFICANT CHANGE UP (ref 1–3.3)
LYMPHOCYTES # BLD AUTO: 17.6 % — SIGNIFICANT CHANGE UP (ref 13–44)
LYMPHOCYTES # BLD AUTO: 17.6 % — SIGNIFICANT CHANGE UP (ref 13–44)
MCHC RBC-ENTMCNC: 29.3 PG — SIGNIFICANT CHANGE UP (ref 27–34)
MCHC RBC-ENTMCNC: 29.3 PG — SIGNIFICANT CHANGE UP (ref 27–34)
MCHC RBC-ENTMCNC: 32.7 G/DL — SIGNIFICANT CHANGE UP (ref 32–36)
MCHC RBC-ENTMCNC: 32.7 G/DL — SIGNIFICANT CHANGE UP (ref 32–36)
MCV RBC AUTO: 89.7 FL — SIGNIFICANT CHANGE UP (ref 80–100)
MCV RBC AUTO: 89.7 FL — SIGNIFICANT CHANGE UP (ref 80–100)
MONOCYTES # BLD AUTO: 0.99 K/UL — HIGH (ref 0–0.9)
MONOCYTES # BLD AUTO: 0.99 K/UL — HIGH (ref 0–0.9)
MONOCYTES NFR BLD AUTO: 16.8 % — HIGH (ref 2–14)
MONOCYTES NFR BLD AUTO: 16.8 % — HIGH (ref 2–14)
NEUTROPHILS # BLD AUTO: 3.78 K/UL — SIGNIFICANT CHANGE UP (ref 1.8–7.4)
NEUTROPHILS # BLD AUTO: 3.78 K/UL — SIGNIFICANT CHANGE UP (ref 1.8–7.4)
NEUTROPHILS NFR BLD AUTO: 63.9 % — SIGNIFICANT CHANGE UP (ref 43–77)
NEUTROPHILS NFR BLD AUTO: 63.9 % — SIGNIFICANT CHANGE UP (ref 43–77)
NRBC # BLD: 0 /100 WBCS — SIGNIFICANT CHANGE UP (ref 0–0)
NRBC # BLD: 0 /100 WBCS — SIGNIFICANT CHANGE UP (ref 0–0)
PLATELET # BLD AUTO: 259 K/UL — SIGNIFICANT CHANGE UP (ref 150–400)
PLATELET # BLD AUTO: 259 K/UL — SIGNIFICANT CHANGE UP (ref 150–400)
POTASSIUM SERPL-MCNC: 3.7 MMOL/L — SIGNIFICANT CHANGE UP (ref 3.5–5.3)
POTASSIUM SERPL-MCNC: 3.7 MMOL/L — SIGNIFICANT CHANGE UP (ref 3.5–5.3)
POTASSIUM SERPL-SCNC: 3.7 MMOL/L — SIGNIFICANT CHANGE UP (ref 3.5–5.3)
POTASSIUM SERPL-SCNC: 3.7 MMOL/L — SIGNIFICANT CHANGE UP (ref 3.5–5.3)
PROT SERPL-MCNC: 6.9 G/DL — SIGNIFICANT CHANGE UP (ref 6–8.3)
PROT SERPL-MCNC: 6.9 G/DL — SIGNIFICANT CHANGE UP (ref 6–8.3)
RBC # BLD: 3.99 M/UL — SIGNIFICANT CHANGE UP (ref 3.8–5.2)
RBC # BLD: 3.99 M/UL — SIGNIFICANT CHANGE UP (ref 3.8–5.2)
RBC # FLD: 13.8 % — SIGNIFICANT CHANGE UP (ref 10.3–14.5)
RBC # FLD: 13.8 % — SIGNIFICANT CHANGE UP (ref 10.3–14.5)
SODIUM SERPL-SCNC: 142 MMOL/L — SIGNIFICANT CHANGE UP (ref 135–145)
SODIUM SERPL-SCNC: 142 MMOL/L — SIGNIFICANT CHANGE UP (ref 135–145)
WBC # BLD: 5.91 K/UL — SIGNIFICANT CHANGE UP (ref 3.8–10.5)
WBC # BLD: 5.91 K/UL — SIGNIFICANT CHANGE UP (ref 3.8–10.5)
WBC # FLD AUTO: 5.91 K/UL — SIGNIFICANT CHANGE UP (ref 3.8–10.5)
WBC # FLD AUTO: 5.91 K/UL — SIGNIFICANT CHANGE UP (ref 3.8–10.5)

## 2024-01-06 LAB
CANCER AG27-29 SERPL-ACNC: 25.8 U/ML — SIGNIFICANT CHANGE UP (ref 0–38.6)
CANCER AG27-29 SERPL-ACNC: 25.8 U/ML — SIGNIFICANT CHANGE UP (ref 0–38.6)

## 2024-01-08 DIAGNOSIS — Z51.11 ENCOUNTER FOR ANTINEOPLASTIC CHEMOTHERAPY: ICD-10-CM

## 2024-01-08 DIAGNOSIS — R11.2 NAUSEA WITH VOMITING, UNSPECIFIED: ICD-10-CM

## 2024-01-08 DIAGNOSIS — C41.9 MALIGNANT NEOPLASM OF BONE AND ARTICULAR CARTILAGE, UNSPECIFIED: ICD-10-CM

## 2024-01-09 ENCOUNTER — APPOINTMENT (OUTPATIENT)
Dept: NUCLEAR MEDICINE | Facility: CLINIC | Age: 59
End: 2024-01-09
Payer: MEDICARE

## 2024-01-09 ENCOUNTER — OUTPATIENT (OUTPATIENT)
Dept: OUTPATIENT SERVICES | Facility: HOSPITAL | Age: 59
LOS: 1 days | End: 2024-01-09

## 2024-01-09 DIAGNOSIS — S32.020A WEDGE COMPRESSION FRACTURE OF SECOND LUMBAR VERTEBRA, INITIAL ENCOUNTER FOR CLOSED FRACTURE: ICD-10-CM

## 2024-01-09 PROCEDURE — 78815 PET IMAGE W/CT SKULL-THIGH: CPT | Mod: 26,PS

## 2024-01-10 ENCOUNTER — NON-APPOINTMENT (OUTPATIENT)
Age: 59
End: 2024-01-10

## 2024-01-12 ENCOUNTER — APPOINTMENT (OUTPATIENT)
Dept: HEMATOLOGY ONCOLOGY | Facility: CLINIC | Age: 59
End: 2024-01-12

## 2024-01-12 ENCOUNTER — APPOINTMENT (OUTPATIENT)
Dept: INFUSION THERAPY | Facility: HOSPITAL | Age: 59
End: 2024-01-12

## 2024-01-15 ENCOUNTER — NON-APPOINTMENT (OUTPATIENT)
Age: 59
End: 2024-01-15

## 2024-01-16 ENCOUNTER — APPOINTMENT (OUTPATIENT)
Dept: HEMATOLOGY ONCOLOGY | Facility: CLINIC | Age: 59
End: 2024-01-16

## 2024-01-31 ENCOUNTER — NON-APPOINTMENT (OUTPATIENT)
Age: 59
End: 2024-01-31

## 2024-02-05 ENCOUNTER — NON-APPOINTMENT (OUTPATIENT)
Age: 59
End: 2024-02-05

## 2024-02-08 ENCOUNTER — APPOINTMENT (OUTPATIENT)
Dept: SURGICAL ONCOLOGY | Facility: CLINIC | Age: 59
End: 2024-02-08

## 2024-02-09 ENCOUNTER — APPOINTMENT (OUTPATIENT)
Dept: HEMATOLOGY ONCOLOGY | Facility: CLINIC | Age: 59
End: 2024-02-09

## 2024-02-09 ENCOUNTER — APPOINTMENT (OUTPATIENT)
Dept: INFUSION THERAPY | Facility: HOSPITAL | Age: 59
End: 2024-02-09

## 2024-02-09 ENCOUNTER — APPOINTMENT (OUTPATIENT)
Dept: SURGICAL ONCOLOGY | Facility: CLINIC | Age: 59
End: 2024-02-09
Payer: MEDICARE

## 2024-02-09 ENCOUNTER — RESULT REVIEW (OUTPATIENT)
Age: 59
End: 2024-02-09

## 2024-02-09 VITALS
DIASTOLIC BLOOD PRESSURE: 79 MMHG | SYSTOLIC BLOOD PRESSURE: 117 MMHG | HEART RATE: 84 BPM | HEIGHT: 62 IN | OXYGEN SATURATION: 97 % | RESPIRATION RATE: 17 BRPM | BODY MASS INDEX: 21.71 KG/M2 | WEIGHT: 118 LBS

## 2024-02-09 LAB
ALBUMIN SERPL ELPH-MCNC: 4.1 G/DL — SIGNIFICANT CHANGE UP (ref 3.3–5)
ALP SERPL-CCNC: 131 U/L — HIGH (ref 40–120)
ALT FLD-CCNC: 10 U/L — SIGNIFICANT CHANGE UP (ref 10–45)
ANION GAP SERPL CALC-SCNC: 9 MMOL/L — SIGNIFICANT CHANGE UP (ref 5–17)
AST SERPL-CCNC: 32 U/L — SIGNIFICANT CHANGE UP (ref 10–40)
BASOPHILS # BLD AUTO: 0.04 K/UL — SIGNIFICANT CHANGE UP (ref 0–0.2)
BASOPHILS # BLD AUTO: 0.05 K/UL — SIGNIFICANT CHANGE UP (ref 0–0.2)
BASOPHILS NFR BLD AUTO: 0.8 % — SIGNIFICANT CHANGE UP (ref 0–2)
BASOPHILS NFR BLD AUTO: 1 % — SIGNIFICANT CHANGE UP (ref 0–2)
BILIRUB SERPL-MCNC: 0.2 MG/DL — SIGNIFICANT CHANGE UP (ref 0.2–1.2)
BUN SERPL-MCNC: 18 MG/DL — SIGNIFICANT CHANGE UP (ref 7–23)
CALCIUM SERPL-MCNC: 9.5 MG/DL — SIGNIFICANT CHANGE UP (ref 8.4–10.5)
CEA SERPL-MCNC: 13.2 NG/ML — HIGH (ref 0–3.8)
CHLORIDE SERPL-SCNC: 104 MMOL/L — SIGNIFICANT CHANGE UP (ref 96–108)
CO2 SERPL-SCNC: 25 MMOL/L — SIGNIFICANT CHANGE UP (ref 22–31)
CREAT SERPL-MCNC: 0.71 MG/DL — SIGNIFICANT CHANGE UP (ref 0.5–1.3)
EGFR: 98 ML/MIN/1.73M2 — SIGNIFICANT CHANGE UP
EOSINOPHIL # BLD AUTO: 0.02 K/UL — SIGNIFICANT CHANGE UP (ref 0–0.5)
EOSINOPHIL # BLD AUTO: 0.02 K/UL — SIGNIFICANT CHANGE UP (ref 0–0.5)
EOSINOPHIL NFR BLD AUTO: 0.4 % — SIGNIFICANT CHANGE UP (ref 0–6)
EOSINOPHIL NFR BLD AUTO: 0.4 % — SIGNIFICANT CHANGE UP (ref 0–6)
GLUCOSE SERPL-MCNC: 87 MG/DL — SIGNIFICANT CHANGE UP (ref 70–99)
HCT VFR BLD CALC: 37.3 % — SIGNIFICANT CHANGE UP (ref 34.5–45)
HCT VFR BLD CALC: 37.9 % — SIGNIFICANT CHANGE UP (ref 34.5–45)
HGB BLD-MCNC: 12.2 G/DL — SIGNIFICANT CHANGE UP (ref 11.5–15.5)
HGB BLD-MCNC: 12.7 G/DL — SIGNIFICANT CHANGE UP (ref 11.5–15.5)
IMM GRANULOCYTES NFR BLD AUTO: 0.4 % — SIGNIFICANT CHANGE UP (ref 0–0.9)
IMM GRANULOCYTES NFR BLD AUTO: 0.6 % — SIGNIFICANT CHANGE UP (ref 0–0.9)
LYMPHOCYTES # BLD AUTO: 1.16 K/UL — SIGNIFICANT CHANGE UP (ref 1–3.3)
LYMPHOCYTES # BLD AUTO: 1.21 K/UL — SIGNIFICANT CHANGE UP (ref 1–3.3)
LYMPHOCYTES # BLD AUTO: 23.9 % — SIGNIFICANT CHANGE UP (ref 13–44)
LYMPHOCYTES # BLD AUTO: 24.3 % — SIGNIFICANT CHANGE UP (ref 13–44)
MCHC RBC-ENTMCNC: 29.2 PG — SIGNIFICANT CHANGE UP (ref 27–34)
MCHC RBC-ENTMCNC: 29.4 PG — SIGNIFICANT CHANGE UP (ref 27–34)
MCHC RBC-ENTMCNC: 32.7 G/DL — SIGNIFICANT CHANGE UP (ref 32–36)
MCHC RBC-ENTMCNC: 33.5 G/DL — SIGNIFICANT CHANGE UP (ref 32–36)
MCV RBC AUTO: 87.7 FL — SIGNIFICANT CHANGE UP (ref 80–100)
MCV RBC AUTO: 89.2 FL — SIGNIFICANT CHANGE UP (ref 80–100)
MONOCYTES # BLD AUTO: 1.04 K/UL — HIGH (ref 0–0.9)
MONOCYTES # BLD AUTO: 1.23 K/UL — HIGH (ref 0–0.9)
MONOCYTES NFR BLD AUTO: 21.8 % — HIGH (ref 2–14)
MONOCYTES NFR BLD AUTO: 24.3 % — HIGH (ref 2–14)
NEUTROPHILS # BLD AUTO: 2.47 K/UL — SIGNIFICANT CHANGE UP (ref 1.8–7.4)
NEUTROPHILS # BLD AUTO: 2.54 K/UL — SIGNIFICANT CHANGE UP (ref 1.8–7.4)
NEUTROPHILS NFR BLD AUTO: 50.2 % — SIGNIFICANT CHANGE UP (ref 43–77)
NEUTROPHILS NFR BLD AUTO: 51.9 % — SIGNIFICANT CHANGE UP (ref 43–77)
NRBC # BLD: 0 /100 WBCS — SIGNIFICANT CHANGE UP (ref 0–0)
NRBC # BLD: 0 /100 WBCS — SIGNIFICANT CHANGE UP (ref 0–0)
PLATELET # BLD AUTO: 211 K/UL — SIGNIFICANT CHANGE UP (ref 150–400)
PLATELET # BLD AUTO: 214 K/UL — SIGNIFICANT CHANGE UP (ref 150–400)
POTASSIUM SERPL-MCNC: 3.8 MMOL/L — SIGNIFICANT CHANGE UP (ref 3.5–5.3)
POTASSIUM SERPL-SCNC: 3.8 MMOL/L — SIGNIFICANT CHANGE UP (ref 3.5–5.3)
PROT SERPL-MCNC: 7.3 G/DL — SIGNIFICANT CHANGE UP (ref 6–8.3)
RBC # BLD: 4.18 M/UL — SIGNIFICANT CHANGE UP (ref 3.8–5.2)
RBC # BLD: 4.32 M/UL — SIGNIFICANT CHANGE UP (ref 3.8–5.2)
RBC # FLD: 14.3 % — SIGNIFICANT CHANGE UP (ref 10.3–14.5)
RBC # FLD: 14.5 % — SIGNIFICANT CHANGE UP (ref 10.3–14.5)
SODIUM SERPL-SCNC: 138 MMOL/L — SIGNIFICANT CHANGE UP (ref 135–145)
WBC # BLD: 4.77 K/UL — SIGNIFICANT CHANGE UP (ref 3.8–10.5)
WBC # BLD: 5.06 K/UL — SIGNIFICANT CHANGE UP (ref 3.8–10.5)
WBC # FLD AUTO: 4.77 K/UL — SIGNIFICANT CHANGE UP (ref 3.8–10.5)
WBC # FLD AUTO: 5.06 K/UL — SIGNIFICANT CHANGE UP (ref 3.8–10.5)

## 2024-02-09 PROCEDURE — 99214 OFFICE O/P EST MOD 30 MIN: CPT

## 2024-02-09 NOTE — HISTORY OF PRESENT ILLNESS
[de-identified] : Ms. ROGELIO PEARSON is a 58 year old female who presents today for a follow-up visit   B/L mammo/sono 4/7/2021: left breast mass at the site a palpable abnormality, enlarged left axillary lymph node, right breast unremarkable. BI-RADS 4.   Ultrasound Guided core biopsy of left breast x 2 sites 4/20/21: 1. Breast, left, 9-11 o'clock, core biopsy: Invasive moderately differentiated ductal carcinoma; Linden score 6 / 9 (3 + 2 +1) in this limited material; Invasive tumor measures at least 1.8cm; Ductal carcinoma in situ not seen; Microcalcifications present; Lymphovascular permeation by tumor not seen. --ER+/DE+/HER-2 equivocal (CISH negative) 2. Left axilla, core biopsy: metastatic ductal carcinoma to lymph node. -ER+/DE+/HER2- by CISH   MRI Lumbar Spine 4/22/21: Metastatic disease to the lower thoracic spine, lumbar spine and upper sacrum with severe pathologic fracture of L2 with retropulsion of the posterior endplate causing moderate canal stenosis. No discogenic disease   PET/CT 5/10/21: Impression:  1. Large FDG-avid, centrally necrotic mass in medial left breast, involving overlying skin and adjacent chest wall musculature and sternum corresponds to known malignancy. Additional separate FDG-avid lesions in left breast are suspicious for additional sites of disease. Further evaluation may be performed with breast MRI 2. FDG-avid lymph nodes in b/l lower cervical, left supraclavicular, left axillary, and left retropectoral regions are compatible  with metastatic disease. 3. Multiple FDG-avid b/l pulmonary nodules are compatible with metastatic disease.  4. Small left pleural effusion with heterogeneous FDG activity suspicious for malignant effusion.  5. Multiple FDG-avid lytic metastases in axial skeleton with severe pathologic compression fracture of L2 vertebral body, as seen on MRI dated 4/22/21, where retropulsion of posterior endplate is noted, causing moderate canal stenosis   PET/CT 8/31/2021: Mildly FDG-avid mass in medial left breast and adjacent difficult to delineate mildly FDG of left breast lesions are decreased in size and metabolism, compatible with a partial response to interval therapy.Resolution of FDG-avid bilateral cervical and left supraclavicular lymph nodes and interval decrease in size, number, and metabolism of mildly FDG-avid left axillary and retropectoral lymph nodes.Multiple mildly FDG-avid and non-FDG-avid bilateral pulmonary nodules are similar, or decreased in size and are decreased metabolism. Small to moderate left pleural effusion is mildly INCREASED. Previously seen FDG-avid lytic lesions in the axial skeleton are decreased in metabolism and demonstrate new sclerosis, compatible with a partial response to interval therapy. Status post interval vertebroplasty of L2 compression fracture.  PET/CT 12/16/21- Increased hypermetabolism of the left breast mass which is slightly decreased in size.  Mildly FDG-avid left axillary and right hilar lymph nodes are mildly increased in metabolism.  Innumerable subcentimeter minimally FDG-avid and non-FDG-avid bilateral pulmonary nodules, not significantly changed in size, number, or metabolism.  Innumerable predominantly sclerotic osseous metastases in the axial skeleton are similar or mildly increased in density on CT, and predominantly demonstrate minimal or no FDG avidity, compatible with treated bone metastases. A few small foci of increased FDG activity suggests residual disease, for example, an FDG-avid lytic lesion in the anterior aspect of the left sixth rib (image 103).  Tumor Markers 2/1/22- CA 27-29: 21.5 | CEA: 10.4 2/11/22- Patient continues Ibrance and Letrozole per Dr. Dowd.  She is scheduled for a PET/CT in March 2022.  Feeling well overall.  PET CT 4/11/22- Increased in size of medial left breast mass, new FDG activity in upper central left breast and nodule in medial upper right breast, suspicious for new sites of disease. Lung and bone lesions stable and unchanged.   Tumor Markers 5/3/22: CEA (14.7); CA15.3 (26.8); CA27.29 (24.6); ALT (9); ALK PHOS (176)   5/16/22- PET scan 4/11/22 showed increased in size of medial left breast mass, new FDG acitivity in upper central left breast and nodule in medial upper right breast, suspicious for new sites of disease. Lung and bone lesions stable and unchanged. Tumor marker uptrending. Caris testing on most recent biopsy- no actionable mutations. Ibrance and Letrozole stopped. Pt. was started on Faslodex and everolimus. Will continue Xgeva monthly.   CT C/A/P 7/2022- stable PET/CT 11/2022 - stable over all but pneumonitis   reports LUE lymphedema that started in September/October, did therapy & wears a JOBST sleeve   PET/CT 1/9/2024: FDG-avid nodules of ventral chest wall/breasts and FDG-avid axillary lymph nodes are similar in size and decreased in FDG metabolism, compatible with a partial response to interval therapy. Multiple mildly FDG-avid lung nodules, not significantly changed in size or metabolism, are compatible with metastatic disease. Predominantly non-FDG-avid sclerotic osseous metastases are unchanged in distribution and decreased in avidity.  labs 1/2024 CEA 12.8, CA 27.29: 25.8

## 2024-02-09 NOTE — ASSESSMENT
[FreeTextEntry1] : IMP:59 y/o with invasive moderately differentiated ductal carcinoma, ER+/MO+/HER2- with metastases to the axilla and lumbar spine. Started Faslodex, everolimus & xgeva w/ Dr. Dowd 5/2022 (now under Dr. Sofia) CT C/A/P 7/2022- stable PET/CT 11/2022 - stable over all but pneumonitis Labs 11/2022 CEA 12.8 (trending up) reports LUE lymphedema that started in September/October, 2022 did therapy & wears a JOBST sleeve PET/CT 1/2024 stable  labs 1/2024 CEA 12.8, CA 27.29: 25.8 currently on Capecitabine & monthly Xgeva  PLAN: continue palliative systemic therapy.  RTO Q3 months

## 2024-02-09 NOTE — PHYSICAL EXAM
[Normal] : supple, no neck mass and thyroid not enlarged [Normal Supraclavicular Lymph Nodes] : normal supraclavicular lymph nodes [Normal Axillary Lymph Nodes] : normal axillary lymph nodes [Normal] : oriented to person, place and time, with appropriate affect [de-identified] : tumor in the midline is now movable; no adenoapthy

## 2024-02-12 LAB — CANCER AG27-29 SERPL-ACNC: 27.1 U/ML — SIGNIFICANT CHANGE UP (ref 0–38.6)

## 2024-02-19 ENCOUNTER — NON-APPOINTMENT (OUTPATIENT)
Age: 59
End: 2024-02-19

## 2024-02-23 ENCOUNTER — RESULT REVIEW (OUTPATIENT)
Age: 59
End: 2024-02-23

## 2024-02-23 ENCOUNTER — APPOINTMENT (OUTPATIENT)
Dept: HEMATOLOGY ONCOLOGY | Facility: CLINIC | Age: 59
End: 2024-02-23
Payer: MEDICARE

## 2024-02-23 VITALS
OXYGEN SATURATION: 98 % | RESPIRATION RATE: 16 BRPM | WEIGHT: 117.7 LBS | DIASTOLIC BLOOD PRESSURE: 84 MMHG | TEMPERATURE: 97.2 F | HEART RATE: 97 BPM | SYSTOLIC BLOOD PRESSURE: 134 MMHG | BODY MASS INDEX: 21.53 KG/M2

## 2024-02-23 DIAGNOSIS — C50.812 MALIGNANT NEOPLASM OF OVERLAPPING SITES OF LEFT FEMALE BREAST: ICD-10-CM

## 2024-02-23 DIAGNOSIS — B02.8 ZOSTER WITH OTHER COMPLICATIONS: ICD-10-CM

## 2024-02-23 DIAGNOSIS — L30.8 ZOSTER WITH OTHER COMPLICATIONS: ICD-10-CM

## 2024-02-23 LAB
BASOPHILS # BLD AUTO: 0.02 K/UL — SIGNIFICANT CHANGE UP (ref 0–0.2)
BASOPHILS NFR BLD AUTO: 0.6 % — SIGNIFICANT CHANGE UP (ref 0–2)
EOSINOPHIL # BLD AUTO: 0.04 K/UL — SIGNIFICANT CHANGE UP (ref 0–0.5)
EOSINOPHIL NFR BLD AUTO: 1.2 % — SIGNIFICANT CHANGE UP (ref 0–6)
HCT VFR BLD CALC: 34.4 % — LOW (ref 34.5–45)
HGB BLD-MCNC: 11.4 G/DL — LOW (ref 11.5–15.5)
IMM GRANULOCYTES NFR BLD AUTO: 0.9 % — SIGNIFICANT CHANGE UP (ref 0–0.9)
LYMPHOCYTES # BLD AUTO: 0.84 K/UL — LOW (ref 1–3.3)
LYMPHOCYTES # BLD AUTO: 24.9 % — SIGNIFICANT CHANGE UP (ref 13–44)
MCHC RBC-ENTMCNC: 29.5 PG — SIGNIFICANT CHANGE UP (ref 27–34)
MCHC RBC-ENTMCNC: 33.1 G/DL — SIGNIFICANT CHANGE UP (ref 32–36)
MCV RBC AUTO: 88.9 FL — SIGNIFICANT CHANGE UP (ref 80–100)
MONOCYTES # BLD AUTO: 0.28 K/UL — SIGNIFICANT CHANGE UP (ref 0–0.9)
MONOCYTES NFR BLD AUTO: 8.3 % — SIGNIFICANT CHANGE UP (ref 2–14)
NEUTROPHILS # BLD AUTO: 2.16 K/UL — SIGNIFICANT CHANGE UP (ref 1.8–7.4)
NEUTROPHILS NFR BLD AUTO: 64.1 % — SIGNIFICANT CHANGE UP (ref 43–77)
NRBC # BLD: 0 /100 WBCS — SIGNIFICANT CHANGE UP (ref 0–0)
PLATELET # BLD AUTO: 237 K/UL — SIGNIFICANT CHANGE UP (ref 150–400)
RBC # BLD: 3.87 M/UL — SIGNIFICANT CHANGE UP (ref 3.8–5.2)
RBC # FLD: 14.6 % — HIGH (ref 10.3–14.5)
WBC # BLD: 3.37 K/UL — LOW (ref 3.8–10.5)
WBC # FLD AUTO: 3.37 K/UL — LOW (ref 3.8–10.5)

## 2024-02-23 PROCEDURE — 99214 OFFICE O/P EST MOD 30 MIN: CPT

## 2024-02-23 NOTE — REVIEW OF SYSTEMS
[Fatigue] : fatigue [Cough] : no cough [SOB on Exertion] : no shortness of breath during exertion [Negative] : Allergic/Immunologic [de-identified] : left upper extremity lymphedema [de-identified] : nodularity of chest wall lesion - drying at this point; dry skin on palms of hands (HFS) [FreeTextEntry2] : manageable symptoms

## 2024-02-23 NOTE — PHYSICAL EXAM
[Fully active, able to carry on all pre-disease performance without restriction] : Status 0 - Fully active, able to carry on all pre-disease performance without restriction [Normal] : affect appropriate [de-identified] : SEE PHOTO:  left breast 9:00-10:00 nodular mass palpated. induration; lobular changes and now with retraction of skin;  ulceration now noted (new); Satellite lesion at 1:00 left breast - flattened 2cm  [de-identified] : 2 cm Left axillary node- not palpable  Left hand swollen diffusely and extending up forearm; edema noted stable [de-identified] : Right neck, back, shoulder,upper chest - dry crusted vessicular lesions diffuse crossing dermatomes; geographic tongue and hyperpigmentation of tongue and palms of hands; bilateral palms and soles - dryness;

## 2024-02-23 NOTE — HISTORY OF PRESENT ILLNESS
[Disease: _____________________] : Disease: [unfilled] [AJCC Stage: ____] : AJCC Stage: [unfilled] [M: ___] : M[unfilled] [FreeTextEntry1] : Metastatic Tx: 1st LIne Ibrance/letrozole 6/2021 - 4/2022 - [place] 2nd line Everolimus/fulvestrant - 5/2022 - 12/2022 - SD - developed pneumonitis on afinitor 3rd line: capecitabine 1/19/2023 - 8/2023 SD 4th line: Doxil 9/2023 - [de-identified] : Pt initially evaluated by Medical Oncology in 7/2021 when she noticed a left breast mass x 2 months for which she did not seek medical attention, denied any pain, ulceration, bleeding or nipple changes/discharge.  She also c/o lower back pain for 2-3 months, associated with pain radiating down the lower extremities relieved with Tylenol prn.  Also reports intermittent upper back pain.   No fevers, night sweats, weight loss.  Reports fatigue and poor appetite.  She went to an urgent care in [place] for back pain and breast mass and was given a referral for mammo/US.    Mammogram and breast US on 4/7/2021 showed  5.6x3.2x5.3cm hypoechoic irregularly-shaped mass with angular margins 9-11:00 5 to 9cm from nipple; left axilla - enlarged 4.0x2.1x3.4cm lymph node, US biopsy rec, BIRADS4.  On 4/20/2021, pt underwent an US guided left breast core biopsy which showed invasive moderately differentiated ductal carcinoma, Mattawan score 6/9 (3+2+10), invasive tumor at least 1.8cm, no DCIS, no LVI, microcalcifications present, ER 90%, RI 2%, HER2 2+, CISH negative.  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    For the back pain, pt underwent an MRI lumbar spine on 4/22/2021 which showed metastatic disease in lower thoracic spine, lumbar spine and upper sacrum with severe pathologic fracture of L2 with retropulsion of the posterior endplate causing moderate canal stenosis.   Admitted to [facility] 6/2021 - s/p L2 kyphoplasty and RT.   6/2021-4/2022 - letrozole and palbociclib s/p two dose reductions due to neutropenia. PET scan 4/11/22 - POD 5/2022 - Started on everolimus and fulvestrant - stopped in 11/2022 due to pneumonitis Patient also on Xgeva monthly since 10/5/21.  Family history is significant for mother had cervical cancer age 64 and father with prostate cancer age 67.  No cancer of the breast, ovary, endometrium, and pancreatic cancer.  The patient is of [place] ethnic background.  No ETOH or tobacco.  Caris Summary Report Source: Spine: 6/4/2021 ER 60%; ERBB2 Negative;  PTEN pathogenic PIK3CA Negative; ESR1 not detected No other actionable mutations noted; full report to be filed.   Germline Testing: No clinically significant mutation on [employer] testing [de-identified] : ER 90%, NV 2%, HER2 2+, CISH negative [de-identified] : 2/23/2024 presents today to assess for evidence of breast cancer recurrence and assess treatment toxicity. S/P  Doxil; tolerated reasonably well; fatigue improved;  PET/CT 1/9/2024 - demonstrated partial response with significant improvement in hypermetabolism About 10 days ago patient developed burning stinging rash across right upper chest, neck, shoulder and back - seen by PMD and diagnosed with shingles Started on antiviral which she will complete today.  She notes that the pain has subsided but there is some itching. No new lesions. At the time of initial rash, she had overall body aches and did not feel well - this has resolved. Patient denies any SOB, CP, abdominal pain, bone pain, headache, or unexplained weight loss Patient denies fever, chills, nausea/vomiting, diarrhea/constipation, neuropathy, headache. HFS improving - decreased dryness decreased left upper extremity swelling; although she is not wearing compression sleeve as often   Imaging: PET/CT 1/9/2024:  IMPRESSION: Compared to FDG-PET/CT dated 8/19/2023: 1. FDG-avid nodules of ventral chest wall/breasts and FDG-avid axillary lymph nodes are similar in size and decreased in FDG metabolism, compatible with a partial response to interval therapy. 2. Multiple mildly FDG-avid lung nodules, not significantly changed in size or metabolism, are compatible with metastatic disease. 3. Predominantly non-FDG-avid sclerotic osseous metastases are unchanged in distribution and decreased in avidity.  Has financial concerns:  on SS disability; considering returning to work 1-2 days per week once she is stabilized   [100: Normal, no complaints, no evidence of disease.] : 100: Normal, no complaints, no evidence of disease.

## 2024-02-26 ENCOUNTER — OUTPATIENT (OUTPATIENT)
Dept: OUTPATIENT SERVICES | Facility: HOSPITAL | Age: 59
LOS: 1 days | Discharge: HOME | End: 2024-02-26

## 2024-02-26 DIAGNOSIS — C79.51 SECONDARY MALIGNANT NEOPLASM OF BONE: ICD-10-CM

## 2024-02-26 DIAGNOSIS — Z98.890 OTHER SPECIFIED POSTPROCEDURAL STATES: Chronic | ICD-10-CM

## 2024-02-26 DIAGNOSIS — C50.919 MALIGNANT NEOPLASM OF UNSPECIFIED SITE OF UNSPECIFIED FEMALE BREAST: ICD-10-CM

## 2024-02-28 ENCOUNTER — NON-APPOINTMENT (OUTPATIENT)
Age: 59
End: 2024-02-28

## 2024-03-08 ENCOUNTER — APPOINTMENT (OUTPATIENT)
Dept: HEMATOLOGY ONCOLOGY | Facility: CLINIC | Age: 59
End: 2024-03-08

## 2024-03-08 ENCOUNTER — RESULT REVIEW (OUTPATIENT)
Age: 59
End: 2024-03-08

## 2024-03-08 ENCOUNTER — APPOINTMENT (OUTPATIENT)
Dept: INFUSION THERAPY | Facility: HOSPITAL | Age: 59
End: 2024-03-08

## 2024-03-08 LAB
ALBUMIN SERPL ELPH-MCNC: 3.8 G/DL — SIGNIFICANT CHANGE UP (ref 3.3–5)
ALP SERPL-CCNC: 113 U/L — SIGNIFICANT CHANGE UP (ref 40–120)
ALT FLD-CCNC: 7 U/L — LOW (ref 10–45)
ANION GAP SERPL CALC-SCNC: 10 MMOL/L — SIGNIFICANT CHANGE UP (ref 5–17)
AST SERPL-CCNC: 28 U/L — SIGNIFICANT CHANGE UP (ref 10–40)
BASOPHILS # BLD AUTO: 0.05 K/UL — SIGNIFICANT CHANGE UP (ref 0–0.2)
BASOPHILS NFR BLD AUTO: 1.2 % — SIGNIFICANT CHANGE UP (ref 0–2)
BILIRUB SERPL-MCNC: 0.2 MG/DL — SIGNIFICANT CHANGE UP (ref 0.2–1.2)
BUN SERPL-MCNC: 12 MG/DL — SIGNIFICANT CHANGE UP (ref 7–23)
CALCIUM SERPL-MCNC: 9.6 MG/DL — SIGNIFICANT CHANGE UP (ref 8.4–10.5)
CEA SERPL-MCNC: 9.9 NG/ML — HIGH (ref 0–3.8)
CHLORIDE SERPL-SCNC: 104 MMOL/L — SIGNIFICANT CHANGE UP (ref 96–108)
CO2 SERPL-SCNC: 26 MMOL/L — SIGNIFICANT CHANGE UP (ref 22–31)
CREAT SERPL-MCNC: 0.65 MG/DL — SIGNIFICANT CHANGE UP (ref 0.5–1.3)
EGFR: 102 ML/MIN/1.73M2 — SIGNIFICANT CHANGE UP
EOSINOPHIL # BLD AUTO: 0.03 K/UL — SIGNIFICANT CHANGE UP (ref 0–0.5)
EOSINOPHIL NFR BLD AUTO: 0.7 % — SIGNIFICANT CHANGE UP (ref 0–6)
GLUCOSE SERPL-MCNC: 84 MG/DL — SIGNIFICANT CHANGE UP (ref 70–99)
HCT VFR BLD CALC: 36.5 % — SIGNIFICANT CHANGE UP (ref 34.5–45)
HGB BLD-MCNC: 12 G/DL — SIGNIFICANT CHANGE UP (ref 11.5–15.5)
IMM GRANULOCYTES NFR BLD AUTO: 0.9 % — SIGNIFICANT CHANGE UP (ref 0–0.9)
LYMPHOCYTES # BLD AUTO: 0.69 K/UL — LOW (ref 1–3.3)
LYMPHOCYTES # BLD AUTO: 16.2 % — SIGNIFICANT CHANGE UP (ref 13–44)
MCHC RBC-ENTMCNC: 29.1 PG — SIGNIFICANT CHANGE UP (ref 27–34)
MCHC RBC-ENTMCNC: 32.9 G/DL — SIGNIFICANT CHANGE UP (ref 32–36)
MCV RBC AUTO: 88.6 FL — SIGNIFICANT CHANGE UP (ref 80–100)
MONOCYTES # BLD AUTO: 0.82 K/UL — SIGNIFICANT CHANGE UP (ref 0–0.9)
MONOCYTES NFR BLD AUTO: 19.2 % — HIGH (ref 2–14)
NEUTROPHILS # BLD AUTO: 2.63 K/UL — SIGNIFICANT CHANGE UP (ref 1.8–7.4)
NEUTROPHILS NFR BLD AUTO: 61.8 % — SIGNIFICANT CHANGE UP (ref 43–77)
NRBC # BLD: 0 /100 WBCS — SIGNIFICANT CHANGE UP (ref 0–0)
PLATELET # BLD AUTO: 240 K/UL — SIGNIFICANT CHANGE UP (ref 150–400)
POTASSIUM SERPL-MCNC: 3.4 MMOL/L — LOW (ref 3.5–5.3)
POTASSIUM SERPL-SCNC: 3.4 MMOL/L — LOW (ref 3.5–5.3)
PROT SERPL-MCNC: 6.9 G/DL — SIGNIFICANT CHANGE UP (ref 6–8.3)
RBC # BLD: 4.12 M/UL — SIGNIFICANT CHANGE UP (ref 3.8–5.2)
RBC # FLD: 15.2 % — HIGH (ref 10.3–14.5)
SODIUM SERPL-SCNC: 140 MMOL/L — SIGNIFICANT CHANGE UP (ref 135–145)
WBC # BLD: 4.26 K/UL — SIGNIFICANT CHANGE UP (ref 3.8–10.5)
WBC # FLD AUTO: 4.26 K/UL — SIGNIFICANT CHANGE UP (ref 3.8–10.5)

## 2024-03-11 DIAGNOSIS — Z51.11 ENCOUNTER FOR ANTINEOPLASTIC CHEMOTHERAPY: ICD-10-CM

## 2024-03-11 DIAGNOSIS — C41.9 MALIGNANT NEOPLASM OF BONE AND ARTICULAR CARTILAGE, UNSPECIFIED: ICD-10-CM

## 2024-03-11 DIAGNOSIS — R11.2 NAUSEA WITH VOMITING, UNSPECIFIED: ICD-10-CM

## 2024-03-12 ENCOUNTER — NON-APPOINTMENT (OUTPATIENT)
Age: 59
End: 2024-03-12

## 2024-03-12 ENCOUNTER — APPOINTMENT (OUTPATIENT)
Dept: OPHTHALMOLOGY | Facility: CLINIC | Age: 59
End: 2024-03-12
Payer: MEDICARE

## 2024-03-12 LAB — CANCER AG27-29 SERPL-ACNC: 28 U/ML — SIGNIFICANT CHANGE UP (ref 0–38.6)

## 2024-03-12 PROCEDURE — 92012 INTRM OPH EXAM EST PATIENT: CPT

## 2024-04-05 ENCOUNTER — RESULT REVIEW (OUTPATIENT)
Age: 59
End: 2024-04-05

## 2024-04-05 ENCOUNTER — APPOINTMENT (OUTPATIENT)
Dept: HEMATOLOGY ONCOLOGY | Facility: CLINIC | Age: 59
End: 2024-04-05
Payer: MEDICARE

## 2024-04-05 ENCOUNTER — APPOINTMENT (OUTPATIENT)
Dept: INFUSION THERAPY | Facility: HOSPITAL | Age: 59
End: 2024-04-05

## 2024-04-05 ENCOUNTER — APPOINTMENT (OUTPATIENT)
Dept: HEMATOLOGY ONCOLOGY | Facility: CLINIC | Age: 59
End: 2024-04-05
Payer: MEDICAID

## 2024-04-05 VITALS
OXYGEN SATURATION: 99 % | SYSTOLIC BLOOD PRESSURE: 131 MMHG | DIASTOLIC BLOOD PRESSURE: 83 MMHG | RESPIRATION RATE: 16 BRPM | WEIGHT: 113.54 LBS | TEMPERATURE: 97.7 F | BODY MASS INDEX: 20.77 KG/M2 | HEART RATE: 100 BPM

## 2024-04-05 LAB
ALBUMIN SERPL ELPH-MCNC: 4 G/DL — SIGNIFICANT CHANGE UP (ref 3.3–5)
ALP SERPL-CCNC: 129 U/L — HIGH (ref 40–120)
ALT FLD-CCNC: 6 U/L — LOW (ref 10–45)
ANION GAP SERPL CALC-SCNC: 10 MMOL/L — SIGNIFICANT CHANGE UP (ref 5–17)
AST SERPL-CCNC: 27 U/L — SIGNIFICANT CHANGE UP (ref 10–40)
BASOPHILS # BLD AUTO: 0.05 K/UL — SIGNIFICANT CHANGE UP (ref 0–0.2)
BASOPHILS NFR BLD AUTO: 1 % — SIGNIFICANT CHANGE UP (ref 0–2)
BILIRUB SERPL-MCNC: 0.2 MG/DL — SIGNIFICANT CHANGE UP (ref 0.2–1.2)
BUN SERPL-MCNC: 12 MG/DL — SIGNIFICANT CHANGE UP (ref 7–23)
CALCIUM SERPL-MCNC: 9.5 MG/DL — SIGNIFICANT CHANGE UP (ref 8.4–10.5)
CEA SERPL-MCNC: 10.2 NG/ML — HIGH (ref 0–3.8)
CHLORIDE SERPL-SCNC: 105 MMOL/L — SIGNIFICANT CHANGE UP (ref 96–108)
CO2 SERPL-SCNC: 25 MMOL/L — SIGNIFICANT CHANGE UP (ref 22–31)
CREAT SERPL-MCNC: 0.68 MG/DL — SIGNIFICANT CHANGE UP (ref 0.5–1.3)
EGFR: 100 ML/MIN/1.73M2 — SIGNIFICANT CHANGE UP
EOSINOPHIL # BLD AUTO: 0.08 K/UL — SIGNIFICANT CHANGE UP (ref 0–0.5)
EOSINOPHIL NFR BLD AUTO: 1.7 % — SIGNIFICANT CHANGE UP (ref 0–6)
GLUCOSE SERPL-MCNC: 126 MG/DL — HIGH (ref 70–99)
HCT VFR BLD CALC: 35.9 % — SIGNIFICANT CHANGE UP (ref 34.5–45)
HGB BLD-MCNC: 12 G/DL — SIGNIFICANT CHANGE UP (ref 11.5–15.5)
IMM GRANULOCYTES NFR BLD AUTO: 0.6 % — SIGNIFICANT CHANGE UP (ref 0–0.9)
LYMPHOCYTES # BLD AUTO: 0.81 K/UL — LOW (ref 1–3.3)
LYMPHOCYTES # BLD AUTO: 16.8 % — SIGNIFICANT CHANGE UP (ref 13–44)
MCHC RBC-ENTMCNC: 29.3 PG — SIGNIFICANT CHANGE UP (ref 27–34)
MCHC RBC-ENTMCNC: 33.4 G/DL — SIGNIFICANT CHANGE UP (ref 32–36)
MCV RBC AUTO: 87.8 FL — SIGNIFICANT CHANGE UP (ref 80–100)
MONOCYTES # BLD AUTO: 0.92 K/UL — HIGH (ref 0–0.9)
MONOCYTES NFR BLD AUTO: 19.1 % — HIGH (ref 2–14)
NEUTROPHILS # BLD AUTO: 2.92 K/UL — SIGNIFICANT CHANGE UP (ref 1.8–7.4)
NEUTROPHILS NFR BLD AUTO: 60.8 % — SIGNIFICANT CHANGE UP (ref 43–77)
NRBC # BLD: 0 /100 WBCS — SIGNIFICANT CHANGE UP (ref 0–0)
PLATELET # BLD AUTO: 227 K/UL — SIGNIFICANT CHANGE UP (ref 150–400)
POTASSIUM SERPL-MCNC: 3.7 MMOL/L — SIGNIFICANT CHANGE UP (ref 3.5–5.3)
POTASSIUM SERPL-SCNC: 3.7 MMOL/L — SIGNIFICANT CHANGE UP (ref 3.5–5.3)
PROT SERPL-MCNC: 7.1 G/DL — SIGNIFICANT CHANGE UP (ref 6–8.3)
RBC # BLD: 4.09 M/UL — SIGNIFICANT CHANGE UP (ref 3.8–5.2)
RBC # FLD: 14.8 % — HIGH (ref 10.3–14.5)
SODIUM SERPL-SCNC: 140 MMOL/L — SIGNIFICANT CHANGE UP (ref 135–145)
WBC # BLD: 4.81 K/UL — SIGNIFICANT CHANGE UP (ref 3.8–10.5)
WBC # FLD AUTO: 4.81 K/UL — SIGNIFICANT CHANGE UP (ref 3.8–10.5)

## 2024-04-05 PROCEDURE — 99214 OFFICE O/P EST MOD 30 MIN: CPT

## 2024-04-05 PROCEDURE — G2211 COMPLEX E/M VISIT ADD ON: CPT | Mod: NC,1L

## 2024-04-05 NOTE — REVIEW OF SYSTEMS
[Fatigue] : fatigue [Negative] : Allergic/Immunologic [Cough] : no cough [SOB on Exertion] : no shortness of breath during exertion [FreeTextEntry2] : manageable symptoms [de-identified] : nodularity of chest wall lesion - more skin breakdown and erythema than prior [de-identified] : left upper extremity lymphedema

## 2024-04-05 NOTE — PHYSICAL EXAM
[Fully active, able to carry on all pre-disease performance without restriction] : Status 0 - Fully active, able to carry on all pre-disease performance without restriction [Normal] : affect appropriate [de-identified] : left breast 9:00-10:00 nodular mass palpated. induration with increased size and encroaching onto right chest wall; lobular changes and now with retraction of skin;   Satellite lesion at 1:00 left breast - flattened 2cm  [de-identified] : 2 cm Left axillary node- not palpable  Left hand swollen diffusely and extending up forearm; edema noted stable [de-identified] : Right neck, back, shoulder,upper chest - dry crusted vessicular lesions diffuse crossing dermatomes;

## 2024-04-05 NOTE — HISTORY OF PRESENT ILLNESS
[Disease: _____________________] : Disease: [unfilled] [M: ___] : M[unfilled] [AJCC Stage: ____] : AJCC Stage: [unfilled] [100: Normal, no complaints, no evidence of disease.] : 100: Normal, no complaints, no evidence of disease. [de-identified] : Pt initially evaluated by Medical Oncology in 7/2021 when she noticed a left breast mass x 2 months for which she did not seek medical attention, denied any pain, ulceration, bleeding or nipple changes/discharge.  She also c/o lower back pain for 2-3 months, associated with pain radiating down the lower extremities relieved with Tylenol prn.  Also reports intermittent upper back pain.   No fevers, night sweats, weight loss.  Reports fatigue and poor appetite.  She went to an urgent care in [place] for back pain and breast mass and was given a referral for mammo/US.    Mammogram and breast US on 4/7/2021 showed  5.6x3.2x5.3cm hypoechoic irregularly-shaped mass with angular margins 9-11:00 5 to 9cm from nipple; left axilla - enlarged 4.0x2.1x3.4cm lymph node, US biopsy rec, BIRADS4.  On 4/20/2021, pt underwent an US guided left breast core biopsy which showed invasive moderately differentiated ductal carcinoma, Raymond score 6/9 (3+2+10), invasive tumor at least 1.8cm, no DCIS, no LVI, microcalcifications present, ER 90%, SD 2%, HER2 2+, CISH negative.  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    For the back pain, pt underwent an MRI lumbar spine on 4/22/2021 which showed metastatic disease in lower thoracic spine, lumbar spine and upper sacrum with severe pathologic fracture of L2 with retropulsion of the posterior endplate causing moderate canal stenosis.   Admitted to [facility] 6/2021 - s/p L2 kyphoplasty and RT.   6/2021-4/2022 - letrozole and palbociclib s/p two dose reductions due to neutropenia. PET scan 4/11/22 - POD 5/2022 - Started on everolimus and fulvestrant - stopped in 11/2022 due to pneumonitis Patient also on Xgeva monthly since 10/5/21.  Family history is significant for mother had cervical cancer age 64 and father with prostate cancer age 67.  No cancer of the breast, ovary, endometrium, and pancreatic cancer.  The patient is of [place] ethnic background.  No ETOH or tobacco.  Caris Summary Report Source: Spine: 6/4/2021 ER 60%; ERBB2 Negative;  PTEN pathogenic PIK3CA Negative; ESR1 not detected No other actionable mutations noted; full report to be filed.   Germline Testing: No clinically significant mutation on [employer] testing [de-identified] : ER 90%, MD 2%, HER2 2+, CISH negative [FreeTextEntry1] : Metastatic Tx: 1st LIne Ibrance/letrozole 6/2021 - 4/2022 - [place] 2nd line Everolimus/fulvestrant - 5/2022 - 12/2022 - SD - developed pneumonitis on afinitor 3rd line: capecitabine 1/19/2023 - 8/2023 SD 4th line: Doxil 9/2023 - [de-identified] : 4/5/24 presents today to assess for evidence of breast cancer recurrence and assess treatment toxicity. Has been on Doxil and today presents for cycle #8 of treatment. tolerated reasonably well; fatigue improved but since her outbreak of shingles in 2/2024, has noted to have increased lesion size and skin breakdown  Patient denies any SOB, CP, abdominal pain, bone pain, headache, or unexplained weight loss Patient denies fever, chills, nausea/vomiting, diarrhea/constipation, neuropathy, headache. HFS improving - decreased dryness decreased left upper extremity swelling; although she is not wearing compression sleeve as often  Most Recent Imaging ================= PET/CT 1/9/2024:  IMPRESSION: Compared to FDG-PET/CT dated 8/19/2023: 1. FDG-avid nodules of ventral chest wall/breasts and FDG-avid axillary lymph nodes are similar in size and decreased in FDG metabolism, compatible with a partial response to interval therapy. 2. Multiple mildly FDG-avid lung nodules, not significantly changed in size or metabolism, are compatible with metastatic disease. 3. Predominantly non-FDG-avid sclerotic osseous metastases are unchanged in distribution and decreased in avidity.  Has financial concerns:  on [employer] disability; considering returning to work 1-2 days per week once she is stabilized

## 2024-04-08 DIAGNOSIS — E86.0 DEHYDRATION: ICD-10-CM

## 2024-04-08 LAB — CANCER AG27-29 SERPL-ACNC: 25.3 U/ML — SIGNIFICANT CHANGE UP (ref 0–38.6)

## 2024-04-16 ENCOUNTER — NON-APPOINTMENT (OUTPATIENT)
Age: 59
End: 2024-04-16

## 2024-04-24 ENCOUNTER — OUTPATIENT (OUTPATIENT)
Dept: OUTPATIENT SERVICES | Facility: HOSPITAL | Age: 59
LOS: 1 days | Discharge: HOME | End: 2024-04-24

## 2024-04-24 DIAGNOSIS — C79.51 SECONDARY MALIGNANT NEOPLASM OF BONE: ICD-10-CM

## 2024-04-24 DIAGNOSIS — C50.919 MALIGNANT NEOPLASM OF UNSPECIFIED SITE OF UNSPECIFIED FEMALE BREAST: ICD-10-CM

## 2024-04-24 DIAGNOSIS — Z98.890 OTHER SPECIFIED POSTPROCEDURAL STATES: Chronic | ICD-10-CM

## 2024-04-25 ENCOUNTER — OUTPATIENT (OUTPATIENT)
Dept: OUTPATIENT SERVICES | Facility: HOSPITAL | Age: 59
LOS: 1 days | End: 2024-04-25
Payer: MEDICAID

## 2024-04-25 ENCOUNTER — APPOINTMENT (OUTPATIENT)
Dept: NUCLEAR MEDICINE | Facility: IMAGING CENTER | Age: 59
End: 2024-04-25
Payer: MEDICAID

## 2024-04-25 DIAGNOSIS — Z98.890 OTHER SPECIFIED POSTPROCEDURAL STATES: Chronic | ICD-10-CM

## 2024-04-25 DIAGNOSIS — C50.919 MALIGNANT NEOPLASM OF UNSPECIFIED SITE OF UNSPECIFIED FEMALE BREAST: ICD-10-CM

## 2024-04-25 PROCEDURE — A9552: CPT

## 2024-04-25 PROCEDURE — 78815 PET IMAGE W/CT SKULL-THIGH: CPT

## 2024-04-25 PROCEDURE — 78815 PET IMAGE W/CT SKULL-THIGH: CPT | Mod: 26,PS

## 2024-05-03 ENCOUNTER — APPOINTMENT (OUTPATIENT)
Dept: HEMATOLOGY ONCOLOGY | Facility: CLINIC | Age: 59
End: 2024-05-03
Payer: MEDICARE

## 2024-05-03 ENCOUNTER — APPOINTMENT (OUTPATIENT)
Dept: INFUSION THERAPY | Facility: HOSPITAL | Age: 59
End: 2024-05-03

## 2024-05-03 ENCOUNTER — RESULT REVIEW (OUTPATIENT)
Age: 59
End: 2024-05-03

## 2024-05-03 ENCOUNTER — NON-APPOINTMENT (OUTPATIENT)
Age: 59
End: 2024-05-03

## 2024-05-03 VITALS
TEMPERATURE: 97.5 F | RESPIRATION RATE: 16 BRPM | HEART RATE: 92 BPM | OXYGEN SATURATION: 98 % | SYSTOLIC BLOOD PRESSURE: 134 MMHG | WEIGHT: 111.77 LBS | DIASTOLIC BLOOD PRESSURE: 91 MMHG | BODY MASS INDEX: 20.44 KG/M2

## 2024-05-03 LAB
BASOPHILS # BLD AUTO: 0.04 K/UL — SIGNIFICANT CHANGE UP (ref 0–0.2)
BASOPHILS NFR BLD AUTO: 0.9 % — SIGNIFICANT CHANGE UP (ref 0–2)
EOSINOPHIL # BLD AUTO: 0.04 K/UL — SIGNIFICANT CHANGE UP (ref 0–0.5)
EOSINOPHIL NFR BLD AUTO: 0.9 % — SIGNIFICANT CHANGE UP (ref 0–6)
HCT VFR BLD CALC: 35.8 % — SIGNIFICANT CHANGE UP (ref 34.5–45)
HGB BLD-MCNC: 11.7 G/DL — SIGNIFICANT CHANGE UP (ref 11.5–15.5)
IMM GRANULOCYTES NFR BLD AUTO: 0.2 % — SIGNIFICANT CHANGE UP (ref 0–0.9)
LYMPHOCYTES # BLD AUTO: 0.78 K/UL — LOW (ref 1–3.3)
LYMPHOCYTES # BLD AUTO: 18.2 % — SIGNIFICANT CHANGE UP (ref 13–44)
MCHC RBC-ENTMCNC: 29.5 PG — SIGNIFICANT CHANGE UP (ref 27–34)
MCHC RBC-ENTMCNC: 32.7 G/DL — SIGNIFICANT CHANGE UP (ref 32–36)
MCV RBC AUTO: 90.2 FL — SIGNIFICANT CHANGE UP (ref 80–100)
MONOCYTES # BLD AUTO: 1.02 K/UL — HIGH (ref 0–0.9)
MONOCYTES NFR BLD AUTO: 23.8 % — HIGH (ref 2–14)
NEUTROPHILS # BLD AUTO: 2.4 K/UL — SIGNIFICANT CHANGE UP (ref 1.8–7.4)
NEUTROPHILS NFR BLD AUTO: 56 % — SIGNIFICANT CHANGE UP (ref 43–77)
NRBC # BLD: 0 /100 WBCS — SIGNIFICANT CHANGE UP (ref 0–0)
PLATELET # BLD AUTO: 191 K/UL — SIGNIFICANT CHANGE UP (ref 150–400)
RBC # BLD: 3.97 M/UL — SIGNIFICANT CHANGE UP (ref 3.8–5.2)
RBC # FLD: 14.4 % — SIGNIFICANT CHANGE UP (ref 10.3–14.5)
WBC # BLD: 4.29 K/UL — SIGNIFICANT CHANGE UP (ref 3.8–10.5)
WBC # FLD AUTO: 4.29 K/UL — SIGNIFICANT CHANGE UP (ref 3.8–10.5)

## 2024-05-03 PROCEDURE — G2211 COMPLEX E/M VISIT ADD ON: CPT

## 2024-05-03 PROCEDURE — 99214 OFFICE O/P EST MOD 30 MIN: CPT

## 2024-05-03 RX ORDER — OMEPRAZOLE 20 MG/1
20 CAPSULE, DELAYED RELEASE ORAL DAILY
Qty: 90 | Refills: 1 | Status: ACTIVE | COMMUNITY
Start: 2023-08-31 | End: 1900-01-01

## 2024-05-03 RX ORDER — ONDANSETRON 8 MG/1
8 TABLET ORAL EVERY 8 HOURS
Qty: 20 | Refills: 2 | Status: ACTIVE | COMMUNITY
Start: 2023-08-31 | End: 1900-01-01

## 2024-05-03 RX ORDER — PROCHLORPERAZINE MALEATE 10 MG/1
10 TABLET ORAL EVERY 8 HOURS
Qty: 30 | Refills: 1 | Status: ACTIVE | COMMUNITY
Start: 2023-08-31 | End: 1900-01-01

## 2024-05-06 DIAGNOSIS — Z51.11 ENCOUNTER FOR ANTINEOPLASTIC CHEMOTHERAPY: ICD-10-CM

## 2024-05-06 DIAGNOSIS — R11.2 NAUSEA WITH VOMITING, UNSPECIFIED: ICD-10-CM

## 2024-05-06 NOTE — HISTORY OF PRESENT ILLNESS
[Disease: _____________________] : Disease: [unfilled] [M: ___] : M[unfilled] [AJCC Stage: ____] : AJCC Stage: [unfilled] [100: Normal, no complaints, no evidence of disease.] : 100: Normal, no complaints, no evidence of disease. [de-identified] : Pt initially evaluated by Medical Oncology in 7/2021 when she noticed a left breast mass x 2 months for which she did not seek medical attention, denied any pain, ulceration, bleeding or nipple changes/discharge.  She also c/o lower back pain for 2-3 months, associated with pain radiating down the lower extremities relieved with Tylenol prn.  Also reports intermittent upper back pain.   No fevers, night sweats, weight loss.  Reports fatigue and poor appetite.  She went to an urgent care in [place] for back pain and breast mass and was given a referral for mammo/US.    Mammogram and breast US on 4/7/2021 showed  5.6x3.2x5.3cm hypoechoic irregularly-shaped mass with angular margins 9-11:00 5 to 9cm from nipple; left axilla - enlarged 4.0x2.1x3.4cm lymph node, US biopsy rec, BIRADS4.  On 4/20/2021, pt underwent an US guided left breast core biopsy which showed invasive moderately differentiated ductal carcinoma, Dennis score 6/9 (3+2+10), invasive tumor at least 1.8cm, no DCIS, no LVI, microcalcifications present, ER 90%, WA 2%, HER2 2+, CISH negative.  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    For the back pain, pt underwent an MRI lumbar spine on 4/22/2021 which showed metastatic disease in lower thoracic spine, lumbar spine and upper sacrum with severe pathologic fracture of L2 with retropulsion of the posterior endplate causing moderate canal stenosis.   Admitted to [facility] 6/2021 - s/p L2 kyphoplasty and RT.   6/2021-4/2022 - letrozole and palbociclib s/p two dose reductions due to neutropenia. PET scan 4/11/22 - POD 5/2022 - Started on everolimus and fulvestrant - stopped in 11/2022 due to pneumonitis Patient also on Xgeva monthly since 10/5/21.  Family history is significant for mother had cervical cancer age 64 and father with prostate cancer age 67.  No cancer of the breast, ovary, endometrium, and pancreatic cancer.  The patient is of [place] ethnic background.  No ETOH or tobacco.  Caris Summary Report Source: Spine: 6/4/2021 ER 60%; ERBB2 Negative;  PTEN pathogenic PIK3CA Negative; ESR1 not detected No other actionable mutations noted; full report to be filed.   Germline Testing: No clinically significant mutation on [employer] testing [de-identified] : ER 90%, MT 2%, HER2 2+, CISH negative [FreeTextEntry1] : Metastatic Tx: 1st LIne Ibrance/letrozole 6/2021 - 4/2022 - [place] 2nd line Everolimus/fulvestrant - 5/2022 - 12/2022 - SD - developed pneumonitis on afinitor 3rd line: capecitabine 1/19/2023 - 8/2023 SD 4th line: Doxil 9/2023 - 5/2024 - Partial Response [de-identified] : 5/3/2024 presents today to discuss recent progression of disease while on Doxil. Now with increased hypermetabolism in breast and lung nodules.  Patient denies any SOB, CP, abdominal pain, bone pain, headache, or unexplained weight loss Patient denies fever, chills, nausea/vomiting, diarrhea/constipation, neuropathy, headache. decreased left upper extremity swelling; although she is not wearing compression sleeve as often  Most Recent Imaging ================= 4/2024:  PET/CT compared to 1/2024 - Progression IMPRESSION: 1. Mild increase in size and activity of anterior chest wall lesions with worsening hypermetabolic lung nodules as detailed above concerning for disease progression. 2. Innumerable osteoblastic lesions majority of which are nonavid; scattered patches of activity may reactive process. Any sites of lingering metastasis are stable or not progressing as there are no worsening low-dose CT changes or new FDG avid bone lesions. 3. Subcutaneous/cutaneous tissue changes with increased activity for which clinical evaluation is recommended for any inflammatory process in the right occipital region and left upper thigh superomedially.  Has financial concerns:  on [employer] disability; considering returning to work 1-2 days per week once she is stabilized

## 2024-05-06 NOTE — REVIEW OF SYSTEMS
[Fatigue] : fatigue [Negative] : Allergic/Immunologic [Cough] : no cough [SOB on Exertion] : no shortness of breath during exertion [FreeTextEntry2] : manageable symptoms [de-identified] : nodularity of chest wall lesion - more skin breakdown and erythema than prior [de-identified] : left upper extremity lymphedema

## 2024-05-06 NOTE — PHYSICAL EXAM
[Fully active, able to carry on all pre-disease performance without restriction] : Status 0 - Fully active, able to carry on all pre-disease performance without restriction [Normal] : affect appropriate [de-identified] : (see photo) left breast 9:00-10:00 nodular mass palpated. induration with increased size and encroaching onto right chest wall; lobular changes and now with retraction of skin;   Satellite lesion at 1:00 left breast - flattened 2cm  [de-identified] : 2 cm Left axillary node- not palpable  Left hand swollen diffusely and extending up forearm; edema noted stable [de-identified] : Right neck, back, shoulder,upper chest - dry crusted vessicular lesions diffuse crossing dermatomes;

## 2024-05-06 NOTE — END OF VISIT
[FreeTextEntry3] : Dr. Sofia was present for this visit and advised on primary plan of care for this patient.

## 2024-05-07 ENCOUNTER — NON-APPOINTMENT (OUTPATIENT)
Age: 59
End: 2024-05-07

## 2024-05-10 ENCOUNTER — RESULT REVIEW (OUTPATIENT)
Age: 59
End: 2024-05-10

## 2024-05-10 ENCOUNTER — NON-APPOINTMENT (OUTPATIENT)
Age: 59
End: 2024-05-10

## 2024-05-10 ENCOUNTER — APPOINTMENT (OUTPATIENT)
Dept: HEMATOLOGY ONCOLOGY | Facility: CLINIC | Age: 59
End: 2024-05-10

## 2024-05-10 ENCOUNTER — APPOINTMENT (OUTPATIENT)
Dept: INFUSION THERAPY | Facility: HOSPITAL | Age: 59
End: 2024-05-10

## 2024-05-10 LAB
ALBUMIN SERPL ELPH-MCNC: 4.1 G/DL — SIGNIFICANT CHANGE UP (ref 3.3–5)
ALP SERPL-CCNC: 115 U/L — SIGNIFICANT CHANGE UP (ref 40–120)
ALT FLD-CCNC: 9 U/L — LOW (ref 10–45)
ANION GAP SERPL CALC-SCNC: 10 MMOL/L — SIGNIFICANT CHANGE UP (ref 5–17)
AST SERPL-CCNC: 28 U/L — SIGNIFICANT CHANGE UP (ref 10–40)
BASOPHILS # BLD AUTO: 0.03 K/UL — SIGNIFICANT CHANGE UP (ref 0–0.2)
BASOPHILS NFR BLD AUTO: 1.3 % — SIGNIFICANT CHANGE UP (ref 0–2)
BILIRUB SERPL-MCNC: 0.3 MG/DL — SIGNIFICANT CHANGE UP (ref 0.2–1.2)
BUN SERPL-MCNC: 9 MG/DL — SIGNIFICANT CHANGE UP (ref 7–23)
CALCIUM SERPL-MCNC: 9.3 MG/DL — SIGNIFICANT CHANGE UP (ref 8.4–10.5)
CHLORIDE SERPL-SCNC: 105 MMOL/L — SIGNIFICANT CHANGE UP (ref 96–108)
CO2 SERPL-SCNC: 25 MMOL/L — SIGNIFICANT CHANGE UP (ref 22–31)
CREAT SERPL-MCNC: 0.67 MG/DL — SIGNIFICANT CHANGE UP (ref 0.5–1.3)
EGFR: 101 ML/MIN/1.73M2 — SIGNIFICANT CHANGE UP
EOSINOPHIL # BLD AUTO: 0.04 K/UL — SIGNIFICANT CHANGE UP (ref 0–0.5)
EOSINOPHIL NFR BLD AUTO: 1.7 % — SIGNIFICANT CHANGE UP (ref 0–6)
GLUCOSE SERPL-MCNC: 98 MG/DL — SIGNIFICANT CHANGE UP (ref 70–99)
HCT VFR BLD CALC: 35.4 % — SIGNIFICANT CHANGE UP (ref 34.5–45)
HGB BLD-MCNC: 11.8 G/DL — SIGNIFICANT CHANGE UP (ref 11.5–15.5)
IMM GRANULOCYTES NFR BLD AUTO: 0.8 % — SIGNIFICANT CHANGE UP (ref 0–0.9)
LYMPHOCYTES # BLD AUTO: 0.68 K/UL — LOW (ref 1–3.3)
LYMPHOCYTES # BLD AUTO: 28.5 % — SIGNIFICANT CHANGE UP (ref 13–44)
MCHC RBC-ENTMCNC: 29.9 PG — SIGNIFICANT CHANGE UP (ref 27–34)
MCHC RBC-ENTMCNC: 33.3 G/DL — SIGNIFICANT CHANGE UP (ref 32–36)
MCV RBC AUTO: 89.8 FL — SIGNIFICANT CHANGE UP (ref 80–100)
MONOCYTES # BLD AUTO: 0.29 K/UL — SIGNIFICANT CHANGE UP (ref 0–0.9)
MONOCYTES NFR BLD AUTO: 12.1 % — SIGNIFICANT CHANGE UP (ref 2–14)
NEUTROPHILS # BLD AUTO: 1.33 K/UL — LOW (ref 1.8–7.4)
NEUTROPHILS NFR BLD AUTO: 55.6 % — SIGNIFICANT CHANGE UP (ref 43–77)
NRBC # BLD: 0 /100 WBCS — SIGNIFICANT CHANGE UP (ref 0–0)
PLAT MORPH BLD: NORMAL — SIGNIFICANT CHANGE UP
PLATELET # BLD AUTO: 216 K/UL — SIGNIFICANT CHANGE UP (ref 150–400)
POTASSIUM SERPL-MCNC: 3.8 MMOL/L — SIGNIFICANT CHANGE UP (ref 3.5–5.3)
POTASSIUM SERPL-SCNC: 3.8 MMOL/L — SIGNIFICANT CHANGE UP (ref 3.5–5.3)
PROT SERPL-MCNC: 7.1 G/DL — SIGNIFICANT CHANGE UP (ref 6–8.3)
RBC # BLD: 3.94 M/UL — SIGNIFICANT CHANGE UP (ref 3.8–5.2)
RBC # FLD: 14.1 % — SIGNIFICANT CHANGE UP (ref 10.3–14.5)
RBC BLD AUTO: SIGNIFICANT CHANGE UP
SODIUM SERPL-SCNC: 140 MMOL/L — SIGNIFICANT CHANGE UP (ref 135–145)
WBC # BLD: 2.39 K/UL — LOW (ref 3.8–10.5)
WBC # FLD AUTO: 2.39 K/UL — LOW (ref 3.8–10.5)

## 2024-05-16 ENCOUNTER — APPOINTMENT (OUTPATIENT)
Dept: HEMATOLOGY ONCOLOGY | Facility: CLINIC | Age: 59
End: 2024-05-16
Payer: MEDICARE

## 2024-05-16 ENCOUNTER — RESULT REVIEW (OUTPATIENT)
Age: 59
End: 2024-05-16

## 2024-05-16 VITALS
SYSTOLIC BLOOD PRESSURE: 107 MMHG | OXYGEN SATURATION: 100 % | DIASTOLIC BLOOD PRESSURE: 74 MMHG | RESPIRATION RATE: 16 BRPM | WEIGHT: 109.13 LBS | HEART RATE: 112 BPM | TEMPERATURE: 98.4 F | BODY MASS INDEX: 19.96 KG/M2

## 2024-05-16 DIAGNOSIS — C50.919 MALIGNANT NEOPLASM OF UNSPECIFIED SITE OF UNSPECIFIED FEMALE BREAST: ICD-10-CM

## 2024-05-16 DIAGNOSIS — D70.2 OTHER DRUG-INDUCED AGRANULOCYTOSIS: ICD-10-CM

## 2024-05-16 DIAGNOSIS — Z79.899 OTHER LONG TERM (CURRENT) DRUG THERAPY: ICD-10-CM

## 2024-05-16 LAB
BASOPHILS # BLD AUTO: 0 K/UL — SIGNIFICANT CHANGE UP (ref 0–0.2)
BASOPHILS NFR BLD AUTO: 0 % — SIGNIFICANT CHANGE UP (ref 0–2)
EOSINOPHIL # BLD AUTO: 0.01 K/UL — SIGNIFICANT CHANGE UP (ref 0–0.5)
EOSINOPHIL NFR BLD AUTO: 1 % — SIGNIFICANT CHANGE UP (ref 0–6)
HCT VFR BLD CALC: 33.1 % — LOW (ref 34.5–45)
HGB BLD-MCNC: 11.3 G/DL — LOW (ref 11.5–15.5)
LYMPHOCYTES # BLD AUTO: 0.48 K/UL — LOW (ref 1–3.3)
LYMPHOCYTES # BLD AUTO: 40 % — SIGNIFICANT CHANGE UP (ref 13–44)
MCHC RBC-ENTMCNC: 29.4 PG — SIGNIFICANT CHANGE UP (ref 27–34)
MCHC RBC-ENTMCNC: 34.1 G/DL — SIGNIFICANT CHANGE UP (ref 32–36)
MCV RBC AUTO: 86 FL — SIGNIFICANT CHANGE UP (ref 80–100)
MONOCYTES # BLD AUTO: 0.13 K/UL — SIGNIFICANT CHANGE UP (ref 0–0.9)
MONOCYTES NFR BLD AUTO: 11 % — SIGNIFICANT CHANGE UP (ref 2–14)
NEUTROPHILS # BLD AUTO: 0.58 K/UL — LOW (ref 1.8–7.4)
NEUTROPHILS NFR BLD AUTO: 47 % — SIGNIFICANT CHANGE UP (ref 43–77)
NEUTS BAND # BLD: 1 % — SIGNIFICANT CHANGE UP (ref 0–8)
NRBC # BLD: 0 /100 WBCS — SIGNIFICANT CHANGE UP (ref 0–0)
NRBC # BLD: SIGNIFICANT CHANGE UP /100 WBCS (ref 0–0)
PLAT MORPH BLD: NORMAL — SIGNIFICANT CHANGE UP
PLATELET # BLD AUTO: 239 K/UL — SIGNIFICANT CHANGE UP (ref 150–400)
RBC # BLD: 3.85 M/UL — SIGNIFICANT CHANGE UP (ref 3.8–5.2)
RBC # FLD: 13.8 % — SIGNIFICANT CHANGE UP (ref 10.3–14.5)
RBC BLD AUTO: SIGNIFICANT CHANGE UP
WBC # BLD: 1.21 K/UL — LOW (ref 3.8–10.5)
WBC # FLD AUTO: 1.21 K/UL — LOW (ref 3.8–10.5)

## 2024-05-16 PROCEDURE — 99214 OFFICE O/P EST MOD 30 MIN: CPT

## 2024-05-16 PROCEDURE — G2211 COMPLEX E/M VISIT ADD ON: CPT

## 2024-05-16 RX ORDER — CIPROFLOXACIN HYDROCHLORIDE 500 MG/1
500 TABLET, FILM COATED ORAL TWICE DAILY
Qty: 6 | Refills: 0 | Status: ACTIVE | COMMUNITY
Start: 2024-05-16 | End: 1900-01-01

## 2024-05-20 ENCOUNTER — APPOINTMENT (OUTPATIENT)
Dept: DERMATOLOGY | Facility: CLINIC | Age: 59
End: 2024-05-20
Payer: MEDICARE

## 2024-05-20 ENCOUNTER — LABORATORY RESULT (OUTPATIENT)
Age: 59
End: 2024-05-20

## 2024-05-20 DIAGNOSIS — C50.911 MALIGNANT NEOPLASM OF UNSPECIFIED SITE OF RIGHT FEMALE BREAST: ICD-10-CM

## 2024-05-20 DIAGNOSIS — L70.9 ACNE, UNSPECIFIED: ICD-10-CM

## 2024-05-20 DIAGNOSIS — C79.2 MALIGNANT NEOPLASM OF UNSPECIFIED SITE OF RIGHT FEMALE BREAST: ICD-10-CM

## 2024-05-20 DIAGNOSIS — L30.9 DERMATITIS, UNSPECIFIED: ICD-10-CM

## 2024-05-20 DIAGNOSIS — L81.0 POSTINFLAMMATORY HYPERPIGMENTATION: ICD-10-CM

## 2024-05-20 DIAGNOSIS — B35.3 TINEA PEDIS: ICD-10-CM

## 2024-05-20 PROBLEM — Z79.899 HIGH RISK MEDICATION USE: Status: ACTIVE | Noted: 2021-09-15

## 2024-05-20 PROBLEM — D70.2 OTHER DRUG-INDUCED NEUTROPENIA: Status: ACTIVE | Noted: 2021-08-03

## 2024-05-20 PROCEDURE — 99204 OFFICE O/P NEW MOD 45 MIN: CPT

## 2024-05-20 RX ORDER — MUPIROCIN 20 MG/G
2 OINTMENT TOPICAL
Qty: 1 | Refills: 1 | Status: ACTIVE | COMMUNITY
Start: 2024-05-20 | End: 1900-01-01

## 2024-05-20 RX ORDER — KETOCONAZOLE 20 MG/G
2 CREAM TOPICAL TWICE DAILY
Qty: 1 | Refills: 5 | Status: ACTIVE | COMMUNITY
Start: 2024-05-20 | End: 1900-01-01

## 2024-05-20 RX ORDER — TRIAMCINOLONE ACETONIDE 1 MG/G
0.1 OINTMENT TOPICAL
Qty: 1 | Refills: 2 | Status: ACTIVE | COMMUNITY
Start: 2024-05-20 | End: 1900-01-01

## 2024-05-20 RX ORDER — TRETINOIN 0.25 MG/G
0.03 CREAM TOPICAL
Qty: 1 | Refills: 2 | Status: ACTIVE | COMMUNITY
Start: 2024-05-20

## 2024-05-20 RX ORDER — UREA 40 G/100G
40 CREAM TOPICAL TWICE DAILY
Qty: 1 | Refills: 2 | Status: ACTIVE | COMMUNITY
Start: 2024-05-20 | End: 1900-01-01

## 2024-05-20 NOTE — REVIEW OF SYSTEMS
[Fatigue] : fatigue [Cough] : no cough [SOB on Exertion] : no shortness of breath during exertion [Negative] : Allergic/Immunologic [de-identified] : nodularity of chest wall lesion - more skin breakdown and erythema than prior and discharge [de-identified] : left upper extremity lymphedema

## 2024-05-20 NOTE — ASSESSMENT
[FreeTextEntry1] : 1. Cutaneous metastatic breast CA on the central chest wall  - Mild purulent drainage today c/f infection, will f/u bacterial culture  - Start mupirocin 2% ointment BID to affected areas  - Advised establishing care with wound care for appropriate dressings/wound care   2. 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 R/b/a topical steroids were discussed including but not limited to thinning of the skin, atrophy and dyspigmentation. - Start 40% urea cream to affected areas twice daily   3. Scarring and PIH 2/2 resolved herpes zoster infection on the R lateral neck and R choulder  4. Mild acne  - Emphasized importance of sun protection with SPF 30+, broad-spectrum sunscreen  - Start tretinoin 0.025% cream to affected areas once nightly. SED including dryness, irritation  - Discussed option of cosmetic laser treatment, discussed out of pocket fee and expectations - pt defers at this time   4.Tinea pedis, chronic condition, flaring  - Education, counseling on nature and course of condition  - Start ketoconazole 2% cream to affected area BID  - Advised to keep feet dry/clean  RTC 6 weeks

## 2024-05-20 NOTE — HISTORY OF PRESENT ILLNESS
[Disease: _____________________] : Disease: [unfilled] [M: ___] : M[unfilled] [AJCC Stage: ____] : AJCC Stage: [unfilled] [de-identified] : Pt initially evaluated by Medical Oncology in 7/2021 when she noticed a left breast mass x 2 months for which she did not seek medical attention, denied any pain, ulceration, bleeding or nipple changes/discharge.  She also c/o lower back pain for 2-3 months, associated with pain radiating down the lower extremities relieved with Tylenol prn.  Also reports intermittent upper back pain.   No fevers, night sweats, weight loss.  Reports fatigue and poor appetite.  She went to an urgent care in [place] for back pain and breast mass and was given a referral for mammo/US.    Mammogram and breast US on 4/7/2021 showed  5.6x3.2x5.3cm hypoechoic irregularly-shaped mass with angular margins 9-11:00 5 to 9cm from nipple; left axilla - enlarged 4.0x2.1x3.4cm lymph node, US biopsy rec, BIRADS4.  On 4/20/2021, pt underwent an US guided left breast core biopsy which showed invasive moderately differentiated ductal carcinoma, [place] score 6/9 (3+2+10), invasive tumor at least 1.8cm, no DCIS, no LVI, microcalcifications present, ER 90%, [place] 2%, HER2 2+, CISH negative.  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    For the back pain, pt underwent an MRI lumbar spine on 4/22/2021 which showed metastatic disease in lower thoracic spine, lumbar spine and upper sacrum with severe pathologic fracture of L2 with retropulsion of the posterior endplate causing moderate canal stenosis.   Admitted to [facility] 6/2021 - s/p L2 kyphoplasty and RT.   6/2021-4/2022 - letrozole and palbociclib s/p two dose reductions due to neutropenia. PET scan 4/11/22 - POD 5/2022 - Started on everolimus and fulvestrant - stopped in 11/2022 due to pneumonitis Patient also on Xgeva monthly since 10/5/21.  Family history is significant for mother had cervical cancer age 64 and father with prostate cancer age 67.  No cancer of the breast, ovary, endometrium, and pancreatic cancer.  The patient is of [place] ethnic background.  No ETOH or tobacco.  Caris Summary Report Source: Spine: 6/4/2021 ER 60%; ERBB2 Negative;  PTEN pathogenic PIK3CA Negative; ESR1 not detected No other actionable mutations noted; full report to be filed.   Germline Testing: No clinically significant mutation on [employer] testing [de-identified] : ER 90%, [place] 2%, HER2 2+, CISH negative [FreeTextEntry1] : Metastatic Tx: 1st LIne Ibrance/letrozole 6/2021 - 4/2022 - [place] 2nd line Everolimus/fulvestrant - 5/2022 - 12/2022 - [place] - developed pneumonitis on afinitor 3rd line: capecitabine 1/19/2023 - 8/2023 [place] 4th line: Doxil 9/2023 - 5/2024 - Partial Response [de-identified] : 5/16/2024 Patient returns today to rule out progression of metastatic breast cancer and assess treatment toxicity. S/p Cycle 1 (day 1, day 8) of Abraxane C/o manageable fatigue and occasional dizziness.  Patient denies any SOB, CP, abdominal pain, bone pain, headache, or unexplained weight loss Patient denies fever, chills, nausea/vomiting, diarrhea/constipation, neuropathy, headache. decreased left upper extremity swelling; although she is not wearing compression sleeve as often Most Recent Imaging ================= 4/2024:  PET/CT compared to 1/2024 - Progression IMPRESSION: 1. Mild increase in size and activity of anterior chest wall lesions with worsening hypermetabolic lung nodules as detailed above concerning for disease progression. 2. Innumerable osteoblastic lesions majority of which are nonavid; scattered patches of activity may reactive process. Any sites of lingering metastasis are stable or not progressing as there are no worsening low-dose CT changes or new FDG avid bone lesions. 3. Subcutaneous/cutaneous tissue changes with increased activity for which clinical evaluation is recommended for any inflammatory process in the right occipital region and left upper thigh superomedially.  Has financial concerns:  on SS disability; considering returning to work 1-2 days per week once she is stabilized   [100: Normal, no complaints, no evidence of disease.] : 100: Normal, no complaints, no evidence of disease.

## 2024-05-20 NOTE — PHYSICAL EXAM
[FreeTextEntry3] : - ulcerated vegetative plaque on the central chest   - hyperpimented scars on the right lateral neck and right shoulder  - scaly hyperpigmented plaques with hyperkeratosis on the hands and feet  - feet with thin scaly annular plaques

## 2024-05-20 NOTE — HISTORY OF PRESENT ILLNESS
[FreeTextEntry1] : NPV: rashes [de-identified] : ROGELIO PEARSON is a 59 year old female new patient with history of invasive moderately differentiated metastatic ductal breast CA who presents for evaluation of the followin. Hx shingles 2 months ago s/p valacyclovir treatment, lesions now resolved with no residual pain however pt does not scarring/discoloration 2. Ulcerated plaque on the chest, present x months. Pt is washing area with antibacterial soap but not applying any treatments   Treatment hx:  Ibrance/letrozole 2021 - 2022 - [place] Everolimus/fulvestrant - 2022 - 2022 - [place] - developed pneumonitis on afinitor Capecitabine 2023 - 2023 SD Doxil 2023 - 2024 Paclitaxel started 2024   PET CT 24:  1. Mild increase in size and activity of anterior chest wall lesions with worsening hypermetabolic lung nodules as detailed above concerning for disease progression.

## 2024-05-20 NOTE — PHYSICAL EXAM
[Fully active, able to carry on all pre-disease performance without restriction] : Status 0 - Fully active, able to carry on all pre-disease performance without restriction [Normal] : affect appropriate [de-identified] : (see photo) left breast ulceration with increased size and purluence  (no odor) and encroaching onto right chest wall; lobular changes and now with retraction of skin;   Satellite lesion at 1:00 left breast - flattened 2cm  [de-identified] : 2 cm Left axillary node- not palpable  Left hand swollen diffusely and extending up forearm; edema noted stable [de-identified] : Right neck, back, shoulder,upper chest - dry crusted vessicular lesions diffuse crossing dermatomes;

## 2024-05-24 ENCOUNTER — RESULT REVIEW (OUTPATIENT)
Age: 59
End: 2024-05-24

## 2024-05-24 ENCOUNTER — APPOINTMENT (OUTPATIENT)
Dept: INFUSION THERAPY | Facility: HOSPITAL | Age: 59
End: 2024-05-24

## 2024-05-24 ENCOUNTER — APPOINTMENT (OUTPATIENT)
Dept: HEMATOLOGY ONCOLOGY | Facility: CLINIC | Age: 59
End: 2024-05-24

## 2024-05-24 LAB
ALBUMIN SERPL ELPH-MCNC: 4.1 G/DL — SIGNIFICANT CHANGE UP (ref 3.3–5)
ALP SERPL-CCNC: 152 U/L — HIGH (ref 40–120)
ALT FLD-CCNC: 8 U/L — LOW (ref 10–45)
ANION GAP SERPL CALC-SCNC: 9 MMOL/L — SIGNIFICANT CHANGE UP (ref 5–17)
ANISOCYTOSIS BLD QL: SLIGHT — SIGNIFICANT CHANGE UP
AST SERPL-CCNC: 26 U/L — SIGNIFICANT CHANGE UP (ref 10–40)
BASOPHILS # BLD AUTO: 0 K/UL — SIGNIFICANT CHANGE UP (ref 0–0.2)
BASOPHILS NFR BLD AUTO: 0 % — SIGNIFICANT CHANGE UP (ref 0–2)
BILIRUB SERPL-MCNC: 0.2 MG/DL — SIGNIFICANT CHANGE UP (ref 0.2–1.2)
BUN SERPL-MCNC: 14 MG/DL — SIGNIFICANT CHANGE UP (ref 7–23)
CALCIUM SERPL-MCNC: 9.9 MG/DL — SIGNIFICANT CHANGE UP (ref 8.4–10.5)
CEA SERPL-MCNC: 9.6 NG/ML — HIGH (ref 0–3.8)
CHLORIDE SERPL-SCNC: 104 MMOL/L — SIGNIFICANT CHANGE UP (ref 96–108)
CO2 SERPL-SCNC: 26 MMOL/L — SIGNIFICANT CHANGE UP (ref 22–31)
CREAT SERPL-MCNC: 0.65 MG/DL — SIGNIFICANT CHANGE UP (ref 0.5–1.3)
DACRYOCYTES BLD QL SMEAR: SLIGHT — SIGNIFICANT CHANGE UP
EGFR: 101 ML/MIN/1.73M2 — SIGNIFICANT CHANGE UP
ELLIPTOCYTES BLD QL SMEAR: SLIGHT — SIGNIFICANT CHANGE UP
EOSINOPHIL # BLD AUTO: 0.05 K/UL — SIGNIFICANT CHANGE UP (ref 0–0.5)
EOSINOPHIL NFR BLD AUTO: 1 % — SIGNIFICANT CHANGE UP (ref 0–6)
GLUCOSE SERPL-MCNC: 80 MG/DL — SIGNIFICANT CHANGE UP (ref 70–99)
HCT VFR BLD CALC: 34.6 % — SIGNIFICANT CHANGE UP (ref 34.5–45)
HGB BLD-MCNC: 11.3 G/DL — LOW (ref 11.5–15.5)
LYMPHOCYTES # BLD AUTO: 1.66 K/UL — SIGNIFICANT CHANGE UP (ref 1–3.3)
LYMPHOCYTES # BLD AUTO: 36 % — SIGNIFICANT CHANGE UP (ref 13–44)
MCHC RBC-ENTMCNC: 29.4 PG — SIGNIFICANT CHANGE UP (ref 27–34)
MCHC RBC-ENTMCNC: 32.7 G/DL — SIGNIFICANT CHANGE UP (ref 32–36)
MCV RBC AUTO: 89.9 FL — SIGNIFICANT CHANGE UP (ref 80–100)
MONOCYTES # BLD AUTO: 0.78 K/UL — SIGNIFICANT CHANGE UP (ref 0–0.9)
MONOCYTES NFR BLD AUTO: 17 % — HIGH (ref 2–14)
NEUTROPHILS # BLD AUTO: 2.12 K/UL — SIGNIFICANT CHANGE UP (ref 1.8–7.4)
NEUTROPHILS NFR BLD AUTO: 46 % — SIGNIFICANT CHANGE UP (ref 43–77)
NRBC # BLD: 0 /100 WBCS — SIGNIFICANT CHANGE UP (ref 0–0)
NRBC # BLD: SIGNIFICANT CHANGE UP /100 WBCS (ref 0–0)
PLAT MORPH BLD: NORMAL — SIGNIFICANT CHANGE UP
PLATELET # BLD AUTO: 281 K/UL — SIGNIFICANT CHANGE UP (ref 150–400)
POIKILOCYTOSIS BLD QL AUTO: SLIGHT — SIGNIFICANT CHANGE UP
POTASSIUM SERPL-MCNC: 4.2 MMOL/L — SIGNIFICANT CHANGE UP (ref 3.5–5.3)
POTASSIUM SERPL-SCNC: 4.2 MMOL/L — SIGNIFICANT CHANGE UP (ref 3.5–5.3)
PROT SERPL-MCNC: 7.1 G/DL — SIGNIFICANT CHANGE UP (ref 6–8.3)
RBC # BLD: 3.85 M/UL — SIGNIFICANT CHANGE UP (ref 3.8–5.2)
RBC # FLD: 14.6 % — HIGH (ref 10.3–14.5)
RBC BLD AUTO: ABNORMAL
SODIUM SERPL-SCNC: 139 MMOL/L — SIGNIFICANT CHANGE UP (ref 135–145)
WBC # BLD: 4.6 K/UL — SIGNIFICANT CHANGE UP (ref 3.8–10.5)
WBC # FLD AUTO: 4.6 K/UL — SIGNIFICANT CHANGE UP (ref 3.8–10.5)

## 2024-05-25 LAB — CANCER AG27-29 SERPL-ACNC: 23.7 U/ML — SIGNIFICANT CHANGE UP (ref 0–38.6)

## 2024-05-28 ENCOUNTER — NON-APPOINTMENT (OUTPATIENT)
Age: 59
End: 2024-05-28

## 2024-05-31 ENCOUNTER — APPOINTMENT (OUTPATIENT)
Dept: INFUSION THERAPY | Facility: HOSPITAL | Age: 59
End: 2024-05-31

## 2024-05-31 ENCOUNTER — APPOINTMENT (OUTPATIENT)
Dept: HEMATOLOGY ONCOLOGY | Facility: CLINIC | Age: 59
End: 2024-05-31

## 2024-05-31 ENCOUNTER — RESULT REVIEW (OUTPATIENT)
Age: 59
End: 2024-05-31

## 2024-05-31 LAB
BASOPHILS # BLD AUTO: 0.03 K/UL — SIGNIFICANT CHANGE UP (ref 0–0.2)
BASOPHILS NFR BLD AUTO: 0.8 % — SIGNIFICANT CHANGE UP (ref 0–2)
EOSINOPHIL # BLD AUTO: 0.02 K/UL — SIGNIFICANT CHANGE UP (ref 0–0.5)
EOSINOPHIL NFR BLD AUTO: 0.5 % — SIGNIFICANT CHANGE UP (ref 0–6)
HCT VFR BLD CALC: 33 % — LOW (ref 34.5–45)
HGB BLD-MCNC: 10.7 G/DL — LOW (ref 11.5–15.5)
IMM GRANULOCYTES NFR BLD AUTO: 0.3 % — SIGNIFICANT CHANGE UP (ref 0–0.9)
LYMPHOCYTES # BLD AUTO: 1.1 K/UL — SIGNIFICANT CHANGE UP (ref 1–3.3)
LYMPHOCYTES # BLD AUTO: 28.3 % — SIGNIFICANT CHANGE UP (ref 13–44)
MCHC RBC-ENTMCNC: 29.6 PG — SIGNIFICANT CHANGE UP (ref 27–34)
MCHC RBC-ENTMCNC: 32.4 G/DL — SIGNIFICANT CHANGE UP (ref 32–36)
MCV RBC AUTO: 91.2 FL — SIGNIFICANT CHANGE UP (ref 80–100)
MONOCYTES # BLD AUTO: 0.25 K/UL — SIGNIFICANT CHANGE UP (ref 0–0.9)
MONOCYTES NFR BLD AUTO: 6.4 % — SIGNIFICANT CHANGE UP (ref 2–14)
NEUTROPHILS # BLD AUTO: 2.48 K/UL — SIGNIFICANT CHANGE UP (ref 1.8–7.4)
NEUTROPHILS NFR BLD AUTO: 63.7 % — SIGNIFICANT CHANGE UP (ref 43–77)
NRBC # BLD: 0 /100 WBCS — SIGNIFICANT CHANGE UP (ref 0–0)
PLATELET # BLD AUTO: 245 K/UL — SIGNIFICANT CHANGE UP (ref 150–400)
RBC # BLD: 3.62 M/UL — LOW (ref 3.8–5.2)
RBC # FLD: 14.4 % — SIGNIFICANT CHANGE UP (ref 10.3–14.5)
WBC # BLD: 3.89 K/UL — SIGNIFICANT CHANGE UP (ref 3.8–10.5)
WBC # FLD AUTO: 3.89 K/UL — SIGNIFICANT CHANGE UP (ref 3.8–10.5)

## 2024-06-01 LAB
ALBUMIN SERPL ELPH-MCNC: 4 G/DL — SIGNIFICANT CHANGE UP (ref 3.3–5)
ALP SERPL-CCNC: 156 U/L — HIGH (ref 40–120)
ALT FLD-CCNC: 15 U/L — SIGNIFICANT CHANGE UP (ref 10–45)
ANION GAP SERPL CALC-SCNC: 15 MMOL/L — SIGNIFICANT CHANGE UP (ref 5–17)
AST SERPL-CCNC: 32 U/L — SIGNIFICANT CHANGE UP (ref 10–40)
BILIRUB SERPL-MCNC: 0.2 MG/DL — SIGNIFICANT CHANGE UP (ref 0.2–1.2)
BUN SERPL-MCNC: 11 MG/DL — SIGNIFICANT CHANGE UP (ref 7–23)
CALCIUM SERPL-MCNC: 9.2 MG/DL — SIGNIFICANT CHANGE UP (ref 8.4–10.5)
CHLORIDE SERPL-SCNC: 102 MMOL/L — SIGNIFICANT CHANGE UP (ref 96–108)
CO2 SERPL-SCNC: 20 MMOL/L — LOW (ref 22–31)
CREAT SERPL-MCNC: 0.64 MG/DL — SIGNIFICANT CHANGE UP (ref 0.5–1.3)
EGFR: 102 ML/MIN/1.73M2 — SIGNIFICANT CHANGE UP
GLUCOSE SERPL-MCNC: 53 MG/DL — CRITICAL LOW (ref 70–99)
POTASSIUM SERPL-MCNC: 4.2 MMOL/L — SIGNIFICANT CHANGE UP (ref 3.5–5.3)
POTASSIUM SERPL-SCNC: 4.2 MMOL/L — SIGNIFICANT CHANGE UP (ref 3.5–5.3)
PROT SERPL-MCNC: 7.5 G/DL — SIGNIFICANT CHANGE UP (ref 6–8.3)
SODIUM SERPL-SCNC: 138 MMOL/L — SIGNIFICANT CHANGE UP (ref 135–145)

## 2024-06-03 DIAGNOSIS — C41.9 MALIGNANT NEOPLASM OF BONE AND ARTICULAR CARTILAGE, UNSPECIFIED: ICD-10-CM

## 2024-06-05 NOTE — PROGRESS NOTE ADULT - PROBLEM SELECTOR PLAN 4
Patient discharged home with family. Educated on discharge instructions, follow ups and medications. No questions or concerns voiced.   Heart attack teaching covered with patient and/or family or significant other:  Signs and symptoms of a heart attack.  When to call 911 and the importance of calling 911.  Personal risk factors and ways to lower their risk.  4.   Importance of quitting smoking if applicable.     Heart attack booklet given to the patient and/or family or significant other. Reviewed:  How to take Nitroglycerin.  The importance of participating in Cardiac Rehab and hours of operation.   Heart Healthy Diet.  Risk factor modification.(Overweight, Obesity, Diabetes, Hypertension, Smoking, High Cholesterol, Stress)  Discharge instructions for Cath/Intervention procedure site if applicable.             MRI lumbar spine showing metastatic disease in the lower thoracic, lumbar, and upper sacral spine with severe pathologic fracture of L2 with retropulsion of the posterior endplate causing moderate canal stenosis. No alarm S/S.  - Pain control with Tylenol PRN for mild pain, oxycodone 5 mg q6hrs PRN for moderate pain, and oxycodone 10 mg q6hrs PRN for severe pain  - Rad-Onc consult for possible palliative RT to spine - pt prefers to hold off at this time   - Pt will need to be fitted for TLSO brace   - Pt seen by neurosurgery as outpatient for severe pathologic fracture of L2 and deemed not a surgical candidate - will discuss with IR for possible kyphoplasty - await MRI

## 2024-06-06 ENCOUNTER — APPOINTMENT (OUTPATIENT)
Dept: SURGICAL ONCOLOGY | Facility: CLINIC | Age: 59
End: 2024-06-06
Payer: MEDICARE

## 2024-06-06 VITALS
OXYGEN SATURATION: 99 % | BODY MASS INDEX: 20.24 KG/M2 | RESPIRATION RATE: 16 BRPM | DIASTOLIC BLOOD PRESSURE: 85 MMHG | SYSTOLIC BLOOD PRESSURE: 128 MMHG | HEART RATE: 59 BPM | WEIGHT: 110 LBS | HEIGHT: 62 IN

## 2024-06-06 DIAGNOSIS — C79.9 SECONDARY MALIGNANT NEOPLASM OF UNSPECIFIED SITE: ICD-10-CM

## 2024-06-06 DIAGNOSIS — C50.919 SECONDARY MALIGNANT NEOPLASM OF UNSPECIFIED SITE: ICD-10-CM

## 2024-06-06 PROCEDURE — 99204 OFFICE O/P NEW MOD 45 MIN: CPT

## 2024-06-06 NOTE — PHYSICAL EXAM
[Normal] : supple, no neck mass and thyroid not enlarged [Normal Neck Lymph Nodes] : normal neck lymph nodes  [Normal Supraclavicular Lymph Nodes] : normal supraclavicular lymph nodes [Normal Axillary Lymph Nodes] : normal axillary lymph nodes [Normal] : oriented to person, place and time, with appropriate affect [de-identified] : lesion in the medial aspect of the left breast is almost completely resolved

## 2024-06-06 NOTE — HISTORY OF PRESENT ILLNESS
[de-identified] : Ms. ROGELIO PEARSON is a 59 year old female who presents today for a follow-up visit   B/L mammo/sono 4/7/2021: left breast mass at the site a palpable abnormality, enlarged left axillary lymph node, right breast unremarkable. BI-RADS 4.   Ultrasound Guided core biopsy of left breast x 2 sites 4/20/21: 1. Breast, left, 9-11 o'clock, core biopsy: Invasive moderately differentiated ductal carcinoma; [place] score 6 / 9 (3 + 2 +1) in this limited material; Invasive tumor measures at least 1.8cm; Ductal carcinoma in situ not seen; Microcalcifications present; Lymphovascular permeation by tumor not seen. --ER+/CA+/HER-2 equivocal (CISH negative) 2. Left axilla, core biopsy: metastatic ductal carcinoma to lymph node. -ER+/CA+/HER2- by CISH   MRI Lumbar Spine 4/22/21: Metastatic disease to the lower thoracic spine, lumbar spine and upper sacrum with severe pathologic fracture of L2 with retropulsion of the posterior endplate causing moderate canal stenosis. No discogenic disease   PET/CT 5/10/21: Impression:  1. Large FDG-avid, centrally necrotic mass in medial left breast, involving overlying skin and adjacent chest wall musculature and sternum corresponds to known malignancy. Additional separate FDG-avid lesions in left breast are suspicious for additional sites of disease. Further evaluation may be performed with breast MRI 2. FDG-avid lymph nodes in b/l lower cervical, left supraclavicular, left axillary, and left retropectoral regions are compatible  with metastatic disease. 3. Multiple FDG-avid b/l pulmonary nodules are compatible with metastatic disease.  4. Small left pleural effusion with heterogeneous FDG activity suspicious for malignant effusion.  5. Multiple FDG-avid lytic metastases in axial skeleton with severe pathologic compression fracture of L2 vertebral body, as seen on MRI dated 4/22/21, where retropulsion of posterior endplate is noted, causing moderate canal stenosis   PET/CT 8/31/2021: Mildly FDG-avid mass in medial left breast and adjacent difficult to delineate mildly FDG of left breast lesions are decreased in size and metabolism, compatible with a partial response to interval therapy.Resolution of FDG-avid bilateral cervical and left supraclavicular lymph nodes and interval decrease in size, number, and metabolism of mildly FDG-avid left axillary and retropectoral lymph nodes.Multiple mildly FDG-avid and non-FDG-avid bilateral pulmonary nodules are similar, or decreased in size and are decreased metabolism. Small to moderate left pleural effusion is mildly INCREASED. Previously seen FDG-avid lytic lesions in the axial skeleton are decreased in metabolism and demonstrate new sclerosis, compatible with a partial response to interval therapy. Status post interval vertebroplasty of L2 compression fracture.  PET/CT 12/16/21- Increased hypermetabolism of the left breast mass which is slightly decreased in size.  Mildly FDG-avid left axillary and right hilar lymph nodes are mildly increased in metabolism.  Innumerable subcentimeter minimally FDG-avid and non-FDG-avid bilateral pulmonary nodules, not significantly changed in size, number, or metabolism.  Innumerable predominantly sclerotic osseous metastases in the axial skeleton are similar or mildly increased in density on CT, and predominantly demonstrate minimal or no FDG avidity, compatible with treated bone metastases. A few small foci of increased FDG activity suggests residual disease, for example, an FDG-avid lytic lesion in the anterior aspect of the left sixth rib (image 103).  Tumor Markers 2/1/22- CA 27-29: 21.5 | CEA: 10.4 2/11/22- Patient continues Ibrance and Letrozole per Dr. Dowd.  She is scheduled for a PET/CT in March 2022.  Feeling well overall.  PET CT 4/11/22- Increased in size of medial left breast mass, new FDG activity in upper central left breast and nodule in medial upper right breast, suspicious for new sites of disease. Lung and bone lesions stable and unchanged.   Tumor Markers 5/3/22: CEA (14.7); CA15.3 (26.8); CA27.29 (24.6); ALT (9); ALK PHOS (176)   5/16/22- PET scan 4/11/22 showed increased in size of medial left breast mass, new FDG acitivity in upper central left breast and nodule in medial upper right breast, suspicious for new sites of disease. Lung and bone lesions stable and unchanged. Tumor marker uptrending. Caris testing on most recent biopsy- no actionable mutations. Ibrance and Letrozole stopped. Pt. was started on Faslodex and everolimus. Will continue Xgeva monthly.   CT C/A/P 7/2022- stable PET/CT 11/2022 - stable over all but pneumonitis   reports LUE lymphedema that started in September/October, did therapy & wears a JOBST sleeve   PET/CT 1/9/2024: FDG-avid nodules of ventral chest wall/breasts and FDG-avid axillary lymph nodes are similar in size and decreased in FDG metabolism, compatible with a partial response to interval therapy. Multiple mildly FDG-avid lung nodules, not significantly changed in size or metabolism, are compatible with metastatic disease. Predominantly non-FDG-avid sclerotic osseous metastases are unchanged in distribution and decreased in avidity.  labs 5/2024 CEA 9.6, CA 27.29: 23.7  PETCT 4/25/24: 1. Mild increase in size and activity of anterior chest wall lesions with worsening hypermetabolic lung nodules as detailed above concerning for disease progression. 2. Innumerable osteoblastic lesions majority of which are nonavid; scattered patches of activity may reactive process. Any sites of lingering metastasis are stable or not progressing as there are no worsening low-dose CT changes or new FDG avid bone lesions. 3. Subcutaneous/cutaneous tissue changes with increased activity for which clinical evaluation is recommended for any inflammatory process in the right occipital region and left upper thigh superomedially

## 2024-06-06 NOTE — ASSESSMENT
[FreeTextEntry1] : IMP:57 y/o with invasive moderately differentiated ductal carcinoma, ER+/PA+/HER2- with metastases to the axilla and lumbar spine. Started Faslodex, everolimus & xgeva w/ Dr. Dowd 5/2022 (now under Dr. Sofia) CT C/A/P 7/2022- stable PET/CT 11/2022 - stable over all but pneumonitis Labs 11/2022 CEA 12.8 (trending up) reports LUE lymphedema that started in September/October, 2022 did therapy & wears a JOBST sleeve PET/CT 1/2024 stable  PET/CT 5/2024:  labs 5/2024 CEA 9.6, CA 27.29: 23.7 currently on Capecitabine & monthly Xgeva  PLAN: continue palliative systemic therapy.  RTO Q3 months

## 2024-06-07 ENCOUNTER — APPOINTMENT (OUTPATIENT)
Dept: HEMATOLOGY ONCOLOGY | Facility: CLINIC | Age: 59
End: 2024-06-07

## 2024-06-14 ENCOUNTER — APPOINTMENT (OUTPATIENT)
Dept: HEMATOLOGY ONCOLOGY | Facility: CLINIC | Age: 59
End: 2024-06-14

## 2024-06-14 ENCOUNTER — RESULT REVIEW (OUTPATIENT)
Age: 59
End: 2024-06-14

## 2024-06-14 ENCOUNTER — APPOINTMENT (OUTPATIENT)
Dept: HEMATOLOGY ONCOLOGY | Facility: CLINIC | Age: 59
End: 2024-06-14
Payer: MEDICARE

## 2024-06-14 ENCOUNTER — APPOINTMENT (OUTPATIENT)
Dept: INFUSION THERAPY | Facility: HOSPITAL | Age: 59
End: 2024-06-14

## 2024-06-14 DIAGNOSIS — R53.81 OTHER MALAISE: ICD-10-CM

## 2024-06-14 DIAGNOSIS — I89.0 LYMPHEDEMA, NOT ELSEWHERE CLASSIFIED: ICD-10-CM

## 2024-06-14 DIAGNOSIS — C50.919 MALIGNANT NEOPLASM OF UNSPECIFIED SITE OF UNSPECIFIED FEMALE BREAST: ICD-10-CM

## 2024-06-14 DIAGNOSIS — R53.83 OTHER MALAISE: ICD-10-CM

## 2024-06-14 DIAGNOSIS — C79.51 SECONDARY MALIGNANT NEOPLASM OF BONE: ICD-10-CM

## 2024-06-14 LAB
BASOPHILS # BLD AUTO: 0.03 K/UL — SIGNIFICANT CHANGE UP (ref 0–0.2)
BASOPHILS NFR BLD AUTO: 0.5 % — SIGNIFICANT CHANGE UP (ref 0–2)
EOSINOPHIL # BLD AUTO: 0.05 K/UL — SIGNIFICANT CHANGE UP (ref 0–0.5)
EOSINOPHIL NFR BLD AUTO: 0.9 % — SIGNIFICANT CHANGE UP (ref 0–6)
HCT VFR BLD CALC: 35.2 % — SIGNIFICANT CHANGE UP (ref 34.5–45)
HGB BLD-MCNC: 11.4 G/DL — LOW (ref 11.5–15.5)
IMM GRANULOCYTES NFR BLD AUTO: 0.2 % — SIGNIFICANT CHANGE UP (ref 0–0.9)
LYMPHOCYTES # BLD AUTO: 0.89 K/UL — LOW (ref 1–3.3)
LYMPHOCYTES # BLD AUTO: 15.5 % — SIGNIFICANT CHANGE UP (ref 13–44)
MCHC RBC-ENTMCNC: 29 PG — SIGNIFICANT CHANGE UP (ref 27–34)
MCHC RBC-ENTMCNC: 32.4 G/DL — SIGNIFICANT CHANGE UP (ref 32–36)
MCV RBC AUTO: 89.6 FL — SIGNIFICANT CHANGE UP (ref 80–100)
MONOCYTES # BLD AUTO: 0.8 K/UL — SIGNIFICANT CHANGE UP (ref 0–0.9)
MONOCYTES NFR BLD AUTO: 13.9 % — SIGNIFICANT CHANGE UP (ref 2–14)
NEUTROPHILS # BLD AUTO: 3.98 K/UL — SIGNIFICANT CHANGE UP (ref 1.8–7.4)
NEUTROPHILS NFR BLD AUTO: 69 % — SIGNIFICANT CHANGE UP (ref 43–77)
NRBC # BLD: 0 /100 WBCS — SIGNIFICANT CHANGE UP (ref 0–0)
PLATELET # BLD AUTO: 251 K/UL — SIGNIFICANT CHANGE UP (ref 150–400)
RBC # BLD: 3.93 M/UL — SIGNIFICANT CHANGE UP (ref 3.8–5.2)
RBC # FLD: 15.3 % — HIGH (ref 10.3–14.5)
WBC # BLD: 5.76 K/UL — SIGNIFICANT CHANGE UP (ref 3.8–10.5)
WBC # FLD AUTO: 5.76 K/UL — SIGNIFICANT CHANGE UP (ref 3.8–10.5)

## 2024-06-14 PROCEDURE — 99214 OFFICE O/P EST MOD 30 MIN: CPT

## 2024-06-14 PROCEDURE — G2211 COMPLEX E/M VISIT ADD ON: CPT

## 2024-06-15 LAB
ALBUMIN SERPL ELPH-MCNC: 4 G/DL — SIGNIFICANT CHANGE UP (ref 3.3–5)
ALP SERPL-CCNC: 129 U/L — HIGH (ref 40–120)
ALT FLD-CCNC: 7 U/L — LOW (ref 10–45)
ANION GAP SERPL CALC-SCNC: 13 MMOL/L — SIGNIFICANT CHANGE UP (ref 5–17)
AST SERPL-CCNC: 25 U/L — SIGNIFICANT CHANGE UP (ref 10–40)
BILIRUB SERPL-MCNC: 0.2 MG/DL — SIGNIFICANT CHANGE UP (ref 0.2–1.2)
BUN SERPL-MCNC: 13 MG/DL — SIGNIFICANT CHANGE UP (ref 7–23)
CALCIUM SERPL-MCNC: 10 MG/DL — SIGNIFICANT CHANGE UP (ref 8.4–10.5)
CANCER AG27-29 SERPL-ACNC: 20.1 U/ML — SIGNIFICANT CHANGE UP (ref 0–38.6)
CEA SERPL-MCNC: 9.5 NG/ML — HIGH (ref 0–3.8)
CHLORIDE SERPL-SCNC: 102 MMOL/L — SIGNIFICANT CHANGE UP (ref 96–108)
CO2 SERPL-SCNC: 24 MMOL/L — SIGNIFICANT CHANGE UP (ref 22–31)
CREAT SERPL-MCNC: 0.71 MG/DL — SIGNIFICANT CHANGE UP (ref 0.5–1.3)
EGFR: 98 ML/MIN/1.73M2 — SIGNIFICANT CHANGE UP
GLUCOSE SERPL-MCNC: 57 MG/DL — LOW (ref 70–99)
POTASSIUM SERPL-MCNC: 3.6 MMOL/L — SIGNIFICANT CHANGE UP (ref 3.5–5.3)
POTASSIUM SERPL-SCNC: 3.6 MMOL/L — SIGNIFICANT CHANGE UP (ref 3.5–5.3)
PROT SERPL-MCNC: 7.5 G/DL — SIGNIFICANT CHANGE UP (ref 6–8.3)
SODIUM SERPL-SCNC: 139 MMOL/L — SIGNIFICANT CHANGE UP (ref 135–145)

## 2024-06-17 PROBLEM — C79.51 METASTATIC CANCER TO SPINE: Status: ACTIVE | Noted: 2021-05-18

## 2024-06-17 PROBLEM — I89.0 LYMPHEDEMA OF LEFT ARM: Status: ACTIVE | Noted: 2022-06-20

## 2024-06-17 PROBLEM — R53.81 MALAISE AND FATIGUE: Status: ACTIVE | Noted: 2022-05-19

## 2024-06-17 PROBLEM — C50.919 BREAST CARCINOMA METASTATIC TO MULTIPLE SITES: Status: ACTIVE | Noted: 2021-05-18

## 2024-06-17 NOTE — PHYSICAL EXAM
[Fully active, able to carry on all pre-disease performance without restriction] : Status 0 - Fully active, able to carry on all pre-disease performance without restriction [Normal] : affect appropriate [de-identified] : left breast ulceration healing without any purulence; decreased nodulariy    Satellite lesion at 1:00 left breast - flattened 2cm  [de-identified] : 2 cm Left axillary node- not palpable  Left hand swollen diffusely and extending up forearm; edema noted stable [de-identified] : Right neck, back, shoulder,upper chest - dry crusted vessicular lesions diffuse crossing dermatomes;

## 2024-06-17 NOTE — HISTORY OF PRESENT ILLNESS
[Disease: _____________________] : Disease: [unfilled] [M: ___] : M[unfilled] [AJCC Stage: ____] : AJCC Stage: [unfilled] [100: Normal, no complaints, no evidence of disease.] : 100: Normal, no complaints, no evidence of disease. [de-identified] : Pt initially evaluated by Medical Oncology in 7/2021 when she noticed a left breast mass x 2 months for which she did not seek medical attention, denied any pain, ulceration, bleeding or nipple changes/discharge.  She also c/o lower back pain for 2-3 months, associated with pain radiating down the lower extremities relieved with Tylenol prn.  Also reports intermittent upper back pain.   No fevers, night sweats, weight loss.  Reports fatigue and poor appetite.  She went to an urgent care in [place] for back pain and breast mass and was given a referral for mammo/US.    Mammogram and breast US on 4/7/2021 showed  5.6x3.2x5.3cm hypoechoic irregularly-shaped mass with angular margins 9-11:00 5 to 9cm from nipple; left axilla - enlarged 4.0x2.1x3.4cm lymph node, US biopsy rec, BIRADS4.  On 4/20/2021, pt underwent an US guided left breast core biopsy which showed invasive moderately differentiated ductal carcinoma, Jasper score 6/9 (3+2+10), invasive tumor at least 1.8cm, no DCIS, no LVI, microcalcifications present, ER 90%, [place] 2%, HER2 2+, CISH negative.  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    For the back pain, pt underwent an MRI lumbar spine on 4/22/2021 which showed metastatic disease in lower thoracic spine, lumbar spine and upper sacrum with severe pathologic fracture of L2 with retropulsion of the posterior endplate causing moderate canal stenosis.   Admitted to [facility] 6/2021 - s/p L2 kyphoplasty and RT.   6/2021-4/2022 - letrozole and palbociclib s/p two dose reductions due to neutropenia. PET scan 4/11/22 - POD 5/2022 - Started on everolimus and fulvestrant - stopped in 11/2022 due to pneumonitis Patient also on Xgeva monthly since 10/5/21.  Family history is significant for mother had cervical cancer age 64 and father with prostate cancer age 67.  No cancer of the breast, ovary, endometrium, and pancreatic cancer.  The patient is of [place] ethnic background.  No ETOH or tobacco.  Caris Summary Report Source: Spine: 6/4/2021 ER 60%; ERBB2 Negative;  PTEN pathogenic PIK3CA Negative; ESR1 not detected No other actionable mutations noted; full report to be filed.   Germline Testing: No clinically significant mutation on [employer] testing [de-identified] : ER 90%, [place] 2%, HER2 2+, CISH negative [FreeTextEntry1] : Metastatic Tx: 1st LIne Ibrance/letrozole 6/2021 - 4/2022 - [place] 2nd line Everolimus/fulvestrant - 5/2022 - 12/2022 - [place] - developed pneumonitis on afinitor 3rd line: capecitabine 1/19/2023 - 8/2023 [place] 4th line: Doxil 9/2023 - 5/2024 - Partial Response [de-identified] : 6/14/2024 Patient returns today to rule out progression of metastatic breast cancer and assess treatment toxicity. Today is  Cycle 4 day 1 of Abraxane C/o manageable fatigue; notes a significant improvement in chest wall lesion  Patient denies any SOB, CP, abdominal pain, bone pain, headache, or unexplained weight loss Patient denies fever, chills, nausea/vomiting, diarrhea/constipation, neuropathy, headache. decreased left upper extremity swelling; although she is not wearing compression sleeve as often Most Recent Imaging ================= 4/2024:  PET/CT compared to 1/2024 - Progression IMPRESSION: 1. Mild increase in size and activity of anterior chest wall lesions with worsening hypermetabolic lung nodules as detailed above concerning for disease progression. 2. Innumerable osteoblastic lesions majority of which are nonavid; scattered patches of activity may reactive process. Any sites of lingering metastasis are stable or not progressing as there are no worsening low-dose CT changes or new FDG avid bone lesions. 3. Subcutaneous/cutaneous tissue changes with increased activity for which clinical evaluation is recommended for any inflammatory process in the right occipital region and left upper thigh superomedially.  Has financial concerns:  on [employer] disability; considering returning to work 1-2 days per week once she is stabilized

## 2024-06-17 NOTE — REVIEW OF SYSTEMS
[Fatigue] : fatigue [Negative] : Allergic/Immunologic [Cough] : no cough [SOB on Exertion] : no shortness of breath during exertion [de-identified] : nodularity of chest wall lesion - improviing [de-identified] : left upper extremity lymphedema

## 2024-06-21 ENCOUNTER — APPOINTMENT (OUTPATIENT)
Dept: INFUSION THERAPY | Facility: HOSPITAL | Age: 59
End: 2024-06-21

## 2024-06-21 ENCOUNTER — RESULT REVIEW (OUTPATIENT)
Age: 59
End: 2024-06-21

## 2024-06-21 ENCOUNTER — APPOINTMENT (OUTPATIENT)
Dept: HEMATOLOGY ONCOLOGY | Facility: CLINIC | Age: 59
End: 2024-06-21

## 2024-06-21 LAB
BASOPHILS # BLD AUTO: 0.03 K/UL — SIGNIFICANT CHANGE UP (ref 0–0.2)
BASOPHILS NFR BLD AUTO: 0.7 % — SIGNIFICANT CHANGE UP (ref 0–2)
EOSINOPHIL # BLD AUTO: 0.03 K/UL — SIGNIFICANT CHANGE UP (ref 0–0.5)
EOSINOPHIL NFR BLD AUTO: 0.7 % — SIGNIFICANT CHANGE UP (ref 0–6)
HCT VFR BLD CALC: 36.2 % — SIGNIFICANT CHANGE UP (ref 34.5–45)
HGB BLD-MCNC: 11.7 G/DL — SIGNIFICANT CHANGE UP (ref 11.5–15.5)
IMM GRANULOCYTES NFR BLD AUTO: 0.2 % — SIGNIFICANT CHANGE UP (ref 0–0.9)
LYMPHOCYTES # BLD AUTO: 1.07 K/UL — SIGNIFICANT CHANGE UP (ref 1–3.3)
LYMPHOCYTES # BLD AUTO: 26.3 % — SIGNIFICANT CHANGE UP (ref 13–44)
MCHC RBC-ENTMCNC: 29.4 PG — SIGNIFICANT CHANGE UP (ref 27–34)
MCHC RBC-ENTMCNC: 32.3 G/DL — SIGNIFICANT CHANGE UP (ref 32–36)
MCV RBC AUTO: 91 FL — SIGNIFICANT CHANGE UP (ref 80–100)
MONOCYTES # BLD AUTO: 0.28 K/UL — SIGNIFICANT CHANGE UP (ref 0–0.9)
MONOCYTES NFR BLD AUTO: 6.9 % — SIGNIFICANT CHANGE UP (ref 2–14)
NEUTROPHILS # BLD AUTO: 2.65 K/UL — SIGNIFICANT CHANGE UP (ref 1.8–7.4)
NEUTROPHILS NFR BLD AUTO: 65.2 % — SIGNIFICANT CHANGE UP (ref 43–77)
NRBC # BLD: 0 /100 WBCS — SIGNIFICANT CHANGE UP (ref 0–0)
PLATELET # BLD AUTO: 214 K/UL — SIGNIFICANT CHANGE UP (ref 150–400)
RBC # BLD: 3.98 M/UL — SIGNIFICANT CHANGE UP (ref 3.8–5.2)
RBC # FLD: 14.6 % — HIGH (ref 10.3–14.5)
WBC # BLD: 4.07 K/UL — SIGNIFICANT CHANGE UP (ref 3.8–10.5)
WBC # FLD AUTO: 4.07 K/UL — SIGNIFICANT CHANGE UP (ref 3.8–10.5)

## 2024-06-22 LAB
ALBUMIN SERPL ELPH-MCNC: 4.1 G/DL — SIGNIFICANT CHANGE UP (ref 3.3–5)
ALP SERPL-CCNC: 120 U/L — SIGNIFICANT CHANGE UP (ref 40–120)
ALT FLD-CCNC: 11 U/L — SIGNIFICANT CHANGE UP (ref 10–45)
ANION GAP SERPL CALC-SCNC: 16 MMOL/L — SIGNIFICANT CHANGE UP (ref 5–17)
AST SERPL-CCNC: 28 U/L — SIGNIFICANT CHANGE UP (ref 10–40)
BILIRUB SERPL-MCNC: 0.2 MG/DL — SIGNIFICANT CHANGE UP (ref 0.2–1.2)
BUN SERPL-MCNC: 13 MG/DL — SIGNIFICANT CHANGE UP (ref 7–23)
CALCIUM SERPL-MCNC: 9.5 MG/DL — SIGNIFICANT CHANGE UP (ref 8.4–10.5)
CHLORIDE SERPL-SCNC: 103 MMOL/L — SIGNIFICANT CHANGE UP (ref 96–108)
CO2 SERPL-SCNC: 20 MMOL/L — LOW (ref 22–31)
CREAT SERPL-MCNC: 0.71 MG/DL — SIGNIFICANT CHANGE UP (ref 0.5–1.3)
EGFR: 98 ML/MIN/1.73M2 — SIGNIFICANT CHANGE UP
GLUCOSE SERPL-MCNC: 75 MG/DL — SIGNIFICANT CHANGE UP (ref 70–99)
POTASSIUM SERPL-MCNC: 4.1 MMOL/L — SIGNIFICANT CHANGE UP (ref 3.5–5.3)
POTASSIUM SERPL-SCNC: 4.1 MMOL/L — SIGNIFICANT CHANGE UP (ref 3.5–5.3)
PROT SERPL-MCNC: 7.5 G/DL — SIGNIFICANT CHANGE UP (ref 6–8.3)
SODIUM SERPL-SCNC: 139 MMOL/L — SIGNIFICANT CHANGE UP (ref 135–145)

## 2024-06-26 ENCOUNTER — OUTPATIENT (OUTPATIENT)
Dept: OUTPATIENT SERVICES | Facility: HOSPITAL | Age: 59
LOS: 1 days | Discharge: HOME | End: 2024-06-26

## 2024-06-26 DIAGNOSIS — C50.919 MALIGNANT NEOPLASM OF UNSPECIFIED SITE OF UNSPECIFIED FEMALE BREAST: ICD-10-CM

## 2024-06-26 DIAGNOSIS — C79.51 SECONDARY MALIGNANT NEOPLASM OF BONE: ICD-10-CM

## 2024-06-26 DIAGNOSIS — Z98.890 OTHER SPECIFIED POSTPROCEDURAL STATES: Chronic | ICD-10-CM

## 2024-06-28 ENCOUNTER — APPOINTMENT (OUTPATIENT)
Dept: INFUSION THERAPY | Facility: HOSPITAL | Age: 59
End: 2024-06-28

## 2024-06-28 ENCOUNTER — NON-APPOINTMENT (OUTPATIENT)
Age: 59
End: 2024-06-28

## 2024-06-28 ENCOUNTER — APPOINTMENT (OUTPATIENT)
Dept: HEMATOLOGY ONCOLOGY | Facility: CLINIC | Age: 59
End: 2024-06-28

## 2024-07-05 ENCOUNTER — RESULT REVIEW (OUTPATIENT)
Age: 59
End: 2024-07-05

## 2024-07-05 ENCOUNTER — APPOINTMENT (OUTPATIENT)
Dept: HEMATOLOGY ONCOLOGY | Facility: CLINIC | Age: 59
End: 2024-07-05
Payer: MEDICARE

## 2024-07-05 ENCOUNTER — APPOINTMENT (OUTPATIENT)
Dept: INFUSION THERAPY | Facility: HOSPITAL | Age: 59
End: 2024-07-05

## 2024-07-05 DIAGNOSIS — C50.812 MALIGNANT NEOPLASM OF OVERLAPPING SITES OF LEFT FEMALE BREAST: ICD-10-CM

## 2024-07-05 LAB
ALBUMIN SERPL ELPH-MCNC: 4.3 G/DL — SIGNIFICANT CHANGE UP (ref 3.3–5)
ALP SERPL-CCNC: 142 U/L — HIGH (ref 40–120)
ALT FLD-CCNC: 9 U/L — LOW (ref 10–45)
ANION GAP SERPL CALC-SCNC: 10 MMOL/L — SIGNIFICANT CHANGE UP (ref 5–17)
AST SERPL-CCNC: 20 U/L — SIGNIFICANT CHANGE UP (ref 10–40)
BASOPHILS # BLD AUTO: 0.04 K/UL — SIGNIFICANT CHANGE UP (ref 0–0.2)
BASOPHILS NFR BLD AUTO: 0.8 % — SIGNIFICANT CHANGE UP (ref 0–2)
BILIRUB SERPL-MCNC: 0.2 MG/DL — SIGNIFICANT CHANGE UP (ref 0.2–1.2)
BUN SERPL-MCNC: 21 MG/DL — SIGNIFICANT CHANGE UP (ref 7–23)
CALCIUM SERPL-MCNC: 10.4 MG/DL — SIGNIFICANT CHANGE UP (ref 8.4–10.5)
CHLORIDE SERPL-SCNC: 102 MMOL/L — SIGNIFICANT CHANGE UP (ref 96–108)
CO2 SERPL-SCNC: 27 MMOL/L — SIGNIFICANT CHANGE UP (ref 22–31)
CREAT SERPL-MCNC: 0.71 MG/DL — SIGNIFICANT CHANGE UP (ref 0.5–1.3)
EGFR: 98 ML/MIN/1.73M2 — SIGNIFICANT CHANGE UP
EOSINOPHIL # BLD AUTO: 0.05 K/UL — SIGNIFICANT CHANGE UP (ref 0–0.5)
EOSINOPHIL NFR BLD AUTO: 1 % — SIGNIFICANT CHANGE UP (ref 0–6)
GLUCOSE SERPL-MCNC: 87 MG/DL — SIGNIFICANT CHANGE UP (ref 70–99)
HCT VFR BLD CALC: 36.1 % — SIGNIFICANT CHANGE UP (ref 34.5–45)
HGB BLD-MCNC: 11.7 G/DL — SIGNIFICANT CHANGE UP (ref 11.5–15.5)
IMM GRANULOCYTES NFR BLD AUTO: 0.8 % — SIGNIFICANT CHANGE UP (ref 0–0.9)
LYMPHOCYTES # BLD AUTO: 1.09 K/UL — SIGNIFICANT CHANGE UP (ref 1–3.3)
LYMPHOCYTES # BLD AUTO: 21 % — SIGNIFICANT CHANGE UP (ref 13–44)
MCHC RBC-ENTMCNC: 28.8 PG — SIGNIFICANT CHANGE UP (ref 27–34)
MCHC RBC-ENTMCNC: 32.4 G/DL — SIGNIFICANT CHANGE UP (ref 32–36)
MCV RBC AUTO: 88.9 FL — SIGNIFICANT CHANGE UP (ref 80–100)
MONOCYTES # BLD AUTO: 1.01 K/UL — HIGH (ref 0–0.9)
MONOCYTES NFR BLD AUTO: 19.5 % — HIGH (ref 2–14)
NEUTROPHILS # BLD AUTO: 2.96 K/UL — SIGNIFICANT CHANGE UP (ref 1.8–7.4)
NEUTROPHILS NFR BLD AUTO: 56.9 % — SIGNIFICANT CHANGE UP (ref 43–77)
NRBC # BLD: 0 /100 WBCS — SIGNIFICANT CHANGE UP (ref 0–0)
PLATELET # BLD AUTO: 263 K/UL — SIGNIFICANT CHANGE UP (ref 150–400)
POTASSIUM SERPL-MCNC: 4.3 MMOL/L — SIGNIFICANT CHANGE UP (ref 3.5–5.3)
POTASSIUM SERPL-SCNC: 4.3 MMOL/L — SIGNIFICANT CHANGE UP (ref 3.5–5.3)
PROT SERPL-MCNC: 7.4 G/DL — SIGNIFICANT CHANGE UP (ref 6–8.3)
RBC # BLD: 4.06 M/UL — SIGNIFICANT CHANGE UP (ref 3.8–5.2)
RBC # FLD: 15.7 % — HIGH (ref 10.3–14.5)
SODIUM SERPL-SCNC: 139 MMOL/L — SIGNIFICANT CHANGE UP (ref 135–145)
WBC # BLD: 5.19 K/UL — SIGNIFICANT CHANGE UP (ref 3.8–10.5)
WBC # FLD AUTO: 5.19 K/UL — SIGNIFICANT CHANGE UP (ref 3.8–10.5)

## 2024-07-05 PROCEDURE — G2211 COMPLEX E/M VISIT ADD ON: CPT

## 2024-07-05 PROCEDURE — 99214 OFFICE O/P EST MOD 30 MIN: CPT

## 2024-07-06 LAB
CANCER AG27-29 SERPL-ACNC: 19.4 U/ML — SIGNIFICANT CHANGE UP (ref 0–38.6)
CEA SERPL-MCNC: 10.8 NG/ML — HIGH (ref 0–3.8)

## 2024-07-08 ENCOUNTER — APPOINTMENT (OUTPATIENT)
Dept: HEMATOLOGY ONCOLOGY | Facility: CLINIC | Age: 59
End: 2024-07-08

## 2024-07-08 DIAGNOSIS — Z51.11 ENCOUNTER FOR ANTINEOPLASTIC CHEMOTHERAPY: ICD-10-CM

## 2024-07-08 DIAGNOSIS — C41.9 MALIGNANT NEOPLASM OF BONE AND ARTICULAR CARTILAGE, UNSPECIFIED: ICD-10-CM

## 2024-07-08 DIAGNOSIS — R11.2 NAUSEA WITH VOMITING, UNSPECIFIED: ICD-10-CM

## 2024-07-12 ENCOUNTER — APPOINTMENT (OUTPATIENT)
Dept: HEMATOLOGY ONCOLOGY | Facility: CLINIC | Age: 59
End: 2024-07-12

## 2024-07-12 ENCOUNTER — APPOINTMENT (OUTPATIENT)
Dept: INFUSION THERAPY | Facility: HOSPITAL | Age: 59
End: 2024-07-12

## 2024-07-18 ENCOUNTER — APPOINTMENT (OUTPATIENT)
Dept: INFUSION THERAPY | Facility: HOSPITAL | Age: 59
End: 2024-07-18

## 2024-07-19 ENCOUNTER — RESULT REVIEW (OUTPATIENT)
Age: 59
End: 2024-07-19

## 2024-07-19 ENCOUNTER — APPOINTMENT (OUTPATIENT)
Dept: HEMATOLOGY ONCOLOGY | Facility: CLINIC | Age: 59
End: 2024-07-19
Payer: MEDICARE

## 2024-07-19 ENCOUNTER — APPOINTMENT (OUTPATIENT)
Dept: INFUSION THERAPY | Facility: HOSPITAL | Age: 59
End: 2024-07-19

## 2024-07-19 VITALS
HEART RATE: 72 BPM | TEMPERATURE: 97.2 F | OXYGEN SATURATION: 97 % | DIASTOLIC BLOOD PRESSURE: 78 MMHG | RESPIRATION RATE: 16 BRPM | SYSTOLIC BLOOD PRESSURE: 117 MMHG | BODY MASS INDEX: 19.72 KG/M2 | WEIGHT: 107.8 LBS

## 2024-07-19 DIAGNOSIS — C79.9 SECONDARY MALIGNANT NEOPLASM OF UNSPECIFIED SITE: ICD-10-CM

## 2024-07-19 DIAGNOSIS — C50.919 MALIGNANT NEOPLASM OF UNSPECIFIED SITE OF UNSPECIFIED FEMALE BREAST: ICD-10-CM

## 2024-07-19 DIAGNOSIS — C50.919 SECONDARY MALIGNANT NEOPLASM OF UNSPECIFIED SITE: ICD-10-CM

## 2024-07-19 DIAGNOSIS — C79.51 SECONDARY MALIGNANT NEOPLASM OF BONE: ICD-10-CM

## 2024-07-19 DIAGNOSIS — Z79.899 OTHER LONG TERM (CURRENT) DRUG THERAPY: ICD-10-CM

## 2024-07-19 LAB
ALBUMIN SERPL ELPH-MCNC: 4.2 G/DL — SIGNIFICANT CHANGE UP (ref 3.3–5)
ALP SERPL-CCNC: 113 U/L — SIGNIFICANT CHANGE UP (ref 40–120)
ALT FLD-CCNC: 9 U/L — LOW (ref 10–45)
ANION GAP SERPL CALC-SCNC: 10 MMOL/L — SIGNIFICANT CHANGE UP (ref 5–17)
AST SERPL-CCNC: 26 U/L — SIGNIFICANT CHANGE UP (ref 10–40)
BASOPHILS # BLD AUTO: 0.02 K/UL — SIGNIFICANT CHANGE UP (ref 0–0.2)
BASOPHILS NFR BLD AUTO: 0.4 % — SIGNIFICANT CHANGE UP (ref 0–2)
BILIRUB SERPL-MCNC: 0.2 MG/DL — SIGNIFICANT CHANGE UP (ref 0.2–1.2)
BUN SERPL-MCNC: 11 MG/DL — SIGNIFICANT CHANGE UP (ref 7–23)
CALCIUM SERPL-MCNC: 9.7 MG/DL — SIGNIFICANT CHANGE UP (ref 8.4–10.5)
CHLORIDE SERPL-SCNC: 105 MMOL/L — SIGNIFICANT CHANGE UP (ref 96–108)
CO2 SERPL-SCNC: 24 MMOL/L — SIGNIFICANT CHANGE UP (ref 22–31)
CREAT SERPL-MCNC: 0.63 MG/DL — SIGNIFICANT CHANGE UP (ref 0.5–1.3)
EGFR: 102 ML/MIN/1.73M2 — SIGNIFICANT CHANGE UP
EOSINOPHIL # BLD AUTO: 0.06 K/UL — SIGNIFICANT CHANGE UP (ref 0–0.5)
EOSINOPHIL NFR BLD AUTO: 1.2 % — SIGNIFICANT CHANGE UP (ref 0–6)
GLUCOSE SERPL-MCNC: 104 MG/DL — HIGH (ref 70–99)
HCT VFR BLD CALC: 36.6 % — SIGNIFICANT CHANGE UP (ref 34.5–45)
HGB BLD-MCNC: 11.5 G/DL — SIGNIFICANT CHANGE UP (ref 11.5–15.5)
IMM GRANULOCYTES NFR BLD AUTO: 0.2 % — SIGNIFICANT CHANGE UP (ref 0–0.9)
LYMPHOCYTES # BLD AUTO: 1.06 K/UL — SIGNIFICANT CHANGE UP (ref 1–3.3)
LYMPHOCYTES # BLD AUTO: 21.8 % — SIGNIFICANT CHANGE UP (ref 13–44)
MCHC RBC-ENTMCNC: 28.4 PG — SIGNIFICANT CHANGE UP (ref 27–34)
MCHC RBC-ENTMCNC: 31.4 G/DL — LOW (ref 32–36)
MCV RBC AUTO: 90.4 FL — SIGNIFICANT CHANGE UP (ref 80–100)
MONOCYTES # BLD AUTO: 0.86 K/UL — SIGNIFICANT CHANGE UP (ref 0–0.9)
MONOCYTES NFR BLD AUTO: 17.7 % — HIGH (ref 2–14)
NEUTROPHILS # BLD AUTO: 2.85 K/UL — SIGNIFICANT CHANGE UP (ref 1.8–7.4)
NEUTROPHILS NFR BLD AUTO: 58.7 % — SIGNIFICANT CHANGE UP (ref 43–77)
NRBC # BLD: 0 /100 WBCS — SIGNIFICANT CHANGE UP (ref 0–0)
PLATELET # BLD AUTO: 275 K/UL — SIGNIFICANT CHANGE UP (ref 150–400)
POTASSIUM SERPL-MCNC: 3.6 MMOL/L — SIGNIFICANT CHANGE UP (ref 3.5–5.3)
POTASSIUM SERPL-SCNC: 3.6 MMOL/L — SIGNIFICANT CHANGE UP (ref 3.5–5.3)
PROT SERPL-MCNC: 7.2 G/DL — SIGNIFICANT CHANGE UP (ref 6–8.3)
RBC # BLD: 4.05 M/UL — SIGNIFICANT CHANGE UP (ref 3.8–5.2)
RBC # FLD: 15.6 % — HIGH (ref 10.3–14.5)
SODIUM SERPL-SCNC: 139 MMOL/L — SIGNIFICANT CHANGE UP (ref 135–145)
WBC # BLD: 4.86 K/UL — SIGNIFICANT CHANGE UP (ref 3.8–10.5)
WBC # FLD AUTO: 4.86 K/UL — SIGNIFICANT CHANGE UP (ref 3.8–10.5)

## 2024-07-19 PROCEDURE — G2211 COMPLEX E/M VISIT ADD ON: CPT

## 2024-07-19 PROCEDURE — 99214 OFFICE O/P EST MOD 30 MIN: CPT

## 2024-07-20 LAB
CANCER AG27-29 SERPL-ACNC: 17.4 U/ML — SIGNIFICANT CHANGE UP (ref 0–38.6)
CEA SERPL-MCNC: 11.6 NG/ML — HIGH (ref 0–3.8)

## 2024-07-26 ENCOUNTER — RESULT REVIEW (OUTPATIENT)
Age: 59
End: 2024-07-26

## 2024-07-26 ENCOUNTER — APPOINTMENT (OUTPATIENT)
Dept: HEMATOLOGY ONCOLOGY | Facility: CLINIC | Age: 59
End: 2024-07-26

## 2024-07-26 ENCOUNTER — APPOINTMENT (OUTPATIENT)
Dept: INFUSION THERAPY | Facility: HOSPITAL | Age: 59
End: 2024-07-26

## 2024-07-26 LAB
ALBUMIN SERPL ELPH-MCNC: 4.1 G/DL — SIGNIFICANT CHANGE UP (ref 3.3–5)
ALP SERPL-CCNC: 118 U/L — SIGNIFICANT CHANGE UP (ref 40–120)
ALT FLD-CCNC: 10 U/L — SIGNIFICANT CHANGE UP (ref 10–45)
ANION GAP SERPL CALC-SCNC: 10 MMOL/L — SIGNIFICANT CHANGE UP (ref 5–17)
AST SERPL-CCNC: 29 U/L — SIGNIFICANT CHANGE UP (ref 10–40)
BASOPHILS # BLD AUTO: 0.03 K/UL — SIGNIFICANT CHANGE UP (ref 0–0.2)
BASOPHILS NFR BLD AUTO: 0.7 % — SIGNIFICANT CHANGE UP (ref 0–2)
BILIRUB SERPL-MCNC: 0.2 MG/DL — SIGNIFICANT CHANGE UP (ref 0.2–1.2)
BUN SERPL-MCNC: 14 MG/DL — SIGNIFICANT CHANGE UP (ref 7–23)
CALCIUM SERPL-MCNC: 9.4 MG/DL — SIGNIFICANT CHANGE UP (ref 8.4–10.5)
CHLORIDE SERPL-SCNC: 103 MMOL/L — SIGNIFICANT CHANGE UP (ref 96–108)
CO2 SERPL-SCNC: 24 MMOL/L — SIGNIFICANT CHANGE UP (ref 22–31)
CREAT SERPL-MCNC: 0.67 MG/DL — SIGNIFICANT CHANGE UP (ref 0.5–1.3)
EGFR: 101 ML/MIN/1.73M2 — SIGNIFICANT CHANGE UP
EOSINOPHIL # BLD AUTO: 0.03 K/UL — SIGNIFICANT CHANGE UP (ref 0–0.5)
EOSINOPHIL NFR BLD AUTO: 0.7 % — SIGNIFICANT CHANGE UP (ref 0–6)
GLUCOSE SERPL-MCNC: 123 MG/DL — HIGH (ref 70–99)
HCT VFR BLD CALC: 36.1 % — SIGNIFICANT CHANGE UP (ref 34.5–45)
HGB BLD-MCNC: 11.6 G/DL — SIGNIFICANT CHANGE UP (ref 11.5–15.5)
IMM GRANULOCYTES NFR BLD AUTO: 0.5 % — SIGNIFICANT CHANGE UP (ref 0–0.9)
LYMPHOCYTES # BLD AUTO: 1.24 K/UL — SIGNIFICANT CHANGE UP (ref 1–3.3)
LYMPHOCYTES # BLD AUTO: 30.9 % — SIGNIFICANT CHANGE UP (ref 13–44)
MCHC RBC-ENTMCNC: 28.7 PG — SIGNIFICANT CHANGE UP (ref 27–34)
MCHC RBC-ENTMCNC: 32.1 G/DL — SIGNIFICANT CHANGE UP (ref 32–36)
MCV RBC AUTO: 89.4 FL — SIGNIFICANT CHANGE UP (ref 80–100)
MONOCYTES # BLD AUTO: 0.31 K/UL — SIGNIFICANT CHANGE UP (ref 0–0.9)
MONOCYTES NFR BLD AUTO: 7.7 % — SIGNIFICANT CHANGE UP (ref 2–14)
NEUTROPHILS # BLD AUTO: 2.38 K/UL — SIGNIFICANT CHANGE UP (ref 1.8–7.4)
NEUTROPHILS NFR BLD AUTO: 59.5 % — SIGNIFICANT CHANGE UP (ref 43–77)
NRBC # BLD: 0 /100 WBCS — SIGNIFICANT CHANGE UP (ref 0–0)
PLATELET # BLD AUTO: 233 K/UL — SIGNIFICANT CHANGE UP (ref 150–400)
POTASSIUM SERPL-MCNC: 3.6 MMOL/L — SIGNIFICANT CHANGE UP (ref 3.5–5.3)
POTASSIUM SERPL-SCNC: 3.6 MMOL/L — SIGNIFICANT CHANGE UP (ref 3.5–5.3)
PROT SERPL-MCNC: 7.2 G/DL — SIGNIFICANT CHANGE UP (ref 6–8.3)
RBC # BLD: 4.04 M/UL — SIGNIFICANT CHANGE UP (ref 3.8–5.2)
RBC # FLD: 15.4 % — HIGH (ref 10.3–14.5)
SODIUM SERPL-SCNC: 137 MMOL/L — SIGNIFICANT CHANGE UP (ref 135–145)
WBC # BLD: 4.01 K/UL — SIGNIFICANT CHANGE UP (ref 3.8–10.5)
WBC # FLD AUTO: 4.01 K/UL — SIGNIFICANT CHANGE UP (ref 3.8–10.5)

## 2024-08-06 ENCOUNTER — APPOINTMENT (OUTPATIENT)
Dept: OPHTHALMOLOGY | Facility: CLINIC | Age: 59
End: 2024-08-06

## 2024-08-07 ENCOUNTER — APPOINTMENT (OUTPATIENT)
Dept: NUCLEAR MEDICINE | Facility: IMAGING CENTER | Age: 59
End: 2024-08-07

## 2024-08-07 ENCOUNTER — OUTPATIENT (OUTPATIENT)
Dept: OUTPATIENT SERVICES | Facility: HOSPITAL | Age: 59
LOS: 1 days | End: 2024-08-07
Payer: COMMERCIAL

## 2024-08-07 DIAGNOSIS — Z98.890 OTHER SPECIFIED POSTPROCEDURAL STATES: Chronic | ICD-10-CM

## 2024-08-07 DIAGNOSIS — C50.919 MALIGNANT NEOPLASM OF UNSPECIFIED SITE OF UNSPECIFIED FEMALE BREAST: ICD-10-CM

## 2024-08-07 PROCEDURE — 78815 PET IMAGE W/CT SKULL-THIGH: CPT | Mod: 26,PS

## 2024-08-07 PROCEDURE — A9552: CPT

## 2024-08-07 PROCEDURE — 78815 PET IMAGE W/CT SKULL-THIGH: CPT

## 2024-08-09 ENCOUNTER — RESULT REVIEW (OUTPATIENT)
Age: 59
End: 2024-08-09

## 2024-08-09 ENCOUNTER — APPOINTMENT (OUTPATIENT)
Dept: HEMATOLOGY ONCOLOGY | Facility: CLINIC | Age: 59
End: 2024-08-09

## 2024-08-09 ENCOUNTER — APPOINTMENT (OUTPATIENT)
Dept: INFUSION THERAPY | Facility: HOSPITAL | Age: 59
End: 2024-08-09

## 2024-08-09 LAB
ALBUMIN SERPL ELPH-MCNC: 3.9 G/DL — SIGNIFICANT CHANGE UP (ref 3.3–5)
ALP SERPL-CCNC: 149 U/L — HIGH (ref 40–120)
ALT FLD-CCNC: 7 U/L — LOW (ref 10–45)
ANION GAP SERPL CALC-SCNC: 11 MMOL/L — SIGNIFICANT CHANGE UP (ref 5–17)
AST SERPL-CCNC: 26 U/L — SIGNIFICANT CHANGE UP (ref 10–40)
BASOPHILS # BLD AUTO: 0.03 K/UL — SIGNIFICANT CHANGE UP (ref 0–0.2)
BASOPHILS NFR BLD AUTO: 0.4 % — SIGNIFICANT CHANGE UP (ref 0–2)
BILIRUB SERPL-MCNC: 0.2 MG/DL — SIGNIFICANT CHANGE UP (ref 0.2–1.2)
BUN SERPL-MCNC: 11 MG/DL — SIGNIFICANT CHANGE UP (ref 7–23)
CALCIUM SERPL-MCNC: 10 MG/DL — SIGNIFICANT CHANGE UP (ref 8.4–10.5)
CHLORIDE SERPL-SCNC: 101 MMOL/L — SIGNIFICANT CHANGE UP (ref 96–108)
CO2 SERPL-SCNC: 26 MMOL/L — SIGNIFICANT CHANGE UP (ref 22–31)
CREAT SERPL-MCNC: 0.71 MG/DL — SIGNIFICANT CHANGE UP (ref 0.5–1.3)
EGFR: 98 ML/MIN/1.73M2 — SIGNIFICANT CHANGE UP
EOSINOPHIL # BLD AUTO: 0.04 K/UL — SIGNIFICANT CHANGE UP (ref 0–0.5)
EOSINOPHIL NFR BLD AUTO: 0.6 % — SIGNIFICANT CHANGE UP (ref 0–6)
GLUCOSE SERPL-MCNC: 126 MG/DL — HIGH (ref 70–99)
HCT VFR BLD CALC: 39.6 % — SIGNIFICANT CHANGE UP (ref 34.5–45)
HGB BLD-MCNC: 12.3 G/DL — SIGNIFICANT CHANGE UP (ref 11.5–15.5)
IMM GRANULOCYTES NFR BLD AUTO: 0.4 % — SIGNIFICANT CHANGE UP (ref 0–0.9)
LYMPHOCYTES # BLD AUTO: 1.19 K/UL — SIGNIFICANT CHANGE UP (ref 1–3.3)
LYMPHOCYTES # BLD AUTO: 17.2 % — SIGNIFICANT CHANGE UP (ref 13–44)
MCHC RBC-ENTMCNC: 27.8 PG — SIGNIFICANT CHANGE UP (ref 27–34)
MCHC RBC-ENTMCNC: 31.1 G/DL — LOW (ref 32–36)
MCV RBC AUTO: 89.6 FL — SIGNIFICANT CHANGE UP (ref 80–100)
MONOCYTES # BLD AUTO: 1.13 K/UL — HIGH (ref 0–0.9)
MONOCYTES NFR BLD AUTO: 16.3 % — HIGH (ref 2–14)
NEUTROPHILS # BLD AUTO: 4.5 K/UL — SIGNIFICANT CHANGE UP (ref 1.8–7.4)
NEUTROPHILS NFR BLD AUTO: 65.1 % — SIGNIFICANT CHANGE UP (ref 43–77)
NRBC # BLD: 0 /100 WBCS — SIGNIFICANT CHANGE UP (ref 0–0)
PLATELET # BLD AUTO: 291 K/UL — SIGNIFICANT CHANGE UP (ref 150–400)
POTASSIUM SERPL-MCNC: 4 MMOL/L — SIGNIFICANT CHANGE UP (ref 3.5–5.3)
POTASSIUM SERPL-SCNC: 4 MMOL/L — SIGNIFICANT CHANGE UP (ref 3.5–5.3)
PROT SERPL-MCNC: 7.2 G/DL — SIGNIFICANT CHANGE UP (ref 6–8.3)
RBC # BLD: 4.42 M/UL — SIGNIFICANT CHANGE UP (ref 3.8–5.2)
RBC # FLD: 15.8 % — HIGH (ref 10.3–14.5)
SODIUM SERPL-SCNC: 137 MMOL/L — SIGNIFICANT CHANGE UP (ref 135–145)
WBC # BLD: 6.92 K/UL — SIGNIFICANT CHANGE UP (ref 3.8–10.5)
WBC # FLD AUTO: 6.92 K/UL — SIGNIFICANT CHANGE UP (ref 3.8–10.5)

## 2024-08-12 ENCOUNTER — NON-APPOINTMENT (OUTPATIENT)
Age: 59
End: 2024-08-12

## 2024-08-15 ENCOUNTER — NON-APPOINTMENT (OUTPATIENT)
Age: 59
End: 2024-08-15

## 2024-08-16 ENCOUNTER — APPOINTMENT (OUTPATIENT)
Dept: HEMATOLOGY ONCOLOGY | Facility: CLINIC | Age: 59
End: 2024-08-16
Payer: MEDICARE

## 2024-08-16 ENCOUNTER — LABORATORY RESULT (OUTPATIENT)
Age: 59
End: 2024-08-16

## 2024-08-16 ENCOUNTER — RESULT REVIEW (OUTPATIENT)
Age: 59
End: 2024-08-16

## 2024-08-16 ENCOUNTER — APPOINTMENT (OUTPATIENT)
Dept: INFUSION THERAPY | Facility: HOSPITAL | Age: 59
End: 2024-08-16

## 2024-08-16 VITALS
HEART RATE: 75 BPM | WEIGHT: 105.82 LBS | RESPIRATION RATE: 16 BRPM | TEMPERATURE: 97.4 F | SYSTOLIC BLOOD PRESSURE: 125 MMHG | DIASTOLIC BLOOD PRESSURE: 85 MMHG | BODY MASS INDEX: 19.35 KG/M2 | OXYGEN SATURATION: 97 %

## 2024-08-16 DIAGNOSIS — C50.919 MALIGNANT NEOPLASM OF UNSPECIFIED SITE OF UNSPECIFIED FEMALE BREAST: ICD-10-CM

## 2024-08-16 DIAGNOSIS — R05.8 OTHER SPECIFIED COUGH: ICD-10-CM

## 2024-08-16 LAB
ALBUMIN SERPL ELPH-MCNC: 4.1 G/DL — SIGNIFICANT CHANGE UP (ref 3.3–5)
ALP SERPL-CCNC: 125 U/L — HIGH (ref 40–120)
ALT FLD-CCNC: 7 U/L — LOW (ref 10–45)
ANION GAP SERPL CALC-SCNC: 13 MMOL/L — SIGNIFICANT CHANGE UP (ref 5–17)
AST SERPL-CCNC: 27 U/L — SIGNIFICANT CHANGE UP (ref 10–40)
BASOPHILS # BLD AUTO: 0.03 K/UL — SIGNIFICANT CHANGE UP (ref 0–0.2)
BASOPHILS NFR BLD AUTO: 0.5 % — SIGNIFICANT CHANGE UP (ref 0–2)
BILIRUB SERPL-MCNC: 0.2 MG/DL — SIGNIFICANT CHANGE UP (ref 0.2–1.2)
BUN SERPL-MCNC: 13 MG/DL — SIGNIFICANT CHANGE UP (ref 7–23)
CALCIUM SERPL-MCNC: 9.6 MG/DL — SIGNIFICANT CHANGE UP (ref 8.4–10.5)
CHLORIDE SERPL-SCNC: 103 MMOL/L — SIGNIFICANT CHANGE UP (ref 96–108)
CO2 SERPL-SCNC: 23 MMOL/L — SIGNIFICANT CHANGE UP (ref 22–31)
CREAT SERPL-MCNC: 0.59 MG/DL — SIGNIFICANT CHANGE UP (ref 0.5–1.3)
EGFR: 104 ML/MIN/1.73M2 — SIGNIFICANT CHANGE UP
EOSINOPHIL # BLD AUTO: 0.03 K/UL — SIGNIFICANT CHANGE UP (ref 0–0.5)
EOSINOPHIL NFR BLD AUTO: 0.5 % — SIGNIFICANT CHANGE UP (ref 0–6)
GLUCOSE SERPL-MCNC: 144 MG/DL — HIGH (ref 70–99)
HCT VFR BLD CALC: 34.3 % — LOW (ref 34.5–45)
HGB BLD-MCNC: 10.7 G/DL — LOW (ref 11.5–15.5)
IMM GRANULOCYTES NFR BLD AUTO: 0.4 % — SIGNIFICANT CHANGE UP (ref 0–0.9)
LYMPHOCYTES # BLD AUTO: 1.21 K/UL — SIGNIFICANT CHANGE UP (ref 1–3.3)
LYMPHOCYTES # BLD AUTO: 21.8 % — SIGNIFICANT CHANGE UP (ref 13–44)
MCHC RBC-ENTMCNC: 27.7 PG — SIGNIFICANT CHANGE UP (ref 27–34)
MCHC RBC-ENTMCNC: 31.2 G/DL — LOW (ref 32–36)
MCV RBC AUTO: 88.9 FL — SIGNIFICANT CHANGE UP (ref 80–100)
MONOCYTES # BLD AUTO: 0.37 K/UL — SIGNIFICANT CHANGE UP (ref 0–0.9)
MONOCYTES NFR BLD AUTO: 6.7 % — SIGNIFICANT CHANGE UP (ref 2–14)
NEUTROPHILS # BLD AUTO: 3.88 K/UL — SIGNIFICANT CHANGE UP (ref 1.8–7.4)
NEUTROPHILS NFR BLD AUTO: 70.1 % — SIGNIFICANT CHANGE UP (ref 43–77)
NRBC # BLD: 0 /100 WBCS — SIGNIFICANT CHANGE UP (ref 0–0)
PLATELET # BLD AUTO: 263 K/UL — SIGNIFICANT CHANGE UP (ref 150–400)
POTASSIUM SERPL-MCNC: 3.7 MMOL/L — SIGNIFICANT CHANGE UP (ref 3.5–5.3)
POTASSIUM SERPL-SCNC: 3.7 MMOL/L — SIGNIFICANT CHANGE UP (ref 3.5–5.3)
PROT SERPL-MCNC: 7.7 G/DL — SIGNIFICANT CHANGE UP (ref 6–8.3)
RBC # BLD: 3.86 M/UL — SIGNIFICANT CHANGE UP (ref 3.8–5.2)
RBC # FLD: 15.8 % — HIGH (ref 10.3–14.5)
SODIUM SERPL-SCNC: 139 MMOL/L — SIGNIFICANT CHANGE UP (ref 135–145)
WBC # BLD: 5.54 K/UL — SIGNIFICANT CHANGE UP (ref 3.8–10.5)
WBC # FLD AUTO: 5.54 K/UL — SIGNIFICANT CHANGE UP (ref 3.8–10.5)

## 2024-08-16 PROCEDURE — 99214 OFFICE O/P EST MOD 30 MIN: CPT

## 2024-08-16 PROCEDURE — G2211 COMPLEX E/M VISIT ADD ON: CPT

## 2024-08-18 NOTE — PHYSICAL EXAM
[Fully active, able to carry on all pre-disease performance without restriction] : Status 0 - Fully active, able to carry on all pre-disease performance without restriction [Normal] : affect appropriate [de-identified] : left breast ulceration well healed without evidence of previously noted nodulartiy (significantly improved - see photo)  Satellite lesion at 1:00 left breast - flattened and softer [de-identified] : decreased swelling in left hand

## 2024-08-18 NOTE — HISTORY OF PRESENT ILLNESS
[Disease: _____________________] : Disease: [unfilled] [M: ___] : M[unfilled] [AJCC Stage: ____] : AJCC Stage: [unfilled] [100: Normal, no complaints, no evidence of disease.] : 100: Normal, no complaints, no evidence of disease. [de-identified] : Pt initially evaluated by Medical Oncology in 7/2021 when she noticed a left breast mass x 2 months for which she did not seek medical attention, denied any pain, ulceration, bleeding or nipple changes/discharge.  She also c/o lower back pain for 2-3 months, associated with pain radiating down the lower extremities relieved with Tylenol prn.  Also reports intermittent upper back pain.   No fevers, night sweats, weight loss.  Reports fatigue and poor appetite.  She went to an urgent care in [place] for back pain and breast mass and was given a referral for mammo/US.    Mammogram and breast US on 4/7/2021 showed  5.6x3.2x5.3cm hypoechoic irregularly-shaped mass with angular margins 9-11:00 5 to 9cm from nipple; left axilla - enlarged 4.0x2.1x3.4cm lymph node, US biopsy rec, [id number].  On 4/20/2021, pt underwent an US guided left breast core biopsy which showed invasive moderately differentiated ductal carcinoma, [place] score 6/9 (3+2+10), invasive tumor at least 1.8cm, no DCIS, no LVI, microcalcifications present, ER 90%, MN 2%, HER2 2+, CISH negative.  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    For the back pain, pt underwent an MRI lumbar spine on 4/22/2021 which showed metastatic disease in lower thoracic spine, lumbar spine and upper sacrum with severe pathologic fracture of L2 with retropulsion of the posterior endplate causing moderate canal stenosis.   Admitted to [facility] 6/2021 - s/p L2 kyphoplasty and RT.   6/2021-4/2022 - letrozole and palbociclib s/p two dose reductions due to neutropenia. PET scan 4/11/22 - POD 5/2022 - Started on everolimus and fulvestrant - stopped in 11/2022 due to pneumonitis Patient also on Xgeva monthly since 10/5/21.  Family history is significant for mother had cervical cancer age 64 and father with prostate cancer age 67.  No cancer of the breast, ovary, endometrium, and pancreatic cancer.  The patient is of [place] ethnic background.  No ETOH or tobacco.  Caris Summary Report Source: Spine: 6/4/2021 ER 60%; ERBB2 Negative;  PTEN pathogenic PIK3CA Negative; ESR1 not detected No other actionable mutations noted; full report to be filed.   Germline Testing: No clinically significant mutation on [employer] testing [de-identified] : ER 90%, [place] 2%, HER2 2+, CISH negative [FreeTextEntry1] : Metastatic Tx: 1st LIne Ibrance/letrozole 6/2021 - 4/2022 - [place] 2nd line Everolimus/fulvestrant - 5/2022 - 12/2022 - [place] - developed pneumonitis on afinitor 3rd line: capecitabine 1/19/2023 - 8/2023 [place] 4th line: Doxil 9/2023 - 5/2024 - Partial Response [de-identified] : 8/16/24 Patient returns today to rule out progression of metastatic breast cancer and assess treatment toxicity. Today is Cycle 5 day 8 of Abraxane C/o manageable fatigue; wishes to know how long she will need to stay on treatment  Patient denies any SOB, CP, abdominal pain, bone pain, headache, or unexplained weight loss Patient denies fever, chills, nausea/vomiting, diarrhea/constipation, neuropathy, headache. decreased left upper extremity swelling; although she is not wearing compression sleeve as often  Most Recent Imaging ================= 8/7/24: PET/CT 1. Mildly FDG-avid left anterior chest wall mass is decreased in size and metabolism, compatible with response to interval therapy. A small focus of residual disease is not excluded. 2. Resolution of hypermetabolism associated with bilateral axillary lymph nodes and multiple bilateral pulmonary nodules which are decreased in size and number, compatible with response to interval therapy. 3. Innumerable non-FDG-avid mixed lytic and sclerotic osseous metastases are unchanged on CT, compatible with treated disease. Mild FDG activity within noninvolved bone likely reflects physiologic bone marrow activity. 4. Resolution of FDG-avid cutaneous focus, right occipital region.  4/2024:  PET/CT compared to 1/2024 - Progression IMPRESSION: 1. Mild increase in size and activity of anterior chest wall lesions with worsening hypermetabolic lung nodules as detailed above concerning for disease progression. 2. Innumerable osteoblastic lesions majority of which are nonavid; scattered patches of activity may reactive process. Any sites of lingering metastasis are stable or not progressing as there are no worsening low-dose CT changes or new FDG avid bone lesions. 3. Subcutaneous/cutaneous tissue changes with increased activity for which clinical evaluation is recommended for any inflammatory process in the right occipital region and left upper thigh superomedially.  Has financial concerns:  on [employer] disability; considering returning to work 1-2 days per week once she is stabilized

## 2024-08-18 NOTE — REVIEW OF SYSTEMS
[Fatigue] : fatigue [Negative] : Allergic/Immunologic [Cough] : no cough [SOB on Exertion] : no shortness of breath during exertion [de-identified] : nodularity of chest wall lesion - improving [de-identified] : left upper extremity lymphedema

## 2024-08-18 NOTE — HISTORY OF PRESENT ILLNESS
[Disease: _____________________] : Disease: [unfilled] [M: ___] : M[unfilled] [AJCC Stage: ____] : AJCC Stage: [unfilled] [100: Normal, no complaints, no evidence of disease.] : 100: Normal, no complaints, no evidence of disease. [de-identified] : Pt initially evaluated by Medical Oncology in 7/2021 when she noticed a left breast mass x 2 months for which she did not seek medical attention, denied any pain, ulceration, bleeding or nipple changes/discharge.  She also c/o lower back pain for 2-3 months, associated with pain radiating down the lower extremities relieved with Tylenol prn.  Also reports intermittent upper back pain.   No fevers, night sweats, weight loss.  Reports fatigue and poor appetite.  She went to an urgent care in [place] for back pain and breast mass and was given a referral for mammo/US.    Mammogram and breast US on 4/7/2021 showed  5.6x3.2x5.3cm hypoechoic irregularly-shaped mass with angular margins 9-11:00 5 to 9cm from nipple; left axilla - enlarged 4.0x2.1x3.4cm lymph node, US biopsy rec, [id number].  On 4/20/2021, pt underwent an US guided left breast core biopsy which showed invasive moderately differentiated ductal carcinoma, [place] score 6/9 (3+2+10), invasive tumor at least 1.8cm, no DCIS, no LVI, microcalcifications present, ER 90%, RI 2%, HER2 2+, CISH negative.  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    For the back pain, pt underwent an MRI lumbar spine on 4/22/2021 which showed metastatic disease in lower thoracic spine, lumbar spine and upper sacrum with severe pathologic fracture of L2 with retropulsion of the posterior endplate causing moderate canal stenosis.   Admitted to [facility] 6/2021 - s/p L2 kyphoplasty and RT.   6/2021-4/2022 - letrozole and palbociclib s/p two dose reductions due to neutropenia. PET scan 4/11/22 - POD 5/2022 - Started on everolimus and fulvestrant - stopped in 11/2022 due to pneumonitis Patient also on Xgeva monthly since 10/5/21.  Family history is significant for mother had cervical cancer age 64 and father with prostate cancer age 67.  No cancer of the breast, ovary, endometrium, and pancreatic cancer.  The patient is of [place] ethnic background.  No ETOH or tobacco.  Caris Summary Report Source: Spine: 6/4/2021 ER 60%; ERBB2 Negative;  PTEN pathogenic PIK3CA Negative; ESR1 not detected No other actionable mutations noted; full report to be filed.   Germline Testing: No clinically significant mutation on [employer] testing [de-identified] : ER 90%, WI 2%, HER2 2+, CISH negative [FreeTextEntry1] : Metastatic Tx: 1st LIne Ibrance/letrozole 6/2021 - 4/2022 - [place] 2nd line Everolimus/fulvestrant - 5/2022 - 12/2022 - SD - developed pneumonitis on afinitor 3rd line: capecitabine 1/19/2023 - 8/2023 [place] 4th line: Doxil 9/2023 - 5/2024 - Partial Response [de-identified] : 8/16/24 Patient returns today to rule out progression of metastatic breast cancer and assess treatment toxicity. Today is Cycle 5 day 8 of Abraxane C/o manageable fatigue; wishes to know how long she will need to stay on treatment  Patient denies any SOB, CP, abdominal pain, bone pain, headache, or unexplained weight loss Patient denies fever, chills, nausea/vomiting, diarrhea/constipation, neuropathy, headache. decreased left upper extremity swelling; although she is not wearing compression sleeve as often  Most Recent Imaging ================= 8/7/24: PET/CT 1. Mildly FDG-avid left anterior chest wall mass is decreased in size and metabolism, compatible with response to interval therapy. A small focus of residual disease is not excluded. 2. Resolution of hypermetabolism associated with bilateral axillary lymph nodes and multiple bilateral pulmonary nodules which are decreased in size and number, compatible with response to interval therapy. 3. Innumerable non-FDG-avid mixed lytic and sclerotic osseous metastases are unchanged on CT, compatible with treated disease. Mild FDG activity within noninvolved bone likely reflects physiologic bone marrow activity. 4. Resolution of FDG-avid cutaneous focus, right occipital region.  4/2024:  PET/CT compared to 1/2024 - Progression IMPRESSION: 1. Mild increase in size and activity of anterior chest wall lesions with worsening hypermetabolic lung nodules as detailed above concerning for disease progression. 2. Innumerable osteoblastic lesions majority of which are nonavid; scattered patches of activity may reactive process. Any sites of lingering metastasis are stable or not progressing as there are no worsening low-dose CT changes or new FDG avid bone lesions. 3. Subcutaneous/cutaneous tissue changes with increased activity for which clinical evaluation is recommended for any inflammatory process in the right occipital region and left upper thigh superomedially.  Has financial concerns:  on [employer] disability; considering returning to work 1-2 days per week once she is stabilized

## 2024-08-18 NOTE — REVIEW OF SYSTEMS
[Fatigue] : fatigue [Negative] : Allergic/Immunologic [Cough] : no cough [SOB on Exertion] : no shortness of breath during exertion [de-identified] : nodularity of chest wall lesion - improving [de-identified] : left upper extremity lymphedema

## 2024-08-18 NOTE — PHYSICAL EXAM
[Fully active, able to carry on all pre-disease performance without restriction] : Status 0 - Fully active, able to carry on all pre-disease performance without restriction [Normal] : affect appropriate [de-identified] : left breast ulceration well healed without evidence of previously noted nodulartiy (significantly improved - see photo)  Satellite lesion at 1:00 left breast - flattened and softer [de-identified] : decreased swelling in left hand

## 2024-08-19 ENCOUNTER — OUTPATIENT (OUTPATIENT)
Dept: OUTPATIENT SERVICES | Facility: HOSPITAL | Age: 59
LOS: 1 days | Discharge: HOME | End: 2024-08-19

## 2024-08-19 DIAGNOSIS — C79.51 SECONDARY MALIGNANT NEOPLASM OF BONE: ICD-10-CM

## 2024-08-19 DIAGNOSIS — C50.919 MALIGNANT NEOPLASM OF UNSPECIFIED SITE OF UNSPECIFIED FEMALE BREAST: ICD-10-CM

## 2024-08-19 DIAGNOSIS — Z98.890 OTHER SPECIFIED POSTPROCEDURAL STATES: Chronic | ICD-10-CM

## 2024-08-30 ENCOUNTER — RESULT REVIEW (OUTPATIENT)
Age: 59
End: 2024-08-30

## 2024-08-30 ENCOUNTER — APPOINTMENT (OUTPATIENT)
Dept: HEMATOLOGY ONCOLOGY | Facility: CLINIC | Age: 59
End: 2024-08-30

## 2024-08-30 ENCOUNTER — APPOINTMENT (OUTPATIENT)
Dept: INFUSION THERAPY | Facility: HOSPITAL | Age: 59
End: 2024-08-30

## 2024-08-30 LAB
BASOPHILS # BLD AUTO: 0.03 K/UL — SIGNIFICANT CHANGE UP (ref 0–0.2)
BASOPHILS NFR BLD AUTO: 0.5 % — SIGNIFICANT CHANGE UP (ref 0–2)
EOSINOPHIL # BLD AUTO: 0.06 K/UL — SIGNIFICANT CHANGE UP (ref 0–0.5)
EOSINOPHIL NFR BLD AUTO: 0.9 % — SIGNIFICANT CHANGE UP (ref 0–6)
HCT VFR BLD CALC: 36.9 % — SIGNIFICANT CHANGE UP (ref 34.5–45)
HGB BLD-MCNC: 11.2 G/DL — LOW (ref 11.5–15.5)
IMM GRANULOCYTES NFR BLD AUTO: 0.2 % — SIGNIFICANT CHANGE UP (ref 0–0.9)
LYMPHOCYTES # BLD AUTO: 1.34 K/UL — SIGNIFICANT CHANGE UP (ref 1–3.3)
LYMPHOCYTES # BLD AUTO: 21.2 % — SIGNIFICANT CHANGE UP (ref 13–44)
MCHC RBC-ENTMCNC: 27.7 PG — SIGNIFICANT CHANGE UP (ref 27–34)
MCHC RBC-ENTMCNC: 30.4 G/DL — LOW (ref 32–36)
MCV RBC AUTO: 91.1 FL — SIGNIFICANT CHANGE UP (ref 80–100)
MONOCYTES # BLD AUTO: 0.95 K/UL — HIGH (ref 0–0.9)
MONOCYTES NFR BLD AUTO: 15 % — HIGH (ref 2–14)
NEUTROPHILS # BLD AUTO: 3.93 K/UL — SIGNIFICANT CHANGE UP (ref 1.8–7.4)
NEUTROPHILS NFR BLD AUTO: 62.2 % — SIGNIFICANT CHANGE UP (ref 43–77)
NRBC # BLD: 0 /100 WBCS — SIGNIFICANT CHANGE UP (ref 0–0)
NRBC BLD-RTO: 0 /100 WBCS — SIGNIFICANT CHANGE UP (ref 0–0)
PLATELET # BLD AUTO: 261 K/UL — SIGNIFICANT CHANGE UP (ref 150–400)
RBC # BLD: 4.05 M/UL — SIGNIFICANT CHANGE UP (ref 3.8–5.2)
RBC # FLD: 16.6 % — HIGH (ref 10.3–14.5)
WBC # BLD: 6.32 K/UL — SIGNIFICANT CHANGE UP (ref 3.8–10.5)
WBC # FLD AUTO: 6.32 K/UL — SIGNIFICANT CHANGE UP (ref 3.8–10.5)

## 2024-08-31 LAB
ALBUMIN SERPL ELPH-MCNC: 4 G/DL — SIGNIFICANT CHANGE UP (ref 3.3–5)
ALP SERPL-CCNC: 166 U/L — HIGH (ref 40–120)
ALT FLD-CCNC: 10 U/L — SIGNIFICANT CHANGE UP (ref 10–45)
ANION GAP SERPL CALC-SCNC: 14 MMOL/L — SIGNIFICANT CHANGE UP (ref 5–17)
AST SERPL-CCNC: 26 U/L — SIGNIFICANT CHANGE UP (ref 10–40)
BILIRUB SERPL-MCNC: <0.2 MG/DL — SIGNIFICANT CHANGE UP (ref 0.2–1.2)
BUN SERPL-MCNC: 15 MG/DL — SIGNIFICANT CHANGE UP (ref 7–23)
CALCIUM SERPL-MCNC: 9.3 MG/DL — SIGNIFICANT CHANGE UP (ref 8.4–10.5)
CANCER AG27-29 SERPL-ACNC: 18.1 U/ML — SIGNIFICANT CHANGE UP (ref 0–38.6)
CEA SERPL-MCNC: 9.4 NG/ML — HIGH (ref 0–3.8)
CHLORIDE SERPL-SCNC: 106 MMOL/L — SIGNIFICANT CHANGE UP (ref 96–108)
CO2 SERPL-SCNC: 22 MMOL/L — SIGNIFICANT CHANGE UP (ref 22–31)
CREAT SERPL-MCNC: 0.58 MG/DL — SIGNIFICANT CHANGE UP (ref 0.5–1.3)
EGFR: 104 ML/MIN/1.73M2 — SIGNIFICANT CHANGE UP
EGFR: 104 ML/MIN/1.73M2 — SIGNIFICANT CHANGE UP
GLUCOSE SERPL-MCNC: 117 MG/DL — HIGH (ref 70–99)
POTASSIUM SERPL-MCNC: 3.9 MMOL/L — SIGNIFICANT CHANGE UP (ref 3.5–5.3)
POTASSIUM SERPL-SCNC: 3.9 MMOL/L — SIGNIFICANT CHANGE UP (ref 3.5–5.3)
PROT SERPL-MCNC: 7.1 G/DL — SIGNIFICANT CHANGE UP (ref 6–8.3)
SODIUM SERPL-SCNC: 142 MMOL/L — SIGNIFICANT CHANGE UP (ref 135–145)

## 2024-09-03 DIAGNOSIS — Z51.11 ENCOUNTER FOR ANTINEOPLASTIC CHEMOTHERAPY: ICD-10-CM

## 2024-09-03 DIAGNOSIS — R11.2 NAUSEA WITH VOMITING, UNSPECIFIED: ICD-10-CM

## 2024-09-06 ENCOUNTER — APPOINTMENT (OUTPATIENT)
Dept: HEMATOLOGY ONCOLOGY | Facility: CLINIC | Age: 59
End: 2024-09-06
Payer: MEDICARE

## 2024-09-06 ENCOUNTER — APPOINTMENT (OUTPATIENT)
Dept: HEMATOLOGY ONCOLOGY | Facility: CLINIC | Age: 59
End: 2024-09-06

## 2024-09-06 ENCOUNTER — RESULT REVIEW (OUTPATIENT)
Age: 59
End: 2024-09-06

## 2024-09-06 ENCOUNTER — APPOINTMENT (OUTPATIENT)
Dept: INFUSION THERAPY | Facility: HOSPITAL | Age: 59
End: 2024-09-06

## 2024-09-06 VITALS
WEIGHT: 106.9 LBS | TEMPERATURE: 97.2 F | SYSTOLIC BLOOD PRESSURE: 151 MMHG | DIASTOLIC BLOOD PRESSURE: 86 MMHG | OXYGEN SATURATION: 97 % | RESPIRATION RATE: 16 BRPM | BODY MASS INDEX: 19.56 KG/M2 | HEART RATE: 70 BPM

## 2024-09-06 DIAGNOSIS — C50.919 MALIGNANT NEOPLASM OF UNSPECIFIED SITE OF UNSPECIFIED FEMALE BREAST: ICD-10-CM

## 2024-09-06 DIAGNOSIS — Z79.899 OTHER LONG TERM (CURRENT) DRUG THERAPY: ICD-10-CM

## 2024-09-06 DIAGNOSIS — C79.51 SECONDARY MALIGNANT NEOPLASM OF BONE: ICD-10-CM

## 2024-09-06 DIAGNOSIS — I89.0 LYMPHEDEMA, NOT ELSEWHERE CLASSIFIED: ICD-10-CM

## 2024-09-06 LAB
ALBUMIN SERPL ELPH-MCNC: 4 G/DL — SIGNIFICANT CHANGE UP (ref 3.3–5)
ALP SERPL-CCNC: 162 U/L — HIGH (ref 40–120)
ALT FLD-CCNC: 8 U/L — LOW (ref 10–45)
ANION GAP SERPL CALC-SCNC: 10 MMOL/L — SIGNIFICANT CHANGE UP (ref 5–17)
AST SERPL-CCNC: 32 U/L — SIGNIFICANT CHANGE UP (ref 10–40)
BASOPHILS # BLD AUTO: 0.03 K/UL — SIGNIFICANT CHANGE UP (ref 0–0.2)
BASOPHILS NFR BLD AUTO: 0.7 % — SIGNIFICANT CHANGE UP (ref 0–2)
BILIRUB SERPL-MCNC: 0.2 MG/DL — SIGNIFICANT CHANGE UP (ref 0.2–1.2)
BUN SERPL-MCNC: 16 MG/DL — SIGNIFICANT CHANGE UP (ref 7–23)
CALCIUM SERPL-MCNC: 9.8 MG/DL — SIGNIFICANT CHANGE UP (ref 8.4–10.5)
CHLORIDE SERPL-SCNC: 101 MMOL/L — SIGNIFICANT CHANGE UP (ref 96–108)
CO2 SERPL-SCNC: 25 MMOL/L — SIGNIFICANT CHANGE UP (ref 22–31)
CREAT SERPL-MCNC: 0.58 MG/DL — SIGNIFICANT CHANGE UP (ref 0.5–1.3)
EGFR: 104 ML/MIN/1.73M2 — SIGNIFICANT CHANGE UP
EGFR: 104 ML/MIN/1.73M2 — SIGNIFICANT CHANGE UP
EOSINOPHIL # BLD AUTO: 0.02 K/UL — SIGNIFICANT CHANGE UP (ref 0–0.5)
EOSINOPHIL NFR BLD AUTO: 0.5 % — SIGNIFICANT CHANGE UP (ref 0–6)
GLUCOSE SERPL-MCNC: 153 MG/DL — HIGH (ref 70–99)
HCT VFR BLD CALC: 35.3 % — SIGNIFICANT CHANGE UP (ref 34.5–45)
HGB BLD-MCNC: 11 G/DL — LOW (ref 11.5–15.5)
IMM GRANULOCYTES NFR BLD AUTO: 0.7 % — SIGNIFICANT CHANGE UP (ref 0–0.9)
LYMPHOCYTES # BLD AUTO: 1.26 K/UL — SIGNIFICANT CHANGE UP (ref 1–3.3)
LYMPHOCYTES # BLD AUTO: 31.1 % — SIGNIFICANT CHANGE UP (ref 13–44)
MCHC RBC-ENTMCNC: 27.9 PG — SIGNIFICANT CHANGE UP (ref 27–34)
MCHC RBC-ENTMCNC: 31.2 G/DL — LOW (ref 32–36)
MCV RBC AUTO: 89.6 FL — SIGNIFICANT CHANGE UP (ref 80–100)
MONOCYTES # BLD AUTO: 0.3 K/UL — SIGNIFICANT CHANGE UP (ref 0–0.9)
MONOCYTES NFR BLD AUTO: 7.4 % — SIGNIFICANT CHANGE UP (ref 2–14)
NEUTROPHILS # BLD AUTO: 2.41 K/UL — SIGNIFICANT CHANGE UP (ref 1.8–7.4)
NEUTROPHILS NFR BLD AUTO: 59.6 % — SIGNIFICANT CHANGE UP (ref 43–77)
NRBC # BLD: 0 /100 WBCS — SIGNIFICANT CHANGE UP (ref 0–0)
NRBC BLD-RTO: 0 /100 WBCS — SIGNIFICANT CHANGE UP (ref 0–0)
PLATELET # BLD AUTO: 214 K/UL — SIGNIFICANT CHANGE UP (ref 150–400)
POTASSIUM SERPL-MCNC: 4 MMOL/L — SIGNIFICANT CHANGE UP (ref 3.5–5.3)
POTASSIUM SERPL-SCNC: 4 MMOL/L — SIGNIFICANT CHANGE UP (ref 3.5–5.3)
PROT SERPL-MCNC: 7.2 G/DL — SIGNIFICANT CHANGE UP (ref 6–8.3)
RBC # BLD: 3.94 M/UL — SIGNIFICANT CHANGE UP (ref 3.8–5.2)
RBC # FLD: 16 % — HIGH (ref 10.3–14.5)
SODIUM SERPL-SCNC: 136 MMOL/L — SIGNIFICANT CHANGE UP (ref 135–145)
WBC # BLD: 4.05 K/UL — SIGNIFICANT CHANGE UP (ref 3.8–10.5)
WBC # FLD AUTO: 4.05 K/UL — SIGNIFICANT CHANGE UP (ref 3.8–10.5)

## 2024-09-06 PROCEDURE — 99214 OFFICE O/P EST MOD 30 MIN: CPT

## 2024-09-06 PROCEDURE — G2211 COMPLEX E/M VISIT ADD ON: CPT

## 2024-09-06 NOTE — PHYSICAL EXAM
[Fully active, able to carry on all pre-disease performance without restriction] : Status 0 - Fully active, able to carry on all pre-disease performance without restriction [Normal] : affect appropriate [de-identified] : left breast ulceration well healed without evidence of previously noted nodulartiy (significantly improved - see photo)  Satellite lesion at 1:00 left breast - flattened and softer [de-identified] : decreased swelling in left hand

## 2024-09-06 NOTE — HISTORY OF PRESENT ILLNESS
[Disease: _____________________] : Disease: [unfilled] [M: ___] : M[unfilled] [AJCC Stage: ____] : AJCC Stage: [unfilled] [de-identified] : Pt initially evaluated by Medical Oncology in 7/2021 when she noticed a left breast mass x 2 months for which she did not seek medical attention, denied any pain, ulceration, bleeding or nipple changes/discharge.  She also c/o lower back pain for 2-3 months, associated with pain radiating down the lower extremities relieved with Tylenol prn.  Also reports intermittent upper back pain.   No fevers, night sweats, weight loss.  Reports fatigue and poor appetite.  She went to an urgent care in [place] for back pain and breast mass and was given a referral for mammo/US.    Mammogram and breast US on 4/7/2021 showed  5.6x3.2x5.3cm hypoechoic irregularly-shaped mass with angular margins 9-11:00 5 to 9cm from nipple; left axilla - enlarged 4.0x2.1x3.4cm lymph node, US biopsy rec, BIRADS4.  On 4/20/2021, pt underwent an US guided left breast core biopsy which showed invasive moderately differentiated ductal carcinoma, [place] score 6/9 (3+2+10), invasive tumor at least 1.8cm, no DCIS, no LVI, microcalcifications present, ER 90%, MO 2%, HER2 2+, CISH negative.  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    For the back pain, pt underwent an MRI lumbar spine on 4/22/2021 which showed metastatic disease in lower thoracic spine, lumbar spine and upper sacrum with severe pathologic fracture of L2 with retropulsion of the posterior endplate causing moderate canal stenosis.   Admitted to [facility] 6/2021 - s/p L2 kyphoplasty and RT.   6/2021-4/2022 - letrozole and palbociclib s/p two dose reductions due to neutropenia. PET scan 4/11/22 - POD 5/2022 - Started on everolimus and fulvestrant - stopped in 11/2022 due to pneumonitis Patient also on Xgeva monthly since 10/5/21.  Family history is significant for mother had cervical cancer age 64 and father with prostate cancer age 67.  No cancer of the breast, ovary, endometrium, and pancreatic cancer.  The patient is of [place] ethnic background.  No ETOH or tobacco.  Caris Summary Report Source: Spine: 6/4/2021 ER 60%; ERBB2 Negative;  PTEN pathogenic PIK3CA Negative; ESR1 not detected No other actionable mutations noted; full report to be filed.   Germline Testing: No clinically significant mutation on [employer] testing [de-identified] : ER 90%, MO 2%, HER2 2+, CISH negative [FreeTextEntry1] : Metastatic Tx: 1st LIne Ibrance/letrozole 6/2021 - 4/2022 - [place] 2nd line Everolimus/fulvestrant - 5/2022 - 12/2022 - SD - developed pneumonitis on afinitor 3rd line: capecitabine 1/19/2023 - 8/2023 [place] 4th line: Doxil 9/2023 - 5/2024 - Partial Response [de-identified] : 9/6/2024 Patient returns today to rule out progression of metastatic breast cancer and assess treatment toxicity. Today cycle 7 day 8 of Abraxane  C/o manageable fatigue; wishes to know how long she will need to stay on treatment Patient denies any SOB, CP, abdominal pain, bone pain, headache, or unexplained weight loss Patient denies fever, chills, nausea/vomiting, diarrhea/constipation, neuropathy, headache. decreased left upper extremity swelling; although she is not wearing compression sleeve as often  Most Recent Imaging ================= 8/7/24: PET/CT 1. Mildly FDG-avid left anterior chest wall mass is decreased in size and metabolism, compatible with response to interval therapy. A small focus of residual disease is not excluded. 2. Resolution of hypermetabolism associated with bilateral axillary lymph nodes and multiple bilateral pulmonary nodules which are decreased in size and number, compatible with response to interval therapy. 3. Innumerable non-FDG-avid mixed lytic and sclerotic osseous metastases are unchanged on CT, compatible with treated disease. Mild FDG activity within noninvolved bone likely reflects physiologic bone marrow activity. 4. Resolution of FDG-avid cutaneous focus, right occipital region.  4/2024:  PET/CT compared to 1/2024 - Progression IMPRESSION: 1. Mild increase in size and activity of anterior chest wall lesions with worsening hypermetabolic lung nodules as detailed above concerning for disease progression. 2. Innumerable osteoblastic lesions majority of which are nonavid; scattered patches of activity may reactive process. Any sites of lingering metastasis are stable or not progressing as there are no worsening low-dose CT changes or new FDG avid bone lesions. 3. Subcutaneous/cutaneous tissue changes with increased activity for which clinical evaluation is recommended for any inflammatory process in the right occipital region and left upper thigh superomedially.  Has financial concerns:  on [employer] disability; considering returning to work 1-2 days per week once she is stabilized [100: Normal, no complaints, no evidence of disease.] : 100: Normal, no complaints, no evidence of disease.

## 2024-09-06 NOTE — REVIEW OF SYSTEMS
[Fatigue] : fatigue [Cough] : no cough [SOB on Exertion] : no shortness of breath during exertion [Negative] : Allergic/Immunologic [de-identified] : nodularity of chest wall lesion - improving [de-identified] : left upper extremity lymphedema

## 2024-09-09 NOTE — ASSESSMENT
[FreeTextEntry1] : IMP:59 y/o with invasive moderately differentiated ductal carcinoma, ER+/MS+/HER2- with metastases to the axilla and lumbar spine. Started Faslodex, everolimus & xgeva w/ Dr. Dowd 5/2022 (now under Dr. Sofia) CT C/A/P 7/2022- stable PET/CT 11/2022 - stable over all but pneumonitis Labs 11/2022 CEA 12.8 (trending up) reports LUE lymphedema that started in September/October, 2022 did therapy & wears a JOBST sleeve PET/CT 1/2024 stable  PET/CT 5/2024:  labs 5/2024 CEA 9.6, CA 27.29: 23.7 currently on Capecitabine & monthly Xgeva  PLAN: continue palliative systemic therapy.  RTO Q3 months

## 2024-09-09 NOTE — HISTORY OF PRESENT ILLNESS
[de-identified] : Ms. ROGELIO PEARSON is a 59 year old female who presents today for a follow-up visit   B/L mammo/sono 4/7/2021: left breast mass at the site a palpable abnormality, enlarged left axillary lymph node, right breast unremarkable. BI-RADS 4.   Ultrasound Guided core biopsy of left breast x 2 sites 4/20/21: 1. Breast, left, 9-11 o'clock, core biopsy: Invasive moderately differentiated ductal carcinoma; [place] score 6 / 9 (3 + 2 +1) in this limited material; Invasive tumor measures at least 1.8cm; Ductal carcinoma in situ not seen; Microcalcifications present; Lymphovascular permeation by tumor not seen. --ER+/NC+/HER-2 equivocal (CISH negative) 2. Left axilla, core biopsy: metastatic ductal carcinoma to lymph node. -ER+/NC+/HER2- by CISH   MRI Lumbar Spine 4/22/21: Metastatic disease to the lower thoracic spine, lumbar spine and upper sacrum with severe pathologic fracture of L2 with retropulsion of the posterior endplate causing moderate canal stenosis. No discogenic disease   PET/CT 5/10/21: Impression:  1. Large FDG-avid, centrally necrotic mass in medial left breast, involving overlying skin and adjacent chest wall musculature and sternum corresponds to known malignancy. Additional separate FDG-avid lesions in left breast are suspicious for additional sites of disease. Further evaluation may be performed with breast MRI 2. FDG-avid lymph nodes in b/l lower cervical, left supraclavicular, left axillary, and left retropectoral regions are compatible  with metastatic disease. 3. Multiple FDG-avid b/l pulmonary nodules are compatible with metastatic disease.  4. Small left pleural effusion with heterogeneous FDG activity suspicious for malignant effusion.  5. Multiple FDG-avid lytic metastases in axial skeleton with severe pathologic compression fracture of L2 vertebral body, as seen on MRI dated 4/22/21, where retropulsion of posterior endplate is noted, causing moderate canal stenosis   PET/CT 8/31/2021: Mildly FDG-avid mass in medial left breast and adjacent difficult to delineate mildly FDG of left breast lesions are decreased in size and metabolism, compatible with a partial response to interval therapy.Resolution of FDG-avid bilateral cervical and left supraclavicular lymph nodes and interval decrease in size, number, and metabolism of mildly FDG-avid left axillary and retropectoral lymph nodes.Multiple mildly FDG-avid and non-FDG-avid bilateral pulmonary nodules are similar, or decreased in size and are decreased metabolism. Small to moderate left pleural effusion is mildly INCREASED. Previously seen FDG-avid lytic lesions in the axial skeleton are decreased in metabolism and demonstrate new sclerosis, compatible with a partial response to interval therapy. Status post interval vertebroplasty of L2 compression fracture.  PET/CT 12/16/21- Increased hypermetabolism of the left breast mass which is slightly decreased in size.  Mildly FDG-avid left axillary and right hilar lymph nodes are mildly increased in metabolism.  Innumerable subcentimeter minimally FDG-avid and non-FDG-avid bilateral pulmonary nodules, not significantly changed in size, number, or metabolism.  Innumerable predominantly sclerotic osseous metastases in the axial skeleton are similar or mildly increased in density on CT, and predominantly demonstrate minimal or no FDG avidity, compatible with treated bone metastases. A few small foci of increased FDG activity suggests residual disease, for example, an FDG-avid lytic lesion in the anterior aspect of the left sixth rib (image 103).  Tumor Markers 2/1/22- CA 27-29: 21.5 | CEA: 10.4 2/11/22- Patient continues Ibrance and Letrozole per Dr. Dowd.  She is scheduled for a PET/CT in March 2022.  Feeling well overall.  PET CT 4/11/22- Increased in size of medial left breast mass, new FDG activity in upper central left breast and nodule in medial upper right breast, suspicious for new sites of disease. Lung and bone lesions stable and unchanged.   Tumor Markers 5/3/22: CEA (14.7); CA15.3 (26.8); CA27.29 (24.6); ALT (9); ALK PHOS (176)   5/16/22- PET scan 4/11/22 showed increased in size of medial left breast mass, new FDG acitivity in upper central left breast and nodule in medial upper right breast, suspicious for new sites of disease. Lung and bone lesions stable and unchanged. Tumor marker uptrending. Caris testing on most recent biopsy- no actionable mutations. Ibrance and Letrozole stopped. Pt. was started on Faslodex and everolimus. Will continue Xgeva monthly.   CT C/A/P 7/2022- stable PET/CT 11/2022 - stable over all but pneumonitis   reports LUE lymphedema that started in September/October, did therapy & wears a JOBST sleeve   PET/CT 1/9/2024: FDG-avid nodules of ventral chest wall/breasts and FDG-avid axillary lymph nodes are similar in size and decreased in FDG metabolism, compatible with a partial response to interval therapy. Multiple mildly FDG-avid lung nodules, not significantly changed in size or metabolism, are compatible with metastatic disease. Predominantly non-FDG-avid sclerotic osseous metastases are unchanged in distribution and decreased in avidity.  labs 5/2024 CEA 9.6, CA 27.29: 23.7  PETCT 4/25/24: 1. Mild increase in size and activity of anterior chest wall lesions with worsening hypermetabolic lung nodules as detailed above concerning for disease progression. 2. Innumerable osteoblastic lesions majority of which are nonavid; scattered patches of activity may reactive process. Any sites of lingering metastasis are stable or not progressing as there are no worsening low-dose CT changes or new FDG avid bone lesions. 3. Subcutaneous/cutaneous tissue changes with increased activity for which clinical evaluation is recommended for any inflammatory process in the right occipital region and left upper thigh superomedially

## 2024-09-09 NOTE — REVIEW OF SYSTEMS
[Negative] : Heme/Lymph [FreeTextEntry2] : no pain [de-identified] : hand foot syndrome from chemo; no back pain

## 2024-09-12 ENCOUNTER — APPOINTMENT (OUTPATIENT)
Dept: SURGICAL ONCOLOGY | Facility: CLINIC | Age: 59
End: 2024-09-12

## 2024-09-12 DIAGNOSIS — C50.919 MALIGNANT NEOPLASM OF UNSPECIFIED SITE OF UNSPECIFIED FEMALE BREAST: ICD-10-CM

## 2024-09-20 ENCOUNTER — RESULT REVIEW (OUTPATIENT)
Age: 59
End: 2024-09-20

## 2024-09-20 ENCOUNTER — APPOINTMENT (OUTPATIENT)
Dept: INFUSION THERAPY | Facility: HOSPITAL | Age: 59
End: 2024-09-20

## 2024-09-20 ENCOUNTER — NON-APPOINTMENT (OUTPATIENT)
Age: 59
End: 2024-09-20

## 2024-09-20 ENCOUNTER — APPOINTMENT (OUTPATIENT)
Dept: HEMATOLOGY ONCOLOGY | Facility: CLINIC | Age: 59
End: 2024-09-20

## 2024-09-20 LAB
ALBUMIN SERPL ELPH-MCNC: 3.8 G/DL — SIGNIFICANT CHANGE UP (ref 3.3–5)
ALP SERPL-CCNC: 135 U/L — HIGH (ref 40–120)
ALT FLD-CCNC: 11 U/L — SIGNIFICANT CHANGE UP (ref 10–45)
ANION GAP SERPL CALC-SCNC: 11 MMOL/L — SIGNIFICANT CHANGE UP (ref 5–17)
AST SERPL-CCNC: 30 U/L — SIGNIFICANT CHANGE UP (ref 10–40)
BASOPHILS # BLD AUTO: 0.04 K/UL — SIGNIFICANT CHANGE UP (ref 0–0.2)
BASOPHILS NFR BLD AUTO: 0.6 % — SIGNIFICANT CHANGE UP (ref 0–2)
BILIRUB SERPL-MCNC: <0.2 MG/DL — SIGNIFICANT CHANGE UP (ref 0.2–1.2)
BUN SERPL-MCNC: 12 MG/DL — SIGNIFICANT CHANGE UP (ref 7–23)
CALCIUM SERPL-MCNC: 9.4 MG/DL — SIGNIFICANT CHANGE UP (ref 8.4–10.5)
CEA SERPL-MCNC: 9.5 NG/ML — HIGH (ref 0–3.8)
CHLORIDE SERPL-SCNC: 104 MMOL/L — SIGNIFICANT CHANGE UP (ref 96–108)
CO2 SERPL-SCNC: 23 MMOL/L — SIGNIFICANT CHANGE UP (ref 22–31)
CREAT SERPL-MCNC: 0.74 MG/DL — SIGNIFICANT CHANGE UP (ref 0.5–1.3)
EGFR: 93 ML/MIN/1.73M2 — SIGNIFICANT CHANGE UP
EGFR: 93 ML/MIN/1.73M2 — SIGNIFICANT CHANGE UP
EOSINOPHIL # BLD AUTO: 0.07 K/UL — SIGNIFICANT CHANGE UP (ref 0–0.5)
EOSINOPHIL NFR BLD AUTO: 1 % — SIGNIFICANT CHANGE UP (ref 0–6)
GLUCOSE SERPL-MCNC: 148 MG/DL — HIGH (ref 70–99)
HCT VFR BLD CALC: 38.2 % — SIGNIFICANT CHANGE UP (ref 34.5–45)
HGB BLD-MCNC: 12.1 G/DL — SIGNIFICANT CHANGE UP (ref 11.5–15.5)
IMM GRANULOCYTES NFR BLD AUTO: 0.3 % — SIGNIFICANT CHANGE UP (ref 0–0.9)
LYMPHOCYTES # BLD AUTO: 1.75 K/UL — SIGNIFICANT CHANGE UP (ref 1–3.3)
LYMPHOCYTES # BLD AUTO: 25.5 % — SIGNIFICANT CHANGE UP (ref 13–44)
MCHC RBC-ENTMCNC: 27.9 PG — SIGNIFICANT CHANGE UP (ref 27–34)
MCHC RBC-ENTMCNC: 31.7 G/DL — LOW (ref 32–36)
MCV RBC AUTO: 88 FL — SIGNIFICANT CHANGE UP (ref 80–100)
MONOCYTES # BLD AUTO: 1.11 K/UL — HIGH (ref 0–0.9)
MONOCYTES NFR BLD AUTO: 16.2 % — HIGH (ref 2–14)
NEUTROPHILS # BLD AUTO: 3.88 K/UL — SIGNIFICANT CHANGE UP (ref 1.8–7.4)
NEUTROPHILS NFR BLD AUTO: 56.4 % — SIGNIFICANT CHANGE UP (ref 43–77)
NRBC # BLD: 0 /100 WBCS — SIGNIFICANT CHANGE UP (ref 0–0)
NRBC BLD-RTO: 0 /100 WBCS — SIGNIFICANT CHANGE UP (ref 0–0)
PLATELET # BLD AUTO: 287 K/UL — SIGNIFICANT CHANGE UP (ref 150–400)
POTASSIUM SERPL-MCNC: 3.8 MMOL/L — SIGNIFICANT CHANGE UP (ref 3.5–5.3)
POTASSIUM SERPL-SCNC: 3.8 MMOL/L — SIGNIFICANT CHANGE UP (ref 3.5–5.3)
PROT SERPL-MCNC: 7.1 G/DL — SIGNIFICANT CHANGE UP (ref 6–8.3)
RBC # BLD: 4.34 M/UL — SIGNIFICANT CHANGE UP (ref 3.8–5.2)
RBC # FLD: 17.2 % — HIGH (ref 10.3–14.5)
SODIUM SERPL-SCNC: 138 MMOL/L — SIGNIFICANT CHANGE UP (ref 135–145)
WBC # BLD: 6.87 K/UL — SIGNIFICANT CHANGE UP (ref 3.8–10.5)
WBC # FLD AUTO: 6.87 K/UL — SIGNIFICANT CHANGE UP (ref 3.8–10.5)

## 2024-09-21 LAB — CANCER AG27-29 SERPL-ACNC: 19.9 U/ML — SIGNIFICANT CHANGE UP (ref 0–38.6)

## 2024-09-27 ENCOUNTER — RESULT REVIEW (OUTPATIENT)
Age: 59
End: 2024-09-27

## 2024-09-27 ENCOUNTER — APPOINTMENT (OUTPATIENT)
Dept: INFUSION THERAPY | Facility: HOSPITAL | Age: 59
End: 2024-09-27

## 2024-09-27 ENCOUNTER — APPOINTMENT (OUTPATIENT)
Dept: HEMATOLOGY ONCOLOGY | Facility: CLINIC | Age: 59
End: 2024-09-27
Payer: MEDICARE

## 2024-09-27 ENCOUNTER — APPOINTMENT (OUTPATIENT)
Dept: HEMATOLOGY ONCOLOGY | Facility: CLINIC | Age: 59
End: 2024-09-27

## 2024-09-27 ENCOUNTER — NON-APPOINTMENT (OUTPATIENT)
Age: 59
End: 2024-09-27

## 2024-09-27 VITALS
TEMPERATURE: 97.3 F | HEART RATE: 92 BPM | SYSTOLIC BLOOD PRESSURE: 160 MMHG | OXYGEN SATURATION: 97 % | RESPIRATION RATE: 16 BRPM | WEIGHT: 112.43 LBS | DIASTOLIC BLOOD PRESSURE: 89 MMHG | BODY MASS INDEX: 20.56 KG/M2

## 2024-09-27 DIAGNOSIS — Z79.899 OTHER LONG TERM (CURRENT) DRUG THERAPY: ICD-10-CM

## 2024-09-27 DIAGNOSIS — L27.1 LOCALIZED SKIN ERUPTION DUE TO DRUGS AND MEDICAMENTS TAKEN INTERNALLY: ICD-10-CM

## 2024-09-27 DIAGNOSIS — C50.919 MALIGNANT NEOPLASM OF UNSPECIFIED SITE OF UNSPECIFIED FEMALE BREAST: ICD-10-CM

## 2024-09-27 LAB
ALBUMIN SERPL ELPH-MCNC: 3.8 G/DL — SIGNIFICANT CHANGE UP (ref 3.3–5)
ALP SERPL-CCNC: 217 U/L — HIGH (ref 40–120)
ALT FLD-CCNC: 8 U/L — LOW (ref 10–45)
ANION GAP SERPL CALC-SCNC: 10 MMOL/L — SIGNIFICANT CHANGE UP (ref 5–17)
AST SERPL-CCNC: 31 U/L — SIGNIFICANT CHANGE UP (ref 10–40)
BASOPHILS # BLD AUTO: 0.04 K/UL — SIGNIFICANT CHANGE UP (ref 0–0.2)
BASOPHILS NFR BLD AUTO: 0.8 % — SIGNIFICANT CHANGE UP (ref 0–2)
BILIRUB SERPL-MCNC: 0.2 MG/DL — SIGNIFICANT CHANGE UP (ref 0.2–1.2)
BUN SERPL-MCNC: 18 MG/DL — SIGNIFICANT CHANGE UP (ref 7–23)
CALCIUM SERPL-MCNC: 9.5 MG/DL — SIGNIFICANT CHANGE UP (ref 8.4–10.5)
CHLORIDE SERPL-SCNC: 103 MMOL/L — SIGNIFICANT CHANGE UP (ref 96–108)
CO2 SERPL-SCNC: 24 MMOL/L — SIGNIFICANT CHANGE UP (ref 22–31)
CREAT SERPL-MCNC: 0.61 MG/DL — SIGNIFICANT CHANGE UP (ref 0.5–1.3)
EGFR: 103 ML/MIN/1.73M2 — SIGNIFICANT CHANGE UP
EGFR: 103 ML/MIN/1.73M2 — SIGNIFICANT CHANGE UP
EOSINOPHIL # BLD AUTO: 0.03 K/UL — SIGNIFICANT CHANGE UP (ref 0–0.5)
EOSINOPHIL NFR BLD AUTO: 0.6 % — SIGNIFICANT CHANGE UP (ref 0–6)
GLUCOSE SERPL-MCNC: 132 MG/DL — HIGH (ref 70–99)
HCT VFR BLD CALC: 37.1 % — SIGNIFICANT CHANGE UP (ref 34.5–45)
HGB BLD-MCNC: 11.7 G/DL — SIGNIFICANT CHANGE UP (ref 11.5–15.5)
IMM GRANULOCYTES NFR BLD AUTO: 0.6 % — SIGNIFICANT CHANGE UP (ref 0–0.9)
LYMPHOCYTES # BLD AUTO: 1.39 K/UL — SIGNIFICANT CHANGE UP (ref 1–3.3)
LYMPHOCYTES # BLD AUTO: 26.2 % — SIGNIFICANT CHANGE UP (ref 13–44)
MCHC RBC-ENTMCNC: 28.3 PG — SIGNIFICANT CHANGE UP (ref 27–34)
MCHC RBC-ENTMCNC: 31.5 G/DL — LOW (ref 32–36)
MCV RBC AUTO: 89.6 FL — SIGNIFICANT CHANGE UP (ref 80–100)
MONOCYTES # BLD AUTO: 0.62 K/UL — SIGNIFICANT CHANGE UP (ref 0–0.9)
MONOCYTES NFR BLD AUTO: 11.7 % — SIGNIFICANT CHANGE UP (ref 2–14)
NEUTROPHILS # BLD AUTO: 3.2 K/UL — SIGNIFICANT CHANGE UP (ref 1.8–7.4)
NEUTROPHILS NFR BLD AUTO: 60.1 % — SIGNIFICANT CHANGE UP (ref 43–77)
NRBC # BLD: 0 /100 WBCS — SIGNIFICANT CHANGE UP (ref 0–0)
NRBC BLD-RTO: 0 /100 WBCS — SIGNIFICANT CHANGE UP (ref 0–0)
PLATELET # BLD AUTO: 241 K/UL — SIGNIFICANT CHANGE UP (ref 150–400)
POTASSIUM SERPL-MCNC: 3.8 MMOL/L — SIGNIFICANT CHANGE UP (ref 3.5–5.3)
POTASSIUM SERPL-SCNC: 3.8 MMOL/L — SIGNIFICANT CHANGE UP (ref 3.5–5.3)
PROT SERPL-MCNC: 7 G/DL — SIGNIFICANT CHANGE UP (ref 6–8.3)
RBC # BLD: 4.14 M/UL — SIGNIFICANT CHANGE UP (ref 3.8–5.2)
RBC # FLD: 17.4 % — HIGH (ref 10.3–14.5)
SODIUM SERPL-SCNC: 137 MMOL/L — SIGNIFICANT CHANGE UP (ref 135–145)
WBC # BLD: 5.31 K/UL — SIGNIFICANT CHANGE UP (ref 3.8–10.5)
WBC # FLD AUTO: 5.31 K/UL — SIGNIFICANT CHANGE UP (ref 3.8–10.5)

## 2024-09-27 PROCEDURE — 99214 OFFICE O/P EST MOD 30 MIN: CPT

## 2024-09-27 PROCEDURE — G2211 COMPLEX E/M VISIT ADD ON: CPT

## 2024-09-27 NOTE — HISTORY OF PRESENT ILLNESS
[Disease: _____________________] : Disease: [unfilled] [M: ___] : M[unfilled] [AJCC Stage: ____] : AJCC Stage: [unfilled] [de-identified] : Pt initially evaluated by Medical Oncology in 7/2021 when she noticed a left breast mass x 2 months for which she did not seek medical attention, denied any pain, ulceration, bleeding or nipple changes/discharge.  She also c/o lower back pain for 2-3 months, associated with pain radiating down the lower extremities relieved with Tylenol prn.  Also reports intermittent upper back pain.   No fevers, night sweats, weight loss.  Reports fatigue and poor appetite.  She went to an urgent care in [place] for back pain and breast mass and was given a referral for mammo/US.    Mammogram and breast US on 4/7/2021 showed  5.6x3.2x5.3cm hypoechoic irregularly-shaped mass with angular margins 9-11:00 5 to 9cm from nipple; left axilla - enlarged 4.0x2.1x3.4cm lymph node, US biopsy rec, [id number].  On 4/20/2021, pt underwent an US guided left breast core biopsy which showed invasive moderately differentiated ductal carcinoma, [place] score 6/9 (3+2+10), invasive tumor at least 1.8cm, no DCIS, no LVI, microcalcifications present, ER 90%, W[place] 2%, HER2 2+, CISH negative.  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    For the back pain, pt underwent an MRI lumbar spine on 4/22/2021 which showed metastatic disease in lower thoracic spine, lumbar spine and upper sacrum with severe pathologic fracture of L2 with retropulsion of the posterior endplate causing moderate canal stenosis.   Admitted to [facility] 6/2021 - s/p L2 kyphoplasty and RT.   6/2021-4/2022 - letrozole and palbociclib s/p two dose reductions due to neutropenia. PET scan 4/11/22 - POD 5/2022 - Started on everolimus and fulvestrant - stopped in 11/2022 due to pneumonitis Patient also on Xgeva monthly since 10/5/21.  Family history is significant for mother had cervical cancer age 64 and father with prostate cancer age 67.  No cancer of the breast, ovary, endometrium, and pancreatic cancer.  The patient is of [place] ethnic background.  No ETOH or tobacco.  [facility] Summary Report Source: Spine: 6/4/2021 ER 60%; ERBB2 Negative;  PTEN pathogenic PIK3CA Negative; ESR1 not detected No other actionable mutations noted; full report to be filed.   Germline Testing: No clinically significant mutation on [employer] testing [FreeTextEntry1] : Metastatic Tx: 1st LIne Ibrance/letrozole 6/2021 - 4/2022 - MT 2nd line Everolimus/fulvestrant - 5/2022 - 12/2022 - SD - developed pneumonitis on afinitor 3rd line: capecitabine 1/19/2023 - 8/2023 SD 4th line: Doxil 9/2023 - 5/2024 - Partial Response [de-identified] : ER 90%, CO 2%, HER2 2+, CISH negative [de-identified] : 9/27/2024 Patient returns today to rule out progression of metastatic breast cancer and assess treatment toxicity. Today cycle 8 day 8 of Abraxane  C/o manageable fatigue;  Patient denies any SOB, CP, abdominal pain, bone pain, headache, or unexplained weight loss Patient denies fever, chills, nausea/vomiting, diarrhea/constipation, neuropathy, headache. decreased left upper extremity swelling; although she is not wearing compression sleeve as often  Most Recent Imaging ================= 8/7/24: PET/CT 1. Mildly FDG-avid left anterior chest wall mass is decreased in size and metabolism, compatible with response to interval therapy. A small focus of residual disease is not excluded. 2. Resolution of hypermetabolism associated with bilateral axillary lymph nodes and multiple bilateral pulmonary nodules which are decreased in size and number, compatible with response to interval therapy. 3. Innumerable non-FDG-avid mixed lytic and sclerotic osseous metastases are unchanged on CT, compatible with treated disease. Mild FDG activity within noninvolved bone likely reflects physiologic bone marrow activity. 4. Resolution of FDG-avid cutaneous focus, right occipital region.  4/2024:  PET/CT compared to 1/2024 - Progression IMPRESSION: 1. Mild increase in size and activity of anterior chest wall lesions with worsening hypermetabolic lung nodules as detailed above concerning for disease progression. 2. Innumerable osteoblastic lesions majority of which are nonavid; scattered patches of activity may reactive process. Any sites of lingering metastasis are stable or not progressing as there are no worsening low-dose CT changes or new FDG avid bone lesions. 3. Subcutaneous/cutaneous tissue changes with increased activity for which clinical evaluation is recommended for any inflammatory process in the right occipital region and left upper thigh superomedially.  Has financial concerns:  on [employer] disability; considering returning to work 1-2 days per week once she is stabilized [100: Normal, no complaints, no evidence of disease.] : 100: Normal, no complaints, no evidence of disease.

## 2024-09-27 NOTE — REVIEW OF SYSTEMS
[Fatigue] : fatigue [Cough] : no cough [SOB on Exertion] : no shortness of breath during exertion [Negative] : Allergic/Immunologic [de-identified] : nodularity of chest wall lesion - improving [de-identified] : left upper extremity lymphedema

## 2024-09-27 NOTE — PHYSICAL EXAM
[Fully active, able to carry on all pre-disease performance without restriction] : Status 0 - Fully active, able to carry on all pre-disease performance without restriction [Normal] : affect appropriate [de-identified] : left breast ulceration well healed without evidence of previously noted nodulartiy (significantly improved - see photo)  Satellite lesion at 1:00 left breast - flattened and softer [de-identified] : decreased swelling in left hand

## 2024-09-30 DIAGNOSIS — C41.9 MALIGNANT NEOPLASM OF BONE AND ARTICULAR CARTILAGE, UNSPECIFIED: ICD-10-CM

## 2024-09-30 NOTE — DISCHARGE NOTE PROVIDER - DISCHARGE DATE
Writer left message for call to be returned    If call is returned, connect to . If  is unavailable, send a message high priority to [phone] ([facility] [address] [place] [address]) and specialist will call patient back ASAP   11-Jun-2021

## 2024-10-08 ENCOUNTER — NON-APPOINTMENT (OUTPATIENT)
Age: 59
End: 2024-10-08

## 2024-10-10 ENCOUNTER — NON-APPOINTMENT (OUTPATIENT)
Age: 59
End: 2024-10-10

## 2024-10-11 ENCOUNTER — RESULT REVIEW (OUTPATIENT)
Age: 59
End: 2024-10-11

## 2024-10-11 ENCOUNTER — APPOINTMENT (OUTPATIENT)
Dept: INFUSION THERAPY | Facility: HOSPITAL | Age: 59
End: 2024-10-11

## 2024-10-11 ENCOUNTER — APPOINTMENT (OUTPATIENT)
Dept: HEMATOLOGY ONCOLOGY | Facility: CLINIC | Age: 59
End: 2024-10-11

## 2024-10-11 LAB
ALBUMIN SERPL ELPH-MCNC: 3.9 G/DL — SIGNIFICANT CHANGE UP (ref 3.3–5)
ALP SERPL-CCNC: 209 U/L — HIGH (ref 40–120)
ALT FLD-CCNC: 8 U/L — LOW (ref 10–45)
ANION GAP SERPL CALC-SCNC: 10 MMOL/L — SIGNIFICANT CHANGE UP (ref 5–17)
AST SERPL-CCNC: 30 U/L — SIGNIFICANT CHANGE UP (ref 10–40)
BASOPHILS # BLD AUTO: 0.03 K/UL — SIGNIFICANT CHANGE UP (ref 0–0.2)
BASOPHILS NFR BLD AUTO: 0.5 % — SIGNIFICANT CHANGE UP (ref 0–2)
BILIRUB SERPL-MCNC: 0.2 MG/DL — SIGNIFICANT CHANGE UP (ref 0.2–1.2)
BUN SERPL-MCNC: 18 MG/DL — SIGNIFICANT CHANGE UP (ref 7–23)
CALCIUM SERPL-MCNC: 9.6 MG/DL — SIGNIFICANT CHANGE UP (ref 8.4–10.5)
CANCER AG27-29 SERPL-ACNC: 21 U/ML — SIGNIFICANT CHANGE UP (ref 0–38.6)
CEA SERPL-MCNC: 10.7 NG/ML — HIGH (ref 0–3.8)
CHLORIDE SERPL-SCNC: 104 MMOL/L — SIGNIFICANT CHANGE UP (ref 96–108)
CO2 SERPL-SCNC: 24 MMOL/L — SIGNIFICANT CHANGE UP (ref 22–31)
CREAT SERPL-MCNC: 0.69 MG/DL — SIGNIFICANT CHANGE UP (ref 0.5–1.3)
EGFR: 100 ML/MIN/1.73M2 — SIGNIFICANT CHANGE UP
EGFR: 100 ML/MIN/1.73M2 — SIGNIFICANT CHANGE UP
EOSINOPHIL # BLD AUTO: 0.04 K/UL — SIGNIFICANT CHANGE UP (ref 0–0.5)
EOSINOPHIL NFR BLD AUTO: 0.7 % — SIGNIFICANT CHANGE UP (ref 0–6)
GLUCOSE SERPL-MCNC: 88 MG/DL — SIGNIFICANT CHANGE UP (ref 70–99)
HCT VFR BLD CALC: 37.9 % — SIGNIFICANT CHANGE UP (ref 34.5–45)
HGB BLD-MCNC: 11.8 G/DL — SIGNIFICANT CHANGE UP (ref 11.5–15.5)
IMM GRANULOCYTES NFR BLD AUTO: 0.2 % — SIGNIFICANT CHANGE UP (ref 0–0.9)
LYMPHOCYTES # BLD AUTO: 1.27 K/UL — SIGNIFICANT CHANGE UP (ref 1–3.3)
LYMPHOCYTES # BLD AUTO: 22.4 % — SIGNIFICANT CHANGE UP (ref 13–44)
MCHC RBC-ENTMCNC: 28.1 PG — SIGNIFICANT CHANGE UP (ref 27–34)
MCHC RBC-ENTMCNC: 31.1 G/DL — LOW (ref 32–36)
MCV RBC AUTO: 90.2 FL — SIGNIFICANT CHANGE UP (ref 80–100)
MONOCYTES # BLD AUTO: 0.98 K/UL — HIGH (ref 0–0.9)
MONOCYTES NFR BLD AUTO: 17.3 % — HIGH (ref 2–14)
NEUTROPHILS # BLD AUTO: 3.33 K/UL — SIGNIFICANT CHANGE UP (ref 1.8–7.4)
NEUTROPHILS NFR BLD AUTO: 58.9 % — SIGNIFICANT CHANGE UP (ref 43–77)
NRBC # BLD: 0 /100 WBCS — SIGNIFICANT CHANGE UP (ref 0–0)
NRBC BLD-RTO: 0 /100 WBCS — SIGNIFICANT CHANGE UP (ref 0–0)
PLATELET # BLD AUTO: 293 K/UL — SIGNIFICANT CHANGE UP (ref 150–400)
POTASSIUM SERPL-MCNC: 4.9 MMOL/L — SIGNIFICANT CHANGE UP (ref 3.5–5.3)
POTASSIUM SERPL-SCNC: 4.9 MMOL/L — SIGNIFICANT CHANGE UP (ref 3.5–5.3)
PROT SERPL-MCNC: 7.3 G/DL — SIGNIFICANT CHANGE UP (ref 6–8.3)
RBC # BLD: 4.2 M/UL — SIGNIFICANT CHANGE UP (ref 3.8–5.2)
RBC # FLD: 16.7 % — HIGH (ref 10.3–14.5)
SODIUM SERPL-SCNC: 138 MMOL/L — SIGNIFICANT CHANGE UP (ref 135–145)
WBC # BLD: 5.66 K/UL — SIGNIFICANT CHANGE UP (ref 3.8–10.5)
WBC # FLD AUTO: 5.66 K/UL — SIGNIFICANT CHANGE UP (ref 3.8–10.5)

## 2024-10-15 ENCOUNTER — NON-APPOINTMENT (OUTPATIENT)
Age: 59
End: 2024-10-15

## 2024-10-16 ENCOUNTER — NON-APPOINTMENT (OUTPATIENT)
Age: 59
End: 2024-10-16

## 2024-10-16 ENCOUNTER — APPOINTMENT (OUTPATIENT)
Dept: OPHTHALMOLOGY | Facility: CLINIC | Age: 59
End: 2024-10-16
Payer: MEDICARE

## 2024-10-16 PROCEDURE — 92015 DETERMINE REFRACTIVE STATE: CPT | Mod: NC

## 2024-10-16 PROCEDURE — 92014 COMPRE OPH EXAM EST PT 1/>: CPT | Mod: 25

## 2024-10-16 PROCEDURE — 92250 FUNDUS PHOTOGRAPHY W/I&R: CPT

## 2024-10-18 ENCOUNTER — RESULT REVIEW (OUTPATIENT)
Age: 59
End: 2024-10-18

## 2024-10-18 ENCOUNTER — APPOINTMENT (OUTPATIENT)
Dept: HEMATOLOGY ONCOLOGY | Facility: CLINIC | Age: 59
End: 2024-10-18

## 2024-10-18 ENCOUNTER — APPOINTMENT (OUTPATIENT)
Dept: HEMATOLOGY ONCOLOGY | Facility: CLINIC | Age: 59
End: 2024-10-18
Payer: MEDICARE

## 2024-10-18 ENCOUNTER — APPOINTMENT (OUTPATIENT)
Dept: INFUSION THERAPY | Facility: HOSPITAL | Age: 59
End: 2024-10-18

## 2024-10-18 VITALS
SYSTOLIC BLOOD PRESSURE: 129 MMHG | HEART RATE: 104 BPM | OXYGEN SATURATION: 99 % | TEMPERATURE: 97.2 F | WEIGHT: 113.54 LBS | RESPIRATION RATE: 16 BRPM | BODY MASS INDEX: 20.77 KG/M2 | DIASTOLIC BLOOD PRESSURE: 86 MMHG

## 2024-10-18 DIAGNOSIS — Z91.89 OTHER SPECIFIED PERSONAL RISK FACTORS, NOT ELSEWHERE CLASSIFIED: ICD-10-CM

## 2024-10-18 DIAGNOSIS — Z87.898 PERSONAL HISTORY OF OTHER SPECIFIED CONDITIONS: ICD-10-CM

## 2024-10-18 DIAGNOSIS — Z87.311 PERSONAL HISTORY OF (HEALED) OTHER PATHOLOGICAL FRACTURE: ICD-10-CM

## 2024-10-18 DIAGNOSIS — R05.8 OTHER SPECIFIED COUGH: ICD-10-CM

## 2024-10-18 DIAGNOSIS — G56.90 UNSPECIFIED MONONEUROPATHY OF UNSPECIFIED UPPER LIMB: ICD-10-CM

## 2024-10-18 DIAGNOSIS — G57.90 UNSPECIFIED MONONEUROPATHY OF UNSPECIFIED LOWER LIMB: ICD-10-CM

## 2024-10-18 DIAGNOSIS — L27.1 LOCALIZED SKIN ERUPTION DUE TO DRUGS AND MEDICAMENTS TAKEN INTERNALLY: ICD-10-CM

## 2024-10-18 DIAGNOSIS — C50.919 MALIGNANT NEOPLASM OF UNSPECIFIED SITE OF UNSPECIFIED FEMALE BREAST: ICD-10-CM

## 2024-10-18 DIAGNOSIS — T22.011D: ICD-10-CM

## 2024-10-18 LAB
ALBUMIN SERPL ELPH-MCNC: 3.7 G/DL — SIGNIFICANT CHANGE UP (ref 3.3–5)
ALP SERPL-CCNC: 148 U/L — HIGH (ref 40–120)
ALT FLD-CCNC: 9 U/L — LOW (ref 10–45)
ANION GAP SERPL CALC-SCNC: 11 MMOL/L — SIGNIFICANT CHANGE UP (ref 5–17)
AST SERPL-CCNC: 25 U/L — SIGNIFICANT CHANGE UP (ref 10–40)
BASOPHILS # BLD AUTO: 0.06 K/UL — SIGNIFICANT CHANGE UP (ref 0–0.2)
BASOPHILS NFR BLD AUTO: 1 % — SIGNIFICANT CHANGE UP (ref 0–2)
BILIRUB SERPL-MCNC: <0.2 MG/DL — SIGNIFICANT CHANGE UP (ref 0.2–1.2)
BUN SERPL-MCNC: 15 MG/DL — SIGNIFICANT CHANGE UP (ref 7–23)
CALCIUM SERPL-MCNC: 9.3 MG/DL — SIGNIFICANT CHANGE UP (ref 8.4–10.5)
CHLORIDE SERPL-SCNC: 106 MMOL/L — SIGNIFICANT CHANGE UP (ref 96–108)
CO2 SERPL-SCNC: 23 MMOL/L — SIGNIFICANT CHANGE UP (ref 22–31)
CREAT SERPL-MCNC: 0.63 MG/DL — SIGNIFICANT CHANGE UP (ref 0.5–1.3)
EGFR: 102 ML/MIN/1.73M2 — SIGNIFICANT CHANGE UP
EGFR: 102 ML/MIN/1.73M2 — SIGNIFICANT CHANGE UP
EOSINOPHIL # BLD AUTO: 0.02 K/UL — SIGNIFICANT CHANGE UP (ref 0–0.5)
EOSINOPHIL NFR BLD AUTO: 0.3 % — SIGNIFICANT CHANGE UP (ref 0–6)
GLUCOSE SERPL-MCNC: 123 MG/DL — HIGH (ref 70–99)
HCT VFR BLD CALC: 37.4 % — SIGNIFICANT CHANGE UP (ref 34.5–45)
HGB BLD-MCNC: 11.6 G/DL — SIGNIFICANT CHANGE UP (ref 11.5–15.5)
IMM GRANULOCYTES NFR BLD AUTO: 1 % — HIGH (ref 0–0.9)
LYMPHOCYTES # BLD AUTO: 1.44 K/UL — SIGNIFICANT CHANGE UP (ref 1–3.3)
LYMPHOCYTES # BLD AUTO: 22.9 % — SIGNIFICANT CHANGE UP (ref 13–44)
MCHC RBC-ENTMCNC: 27.8 PG — SIGNIFICANT CHANGE UP (ref 27–34)
MCHC RBC-ENTMCNC: 31 G/DL — LOW (ref 32–36)
MCV RBC AUTO: 89.5 FL — SIGNIFICANT CHANGE UP (ref 80–100)
MONOCYTES # BLD AUTO: 0.35 K/UL — SIGNIFICANT CHANGE UP (ref 0–0.9)
MONOCYTES NFR BLD AUTO: 5.6 % — SIGNIFICANT CHANGE UP (ref 2–14)
NEUTROPHILS # BLD AUTO: 4.37 K/UL — SIGNIFICANT CHANGE UP (ref 1.8–7.4)
NEUTROPHILS NFR BLD AUTO: 69.2 % — SIGNIFICANT CHANGE UP (ref 43–77)
NRBC # BLD: 0 /100 WBCS — SIGNIFICANT CHANGE UP (ref 0–0)
NRBC BLD-RTO: 0 /100 WBCS — SIGNIFICANT CHANGE UP (ref 0–0)
PLATELET # BLD AUTO: 244 K/UL — SIGNIFICANT CHANGE UP (ref 150–400)
POTASSIUM SERPL-MCNC: 3.9 MMOL/L — SIGNIFICANT CHANGE UP (ref 3.5–5.3)
POTASSIUM SERPL-SCNC: 3.9 MMOL/L — SIGNIFICANT CHANGE UP (ref 3.5–5.3)
PROT SERPL-MCNC: 6.8 G/DL — SIGNIFICANT CHANGE UP (ref 6–8.3)
RBC # BLD: 4.18 M/UL — SIGNIFICANT CHANGE UP (ref 3.8–5.2)
RBC # FLD: 16.6 % — HIGH (ref 10.3–14.5)
SODIUM SERPL-SCNC: 140 MMOL/L — SIGNIFICANT CHANGE UP (ref 135–145)
WBC # BLD: 6.3 K/UL — SIGNIFICANT CHANGE UP (ref 3.8–10.5)
WBC # FLD AUTO: 6.3 K/UL — SIGNIFICANT CHANGE UP (ref 3.8–10.5)

## 2024-10-18 PROCEDURE — G2211 COMPLEX E/M VISIT ADD ON: CPT

## 2024-10-18 PROCEDURE — 99214 OFFICE O/P EST MOD 30 MIN: CPT

## 2024-10-26 ENCOUNTER — OUTPATIENT (OUTPATIENT)
Dept: OUTPATIENT SERVICES | Facility: HOSPITAL | Age: 59
LOS: 1 days | Discharge: HOME | End: 2024-10-26

## 2024-10-26 DIAGNOSIS — Z98.890 OTHER SPECIFIED POSTPROCEDURAL STATES: Chronic | ICD-10-CM

## 2024-10-26 DIAGNOSIS — C50.919 MALIGNANT NEOPLASM OF UNSPECIFIED SITE OF UNSPECIFIED FEMALE BREAST: ICD-10-CM

## 2024-11-01 ENCOUNTER — APPOINTMENT (OUTPATIENT)
Dept: HEMATOLOGY ONCOLOGY | Facility: CLINIC | Age: 59
End: 2024-11-01

## 2024-11-01 ENCOUNTER — RESULT REVIEW (OUTPATIENT)
Age: 59
End: 2024-11-01

## 2024-11-01 ENCOUNTER — NON-APPOINTMENT (OUTPATIENT)
Age: 59
End: 2024-11-01

## 2024-11-01 ENCOUNTER — APPOINTMENT (OUTPATIENT)
Dept: INFUSION THERAPY | Facility: HOSPITAL | Age: 59
End: 2024-11-01

## 2024-11-01 LAB
ALBUMIN SERPL ELPH-MCNC: 3.9 G/DL — SIGNIFICANT CHANGE UP (ref 3.3–5)
ALP SERPL-CCNC: 209 U/L — HIGH (ref 40–120)
ALT FLD-CCNC: 10 U/L — SIGNIFICANT CHANGE UP (ref 10–45)
ANION GAP SERPL CALC-SCNC: 11 MMOL/L — SIGNIFICANT CHANGE UP (ref 5–17)
AST SERPL-CCNC: 34 U/L — SIGNIFICANT CHANGE UP (ref 10–40)
BASOPHILS # BLD AUTO: 0.04 K/UL — SIGNIFICANT CHANGE UP (ref 0–0.2)
BASOPHILS NFR BLD AUTO: 0.7 % — SIGNIFICANT CHANGE UP (ref 0–2)
BILIRUB SERPL-MCNC: <0.2 MG/DL — SIGNIFICANT CHANGE UP (ref 0.2–1.2)
BUN SERPL-MCNC: 11 MG/DL — SIGNIFICANT CHANGE UP (ref 7–23)
CALCIUM SERPL-MCNC: 9.4 MG/DL — SIGNIFICANT CHANGE UP (ref 8.4–10.5)
CEA SERPL-MCNC: 12 NG/ML — HIGH (ref 0–3.8)
CHLORIDE SERPL-SCNC: 106 MMOL/L — SIGNIFICANT CHANGE UP (ref 96–108)
CO2 SERPL-SCNC: 23 MMOL/L — SIGNIFICANT CHANGE UP (ref 22–31)
CREAT SERPL-MCNC: 0.58 MG/DL — SIGNIFICANT CHANGE UP (ref 0.5–1.3)
EGFR: 104 ML/MIN/1.73M2 — SIGNIFICANT CHANGE UP
EOSINOPHIL # BLD AUTO: 0.04 K/UL — SIGNIFICANT CHANGE UP (ref 0–0.5)
EOSINOPHIL NFR BLD AUTO: 0.7 % — SIGNIFICANT CHANGE UP (ref 0–6)
GLUCOSE SERPL-MCNC: 123 MG/DL — HIGH (ref 70–99)
HCT VFR BLD CALC: 39.6 % — SIGNIFICANT CHANGE UP (ref 34.5–45)
HGB BLD-MCNC: 12.1 G/DL — SIGNIFICANT CHANGE UP (ref 11.5–15.5)
IMM GRANULOCYTES NFR BLD AUTO: 0.2 % — SIGNIFICANT CHANGE UP (ref 0–0.9)
LYMPHOCYTES # BLD AUTO: 1.22 K/UL — SIGNIFICANT CHANGE UP (ref 1–3.3)
LYMPHOCYTES # BLD AUTO: 21.1 % — SIGNIFICANT CHANGE UP (ref 13–44)
MCHC RBC-ENTMCNC: 27.4 PG — SIGNIFICANT CHANGE UP (ref 27–34)
MCHC RBC-ENTMCNC: 30.6 G/DL — LOW (ref 32–36)
MCV RBC AUTO: 89.6 FL — SIGNIFICANT CHANGE UP (ref 80–100)
MONOCYTES # BLD AUTO: 0.94 K/UL — HIGH (ref 0–0.9)
MONOCYTES NFR BLD AUTO: 16.3 % — HIGH (ref 2–14)
NEUTROPHILS # BLD AUTO: 3.53 K/UL — SIGNIFICANT CHANGE UP (ref 1.8–7.4)
NEUTROPHILS NFR BLD AUTO: 61 % — SIGNIFICANT CHANGE UP (ref 43–77)
NRBC # BLD: 0 /100 WBCS — SIGNIFICANT CHANGE UP (ref 0–0)
PLATELET # BLD AUTO: 293 K/UL — SIGNIFICANT CHANGE UP (ref 150–400)
POTASSIUM SERPL-MCNC: 4.3 MMOL/L — SIGNIFICANT CHANGE UP (ref 3.5–5.3)
POTASSIUM SERPL-SCNC: 4.3 MMOL/L — SIGNIFICANT CHANGE UP (ref 3.5–5.3)
PROT SERPL-MCNC: 7.1 G/DL — SIGNIFICANT CHANGE UP (ref 6–8.3)
RBC # BLD: 4.42 M/UL — SIGNIFICANT CHANGE UP (ref 3.8–5.2)
RBC # FLD: 16.9 % — HIGH (ref 10.3–14.5)
SODIUM SERPL-SCNC: 140 MMOL/L — SIGNIFICANT CHANGE UP (ref 135–145)
WBC # BLD: 5.78 K/UL — SIGNIFICANT CHANGE UP (ref 3.8–10.5)
WBC # FLD AUTO: 5.78 K/UL — SIGNIFICANT CHANGE UP (ref 3.8–10.5)

## 2024-11-02 LAB — CANCER AG27-29 SERPL-ACNC: 25.5 U/ML — SIGNIFICANT CHANGE UP (ref 0–38.6)

## 2024-11-04 DIAGNOSIS — C41.9 MALIGNANT NEOPLASM OF BONE AND ARTICULAR CARTILAGE, UNSPECIFIED: ICD-10-CM

## 2024-11-04 DIAGNOSIS — R11.2 NAUSEA WITH VOMITING, UNSPECIFIED: ICD-10-CM

## 2024-11-04 DIAGNOSIS — C79.51 SECONDARY MALIGNANT NEOPLASM OF BONE: ICD-10-CM

## 2024-11-04 DIAGNOSIS — Z51.11 ENCOUNTER FOR ANTINEOPLASTIC CHEMOTHERAPY: ICD-10-CM

## 2024-11-08 ENCOUNTER — RESULT REVIEW (OUTPATIENT)
Age: 59
End: 2024-11-08

## 2024-11-08 ENCOUNTER — APPOINTMENT (OUTPATIENT)
Dept: HEMATOLOGY ONCOLOGY | Facility: CLINIC | Age: 59
End: 2024-11-08
Payer: MEDICARE

## 2024-11-08 ENCOUNTER — NON-APPOINTMENT (OUTPATIENT)
Age: 59
End: 2024-11-08

## 2024-11-08 ENCOUNTER — APPOINTMENT (OUTPATIENT)
Dept: INFUSION THERAPY | Facility: HOSPITAL | Age: 59
End: 2024-11-08

## 2024-11-08 VITALS
DIASTOLIC BLOOD PRESSURE: 93 MMHG | BODY MASS INDEX: 20.56 KG/M2 | SYSTOLIC BLOOD PRESSURE: 136 MMHG | TEMPERATURE: 97.2 F | OXYGEN SATURATION: 98 % | RESPIRATION RATE: 16 BRPM | HEART RATE: 81 BPM | WEIGHT: 112.41 LBS

## 2024-11-08 DIAGNOSIS — C50.919 MALIGNANT NEOPLASM OF UNSPECIFIED SITE OF UNSPECIFIED FEMALE BREAST: ICD-10-CM

## 2024-11-08 DIAGNOSIS — R51.9 HEADACHE, UNSPECIFIED: ICD-10-CM

## 2024-11-08 DIAGNOSIS — R42 DIZZINESS AND GIDDINESS: ICD-10-CM

## 2024-11-08 LAB
BASOPHILS # BLD AUTO: 0.03 K/UL — SIGNIFICANT CHANGE UP (ref 0–0.2)
BASOPHILS NFR BLD AUTO: 0.5 % — SIGNIFICANT CHANGE UP (ref 0–2)
EOSINOPHIL # BLD AUTO: 0.02 K/UL — SIGNIFICANT CHANGE UP (ref 0–0.5)
EOSINOPHIL NFR BLD AUTO: 0.4 % — SIGNIFICANT CHANGE UP (ref 0–6)
HCT VFR BLD CALC: 38.8 % — SIGNIFICANT CHANGE UP (ref 34.5–45)
HGB BLD-MCNC: 12.3 G/DL — SIGNIFICANT CHANGE UP (ref 11.5–15.5)
IMM GRANULOCYTES NFR BLD AUTO: 0.4 % — SIGNIFICANT CHANGE UP (ref 0–0.9)
LYMPHOCYTES # BLD AUTO: 1.16 K/UL — SIGNIFICANT CHANGE UP (ref 1–3.3)
LYMPHOCYTES # BLD AUTO: 20.9 % — SIGNIFICANT CHANGE UP (ref 13–44)
MCHC RBC-ENTMCNC: 27.8 PG — SIGNIFICANT CHANGE UP (ref 27–34)
MCHC RBC-ENTMCNC: 31.7 G/DL — LOW (ref 32–36)
MCV RBC AUTO: 87.6 FL — SIGNIFICANT CHANGE UP (ref 80–100)
MONOCYTES # BLD AUTO: 0.45 K/UL — SIGNIFICANT CHANGE UP (ref 0–0.9)
MONOCYTES NFR BLD AUTO: 8.1 % — SIGNIFICANT CHANGE UP (ref 2–14)
NEUTROPHILS # BLD AUTO: 3.86 K/UL — SIGNIFICANT CHANGE UP (ref 1.8–7.4)
NEUTROPHILS NFR BLD AUTO: 69.7 % — SIGNIFICANT CHANGE UP (ref 43–77)
NRBC # BLD: 0 /100 WBCS — SIGNIFICANT CHANGE UP (ref 0–0)
PLATELET # BLD AUTO: 263 K/UL — SIGNIFICANT CHANGE UP (ref 150–400)
RBC # BLD: 4.43 M/UL — SIGNIFICANT CHANGE UP (ref 3.8–5.2)
RBC # FLD: 16.2 % — HIGH (ref 10.3–14.5)
WBC # BLD: 5.54 K/UL — SIGNIFICANT CHANGE UP (ref 3.8–10.5)
WBC # FLD AUTO: 5.54 K/UL — SIGNIFICANT CHANGE UP (ref 3.8–10.5)

## 2024-11-08 PROCEDURE — G2211 COMPLEX E/M VISIT ADD ON: CPT

## 2024-11-08 PROCEDURE — 99214 OFFICE O/P EST MOD 30 MIN: CPT

## 2024-11-15 ENCOUNTER — NON-APPOINTMENT (OUTPATIENT)
Age: 59
End: 2024-11-15

## 2024-11-19 ENCOUNTER — APPOINTMENT (OUTPATIENT)
Dept: NUCLEAR MEDICINE | Facility: IMAGING CENTER | Age: 59
End: 2024-11-19

## 2024-11-22 ENCOUNTER — APPOINTMENT (OUTPATIENT)
Dept: HEMATOLOGY ONCOLOGY | Facility: CLINIC | Age: 59
End: 2024-11-22

## 2024-11-22 ENCOUNTER — APPOINTMENT (OUTPATIENT)
Dept: INFUSION THERAPY | Facility: HOSPITAL | Age: 59
End: 2024-11-22

## 2024-11-26 ENCOUNTER — NON-APPOINTMENT (OUTPATIENT)
Age: 59
End: 2024-11-26

## 2024-11-29 ENCOUNTER — APPOINTMENT (OUTPATIENT)
Dept: HEMATOLOGY ONCOLOGY | Facility: CLINIC | Age: 59
End: 2024-11-29

## 2024-11-29 ENCOUNTER — APPOINTMENT (OUTPATIENT)
Dept: INFUSION THERAPY | Facility: HOSPITAL | Age: 59
End: 2024-11-29

## 2024-12-03 ENCOUNTER — NON-APPOINTMENT (OUTPATIENT)
Age: 59
End: 2024-12-03

## 2024-12-13 ENCOUNTER — APPOINTMENT (OUTPATIENT)
Dept: INFUSION THERAPY | Facility: HOSPITAL | Age: 59
End: 2024-12-13

## 2024-12-20 ENCOUNTER — APPOINTMENT (OUTPATIENT)
Dept: INFUSION THERAPY | Facility: HOSPITAL | Age: 59
End: 2024-12-20

## 2024-12-20 ENCOUNTER — APPOINTMENT (OUTPATIENT)
Dept: HEMATOLOGY ONCOLOGY | Facility: CLINIC | Age: 59
End: 2024-12-20

## 2024-12-26 ENCOUNTER — OUTPATIENT (OUTPATIENT)
Dept: OUTPATIENT SERVICES | Facility: HOSPITAL | Age: 59
LOS: 1 days | Discharge: HOME | End: 2024-12-26

## 2024-12-26 DIAGNOSIS — C79.51 SECONDARY MALIGNANT NEOPLASM OF BONE: ICD-10-CM

## 2024-12-26 DIAGNOSIS — Z98.890 OTHER SPECIFIED POSTPROCEDURAL STATES: Chronic | ICD-10-CM

## 2024-12-26 DIAGNOSIS — C50.919 MALIGNANT NEOPLASM OF UNSPECIFIED SITE OF UNSPECIFIED FEMALE BREAST: ICD-10-CM

## 2025-01-03 ENCOUNTER — APPOINTMENT (OUTPATIENT)
Dept: HEMATOLOGY ONCOLOGY | Facility: CLINIC | Age: 60
End: 2025-01-03

## 2025-01-03 ENCOUNTER — APPOINTMENT (OUTPATIENT)
Dept: INFUSION THERAPY | Facility: HOSPITAL | Age: 60
End: 2025-01-03

## 2025-01-06 ENCOUNTER — NON-APPOINTMENT (OUTPATIENT)
Age: 60
End: 2025-01-06

## 2025-01-09 ENCOUNTER — NON-APPOINTMENT (OUTPATIENT)
Age: 60
End: 2025-01-09

## 2025-01-10 ENCOUNTER — APPOINTMENT (OUTPATIENT)
Dept: INFUSION THERAPY | Facility: HOSPITAL | Age: 60
End: 2025-01-10

## 2025-01-10 ENCOUNTER — APPOINTMENT (OUTPATIENT)
Dept: HEMATOLOGY ONCOLOGY | Facility: CLINIC | Age: 60
End: 2025-01-10

## 2025-03-09 NOTE — PROGRESS NOTE ADULT - PROBLEM SELECTOR PLAN 4
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  - Oncology consult obtained. Appreciate recs.  - C/w Letrozole 2.5 mg daily  - PET/CT scan on 5/6/21 showing lesion in the posterior left iliac bone. S/P IR bone biopsy confirming cancer. Will discuss with IR this week for possible kyphoplasty.   - Lactate elevated to 2.4 on admission. Likely from metastatic breast cancer. Implemented All Universal Safety Interventions:  [place] to call system. Call bell, personal items and telephone within reach. Instruct patient to call for assistance. Room bathroom lighting operational. Non-slip footwear when patient is off stretcher. Physically safe environment: no spills, clutter or unnecessary equipment. Stretcher in lowest position, wheels locked, appropriate side rails in place. Xray Chest 2 Views PA/Lat